# Patient Record
Sex: FEMALE | Race: WHITE | NOT HISPANIC OR LATINO | Employment: FULL TIME | URBAN - METROPOLITAN AREA
[De-identification: names, ages, dates, MRNs, and addresses within clinical notes are randomized per-mention and may not be internally consistent; named-entity substitution may affect disease eponyms.]

---

## 2017-01-27 ENCOUNTER — PATIENT MESSAGE (OUTPATIENT)
Dept: INTERNAL MEDICINE | Facility: CLINIC | Age: 72
End: 2017-01-27

## 2017-01-30 ENCOUNTER — TELEPHONE (OUTPATIENT)
Dept: INTERNAL MEDICINE | Facility: CLINIC | Age: 72
End: 2017-01-30

## 2017-01-30 RX ORDER — IBUPROFEN 800 MG/1
800 TABLET ORAL EVERY 6 HOURS PRN
Qty: 90 TABLET | Refills: 11 | Status: SHIPPED | OUTPATIENT
Start: 2017-01-30 | End: 2018-02-04 | Stop reason: SDUPTHER

## 2017-01-30 RX ORDER — FAMOTIDINE 20 MG/1
20 TABLET, FILM COATED ORAL 2 TIMES DAILY
Qty: 60 TABLET | Refills: 11 | Status: SHIPPED | OUTPATIENT
Start: 2017-01-30 | End: 2017-05-23 | Stop reason: SDUPTHER

## 2017-01-30 NOTE — TELEPHONE ENCOUNTER
Called pt's pharmacy to see which drugs are formulary for her insurance.  Says Rx has been broken up into the two drugs.//rlt

## 2017-02-28 ENCOUNTER — TELEPHONE (OUTPATIENT)
Dept: OPHTHALMOLOGY | Facility: CLINIC | Age: 72
End: 2017-02-28

## 2017-02-28 NOTE — TELEPHONE ENCOUNTER
----- Message from Chandrika Dacosta sent at 2/28/2017  3:39 PM CST -----  Contact: pt  Pt needs a request sent to [facility] to get medical records, pt can be reached at [phone]///thxMW

## 2017-02-28 NOTE — TELEPHONE ENCOUNTER
I called the pt and let her know she has to sign a release. She said [facility] said she did not and for us to request them. She wants us to fax her a form for her to complete She does not know her fax number- She will call our office tomorrow with a fax number.

## 2017-03-09 ENCOUNTER — TELEPHONE (OUTPATIENT)
Dept: OPHTHALMOLOGY | Facility: CLINIC | Age: 72
End: 2017-03-09

## 2017-03-09 NOTE — TELEPHONE ENCOUNTER
----- Message from Gosia Sun sent at 3/9/2017 10:26 AM CST -----  Contact: pt  Call pt at [phone]//calling to see if you all received my medical records from Dr Pacheco office last week//coty bella

## 2017-03-10 NOTE — TELEPHONE ENCOUNTER
----- Message from Gisel Dacosta sent at 3/9/2017  4:23 PM CST -----  Contact: pt  Would like to speak with nurse to make sure her medical records have been faxed over to office she is schedule for next week pls confirm at [phone]

## 2017-03-14 ENCOUNTER — OFFICE VISIT (OUTPATIENT)
Dept: OPHTHALMOLOGY | Facility: CLINIC | Age: 72
End: 2017-03-14
Payer: MEDICARE

## 2017-03-14 DIAGNOSIS — H25.13 NUCLEAR SCLEROTIC CATARACT OF BOTH EYES: Primary | ICD-10-CM

## 2017-03-14 DIAGNOSIS — H52.7 REFRACTION DISORDER: ICD-10-CM

## 2017-03-14 PROCEDURE — 92015 DETERMINE REFRACTIVE STATE: CPT | Mod: S$GLB,,, | Performed by: OPHTHALMOLOGY

## 2017-03-14 PROCEDURE — 92004 COMPRE OPH EXAM NEW PT 1/>: CPT | Mod: S$GLB,,, | Performed by: OPHTHALMOLOGY

## 2017-03-14 PROCEDURE — 99999 PR PBB SHADOW E&M-EST. PATIENT-LVL I: CPT | Mod: PBBFAC,,, | Performed by: OPHTHALMOLOGY

## 2017-03-14 NOTE — PROGRESS NOTES
HPI     NP to dept.  Told she needs cataract surgery by [facility].  Wants   to keep all care at [facility].  States vision is good, except in glare when   driving at night.       Last edited by Sia Ennis on 3/14/2017  4:17 PM.         Assessment /Plan     For exam results, see Encounter Report.      ICD-10-CM ICD-9-CM    1. Nuclear sclerotic cataract of both eyes H25.13 366.16 Will follow not Visually Impairing   2. Refraction disorder H52.7 367.9 Final Spec RX       Return to clinic PRN

## 2017-05-10 ENCOUNTER — PATIENT OUTREACH (OUTPATIENT)
Dept: ADMINISTRATIVE | Facility: HOSPITAL | Age: 72
End: 2017-05-10

## 2017-05-15 ENCOUNTER — LAB VISIT (OUTPATIENT)
Dept: LAB | Facility: HOSPITAL | Age: 72
End: 2017-05-15
Attending: PEDIATRICS
Payer: MEDICARE

## 2017-05-15 DIAGNOSIS — E78.5 HYPERLIPIDEMIA, UNSPECIFIED HYPERLIPIDEMIA TYPE: ICD-10-CM

## 2017-05-15 LAB
CHOLEST/HDLC SERPL: 2.9 {RATIO}
HDL/CHOLESTEROL RATIO: 34.2 %
HDLC SERPL-MCNC: 155 MG/DL
HDLC SERPL-MCNC: 53 MG/DL
LDLC SERPL CALC-MCNC: 87.2 MG/DL
NONHDLC SERPL-MCNC: 102 MG/DL
TRIGL SERPL-MCNC: 74 MG/DL

## 2017-05-15 PROCEDURE — 80061 LIPID PANEL: CPT

## 2017-05-15 PROCEDURE — 36415 COLL VENOUS BLD VENIPUNCTURE: CPT | Mod: PO

## 2017-05-23 ENCOUNTER — OFFICE VISIT (OUTPATIENT)
Dept: INTERNAL MEDICINE | Facility: CLINIC | Age: 72
End: 2017-05-23
Payer: MEDICARE

## 2017-05-23 VITALS
TEMPERATURE: 96 F | SYSTOLIC BLOOD PRESSURE: 120 MMHG | HEIGHT: 62 IN | OXYGEN SATURATION: 99 % | BODY MASS INDEX: 38.7 KG/M2 | HEART RATE: 74 BPM | WEIGHT: 210.31 LBS | DIASTOLIC BLOOD PRESSURE: 70 MMHG

## 2017-05-23 DIAGNOSIS — E66.9 OBESITY, CLASS I, BMI 30-34.9: ICD-10-CM

## 2017-05-23 DIAGNOSIS — M47.9 OSTEOARTHRITIS OF LOWER BACK: ICD-10-CM

## 2017-05-23 DIAGNOSIS — F41.9 ANXIETY: ICD-10-CM

## 2017-05-23 DIAGNOSIS — K21.9 GASTROESOPHAGEAL REFLUX DISEASE WITHOUT ESOPHAGITIS: Chronic | ICD-10-CM

## 2017-05-23 DIAGNOSIS — E55.9 VITAMIN D DEFICIENCY: ICD-10-CM

## 2017-05-23 DIAGNOSIS — G47.33 OSA ON CPAP: ICD-10-CM

## 2017-05-23 DIAGNOSIS — E78.00 PURE HYPERCHOLESTEROLEMIA: Primary | ICD-10-CM

## 2017-05-23 PROCEDURE — 99214 OFFICE O/P EST MOD 30 MIN: CPT | Mod: S$GLB,,, | Performed by: PEDIATRICS

## 2017-05-23 PROCEDURE — 1160F RVW MEDS BY RX/DR IN RCRD: CPT | Mod: S$GLB,,, | Performed by: PEDIATRICS

## 2017-05-23 PROCEDURE — 99499 UNLISTED E&M SERVICE: CPT | Mod: S$GLB,,, | Performed by: PEDIATRICS

## 2017-05-23 PROCEDURE — 1157F ADVNC CARE PLAN IN RCRD: CPT | Mod: 8P,S$GLB,, | Performed by: PEDIATRICS

## 2017-05-23 PROCEDURE — 1125F AMNT PAIN NOTED PAIN PRSNT: CPT | Mod: S$GLB,,, | Performed by: PEDIATRICS

## 2017-05-23 PROCEDURE — 1159F MED LIST DOCD IN RCRD: CPT | Mod: S$GLB,,, | Performed by: PEDIATRICS

## 2017-05-23 PROCEDURE — 99999 PR PBB SHADOW E&M-EST. PATIENT-LVL III: CPT | Mod: PBBFAC,,, | Performed by: PEDIATRICS

## 2017-05-23 RX ORDER — FAMOTIDINE 20 MG/1
20 TABLET, FILM COATED ORAL 2 TIMES DAILY
Qty: 180 TABLET | Refills: 3 | Status: SHIPPED | OUTPATIENT
Start: 2017-05-23 | End: 2018-02-27 | Stop reason: SDUPTHER

## 2017-05-23 NOTE — PROGRESS NOTES
Patient, Hiwot Coulter (MRN #[id number]), presented with a recorded BMI of 38.47 kg/m^2 and a documented comorbidity(s):  - Obstructive Sleep Apnea  - Hyperlipidemia  to which the severe obesity is a contributing factor. This is consistent with the definition of severe obesity (BMI 35.0-35.9) with comorbidity (ICD-10 E66.01, Z68.35). The patient's severe obesity was monitored, evaluated, addressed and/or treated. This addendum to the medical record is made on 05/23/2017.

## 2017-05-23 NOTE — PROGRESS NOTES
Subjective:       Patient ID: Hiwot Coulter is a 72 y.o. female.    Chief Complaint: Annual Exam (6mo/lab)    LIPIDS: Weight is up, not following D&E.   Quiet anxiety. No buspar/lexapro. [place]  website shows no interval xanax use  GERD Quiet on treatment, rare pepcid use.  Vitamin D: using twice weekly D  Plantar fascitis and low back pain. Generally good on ibuprofen, rarely takes hydrocodone when [place]  website accessed.  LABS REVIEWED AND DISCUSSED WITH PATIENT       Review of Systems   Constitutional: Negative for fever and unexpected weight change.   HENT: Negative for congestion and rhinorrhea.    Eyes: Negative for discharge and redness.   Respiratory: Negative for cough and wheezing.    Cardiovascular: Negative for chest pain, palpitations and leg swelling.   Gastrointestinal: Negative for constipation, diarrhea and vomiting.   Endocrine: Negative for cold intolerance, heat intolerance, polydipsia, polyphagia and polyuria.   Genitourinary: Negative for decreased urine volume, difficulty urinating and menstrual problem.   Musculoskeletal: Positive for gait problem (occasional plantar fascitis). Negative for arthralgias and joint swelling.   Skin: Negative for rash and wound.   Neurological: Negative for syncope and headaches.   Psychiatric/Behavioral: Negative for behavioral problems, decreased concentration, dysphoric mood, self-injury, sleep disturbance and suicidal ideas. The patient is not nervous/anxious.        Objective:      Physical Exam   Constitutional: She is oriented to person, place, and time. She appears well-developed and well-nourished. She is cooperative.   HENT:   Head: Normocephalic and atraumatic.   Eyes: Conjunctivae, EOM and lids are normal. Pupils are equal, round, and reactive to light.   Neck: Trachea normal and normal range of motion. Neck supple. No JVD present. Carotid bruit is not present. No Brudzinski's sign and no Kernig's sign noted. No thyroid mass and no thyromegaly  present.   Cardiovascular: Normal rate, regular rhythm, normal heart sounds and normal pulses.    No murmur heard.  Pulmonary/Chest: Effort normal and breath sounds normal. She has no wheezes. She has no rhonchi. She has no rales.   Abdominal: Soft. Normal appearance. There is no hepatosplenomegaly. There is no tenderness. There is no rebound and no CVA tenderness.   Lymphadenopathy:     She has no cervical adenopathy.   Neurological: She is alert and oriented to person, place, and time. She has normal strength and normal reflexes. No cranial nerve deficit or sensory deficit. Coordination and gait normal.   Skin: Skin is warm. No abrasion and no rash noted.   Psychiatric: She has a normal mood and affect. Her speech is normal and behavior is normal. Judgment and thought content normal. Her mood appears not anxious. Cognition and memory are normal. She does not exhibit a depressed mood.       Assessment:       1. Pure hypercholesterolemia    2. Obesity, Class I, BMI 30-34.9    3. Vitamin D deficiency    4. Gastroesophageal reflux disease without esophagitis    5. Anxiety    6. JAMAL on CPAP    7. Osteoarthritis of lower back        Plan:       Pure hypercholesterolemia    Obesity, Class I, BMI 30-34.9    Vitamin D deficiency    Gastroesophageal reflux disease without esophagitis    Anxiety    JAMAL on CPAP    Osteoarthritis of lower back    Other orders  -     naltrexone-bupropion 8-90 mg TbSR; Take 2 tablets by mouth 2 (two) times daily.  Dispense: 120 tablet; Refill: 4    meds reviewed with patient. She will remain off lexapro and buspar, try contrave. D&E, weight loss. F/U 3 months.

## 2017-05-31 ENCOUNTER — OFFICE VISIT (OUTPATIENT)
Dept: PULMONOLOGY | Facility: CLINIC | Age: 72
End: 2017-05-31
Payer: MEDICARE

## 2017-05-31 VITALS
HEIGHT: 62 IN | HEART RATE: 75 BPM | SYSTOLIC BLOOD PRESSURE: 120 MMHG | DIASTOLIC BLOOD PRESSURE: 82 MMHG | OXYGEN SATURATION: 99 % | WEIGHT: 191.38 LBS | BODY MASS INDEX: 35.22 KG/M2

## 2017-05-31 DIAGNOSIS — G47.33 OSA ON CPAP: Primary | ICD-10-CM

## 2017-05-31 PROCEDURE — 99213 OFFICE O/P EST LOW 20 MIN: CPT | Mod: S$GLB,,, | Performed by: INTERNAL MEDICINE

## 2017-05-31 PROCEDURE — 1159F MED LIST DOCD IN RCRD: CPT | Mod: S$GLB,,, | Performed by: INTERNAL MEDICINE

## 2017-05-31 PROCEDURE — 1126F AMNT PAIN NOTED NONE PRSNT: CPT | Mod: S$GLB,,, | Performed by: INTERNAL MEDICINE

## 2017-05-31 PROCEDURE — 99999 PR PBB SHADOW E&M-EST. PATIENT-LVL III: CPT | Mod: PBBFAC,,, | Performed by: INTERNAL MEDICINE

## 2017-05-31 NOTE — PROGRESS NOTES
Subjective:       Patient ID: Hiwot Coulter is a 72 y.o. female.    Chief Complaint: Sleep Apnea    HPI   Hiwot Coulter presents for review of CPAP compliance. Information from CPAP machine downloaded and reviewed. Summary of compliance report is as follows:  Out of town and did not use machine for 2 weeks  Compliance Summary  5/1/2017 - 5/30/2017 (30 days)  Days with Device Usage 15 days  Days without Device Usage 15 days  Percent Days with Device Usage 50.0%  Cumulative Usage 3 days 18 hrs. 58 mins.  Maximum Usage (1 Day) 9 hrs. 18 mins. 37 secs.  Average Usage (All Days) 3 hrs. 1 mins. 56 secs.  Average Usage (Days Used) 6 hrs. 3 mins. 52 secs.  Minimum Usage (1 Day) 3 hrs. 42 mins. 59 secs.  Percent of Days with Usage >= 4 Hours 46.7%  Percent of Days with Usage < 4 Hours 53.3%  Date Range  Sleep Therapy Statistics ([employer])  Average Time in Large Leak Per Day 0 secs.  Average AHI 15.4  CPAP 7.0 cmH2O    Patient has complaints of leak  Patient is using CPAP as prescribed and benefiting from therapy.  Does not want machine set any higher      Past Medical History:   Diagnosis Date    Cataract     GERD (gastroesophageal reflux disease)     Pollen allergies     Sleep apnea      Past Surgical History:   Procedure Laterality Date    CHOLECYSTECTOMY       Review of Systems   Constitutional: Negative for fever and fatigue.   HENT: Positive for postnasal drip. Negative for rhinorrhea.    Eyes: Negative for redness and itching.   Respiratory: Positive for apnea. Negative for cough, shortness of breath, wheezing, dyspnea on extertion and Paroxysmal Nocturnal Dyspnea.    Cardiovascular: Negative for chest pain.   Genitourinary: Negative for difficulty urinating and hematuria.   Endocrine: Negative for polyphagia, cold intolerance and heat intolerance.    Musculoskeletal: Negative for arthralgias.   Skin: Negative for rash.   Gastrointestinal: Negative for nausea, vomiting, abdominal pain and  abdominal distention.   Neurological: Negative for dizziness and headaches.   Hematological: Negative for adenopathy. Does not bruise/bleed easily and no excessive bruising.   Psychiatric/Behavioral: The patient is not nervous/anxious.          Objective:      Physical Exam   Constitutional: She is oriented to person, place, and time. She appears well-developed and well-nourished.   HENT:   Head: Normocephalic and atraumatic.   Eyes: Conjunctivae are normal. Pupils are equal, round, and reactive to light.   Neck: Neck supple. No JVD present. No tracheal deviation present. No thyromegaly present.   Cardiovascular: Normal rate, regular rhythm and normal heart sounds.    Pulmonary/Chest: Effort normal and breath sounds normal. No respiratory distress. She has no wheezes. She has no rales. She exhibits no tenderness.   Abdominal: Soft. Bowel sounds are normal.   Musculoskeletal: Normal range of motion. She exhibits no edema.   Lymphadenopathy:     She has no cervical adenopathy.   Neurological: She is alert and oriented to person, place, and time.   Skin: Skin is warm and dry.   Nursing note and vitals reviewed.    Personal Diagnostic Review  PSG: significant for Obstructive Sleep Apnea   No flowsheet data found.    [facility]  Sleep Disorder Center  CPAP T ITRAT ION PSG REPORT  Patient Name: Hiwot Coulter Subject Code: [id number] Study Date: 2016  Page 1  Patient Name: Hiwot Coulter Date of Report: 16 Date of PS2016  [facility] No.: [id number] : 1945 Time of PS:55:14 PM - 5:15:10 AM  Sex: Female Age: 71 Weight: 187.0 lbs Height: 5  2  Type of PSG: CPAP titration  REASONS FOR REFERRAL: Ms Nam diagnostic polysomnography (3-17-16) revealed an Apnea + Hypopnea Index = 12.2  events / hr asleep, mild obstructive sleep apnea / hypopnea syndrome. CPAP titration was recommended, and Dr. Zachary Marroquin, determined it was warranted. She has been using CPAP successfully  since then, but now has complaints about  leakage and high pressure, so a re - titration and new equipment has been recommended. Dr. Fred Ramirez was the original  referring physician, and is the patients primary care physician.  STUDY PARAMETERS: This study involved analysis of the patient's sleep pattern while breathing was assisted. The study was  performed with a sleep technologist in attendance for the entire test period, with video monitoring throughout the study, and  routine laboratory clinical parameters recorded: NOTE: The polysomnography electrophysiological record for the patient has  been reviewed in its entirety by Dr. Quijano.  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  1. The CPAP titration polysomnography revealed that 7 cm H2O pressure (C - Flex 3, humidity 2), the most effective pressure  most completely tested, was optimal as it reduced the rate of respiratory events to zero in 1.0 hrs sleep (0.54 hrs REM sleep)  Lower pressure also could be successful (6 cm A + H I = 1.5, with 0.3 hrs REM sleep in 2.7 hrs sleep), or autoCPAP (4 cm  - 20 cm) could be tried if necessary.  The overall A + H Index was 1.9 / hr asleep, with only 1.0 respiratory event - related arousals / hr asleep for the titration  trial; there were no RERAs (respiratory effort- related arousals). The mean SpO2 value was 96.1 % throughout the study,  with a minimum oxygen saturation during sleep of 89.0 % (waking baseline SpO2 was 97 %). Snoring was eliminated at all  pressures.  A medium [employer]ics Wisp nasal CPAP mask was used and was well - tolerated. Please see PAP trial outcomes  table, below.  Therapy Device Titration  Chart  Treatment  Level TIB REM Non-REM Obs. [place]. Mixed RERA Total AHI RDI Total AHI RDI Max. Min. Mean  (cm. H2O) Apnea Apnea Apnea Hyp Hyp SpO2% SpO2% SpO2%  CPAP 4 2:44:36 0:00:00 1:25:49 0 1 0 0 1 1.4 1.4 0 0.7 0.7 98.0 91.0 95.9  CPAP 5 0:04:13 0:00:00 0:04:13 0 0 0 0 2 28.5 28.5 1 14.3 14.3 98.0 93.0 96.1  CPAP 6 3:31:10 0:17:42 2:26:28 0 3 0 0 3 2.2 2.2 1 1.5 1.5 98.0 89.0 95.8  CPAP 7 0:59:40 0:32:18 0:26:52 0 0 0 0 0 0.0 0.0 0 0.0 0.0 99.0 93.0 97.1  TIME  (hrs:min:sec)  (AASM) APNEA & RERA AASM 3% Desat CMS 4% Desat OXIMETRY  2. Ms Coulter is taking Xanax, a benzodiazepine that can cause daytime hypersomnolence and may exacerbate OSAHS (and [facility]  Sleep Disorder Center  CPAP T ITRAT ION PSG REPORT  Patient Name: Hiwot Coulter Subject Code: [id number] Study Date: 6/19/2016  Page 2  possibly snoring) because of its muscle relaxant properties.  3. Weight loss to the normal range is recommended as it can decrease respiratory events and snoring in overweight patients.  SECONDARY TREATMENT RECOMMENDATIONS Treat, or refer to SDC if problems are not satisfactorily resolved by the above.  1. A severe PLM disorder was observed (PLMS Index = 72.9 / hr sleep), and the PLMS were disruptive of sleep (10.1 arousals /  hr asleep), plus there was SDI evidence of restless legs syndrome (which can be treated with the same medications as  PLMS); consider treatment of restless legs syndrome if RLS is confirmed, and / or PLM disorder if PLMS symptoms are  sufficiently bothersome to the patient. Note that the benzodiazepine medications sometimes used to treat PLM disorder  (e.g., alprazolam / Xanax) may exacerbate some sleep - related respiratory disorders, and that dopaminergic medications  such as Mirapex and Requip can be used in such instances. A moderate PLM disorder was evident in the dx PSG.  2. Ms Coulter is taking escitalopram / Lexapro. Most tricyclic, and many SSRI antidepressants (e.g.,  fluoxetine - Prozac,  sertraline - Zoloft, venlafaxine - Effexor), are associated with increased PLMS in some studies and increased RLS in others.  If Lexapro is one such medication, consider replacing it with a medication known not to exacerbate PLMS or RLS.  The following treatment recommendations from the Sleep Disorder Evaluation of 3-19-15 likely still apply:  PRIMARY TREATMENT RECOMMENDATIONS Treat, or refer to Sleep Disorders Center.  1. The diagnostic polysomnography revealed a mild obstructive sleep apnea / hypopnea syndrome (A + H Index = 12.2  events / hr asleep with only 1.4 arousals / hr asleep for the study, and a mean SpO2 value of 95.9 %, mild, minimum oxygen  saturation during sleep of 82.0 %, and waking baseline SpO2 = 97 %. Sporadic, loud snoring was noted. A CPAP titration  polysomnography is recommended  2. Ms Coulter is taking alprazolam / Xanax, a benzodiazepine that can cause daytime hypersomnolence and may exacerbate  OSAHS (and possibly snoring) because of its muscle relaxant properties.  3. As respiratory events had a strong supine positional tendency, a device to discourage sleep in the supine position is  recommended to reduce respiratory events and snoring.  4. Weight loss to the normal range is recommended as it can decrease respiratory events and snoring in overweight patients.  5. The following changes in sleep hygiene / sleep - related behavior are recommended after medical treatments are successful   Set time for sleep to number of hours of sleep needed for adequate daytime functioning (7.5 to 9.0 hrs / night).   Regular bedtimes and wake times, including weekends: Total sleep time / night should not be more than one hour more  than usual, and bedtime or wake time should not be more than one hour earlier or later than usual.   Do not attempt to make up lost sleep by extending sleep periods.  SECONDARY TREATMENT RECOMMENDATIONS Treat, or refer to SDC if problems are not  satisfactorily resolved by the above.  1. A moderate PLM disorder was observed (PLMS Index = 39.5 / hr asleep, but treatment might not be optimal because the  PLMS were not very disruptive of sleep (4.7 arousals / hr asleep); however, as there was SDI evidence of restless legs  syndrome (which can be treated with the same medications as PLMS), consider treatment of restless legs syndrome if  RLS is confirmed, and / or PLM disorder if PLMS symptoms are sufficiently bothersome to the patient. Note that  benzodiazepine medications sometimes used to treat PLM disorder (e.g., alprazolam / Xanax) may exacerbate some sleep -  related respiratory disorders, and that dopaminergic medications such as Mirapex and Requip can be used in such instances.  2. Ms Coulter is taking escitalopram / Lexapro. Most tricyclic, and many SSRI antidepressants (e.g., fluoxetine - Prozac, sertraline  - Zoloft, venlafaxine - Effexor), are associated with increased PLMS in some studies and increased RLS in others. If Lexapro is  one such medication, consider replacing it with a medication known not to exacerbate PLMS or RLS.  3. Consider behavioral and cognitive / behavioral treatments for anxiety (and / or depression) to complement the medications  (Xanax, Lexapro) and to increase the probability of long-term adaptive change. Sleep might be expected to further improve.  [facility]  Sleep Disorder Center  CPAP T ITRAT ION PSG REPORT  Patient Name: Hiwot Coulter Subject Code: [id number] Study Date: 6/19/2016  Page 3  See below for a complete interpretation of data from the polysomnography and Sleep Disorders Inventory.  Thank you for referring this patient to the [facility] Disorders Center.  Cornelius Quijano, Ph.D., ABPP; Diplomate, American Board of Sleep Medicine  Assessment:       1. JAMAL on CPAP        Outpatient Encounter Prescriptions as of 5/31/2017   Medication Sig Dispense Refill    acetaminophen (TYLENOL) 500 mg  Cap       alprazolam (XANAX) 2 MG Tab Take 1 tablet (2 mg total) by mouth 2 (two) times daily as needed. 180 tablet 1    clobetasol 0.05% (TEMOVATE) 0.05 % Oint Apply topically 2 (two) times daily. 60 g 2    desonide 0.05% (DESOWEN) 0.05 % Oint AAA bid      famotidine (PEPCID) 20 MG tablet Take 1 tablet (20 mg total) by mouth 2 (two) times daily. 180 tablet 3    fexofenadine-pseudoephedrine (ALLEGRA-D 24) 180-240 mg per 24 hr tablet Take 1 tablet by mouth once daily. 30 tablet 11    fluticasone (FLONASE) 50 mcg/actuation nasal spray USE 2 SPRAYS IN EACH NOSTRIL EVERY DAY 48 g 3    ibuprofen (ADVIL,MOTRIN) 800 MG tablet Take 1 tablet (800 mg total) by mouth every 6 (six) hours as needed for Pain. 90 tablet 11    naltrexone-bupropion 8-90 mg TbSR Take 2 tablets by mouth 2 (two) times daily. 120 tablet 4    VITAMIN D2 50,000 unit capsule TAKE 1 CAPSULE BY MOUTH TWICE A WEEK 25 capsule 3     No facility-administered encounter medications on file as of 5/31/2017.      Orders Placed This Encounter   Procedures    CPAP/BIPAP SUPPLIES     90 day supply, 4 refills  [facility] for CPAP/Oxygen/Nebulizer supplies.  Customer Service: [phone]  Call: [phone]  Fax: [phone]  Billing Inquiries: [phone] or [phone]     Order Specific Question:   Type of mask:     Answer:   FFM     Comments:   patient prefernce     Order Specific Question:   Headgear?     Answer:   Yes     Order Specific Question:   Tubing?     Answer:   Yes     Order Specific Question:   Humidifier chamber?     Answer:   Yes     Order Specific Question:   Chin strap?     Answer:   Yes     Order Specific Question:   Filters?     Answer:   Yes     Order Specific Question:   Length of need (1-99 months):     Answer:   99     Order Specific Question:   Vendor:     Answer:   [facility]     Order Specific Question:   Expected Date of Delivery:     Answer:   6/1/2017     Plan:     Follow-up in 1 year, sooner should new symptoms or  problems arise.    Return in about 1 year (around 5/31/2018) for CPAP download.

## 2017-05-31 NOTE — PATIENT INSTRUCTIONS
"[facility] program  [phone]    [facility] is a medically supervised program by Dr. Rufus Mcmullen and his team, and incorporates [employer] - behavioral weight loss programs, including meal replacement therapy. The program is designed for patients who want to lose anywhere from 10 to 200 or more pounds and offers several options, including a clinic-based weight loss program with medical supervision and a home-based weight loss program complete with "dl-ko-capytwsq" kits.  Research shows that consuming low-calorie, portion-controlled meal replacements lead to substantially greater weight loss and better weight maintenance than standard weight loss methods.  Our primary goal is to teach patients healthier behavior and new lifestyle skills through weekly educational sessions. After patients have achieved their desired weight loss goal, they participate in an extensive maintenance program that focuses on simple strategies for long-term weight management.  [employer] offers one-hour informational seminars held Mondays at 11 am by appointment only and Wednesdays at 6 pm. These sessions are designed to inform potential patients of all aspects of our weight management programs, including dieting level options, class availability, costs and how to get started. For more information, please call [phone].     Dr. Rufus Mcmullen, Medical Director of [facility], is a native of [place]. 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  Dr. Mcmullen is Board Certified in Internal Medicine and has been in practice since 1999. Dr. Mcmullen specializes in adult and adolescent primary care and preventive medicine. His special interest is in treatment of diabetes, metabolic " syndrome, insulin resistance, hypertension, high cholesterol and heart disease. Dr. Mcmullen received his certification from The American Board of Obesity Medicine (ABOM) in March 2011.    Please tell them that you were referred by Dr. Zachary Marroquin MD

## 2017-08-02 ENCOUNTER — OFFICE VISIT (OUTPATIENT)
Dept: INTERNAL MEDICINE | Facility: CLINIC | Age: 72
End: 2017-08-02
Payer: MEDICARE

## 2017-08-02 VITALS
HEIGHT: 62 IN | HEART RATE: 79 BPM | WEIGHT: 187.38 LBS | DIASTOLIC BLOOD PRESSURE: 76 MMHG | BODY MASS INDEX: 34.48 KG/M2 | OXYGEN SATURATION: 98 % | SYSTOLIC BLOOD PRESSURE: 128 MMHG

## 2017-08-02 DIAGNOSIS — H25.13 NUCLEAR SCLEROSIS, BILATERAL: ICD-10-CM

## 2017-08-02 DIAGNOSIS — K21.9 GASTROESOPHAGEAL REFLUX DISEASE WITHOUT ESOPHAGITIS: Chronic | ICD-10-CM

## 2017-08-02 DIAGNOSIS — E78.00 PURE HYPERCHOLESTEROLEMIA: ICD-10-CM

## 2017-08-02 DIAGNOSIS — G47.33 OSA ON CPAP: ICD-10-CM

## 2017-08-02 DIAGNOSIS — Z00.00 ENCOUNTER FOR PREVENTIVE HEALTH EXAMINATION: Primary | ICD-10-CM

## 2017-08-02 DIAGNOSIS — E55.9 VITAMIN D DEFICIENCY: ICD-10-CM

## 2017-08-02 DIAGNOSIS — M72.2 PLANTAR FASCIITIS: ICD-10-CM

## 2017-08-02 DIAGNOSIS — M50.30 DDD (DEGENERATIVE DISC DISEASE), CERVICAL: ICD-10-CM

## 2017-08-02 DIAGNOSIS — E66.9 OBESITY, CLASS I, BMI 30-34.9: ICD-10-CM

## 2017-08-02 DIAGNOSIS — M47.9 OSTEOARTHRITIS OF LOWER BACK: ICD-10-CM

## 2017-08-02 DIAGNOSIS — F41.9 ANXIETY: ICD-10-CM

## 2017-08-02 PROBLEM — H25.10 NUCLEAR SCLEROSIS: Status: ACTIVE | Noted: 2017-08-02

## 2017-08-02 PROCEDURE — G0439 PPPS, SUBSEQ VISIT: HCPCS | Mod: S$GLB,,, | Performed by: PHYSICIAN ASSISTANT

## 2017-08-02 PROCEDURE — 99999 PR PBB SHADOW E&M-EST. PATIENT-LVL IV: CPT | Mod: PBBFAC,,, | Performed by: PHYSICIAN ASSISTANT

## 2017-08-02 PROCEDURE — 99499 UNLISTED E&M SERVICE: CPT | Mod: S$GLB,,, | Performed by: PHYSICIAN ASSISTANT

## 2017-08-02 NOTE — PATIENT INSTRUCTIONS
Counseling and Referral of Other Preventative  (Italic type indicates deductible and co-insurance are waived)    Patient Name: Hiwot Coulter  Today's Date: 8/2/2017      SERVICE LIMITATIONS RECOMMENDATION    Vaccines    · Pneumococcal (once after 65)    · Influenza (annually)    · Hepatitis B (if medium/high risk)    · Prevnar 13      Hepatitis B medium/high risk factors:       - End-stage renal disease       - Hemophiliacs who received Factor VII or         IX concentrates       - Clients of institutions for the mentally             retarded       - Persons who live in the same house as          a HepB carrier       - Homosexual men       - Illicit injectable drug abusers     Pneumococcal: done 12/2013, 11/2012     Influenza: done 11/2016     Hepatitis B: N/A     Prevnar 13: done 11/2015    Mammogram (biennial age 50-74)  Annually (age 40 or over)  Last done 5/19/2016, recommend to repeat every 1 per report  years    Pap (up to age 70 and after 70 if unknown history or abnormal study last 10 years)   Done 11/2016     The USPSTF recommends against screening for cervical cancer in women older than age 65 years who have had adequate prior screening and are not otherwise at high risk for cervical cancer.      Colorectal cancer screening (to age 75)    · Fecal occult blood test (annual)  · Flexible sigmoidoscopy (5y)  · Screening colonoscopy (10y)  · Barium enema   Follow. [facility]. Dr. Kirkland    Diabetes self-management training (no USPSTF recommendations)  Requires referral by treating physician for patient with diabetes or renal disease. 10 hours of initial DSMT sessions of no less than 30 minutes each in a continuous 12-month period. 2 hours of follow-up DSMT in subsequent years.  N/A    Bone mass measurements (age 65 & older, biennial)  Requires diagnosis related to osteoporosis or estrogen deficiency. Biennial benefit unless patient has history of long-term glucocorticoid  Follow PCP    Glaucoma  screening (no USPSTF recommendation)  Diabetes mellitus, family history   , age 50 or over    American, age 65 or over  done. Dr. Ford.    Medical nutrition therapy for diabetes or renal disease (no recommended schedule)  Requires referral by treating physician for patient with diabetes or renal disease or kidney transplant within the past 3 years.  Can be provided in same year as diabetes self-management training (DSMT), and CMS recommends medical nutrition therapy take place after DSMT. Up to 3 hours for initial year and 2 hours in subsequent years.  N/A    Cardiovascular screening blood tests (every 5 years)  · Fasting lipid panel  Order as a panel if possible  Done this year, repeat every year    Diabetes screening tests (at least every 3 years, Medicare covers annually or at 6-month intervals for prediabetic patients)  · Fasting blood sugar (FBS) or glucose tolerance test (GTT)  Patient must be diagnosed with one of the following:       - Hypertension       - Dyslipidemia       - Obesity (BMI 30kg/m2)       - Previous elevated impaired FBS or GTT       ... or any two of the following:       - Overweight (BMI 25 but <30)       - Family history of diabetes       - Age 65 or older       - History of gestational diabetes or birth of baby weighing more than 9 pounds  follow PCP    Abdominal aortic aneurysm screening (once)  · Sonogram   Limited to patients who meet one of the following criteria:       - Men who are 65-75 years old and have smoked more than 100 cigarette in their lifetime       - Anyone with a family history of abdominal aortic aneurysm       - Anyone recommended for screening by the USPSTF  N/A    HIV screening (annually for increased risk patients)  · HIV-1 and HIV-2 by EIA, or QUINTEN, rapid antibody test or oral mucosa transudate  Patients must be at increased risk for HIV infection per USPSTF guidelines or pregnant. Tests covered annually for patient at increased risk or  as requested by the patient. Pregnant patients may receive up to 3 tests during pregnancy.  Risks discussed, screening is not recommended    Smoking cessation counseling (up to 8 sessions per year)  Patients must be asymptomatic of tobacco-related conditions to receive as a preventative service.  Non-smoker    Subsequent annual wellness visit  At least 12 months since last AWV  Return in one year     The following information is provided to all patients.  This information is to help you find resources for any of the problems found today that may be affecting your health:                Living healthy guide: www.[facility].[place].[facility]      Understanding Diabetes: www.diabetes.org      Eating healthy: www.cdc.gov/healthyweight      [facility] [place][note 2] home safety checklist: www.cdc.gov/steadi/patient.html      Agency on Aging: www.goea.[place].[facility]      Alcoholics anonymous (AA): www.aa.org      Physical Activity: www.natalie.nih.gov/np9bwqp      Tobacco use: www.quitwithusla.org

## 2017-08-02 NOTE — PROGRESS NOTES
"Hiwot Coulter presented for a  Medicare AWV and comprehensive Health Risk Assessment today. The following components were reviewed and updated:    · Medical history  · Family History  · Social history  · Allergies and Current Medications  · Health Risk Assessment  · Health Maintenance  · Care Team     ** See Completed Assessments for Annual Wellness Visit within the encounter summary.**       The following assessments were completed:  · Living Situation  · CAGE  · Depression Screening  · Timed Get Up and Go  · Whisper Test  · Cognitive Function Screening  · Nutrition Screening  · ADL Screening  · PAQ Screening    Vitals:    08/02/17 1617   BP: 128/76   BP Location: Left arm   Patient Position: Sitting   BP Method: Manual   Pulse: 79   SpO2: 98%   Weight: 85 kg (187 lb 6.3 oz)   Height: 5' 2" (1.575 m)     Body mass index is 34.27 kg/m².  Physical Exam   Constitutional: She appears well-developed and well-nourished. No distress.   HENT:   Head: Normocephalic and atraumatic.   Eyes: Conjunctivae and EOM are normal. Right eye exhibits no discharge. Left eye exhibits no discharge.   Neck: Normal range of motion. Neck supple. No tracheal deviation present. No thyromegaly present.   Cardiovascular: Normal rate, regular rhythm and normal heart sounds.    No murmur heard.  Pulses:       Radial pulses are 2+ on the right side, and 2+ on the left side.   Pulmonary/Chest: Effort normal and breath sounds normal. No respiratory distress. She has no wheezes.   Abdominal: Soft. Bowel sounds are normal. She exhibits no distension. There is no tenderness. There is no rebound and no guarding.   Musculoskeletal: Normal range of motion. She exhibits no edema or tenderness.   Neurological: No cranial nerve deficit.   Grasp equal both hands, No tremors, or muscle fasciculations noted. Toes downgoing, Sensation intact to soft touch. Gait: No ataxia.    Skin: Skin is warm and dry. No rash noted. She is not diaphoretic. No erythema. No " pallor.   Psychiatric: Her behavior is normal. Judgment and thought content normal.   anxious   Nursing note and vitals reviewed.        Diagnoses and health risks identified today and associated recommendations/orders:    1. Encounter for preventive health examination  Completed today    2. Gastroesophageal reflux disease without esophagitis  Stable. Pepcid prn. Continue current treatment plan as previously prescribed with your PCP.    3. Anxiety  Stable. Xanax prn. Continue current treatment plan as previously prescribed with your PCP.    4. Pure hypercholesterolemia  Stable. Diet/exercise. Continue current treatment plan as previously prescribed with your PCP.    5. Vitamin D deficiency  Stable. Takes Vit D. Continue current treatment plan as previously prescribed with your PCP.    6. JAMAL on CPAP  Polysomnogram 3/2015, 6/2016. Wears CPAP.  Stable. Continue current treatment plan as previously prescribed with your pulmonology, Dr. Marroquin/ Fritz NP. Appt 6/6/18.    7. Obesity, Class I, BMI 30-34.9  Encourage continue wt loss, diet/ exercise. Contrave prn.  Reports losing weight.   Continue follow with PCP.    8. Plantar fasciitis  Stable. Takes Ibuprofen. Continue current treatment plan as previously prescribed with your podiatrist, Dr. Veliz.    9. DDD (degenerative disc disease), cervical  C-MRI 8/26/13- Multilevel degenerative disk disease and degenerative change in the cervical spine as outlined above.  There is canal and foraminal narrowing at C4-5, C5-6 and C6-7.  Stable. Pt reports no specific pain. Follow PCP as necessary.    10. Osteoarthritis of lower back  Lumbar xray 5/6/12- There is minimal anterior subluxation of L4 upon L5.  Bony structures are diffusely demineralized without acute fracture or suspicious focal bony lesion   identified.  There is minimal disk space narrowing at the L5-S1 level.  There are prominent hypertrophic facet joint changes at the L4-5 and   L5-S1 levels.  No specific complaints.  Follow PCP as necessary.    11. Nuclear sclerosis, bilateral  Stable. Continue current treatment plan as previously prescribed with your ophthalmologist, Dr. Ford.    Provided Hiwot with a 5-10 year written screening schedule and personal prevention plan. Recommendations were developed using the USPSTF age appropriate recommendations. Education, counseling, and referrals were provided as needed. After Visit Summary printed and given to patient which includes a list of additional screenings\tests needed. Consider update bone density.  Continue to follow with your PCP as scheduled 8/24/17 or sooner if necessary.    Derek Rosas PA-C

## 2017-08-24 ENCOUNTER — OFFICE VISIT (OUTPATIENT)
Dept: INTERNAL MEDICINE | Facility: CLINIC | Age: 72
End: 2017-08-24
Payer: MEDICARE

## 2017-08-24 VITALS
OXYGEN SATURATION: 97 % | DIASTOLIC BLOOD PRESSURE: 82 MMHG | SYSTOLIC BLOOD PRESSURE: 130 MMHG | WEIGHT: 186.31 LBS | HEIGHT: 62 IN | HEART RATE: 67 BPM | TEMPERATURE: 98 F | BODY MASS INDEX: 34.29 KG/M2

## 2017-08-24 DIAGNOSIS — K21.9 GASTROESOPHAGEAL REFLUX DISEASE WITHOUT ESOPHAGITIS: Chronic | ICD-10-CM

## 2017-08-24 DIAGNOSIS — M50.30 DDD (DEGENERATIVE DISC DISEASE), CERVICAL: ICD-10-CM

## 2017-08-24 DIAGNOSIS — M47.9 OSTEOARTHRITIS OF LOWER BACK: ICD-10-CM

## 2017-08-24 DIAGNOSIS — E66.01 SEVERE OBESITY (BMI 35.0-39.9) WITH COMORBIDITY: ICD-10-CM

## 2017-08-24 DIAGNOSIS — M72.2 PLANTAR FASCIITIS: ICD-10-CM

## 2017-08-24 DIAGNOSIS — F41.9 ANXIETY: ICD-10-CM

## 2017-08-24 DIAGNOSIS — E78.00 PURE HYPERCHOLESTEROLEMIA: ICD-10-CM

## 2017-08-24 DIAGNOSIS — E55.9 VITAMIN D DEFICIENCY: Primary | ICD-10-CM

## 2017-08-24 PROCEDURE — 99214 OFFICE O/P EST MOD 30 MIN: CPT | Mod: S$GLB,,, | Performed by: PEDIATRICS

## 2017-08-24 PROCEDURE — 1159F MED LIST DOCD IN RCRD: CPT | Mod: S$GLB,,, | Performed by: PEDIATRICS

## 2017-08-24 PROCEDURE — 1126F AMNT PAIN NOTED NONE PRSNT: CPT | Mod: S$GLB,,, | Performed by: PEDIATRICS

## 2017-08-24 PROCEDURE — 3008F BODY MASS INDEX DOCD: CPT | Mod: S$GLB,,, | Performed by: PEDIATRICS

## 2017-08-24 PROCEDURE — 99499 UNLISTED E&M SERVICE: CPT | Mod: S$GLB,,, | Performed by: PEDIATRICS

## 2017-08-24 PROCEDURE — 99999 PR PBB SHADOW E&M-EST. PATIENT-LVL III: CPT | Mod: PBBFAC,,, | Performed by: PEDIATRICS

## 2017-08-24 NOTE — PROGRESS NOTES
Subjective:       Patient ID: Hiwot Coulter is a 72 y.o. female.    Chief Complaint: Follow-up (3mo)    LIPIDS: Weight is down, somewhat following D&E, inconsistent with contrave.   Obesity: contrave use  Quiet anxiety. No buspar/lexapro. Contrave does help patient. [place]  website shows no interval xanax use  GERD Quiet on treatment, rare pepcid use.  Vitamin D: using twice weekly D  Plantar fascitis and cervical/low back pain. Generally good on ibuprofen, rarely takes hydrocodone when [place]  website accessed.  Subspecialty notes and LABS REVIEWED AND DISCUSSED WITH PATIENT       Review of Systems   Constitutional: Negative for fever and unexpected weight change.   HENT: Negative for congestion and rhinorrhea.    Eyes: Negative for discharge and redness.   Respiratory: Negative for cough and wheezing.    Cardiovascular: Negative for chest pain, palpitations and leg swelling.   Gastrointestinal: Negative for abdominal pain, constipation, diarrhea and vomiting.   Genitourinary: Negative for decreased urine volume, difficulty urinating and menstrual problem.   Musculoskeletal: Positive for arthralgias (uses icve and stretching for plantar fascitis), back pain and neck pain. Negative for gait problem and joint swelling.        Managable   Skin: Negative for rash and wound.   Neurological: Negative for syncope and headaches.   Psychiatric/Behavioral: Negative for behavioral problems, dysphoric mood and sleep disturbance. The patient is nervous/anxious (minimal).        Objective:      Physical Exam   Constitutional: She is oriented to person, place, and time. She appears well-developed and well-nourished. She is cooperative.   HENT:   Head: Normocephalic and atraumatic.   Eyes: Conjunctivae, EOM and lids are normal. Pupils are equal, round, and reactive to light.   Neck: Trachea normal and normal range of motion. Neck supple. No JVD present. Carotid bruit is not present. No Brudzinski's sign and no Kernig's sign noted.  No thyroid mass and no thyromegaly present.   Cardiovascular: Normal rate, regular rhythm, normal heart sounds and normal pulses.    No murmur heard.  Pulmonary/Chest: Effort normal and breath sounds normal. She has no wheezes. She has no rhonchi. She has no rales.   Abdominal: Soft. Normal appearance. She exhibits no mass. There is no hepatosplenomegaly. There is no tenderness. There is no rebound, no guarding and no CVA tenderness.   Musculoskeletal: She exhibits no edema.   Lymphadenopathy:     She has no cervical adenopathy.   Neurological: She is alert and oriented to person, place, and time. She has normal strength and normal reflexes. No cranial nerve deficit or sensory deficit. Coordination and gait normal.   Skin: Skin is warm. No abrasion and no rash noted.   Psychiatric: She has a normal mood and affect. Her speech is normal and behavior is normal. Judgment and thought content normal. Her mood appears not anxious. Cognition and memory are normal. She does not exhibit a depressed mood.       Assessment:       1. Vitamin D deficiency    2. Gastroesophageal reflux disease without esophagitis    3. Pure hypercholesterolemia    4. Anxiety    5. Severe obesity (BMI 35.0-39.9) with comorbidity    6. Osteoarthritis of lower back    7. Plantar fasciitis    8. DDD (degenerative disc disease), cervical        Plan:       Vitamin D deficiency  -     Vitamin D; Future; Expected date: 08/24/2017    Gastroesophageal reflux disease without esophagitis    Pure hypercholesterolemia  -     Lipid panel; Future; Expected date: 08/24/2017    Anxiety    Severe obesity (BMI 35.0-39.9) with comorbidity  -     Basic metabolic panel; Future; Expected date: 08/24/2017    Osteoarthritis of lower back    Plantar fasciitis    DDD (degenerative disc disease), cervical    D&E, weight loss. Maintain meds(consistent contrave use discussed). F/U in 6 months with labs.

## 2017-10-14 DIAGNOSIS — E55.9 VITAMIN D DEFICIENCY: ICD-10-CM

## 2017-10-16 RX ORDER — ERGOCALCIFEROL 1.25 MG/1
CAPSULE ORAL
Qty: 25 CAPSULE | Refills: 3 | Status: SHIPPED | OUTPATIENT
Start: 2017-10-16 | End: 2018-10-02 | Stop reason: SDUPTHER

## 2018-02-05 RX ORDER — IBUPROFEN 800 MG/1
TABLET ORAL
Qty: 90 TABLET | Refills: 0 | Status: SHIPPED | OUTPATIENT
Start: 2018-02-05 | End: 2018-02-27 | Stop reason: SDUPTHER

## 2018-02-19 ENCOUNTER — LAB VISIT (OUTPATIENT)
Dept: LAB | Facility: HOSPITAL | Age: 73
End: 2018-02-19
Attending: PEDIATRICS
Payer: MEDICARE

## 2018-02-19 DIAGNOSIS — E78.00 PURE HYPERCHOLESTEROLEMIA: ICD-10-CM

## 2018-02-19 DIAGNOSIS — E66.01 SEVERE OBESITY (BMI 35.0-39.9) WITH COMORBIDITY: ICD-10-CM

## 2018-02-19 DIAGNOSIS — E55.9 VITAMIN D DEFICIENCY: ICD-10-CM

## 2018-02-19 LAB
25(OH)D3+25(OH)D2 SERPL-MCNC: 29 NG/ML
ANION GAP SERPL CALC-SCNC: 10 MMOL/L
BUN SERPL-MCNC: 23 MG/DL
CALCIUM SERPL-MCNC: 8.3 MG/DL
CHLORIDE SERPL-SCNC: 105 MMOL/L
CHOLEST SERPL-MCNC: 192 MG/DL
CHOLEST/HDLC SERPL: 3.1 {RATIO}
CO2 SERPL-SCNC: 23 MMOL/L
CREAT SERPL-MCNC: 0.8 MG/DL
EST. GFR  (AFRICAN AMERICAN): >60 ML/MIN/1.73 M^2
EST. GFR  (NON AFRICAN AMERICAN): >60 ML/MIN/1.73 M^2
GLUCOSE SERPL-MCNC: 79 MG/DL
HDLC SERPL-MCNC: 61 MG/DL
HDLC SERPL: 31.8 %
LDLC SERPL CALC-MCNC: 120.8 MG/DL
NONHDLC SERPL-MCNC: 131 MG/DL
POTASSIUM SERPL-SCNC: 4.3 MMOL/L
SODIUM SERPL-SCNC: 138 MMOL/L
TRIGL SERPL-MCNC: 51 MG/DL

## 2018-02-19 PROCEDURE — 36415 COLL VENOUS BLD VENIPUNCTURE: CPT | Mod: PO

## 2018-02-19 PROCEDURE — 80061 LIPID PANEL: CPT

## 2018-02-19 PROCEDURE — 82306 VITAMIN D 25 HYDROXY: CPT

## 2018-02-19 PROCEDURE — 80048 BASIC METABOLIC PNL TOTAL CA: CPT

## 2018-02-27 ENCOUNTER — OFFICE VISIT (OUTPATIENT)
Dept: INTERNAL MEDICINE | Facility: CLINIC | Age: 73
End: 2018-02-27
Payer: MEDICARE

## 2018-02-27 VITALS
TEMPERATURE: 97 F | WEIGHT: 190.25 LBS | SYSTOLIC BLOOD PRESSURE: 128 MMHG | HEART RATE: 76 BPM | OXYGEN SATURATION: 97 % | HEIGHT: 62 IN | BODY MASS INDEX: 35.01 KG/M2 | DIASTOLIC BLOOD PRESSURE: 70 MMHG

## 2018-02-27 DIAGNOSIS — K21.9 GASTROESOPHAGEAL REFLUX DISEASE WITHOUT ESOPHAGITIS: Chronic | ICD-10-CM

## 2018-02-27 DIAGNOSIS — E66.01 SEVERE OBESITY (BMI 35.0-39.9) WITH COMORBIDITY: ICD-10-CM

## 2018-02-27 DIAGNOSIS — G47.61 PERIODIC LIMB MOVEMENT DISORDER (PLMD): ICD-10-CM

## 2018-02-27 DIAGNOSIS — M81.0 OSTEOPOROSIS, UNSPECIFIED OSTEOPOROSIS TYPE, UNSPECIFIED PATHOLOGICAL FRACTURE PRESENCE: ICD-10-CM

## 2018-02-27 DIAGNOSIS — M47.9 OSTEOARTHRITIS OF LOWER BACK: ICD-10-CM

## 2018-02-27 DIAGNOSIS — E55.9 VITAMIN D DEFICIENCY: ICD-10-CM

## 2018-02-27 DIAGNOSIS — F41.9 ANXIETY: ICD-10-CM

## 2018-02-27 DIAGNOSIS — G47.33 OSA ON CPAP: ICD-10-CM

## 2018-02-27 DIAGNOSIS — E78.00 PURE HYPERCHOLESTEROLEMIA: Primary | ICD-10-CM

## 2018-02-27 DIAGNOSIS — M50.30 DDD (DEGENERATIVE DISC DISEASE), CERVICAL: ICD-10-CM

## 2018-02-27 PROCEDURE — 99214 OFFICE O/P EST MOD 30 MIN: CPT | Mod: S$GLB,,, | Performed by: PEDIATRICS

## 2018-02-27 PROCEDURE — 3008F BODY MASS INDEX DOCD: CPT | Mod: S$GLB,,, | Performed by: PEDIATRICS

## 2018-02-27 PROCEDURE — 99999 PR PBB SHADOW E&M-EST. PATIENT-LVL III: CPT | Mod: PBBFAC,,, | Performed by: PEDIATRICS

## 2018-02-27 PROCEDURE — 90662 IIV NO PRSV INCREASED AG IM: CPT | Mod: S$GLB,,, | Performed by: PEDIATRICS

## 2018-02-27 PROCEDURE — G0008 ADMIN INFLUENZA VIRUS VAC: HCPCS | Mod: S$GLB,,, | Performed by: PEDIATRICS

## 2018-02-27 PROCEDURE — 1159F MED LIST DOCD IN RCRD: CPT | Mod: S$GLB,,, | Performed by: PEDIATRICS

## 2018-02-27 PROCEDURE — 1126F AMNT PAIN NOTED NONE PRSNT: CPT | Mod: S$GLB,,, | Performed by: PEDIATRICS

## 2018-02-27 PROCEDURE — 99499 UNLISTED E&M SERVICE: CPT | Mod: S$GLB,,, | Performed by: PEDIATRICS

## 2018-02-27 RX ORDER — FAMOTIDINE 20 MG/1
20 TABLET, FILM COATED ORAL 2 TIMES DAILY
Qty: 180 TABLET | Refills: 3 | Status: SHIPPED | OUTPATIENT
Start: 2018-02-27 | End: 2019-03-25 | Stop reason: SDUPTHER

## 2018-02-27 RX ORDER — IBUPROFEN 800 MG/1
TABLET ORAL
Qty: 270 TABLET | Refills: 3 | Status: SHIPPED | OUTPATIENT
Start: 2018-02-27 | End: 2018-04-02 | Stop reason: ALTCHOICE

## 2018-02-27 RX ORDER — HYDROCODONE BITARTRATE AND ACETAMINOPHEN 5; 325 MG/1; MG/1
1 TABLET ORAL EVERY 6 HOURS PRN
Qty: 60 TABLET | Refills: 0 | Status: SHIPPED | OUTPATIENT
Start: 2018-02-27 | End: 2021-12-03

## 2018-02-27 NOTE — PROGRESS NOTES
Subjective:       Patient ID: Hiwot Coulter is a 72 y.o. female.    Chief Complaint: Annual Exam    LIPIDS: Weight is up, not following D&E, inconsistent with contrave.   Obesity: contrave use  Quiet anxiety. No buspar/lexapro. Contrave does help patient. [place]  website shows no interval xanax use  GERD Quiet on treatment, rare pepcid use.  Vitamin D: using twice weekly D  Plantar fascitis and cervical/low back pain. Recent flare with mild sciatica. Generally good on ibuprofen, used hydrocodone last 3 months ago when [place] [employer] website accessed. She needs updated refill for her work so that on rare chance she has to use norco and gets a urine drug screen, she is covered.JAMAL: followed by pulmonary  LABS REVIEWED AND DISCUSSED WITH PATIENT      Review of Systems   Constitutional: Negative for fever and unexpected weight change.   HENT: Negative for congestion and rhinorrhea.    Eyes: Negative for discharge and redness.   Respiratory: Negative for cough and wheezing.    Cardiovascular: Negative for chest pain, palpitations and leg swelling.   Gastrointestinal: Negative for constipation, diarrhea and vomiting.   Endocrine: Negative for cold intolerance, heat intolerance, polydipsia, polyphagia and polyuria.   Genitourinary: Negative for decreased urine volume, difficulty urinating and menstrual problem.   Musculoskeletal: Positive for arthralgias and back pain. Negative for gait problem and joint swelling.   Skin: Negative for rash and wound.   Neurological: Negative for syncope and headaches.   Psychiatric/Behavioral: Negative for behavioral problems, dysphoric mood, self-injury, sleep disturbance and suicidal ideas. The patient is nervous/anxious.        Objective:      Physical Exam   Constitutional: She is oriented to person, place, and time. She appears well-developed and well-nourished. She is cooperative.   HENT:   Head: Normocephalic and atraumatic.   Eyes: Conjunctivae, EOM and lids are normal. Pupils are equal,  round, and reactive to light.   Neck: Trachea normal and normal range of motion. Neck supple. No JVD present. Carotid bruit is not present. No Brudzinski's sign and no Kernig's sign noted. No thyroid mass and no thyromegaly present.   Cardiovascular: Normal rate, regular rhythm, normal heart sounds and normal pulses.    No murmur heard.  Pulmonary/Chest: Effort normal and breath sounds normal. She has no wheezes. She has no rhonchi. She has no rales.   Abdominal: Soft. Normal appearance. There is no hepatosplenomegaly. There is no tenderness. There is no rebound and no CVA tenderness.   Musculoskeletal: She exhibits no edema.   Lymphadenopathy:     She has no cervical adenopathy.   Neurological: She is alert and oriented to person, place, and time. She has normal strength and normal reflexes. No cranial nerve deficit or sensory deficit. Coordination and gait normal.   Skin: Skin is warm. No abrasion and no rash noted.   Psychiatric: She has a normal mood and affect. Her speech is normal and behavior is normal. Judgment and thought content normal. Her mood appears not anxious. Cognition and memory are normal. She does not exhibit a depressed mood.       Assessment:       1. Pure hypercholesterolemia    2. Anxiety    3. Vitamin D deficiency    4. Gastroesophageal reflux disease without esophagitis    5. JAMAL on CPAP    6. Severe obesity (BMI 35.0-39.9) with comorbidity    7. DDD (degenerative disc disease), cervical    8. Periodic limb movement disorder (PLMD)    9. Osteoporosis, unspecified osteoporosis type, unspecified pathological fracture presence    10. Osteoarthritis of lower back        Plan:       Pure hypercholesterolemia  -     ALT (SGPT); Future; Expected date: 02/27/2018  -     AST (SGOT); Future; Expected date: 02/27/2018  -     Lipid panel; Future; Expected date: 02/27/2018    Anxiety    Vitamin D deficiency  -     Vitamin D; Future; Expected date: 02/27/2018    Gastroesophageal reflux disease without  esophagitis  -     famotidine (PEPCID) 20 MG tablet; Take 1 tablet (20 mg total) by mouth 2 (two) times daily.  Dispense: 180 tablet; Refill: 3    JAMAL on CPAP    Severe obesity (BMI 35.0-39.9) with comorbidity  -     naltrexone-bupropion 8-90 mg TbSR; Take 2 tablets by mouth 2 (two) times daily. Not to be used with in 3 days of hydrocodone.  Dispense: 120 tablet; Refill: 4    DDD (degenerative disc disease), cervical    Periodic limb movement disorder (PLMD)    Osteoporosis, unspecified osteoporosis type, unspecified pathological fracture presence  -     DXA Bone Density Spine And Hip; Future; Expected date: 02/27/2018    Osteoarthritis of lower back  -     hydrocodone-acetaminophen 5-325mg (NORCO) 5-325 mg per tablet; Take 1 tablet by mouth every 6 (six) hours as needed for Pain. Do not use more than 4 a day including OTC tylenol. Not to be used within 3 days of contrave.  Dispense: 60 tablet; Refill: 0  -     ibuprofen (ADVIL,MOTRIN) 800 MG tablet; TAKE 1 TABLET(800 MG) BY MOUTH EVERY 6 HOURS AS NEEDED FOR PAIN  Dispense: 270 tablet; Refill: 3    She was cautioned about opiod antagonistic property of contrave. She was cautioned to not take the two w/i 3 days of each other. She rarely takes norco. D&E, weight loss. She is stable with her medical problems, f/u 6 months with labs. Flu vaccine.

## 2018-02-28 ENCOUNTER — APPOINTMENT (OUTPATIENT)
Dept: RADIOLOGY | Facility: CLINIC | Age: 73
End: 2018-02-28
Attending: PEDIATRICS
Payer: MEDICARE

## 2018-02-28 ENCOUNTER — PATIENT MESSAGE (OUTPATIENT)
Dept: INTERNAL MEDICINE | Facility: CLINIC | Age: 73
End: 2018-02-28

## 2018-02-28 DIAGNOSIS — M81.0 OSTEOPOROSIS, UNSPECIFIED OSTEOPOROSIS TYPE, UNSPECIFIED PATHOLOGICAL FRACTURE PRESENCE: ICD-10-CM

## 2018-02-28 DIAGNOSIS — M81.0 OSTEOPOROSIS, UNSPECIFIED OSTEOPOROSIS TYPE, UNSPECIFIED PATHOLOGICAL FRACTURE PRESENCE: Primary | ICD-10-CM

## 2018-02-28 PROCEDURE — 77080 DXA BONE DENSITY AXIAL: CPT | Mod: 26,,, | Performed by: RADIOLOGY

## 2018-02-28 PROCEDURE — 77080 DXA BONE DENSITY AXIAL: CPT | Mod: TC,PO

## 2018-02-28 RX ORDER — ALENDRONATE SODIUM 70 MG/1
70 TABLET ORAL
Qty: 4 TABLET | Refills: 11 | Status: SHIPPED | OUTPATIENT
Start: 2018-02-28 | End: 2018-03-03 | Stop reason: SDUPTHER

## 2018-03-03 DIAGNOSIS — M81.0 OSTEOPOROSIS, UNSPECIFIED OSTEOPOROSIS TYPE, UNSPECIFIED PATHOLOGICAL FRACTURE PRESENCE: ICD-10-CM

## 2018-03-03 RX ORDER — ALENDRONATE SODIUM 70 MG/1
TABLET ORAL
Qty: 4 TABLET | Refills: 11 | Status: SHIPPED | OUTPATIENT
Start: 2018-03-03 | End: 2018-03-12 | Stop reason: SDUPTHER

## 2018-03-12 ENCOUNTER — OFFICE VISIT (OUTPATIENT)
Dept: INTERNAL MEDICINE | Facility: CLINIC | Age: 73
End: 2018-03-12
Payer: MEDICARE

## 2018-03-12 VITALS
SYSTOLIC BLOOD PRESSURE: 118 MMHG | HEIGHT: 62 IN | OXYGEN SATURATION: 96 % | HEART RATE: 89 BPM | BODY MASS INDEX: 35.41 KG/M2 | RESPIRATION RATE: 20 BRPM | WEIGHT: 192.44 LBS | TEMPERATURE: 98 F | DIASTOLIC BLOOD PRESSURE: 74 MMHG

## 2018-03-12 DIAGNOSIS — M81.0 OSTEOPOROSIS, UNSPECIFIED OSTEOPOROSIS TYPE, UNSPECIFIED PATHOLOGICAL FRACTURE PRESENCE: ICD-10-CM

## 2018-03-12 DIAGNOSIS — J40 BRONCHITIS: Primary | ICD-10-CM

## 2018-03-12 PROCEDURE — 99213 OFFICE O/P EST LOW 20 MIN: CPT | Mod: S$GLB,,, | Performed by: PHYSICIAN ASSISTANT

## 2018-03-12 PROCEDURE — 99999 PR PBB SHADOW E&M-EST. PATIENT-LVL IV: CPT | Mod: PBBFAC,,, | Performed by: PHYSICIAN ASSISTANT

## 2018-03-12 PROCEDURE — 96372 THER/PROPH/DIAG INJ SC/IM: CPT | Mod: S$GLB,,, | Performed by: PEDIATRICS

## 2018-03-12 RX ORDER — ALENDRONATE SODIUM 70 MG/1
TABLET ORAL
Qty: 12 TABLET | Refills: 3 | Status: SHIPPED | OUTPATIENT
Start: 2018-03-12 | End: 2018-09-01 | Stop reason: RX

## 2018-03-12 RX ORDER — AMOXICILLIN AND CLAVULANATE POTASSIUM 500; 125 MG/1; MG/1
1 TABLET, FILM COATED ORAL 2 TIMES DAILY
Qty: 20 TABLET | Refills: 0 | Status: SHIPPED | OUTPATIENT
Start: 2018-03-12 | End: 2018-03-22

## 2018-03-12 RX ORDER — BENZONATATE 200 MG/1
CAPSULE ORAL
Qty: 30 CAPSULE | Refills: 1 | Status: SHIPPED | OUTPATIENT
Start: 2018-03-12 | End: 2018-12-03

## 2018-03-12 RX ORDER — METHYLPREDNISOLONE ACETATE 80 MG/ML
80 INJECTION, SUSPENSION INTRA-ARTICULAR; INTRALESIONAL; INTRAMUSCULAR; SOFT TISSUE
Status: COMPLETED | OUTPATIENT
Start: 2018-03-12 | End: 2018-03-12

## 2018-03-12 RX ADMIN — METHYLPREDNISOLONE ACETATE 80 MG: 80 INJECTION, SUSPENSION INTRA-ARTICULAR; INTRALESIONAL; INTRAMUSCULAR; SOFT TISSUE at 04:03

## 2018-03-12 NOTE — PROGRESS NOTES
Subjective:       Patient ID: Hiwot Coulter is a 72 y.o.W/ female.    Chief Complaint: Cough; Headache; Nasal Congestion; and Chest Congestion    HPI         She comes in today by herself and has the above problems.  She has been getting a headache directly over her left eyebrow every time she gets into a coughing fit.  Once the coughing is done the headache disappeared completely.  The pain is usually a 6-8/10 level of pain.  Once it's gone it's 0/10.  She also has been having some nasal congestion and drainage.  She denies any problem with fever, chills, rigors.  She says her chest hurts retrosternally anytime she takes a cough or she takes a deep breath.  She is getting some greenish mucus up out of her chest every time she coughs.  The cough seems to be round-the-clock and she's had it now for well over 2 weeks.  She still currently working 12 hour shifts a day and doing that 3-4 times per week.  She loves what she does has no intention of retiring until she loses that love of work.  She intends on going right back to work this afternoon so she can complete her shift.        She has been taken Mucinex DM, Claritin-D, and she was just in 2 weeks ago when she saw her PCP Dr. Ramirez and she did get a flu shot at that time.        She denies any problem with: Earache, sore throat, alteration of her voice, fever, chills, myalgias, arthralgias, CP, pleurisy, sweats, diaphoresis, wheezes, dyspnea of any modality, or GI symptoms of anorexia, nausea, or vomiting.        She has no prior history of asthma, bronchospasm, bronchitis, or pneumonia.  Review of Systems    Otherwise negative concerning the NEUROLOGIC, ENT, RESPIRATORY, PULMONARY, VASCULAR, CV, and GI system review.    Objective:      Physical Exam    HEAD: Normal exam.  EYES: Normal without papilledema.  CAROTIDS: Quiet without bruit.  Templer arteries are also without bruit.  ENT: All within normal limits and there is no turbinate redness or edema.  I don't  see any rhinorrhea or mucopurulence present inside the nose.  Her mouth and throat appear normal without any redness to the posterior pharynx or the soft palate.  She has no exudates or halitosis.  There are no tender glands or adenopathy present.  CHEST: She has loud rhonchi present in the left lower lobe.  She has moderately loud rhonchi present in the right lower lobe.  Throughout the rest of the lung fields I can hear the rhonchi from the bases but the rest of the lung fields appear clear anterior to posterior.    Assessment:       1. Bronchitis        Plan:      1.  She was offered and accepted a Depo-Medrol 80 mg IM injection as an anti-inflammatory today.  2.  She is going to continue taking her Mucinex DM 1200 mg along with Claritin-D.  3.  Also start her on Tessalon Perles 200 mg daily at bedtime to suppress cough at night.  4.  Also start 500 mg Augmentin twice a day ×10 days.  She is to take a probiotic or yogurt to prevent diarrhea and vaginal yeast development.  5.  Recheck in 10-12 days if her cough persists.  6.  She can return to work today as I see there not being any problem with that.

## 2018-04-02 ENCOUNTER — TELEPHONE (OUTPATIENT)
Dept: PULMONOLOGY | Facility: CLINIC | Age: 73
End: 2018-04-02

## 2018-04-02 ENCOUNTER — OFFICE VISIT (OUTPATIENT)
Dept: PULMONOLOGY | Facility: CLINIC | Age: 73
End: 2018-04-02
Payer: MEDICARE

## 2018-04-02 ENCOUNTER — HOSPITAL ENCOUNTER (OUTPATIENT)
Dept: RADIOLOGY | Facility: HOSPITAL | Age: 73
Discharge: HOME | End: 2018-04-02
Attending: INTERNAL MEDICINE
Payer: MEDICARE

## 2018-04-02 VITALS
RESPIRATION RATE: 18 BRPM | TEMPERATURE: 99 F | OXYGEN SATURATION: 98 % | HEIGHT: 62 IN | SYSTOLIC BLOOD PRESSURE: 120 MMHG | DIASTOLIC BLOOD PRESSURE: 60 MMHG | BODY MASS INDEX: 34.82 KG/M2 | HEART RATE: 85 BPM | WEIGHT: 189.25 LBS

## 2018-04-02 DIAGNOSIS — J30.2 CHRONIC SEASONAL ALLERGIC RHINITIS DUE TO OTHER ALLERGEN: ICD-10-CM

## 2018-04-02 DIAGNOSIS — G47.33 OSA ON CPAP: ICD-10-CM

## 2018-04-02 DIAGNOSIS — J20.9 ACUTE BRONCHITIS, UNSPECIFIED ORGANISM: Primary | ICD-10-CM

## 2018-04-02 DIAGNOSIS — J20.9 ACUTE BRONCHITIS, UNSPECIFIED ORGANISM: ICD-10-CM

## 2018-04-02 PROCEDURE — 99999 PR PBB SHADOW E&M-EST. PATIENT-LVL IV: CPT | Mod: PBBFAC,,, | Performed by: INTERNAL MEDICINE

## 2018-04-02 PROCEDURE — 71046 X-RAY EXAM CHEST 2 VIEWS: CPT | Mod: 26,,, | Performed by: RADIOLOGY

## 2018-04-02 PROCEDURE — 96372 THER/PROPH/DIAG INJ SC/IM: CPT | Mod: S$GLB,,, | Performed by: INTERNAL MEDICINE

## 2018-04-02 PROCEDURE — 71046 X-RAY EXAM CHEST 2 VIEWS: CPT | Mod: TC

## 2018-04-02 PROCEDURE — 99215 OFFICE O/P EST HI 40 MIN: CPT | Mod: 25,S$GLB,, | Performed by: INTERNAL MEDICINE

## 2018-04-02 RX ORDER — TRIAMCINOLONE ACETONIDE 40 MG/ML
80 INJECTION, SUSPENSION INTRA-ARTICULAR; INTRAMUSCULAR
Status: COMPLETED | OUTPATIENT
Start: 2018-04-02 | End: 2018-04-02

## 2018-04-02 RX ORDER — LEVOFLOXACIN 500 MG/1
500 TABLET, FILM COATED ORAL DAILY
Qty: 10 TABLET | Refills: 1 | Status: SHIPPED | OUTPATIENT
Start: 2018-04-02 | End: 2018-06-19

## 2018-04-02 RX ORDER — PREDNISONE 20 MG/1
TABLET ORAL
Qty: 20 TABLET | Refills: 1 | Status: SHIPPED | OUTPATIENT
Start: 2018-04-02 | End: 2019-07-01 | Stop reason: ALTCHOICE

## 2018-04-02 RX ORDER — FLUTICASONE PROPIONATE 50 MCG
2 SPRAY, SUSPENSION (ML) NASAL DAILY
Qty: 48 G | Refills: 3 | Status: SHIPPED | OUTPATIENT
Start: 2018-04-02 | End: 2018-08-28 | Stop reason: SDUPTHER

## 2018-04-02 RX ADMIN — TRIAMCINOLONE ACETONIDE 80 MG: 40 INJECTION, SUSPENSION INTRA-ARTICULAR; INTRAMUSCULAR at 03:04

## 2018-04-02 NOTE — PROGRESS NOTES
Subjective:       Patient ID: Hiwot Coulter is a 72 y.o. female.    Chief Complaint: She       Chest Pain and low grade fever    HPI       Concerned about persitent symptoms of bronchitis / asthma  Low grade fever  shortness of breath  Pleuritic pain - both sides  [facility]en [facility] - steroid shot, negative flu swab  Nasal spray - did not take this medicaiton  Fever come and go - highest 100    History of sciatic nerve pain - 3 weeks pf physical therapy - left leg is worse  Steroid shot helped that she received in walk in clinic    Past Medical History:   Diagnosis Date    Anxiety     Arthritis     hand, neck, back    Cataract     Depression     GERD (gastroesophageal reflux disease)     Hyperlipidemia     Obesity     Osteopenia     Pollen allergies     Sleep apnea      Past Surgical History:   Procedure Laterality Date    CHOLECYSTECTOMY  2012     Social History     Social History    Marital status:      Spouse name: N/A    Number of children: N/A    Years of education: N/A     Occupational History     [employer]     Social History Main Topics    Smoking status: Never Smoker    Smokeless tobacco: Never Used    Alcohol use No    Drug use: No    Sexual activity: Yes     Partners: Male     Birth control/ protection: None     Other Topics Concern    Are You Pregnant Or Think You May Be? No    Breast-Feeding No     Social History Narrative    No pets or smokers in household.     Review of Systems   Constitutional: Positive for fatigue. Negative for fever.   HENT: Positive for postnasal drip, rhinorrhea and congestion.    Eyes: Negative for redness and itching.   Respiratory: Positive for cough, sputum production, shortness of breath, dyspnea on extertion, use of rescue inhaler and Paroxysmal Nocturnal Dyspnea.    Cardiovascular: Negative for chest pain, palpitations and leg swelling.   Genitourinary: Negative for difficulty urinating and hematuria.   Endocrine: Negative  for cold intolerance and heat intolerance.    Skin: Negative for rash.   Gastrointestinal: Negative for nausea and abdominal pain.   Neurological: Negative for dizziness, syncope, weakness and light-headedness.   Hematological: Negative for adenopathy. Does not bruise/bleed easily.   Psychiatric/Behavioral: Negative for sleep disturbance. The patient is not nervous/anxious.        Objective:      Physical Exam   Constitutional: She is oriented to person, place, and time. She appears well-developed and well-nourished.   HENT:   Head: Normocephalic and atraumatic.   Mouth/Throat: Oropharyngeal exudate present.   Eyes: Conjunctivae are normal. Pupils are equal, round, and reactive to light.   Neck: Neck supple. No JVD present. No tracheal deviation present. No thyromegaly present.   Cardiovascular: Normal rate, regular rhythm and normal heart sounds.    Pulmonary/Chest: Effort normal. No respiratory distress. She has decreased breath sounds. She has wheezes in the right lower field and the left lower field. She has no rhonchi. She has no rales. She exhibits no tenderness.   Abdominal: Soft. Bowel sounds are normal.   Musculoskeletal: She exhibits no edema.   Decreased range of motion of  Left leg     Lymphadenopathy:     She has no cervical adenopathy.   Neurological: She is alert and oriented to person, place, and time.   Skin: Skin is warm and dry.   Nursing note and vitals reviewed.    Personal Diagnostic Review  Chest x-ray: stable hyperinflation    Office Spirometry Results:     No flowsheet data found.  Pulmonary Studies Review 4/2/2018   SpO2 98   Height 62.000   Weight 3028.24   BMI (Calculated) 34.7   Predicted Distance 247.54   Predicted Distance Meters (Calculated) 392.35         Assessment:       1. Acute bronchitis, unspecified organism    2. Chronic seasonal allergic rhinitis due to other allergen    3. JAMAL on CPAP        Outpatient Encounter Prescriptions as of 4/2/2018   Medication Sig Dispense Refill     acetaminophen (TYLENOL) 500 mg Cap as needed.       alendronate (FOSAMAX) 70 MG tablet TAKE 1 TABLET(70 MG) BY MOUTH EVERY 7 DAYS 12 tablet 3    alprazolam (XANAX) 2 MG Tab Take 1 tablet (2 mg total) by mouth 2 (two) times daily as needed. 180 tablet 1    benzonatate (TESSALON) 200 MG capsule 1 Q 8 hrs to suppress cough,or if not needed in AM prefer HS dosing only. 30 capsule 1    clobetasol 0.05% (TEMOVATE) 0.05 % Oint Apply topically 2 (two) times daily. (Patient taking differently: Apply topically as needed. ) 60 g 2    desonide 0.05% (DESOWEN) 0.05 % Oint as needed. AAA bid       ergocalciferol (ERGOCALCIFEROL) 50,000 unit Cap TAKE 1 CAPSULE BY MOUTH 2 TIMES A WEEK 25 capsule 3    famotidine (PEPCID) 20 MG tablet Take 1 tablet (20 mg total) by mouth 2 (two) times daily. 180 tablet 3    fluticasone (FLONASE) 50 mcg/actuation nasal spray 2 sprays (100 mcg total) by Each Nare route once daily. 48 g 3    hydrocodone-acetaminophen 5-325mg (NORCO) 5-325 mg per tablet Take 1 tablet by mouth every 6 (six) hours as needed for Pain. Do not use more than 4 a day including OTC tylenol. Not to be used within 3 days of contrave. 60 tablet 0    levoFLOXacin (LEVAQUIN) 500 MG tablet Take 1 tablet (500 mg total) by mouth once daily. 10 tablet 1    naltrexone-bupropion 8-90 mg TbSR Take 2 tablets by mouth 2 (two) times daily. Not to be used with in 3 days of hydrocodone. 120 tablet 4    predniSONE (DELTASONE) 20 MG tablet Prednisone 60 mg/ day for 3 days, 40 mg/day for 3 days,20 mg/ day for 3 days, (1/2 tablet )10 mg a day for 3 days. 20 tablet 1    [DISCONTINUED] fluticasone (FLONASE) 50 mcg/actuation nasal spray USE 2 SPRAYS IN EACH NOSTRIL EVERY DAY (Patient taking differently: USE 2 SPRAYS IN EACH NOSTRIL EVERY DAY AS NEEDED.) 48 g 3    [DISCONTINUED] ibuprofen (ADVIL,MOTRIN) 800 MG tablet TAKE 1 TABLET(800 MG) BY MOUTH EVERY 6 HOURS AS NEEDED FOR PAIN 270 tablet 3     Facility-Administered Encounter  Medications as of 2018   Medication Dose Route Frequency Provider Last Rate Last Dose    [COMPLETED] triamcinolone acetonide injection 80 mg  80 mg Intramuscular 1 time in Clinic/HOD Zachary Marroquin MD   80 mg at 18 1535     Orders Placed This Encounter   Procedures    CPAP/BIPAP SUPPLIES     [facility] for CPAP/Oxygen/Nebulizer supplies.  Customer Service: [phone]  Call: [phone]  Fax: [phone]  Billing Inquiries: [phone] or [phone]       Order Specific Question:   Type of mask:     Answer:   FFM     Comments:   patient prefernce     Order Specific Question:   Headgear?     Answer:   Yes     Order Specific Question:   Tubing?     Answer:   Yes     Order Specific Question:   Humidifier chamber?     Answer:   Yes     Order Specific Question:   Chin strap?     Answer:   Yes     Order Specific Question:   Filters?     Answer:   Yes     Order Specific Question:   Length of need (1-99 months):     Answer:   99    X-Ray Chest PA And Lateral     Standing Status:   Future     Number of Occurrences:   1     Standing Expiration Date:   2019     Order Specific Question:   May the Radiologist modify the order per protocol to meet the clinical needs of the patient?     Answer:   Yes     Plan:       Requested Prescriptions     Signed Prescriptions Disp Refills    fluticasone (FLONASE) 50 mcg/actuation nasal spray 48 g 3     Si sprays (100 mcg total) by Each Nare route once daily.    predniSONE (DELTASONE) 20 MG tablet 20 tablet 1     Sig: Prednisone 60 mg/ day for 3 days, 40 mg/day for 3 days,20 mg/ day for 3 days, (1/2 tablet )10 mg a day for 3 days.    levoFLOXacin (LEVAQUIN) 500 MG tablet 10 tablet 1     Sig: Take 1 tablet (500 mg total) by mouth once daily.     Acute bronchitis, unspecified organism  -     X-Ray Chest PA And Lateral; Future; Expected date: 2018  -     fluticasone (FLONASE) 50 mcg/actuation nasal spray; 2 sprays (100 mcg total) by Each Nare route once  daily.  Dispense: 48 g; Refill: 3  -     predniSONE (DELTASONE) 20 MG tablet; Prednisone 60 mg/ day for 3 days, 40 mg/day for 3 days,20 mg/ day for 3 days, (1/2 tablet )10 mg a day for 3 days.  Dispense: 20 tablet; Refill: 1  -     levoFLOXacin (LEVAQUIN) 500 MG tablet; Take 1 tablet (500 mg total) by mouth once daily.  Dispense: 10 tablet; Refill: 1  -     triamcinolone acetonide injection 80 mg; Inject 2 mLs (80 mg total) into the muscle one time.    Chronic seasonal allergic rhinitis due to other allergen  -     fluticasone (FLONASE) 50 mcg/actuation nasal spray; 2 sprays (100 mcg total) by Each Nare route once daily.  Dispense: 48 g; Refill: 3    JAMAL on CPAP  -     CPAP/BIPAP SUPPLIES           Follow-up in about 1 year (around 4/2/2019) for CPAP download.    MEDICAL DECISION MAKING: Moderate to high complexity.  Overall, the multiple problems listed are of moderate to high severity that may impact quality of life and activities of daily living. Side effects of medications, treatment plan as well as options and alternatives reviewed and discussed with patient. There was counseling of patient concerning these issues.    Total time spent in face to face counseling and coordination of care - 40  minutes over 50% of time was used in discussion of prognosis, risks, benefits of treatment, instructions and compliance with regimen . Discussion with other physicians or health care providers (DME, NP, pharmacy, respiratory therapy) occurred.

## 2018-04-02 NOTE — PATIENT INSTRUCTIONS
Levofloxacin tablets  What is this medicine?  LEVOFLOXACIN (isaac bell CLAUDIA juan ramon sin) is a quinolone antibiotic. It is used to treat certain kinds of bacterial infections. It will not work for colds, flu, or other viral infections.  How should I use this medicine?  Take this medicine by mouth with a full glass of water. Follow the directions on the prescription label. This medicine can be taken with or without food. Take your medicine at regular intervals. Do not take your medicine more often than directed. Do not skip doses or stop your medicine early even if you feel better. Do not stop taking except on your doctor's advice.  A special MedGuide will be given to you by the pharmacist with each prescription and refill. Be sure to read this information carefully each time.  Talk to your pediatrician regarding the use of this medicine in children. While this drug may be prescribed for children as young as 6 months for selected conditions, precautions do apply.  What side effects may I notice from receiving this medicine?  Side effects that you should report to your doctor or health care professional as soon as possible:  · allergic reactions like skin rash or hives, swelling of the face, lips, or tongue  · anxious  · confusion  · depressed mood  · diarrhea  · fast, irregular heartbeat  · hallucination, loss of contact with reality  · joint, muscle, or tendon pain or swelling  · pain, tingling, numbness in the hands or feet  · suicidal thoughts or other mood changes  · sunburn  · unusually weak or tired  Side effects that usually do not require medical attention (report to your doctor or health care professional if they continue or are bothersome):  · dry mouth  · headache  · nausea  · trouble sleeping  What may interact with this medicine?  Do not take this medicine with any of the following medications:  · arsenic trioxide  · chloroquine  · droperidol  · medicines for irregular heart rhythm like amiodarone, disopyramide,  dofetilide, flecainide, quinidine, procainamide, sotalol  · some medicines for depression or mental problems like phenothiazines, pimozide, and ziprasidone  This medicine may also interact with the following medications:  · amoxapine  · antacids  · birth control pills  · cisapride  · dairy products  · didanosine (ddI) buffered tablets or powder  · haloperidol  · multivitamins  · NSAIDS, medicines for pain and inflammation, like ibuprofen or naproxen  · retinoid products like tretinoin or isotretinoin  · risperidone  · some other antibiotics like clarithromycin or erythromycin  · sucralfate  · theophylline  · warfarin  What if I miss a dose?  If you miss a dose, take it as soon as you remember. If it is almost time for your next dose, take only that dose. Do not take double or extra doses.  Where should I keep my medicine?  Keep out of the reach of children.  Store at room temperature between 15 and 30 degrees C (59 and 86 degrees F). Keep in a tightly closed container. Throw away any unused medicine after the expiration date.  What should I tell my health care provider before I take this medicine?  They need to know if you have any of these conditions:  · bone problems  · cerebral disease  · history of low levels of potassium in the blood  · irregular heartbeat  · joint problems  · kidney disease  · myasthenia gravis  · seizures  · tendon problems  · tingling of the fingers or toes, or other nerve disorder  · an unusual or allergic reaction to levofloxacin, other quinolone antibiotics, foods, dyes, or preservatives  · pregnant or trying to get pregnant  · breast-feeding  What should I watch for while using this medicine?  Tell your doctor or health care professional if your symptoms do not improve or if they get worse. Drink several glasses of water a day and cut down on drinks that contain caffeine. You must not get dehydrated while taking this medicine.  You may get drowsy or dizzy. Do not drive, use machinery, or  do anything that needs mental alertness until you know how this medicine affects you. Do not sit or stand up quickly, especially if you are an older patient. This reduces the risk of dizzy or fainting spells.  This medicine can make you more sensitive to the sun. Keep out of the sun. If you cannot avoid being in the sun, wear protective clothing and use a sunscreen. Do not use sun lamps or tanning beds/booths. Contact your doctor if you get a sunburn.  If you are a diabetic monitor your blood glucose carefully. If you get an unusual reading stop taking this medicine and call your doctor right away.  Do not treat diarrhea with over-the-counter products. Contact your doctor if you have diarrhea that lasts more than 2 days or if the diarrhea is severe and watery.  Avoid antacids, calcium, iron, and zinc products for 2 hours before and 2 hours after taking a dose of this medicine.  NOTE:This sheet is a summary. It may not cover all possible information. If you have questions about this medicine, talk to your doctor, pharmacist, or health care provider. Copyright© 2017 [employer]        Prednisone tablets  What is this medicine?  PREDNISONE (PRED ni sone) is a corticosteroid. It is commonly used to treat inflammation of the skin, joints, lungs, and other organs. Common conditions treated include asthma, allergies, and arthritis. It is also used for other conditions, such as blood disorders and diseases of the adrenal glands.  How should I use this medicine?  Take this medicine by mouth with a glass of water. Follow the directions on the prescription label. Take this medicine with food. If you are taking this medicine once a day, take it in the morning. Do not take more medicine than you are told to take. Do not suddenly stop taking your medicine because you may develop a severe reaction. Your doctor will tell you how much medicine to take. If your doctor wants you to stop the medicine, the dose may be slowly lowered  over time to avoid any side effects.  Talk to your pediatrician regarding the use of this medicine in children. Special care may be needed.  What side effects may I notice from receiving this medicine?  Side effects that you should report to your doctor or health care professional as soon as possible:  · allergic reactions like skin rash, itching or hives, swelling of the face, lips, or tongue  · changes in emotions or moods  · changes in vision  · depressed mood  · eye pain  · fever or chills, cough, sore throat, pain or difficulty passing urine  · increased thirst  · swelling of ankles, feet  Side effects that usually do not require medical attention (report to your doctor or health care professional if they continue or are bothersome):  · confusion, excitement, restlessness  · headache  · nausea, vomiting  · skin problems, acne, thin and shiny skin  · trouble sleeping  · weight gain  What may interact with this medicine?  Do not take this medicine with any of the following medications:  · metyrapone  · mifepristone  This medicine may also interact with the following medications:  · aminoglutethimide  · amphotericin B  · aspirin and aspirin-like medicines  · barbiturates  · certain medicines for diabetes, like glipizide or glyburide  · cholestyramine  · cholinesterase inhibitors  · cyclosporine  · digoxin  · diuretics  · ephedrine  · female hormones, like estrogens and birth control pills  · isoniazid  · ketoconazole  · NSAIDS, medicines for pain and inflammation, like ibuprofen or naproxen  · phenytoin  · rifampin  · toxoids  · vaccines  · warfarin  What if I miss a dose?  If you miss a dose, take it as soon as you can. If it is almost time for your next dose, talk to your doctor or health care professional. You may need to miss a dose or take an extra dose. Do not take double or extra doses without advice.  Where should I keep my medicine?  Keep out of the reach of children.  Store at room temperature between 15  and 30 degrees C (59 and 86 degrees F). Protect from light. Keep container tightly closed. Throw away any unused medicine after the expiration date.  What should I tell my health care provider before I take this medicine?  They need to know if you have any of these conditions:  · Cushing's syndrome  · diabetes  · glaucoma  · heart disease  · high blood pressure  · infection (especially a virus infection such as chickenpox, cold sores, or herpes)  · kidney disease  · liver disease  · mental illness  · myasthenia gravis  · osteoporosis  · seizures  · stomach or intestine problems  · thyroid disease  · an unusual or allergic reaction to lactose, prednisone, other medicines, foods, dyes, or preservatives  · pregnant or trying to get pregnant  · breast-feeding  What should I watch for while using this medicine?  Visit your doctor or health care professional for regular checks on your progress. If you are taking this medicine over a prolonged period, carry an identification card with your name and address, the type and dose of your medicine, and your doctor's name and address.  This medicine may increase your risk of getting an infection. Tell your doctor or health care professional if you are around anyone with measles or chickenpox, or if you develop sores or blisters that do not heal properly.  If you are going to have surgery, tell your doctor or health care professional that you have taken this medicine within the last twelve months.  Ask your doctor or health care professional about your diet. You may need to lower the amount of salt you eat.  This medicine may affect blood sugar levels. If you have diabetes, check with your doctor or health care professional before you change your diet or the dose of your diabetic medicine.  NOTE:This sheet is a summary. It may not cover all possible information. If you have questions about this medicine, talk to your doctor, pharmacist, or health care provider. Copyright© 2017  [employer]

## 2018-04-09 ENCOUNTER — PATIENT MESSAGE (OUTPATIENT)
Dept: PULMONOLOGY | Facility: CLINIC | Age: 73
End: 2018-04-09

## 2018-05-21 ENCOUNTER — PATIENT MESSAGE (OUTPATIENT)
Dept: PULMONOLOGY | Facility: CLINIC | Age: 73
End: 2018-05-21

## 2018-06-19 ENCOUNTER — OFFICE VISIT (OUTPATIENT)
Dept: PULMONOLOGY | Facility: CLINIC | Age: 73
End: 2018-06-19
Payer: MEDICARE

## 2018-06-19 VITALS
BODY MASS INDEX: 33.63 KG/M2 | SYSTOLIC BLOOD PRESSURE: 136 MMHG | WEIGHT: 182.75 LBS | DIASTOLIC BLOOD PRESSURE: 78 MMHG | RESPIRATION RATE: 18 BRPM | OXYGEN SATURATION: 99 % | HEIGHT: 62 IN | HEART RATE: 59 BPM

## 2018-06-19 DIAGNOSIS — G47.33 OSA ON CPAP: Primary | ICD-10-CM

## 2018-06-19 DIAGNOSIS — E66.01 SEVERE OBESITY (BMI 35.0-39.9) WITH COMORBIDITY: ICD-10-CM

## 2018-06-19 PROCEDURE — 99213 OFFICE O/P EST LOW 20 MIN: CPT | Mod: S$GLB,,, | Performed by: NURSE PRACTITIONER

## 2018-06-19 PROCEDURE — 99999 PR PBB SHADOW E&M-EST. PATIENT-LVL III: CPT | Mod: PBBFAC,,, | Performed by: NURSE PRACTITIONER

## 2018-06-19 NOTE — ASSESSMENT & PLAN NOTE
"Encouraged calorie reduction and 30 minutes of exercise daily. Discussed impact of obesity on general health.  Exercise began last week, TM 3-4 miles 2-3 times a week.   Primary Care Provider, Dr. Ramirez provided naltrexone-bupropion 8-90 mg to aid in weight loss.   She has not begun her diet at this point, weakness is "sweets".   "

## 2018-06-19 NOTE — PROGRESS NOTES
"Subjective:      Patient ID: Hiwot Coulter is a 73 y.o. female.    Chief Complaint: Sleep Apnea    HPI: Hiwot Coulter is here for follow up for JAMAL and CPAP yearly complaince assessment.  She is on CPAP of 7 cmH2O pressure.  She is not compliant with CPAP use. Complaince download today reveals 33.3% of days with greater than 4 hours of device use.   Patient reports no noticable benefit from CPAP use. AHI is 30.9 on dwld, change to auto CPAP 4-10 cm   Patient reports no complaints, not compliance is reported by patient as "being lazy", neglect putting on most nights.     [place] [facility]    Previous Report Reviewed: lab reports and office notes     Past Medical History: The following portions of the patient's history were reviewed and updated as appropriate:   She  has a past surgical history that includes Cholecystectomy (2012).  Her family history includes Cancer in her father; Cataracts in her mother; Heart disease in her father; Leukemia in her father.  She  reports that she has never smoked. She has never used smokeless tobacco. She reports that she does not drink alcohol or use drugs.  She has a current medication list which includes the following prescription(s): acetaminophen, alendronate, alprazolam, benzonatate, ergocalciferol, famotidine, fluticasone, hydrocodone-acetaminophen, naltrexone-bupropion, prednisone, clobetasol 0.05%, and desonide 0.05%.  She has No Known Allergies..    The following portions of the patient's history were reviewed and updated as appropriate: allergies, current medications, past family history, past medical history, past social history, past surgical history and problem list.    Review of Systems   Constitutional: Negative for fever, chills, weight loss, weight gain, activity change, appetite change, fatigue and night sweats.   HENT: Negative for postnasal drip, rhinorrhea, sinus pressure, voice change and congestion.    Eyes: Negative for redness and itching.   Respiratory: " "Negative for snoring, cough, sputum production, chest tightness, shortness of breath, wheezing, orthopnea, asthma nighttime symptoms, dyspnea on extertion, use of rescue inhaler and somnolence.    Cardiovascular: Negative.  Negative for chest pain, palpitations and leg swelling.   Genitourinary: Negative for difficulty urinating and hematuria.   Endocrine: Negative for cold intolerance and heat intolerance.    Musculoskeletal: Negative for arthralgias, gait problem, joint swelling and myalgias.   Skin: Negative.    Gastrointestinal: Negative for nausea, vomiting, abdominal pain and acid reflux.   Neurological: Negative for dizziness, weakness, light-headedness and headaches.   Hematological: Negative for adenopathy. No excessive bruising.   All other systems reviewed and are negative.     Objective:   /78 (BP Location: Right arm, Patient Position: Sitting)   Pulse (!) 59   Resp 18   Ht 5' 2" (1.575 m)   Wt 82.9 kg (182 lb 12.2 oz)   SpO2 99%   BMI 33.43 kg/m²   Physical Exam   Constitutional: She is oriented to person, place, and time. She appears well-developed and well-nourished. She is active and cooperative.  Non-toxic appearance. She does not appear ill. No distress.   HENT:   Head: Normocephalic.   Right Ear: External ear normal.   Left Ear: External ear normal.   Nose: Nose normal.   Mouth/Throat: Oropharynx is clear and moist. No oropharyngeal exudate.   Eyes: Conjunctivae are normal.   Neck: Normal range of motion. Neck supple.   Cardiovascular: Normal rate, regular rhythm, normal heart sounds and intact distal pulses.    Pulmonary/Chest: Effort normal and breath sounds normal. No stridor.   Abdominal: Soft.   Musculoskeletal: Normal range of motion.   Lymphadenopathy:     She has no cervical adenopathy.   Neurological: She is alert and oriented to person, place, and time.   Skin: Skin is warm and dry.   Psychiatric: She has a normal mood and affect. Her behavior is normal. Judgment and thought " "content normal.   Vitals reviewed.    Personal Diagnostic Review  CPAP download  CPAP 7 cm  Compliance Summary  5/17/2018 - 6/15/2018 (30 days)  Days with Device Usage 10 days  Days without Device Usage 20 days  Percent Days with Device Usage 33.3%  Cumulative Usage 3 days 8 hrs. 17 mins. 38 secs.  Maximum Usage (1 Day) 9 hrs. 4 mins. 45 secs.  Average Usage (All Days) 2 hrs. 40 mins. 35 secs.  Average Usage (Days Used) 8 hrs. 1 mins. 45 secs.  Minimum Usage (1 Day) 6 hrs. 2 mins. 13 secs.  Percent of Days with Usage >= 4 Hours 33.3%  Percent of Days with Usage < 4 Hours 66.7%  Date Range  Total Blower Time 3 days 8 hrs. 17 mins. 38 secs.  CPAP Summary ([facility])  Average Time in Large Leak Per Day 0 secs.  Average AHI 30.9  CPAP 7.0 cmH2O    Assessment:     1. JAMAL on CPAP    2. Severe obesity (BMI 35.0-39.9) with comorbidity        Orders Placed This Encounter   Procedures    CPAP FOR HOME USE     This script is for patient to obtain on her own a small travel unit. Prescription printed.     Order Specific Question:   Type:     Answer:   Auto CPAP     Order Specific Question:   Auto CPAP pressure setting range (cmH20):     Answer:   4-10 cm     Order Specific Question:   Length of need (1-99 months):     Answer:   99     Order Specific Question:   Humidification:     Answer:   Heated     Order Specific Question:   Type of mask:     Answer:   FFM     Order Specific Question:   Headgear?     Answer:   Yes     Order Specific Question:   Tubing?     Answer:   Yes     Order Specific Question:   Humidifier chamber?     Answer:   Yes     Order Specific Question:   Chin strap?     Answer:   Yes     Order Specific Question:   Filters?     Answer:   Yes    HME - OTHER     Please change remotely to auto  CPAP 4-10 cm   [facility]: [email or web address]  Resp: [facility] location  [facility]     Order Specific Question:   Type of Equipment:     Answer:   cpap     Order Specific Question:   Height:     Answer:   5' 2" (1.575 m)     Order " "Specific Question:   Weight:     Answer:   82.9 kg (182 lb 12.2 oz)     Order Specific Question:   Does patient have medical equipment at home?     Answer:   CPAP     Plan:     Problem List Items Addressed This Visit     JAMAL on CPAP - Primary     Not compliant with CPAP 7 cm.   Percent of Days with Usage >= 4 Hours 33.3%  [place] 3  Average AHI 30.9    3/17/2015 PSG AHI 12.2   Complained of high pressures  6/19/2016 CPAP re-titration CPAP 7 cm optimal treatment     Changed to auto CPAP 4-10  Cm to determine if improved AHI   Follow up in 6 weeks for dwld.          Relevant Orders    CPAP FOR HOME USE    HME - OTHER    Severe obesity (BMI 35.0-39.9) with comorbidity     Encouraged calorie reduction and 30 minutes of exercise daily. Discussed impact of obesity on general health.  Exercise began last week, TM 3-4 miles 2-3 times a week.   Primary Care Provider, Dr. Ramirez provided naltrexone-bupropion 8-90 mg to aid in weight loss.   She has not begun her diet at this point, weakness is "sweets".               (DME - [facility]  Reviewed therapeutic goals for positive airway pressure therapy Auto CPAP  Ideal is usage 100% of nights for 6 - 8 hours per night. Minimum usage is 70% of night for at least 4 hours per night used. Pateint expressed understanding.     Follow-up in about 6 weeks (around 7/31/2018) for CPAP compliance download not compliant at annual. will need yearly supply order on rtc    "

## 2018-08-01 ENCOUNTER — PES CALL (OUTPATIENT)
Dept: ADMINISTRATIVE | Facility: CLINIC | Age: 73
End: 2018-08-01

## 2018-08-02 ENCOUNTER — OFFICE VISIT (OUTPATIENT)
Dept: PODIATRY | Facility: CLINIC | Age: 73
End: 2018-08-02
Payer: MEDICARE

## 2018-08-02 ENCOUNTER — LAB VISIT (OUTPATIENT)
Dept: LAB | Facility: HOSPITAL | Age: 73
End: 2018-08-02
Attending: PODIATRIST
Payer: MEDICARE

## 2018-08-02 VITALS — DIASTOLIC BLOOD PRESSURE: 86 MMHG | SYSTOLIC BLOOD PRESSURE: 121 MMHG | HEART RATE: 90 BPM

## 2018-08-02 DIAGNOSIS — B35.1 DERMATOPHYTOSIS OF NAIL: ICD-10-CM

## 2018-08-02 DIAGNOSIS — B35.1 DERMATOPHYTOSIS OF NAIL: Primary | ICD-10-CM

## 2018-08-02 DIAGNOSIS — S61.309A: ICD-10-CM

## 2018-08-02 LAB
ALBUMIN SERPL BCP-MCNC: 3.9 G/DL
ALP SERPL-CCNC: 60 U/L
ALT SERPL W/O P-5'-P-CCNC: 16 U/L
AST SERPL-CCNC: 19 U/L
BILIRUB DIRECT SERPL-MCNC: 0.2 MG/DL
BILIRUB SERPL-MCNC: 0.4 MG/DL
PROT SERPL-MCNC: 6.8 G/DL

## 2018-08-02 PROCEDURE — 88302 TISSUE EXAM BY PATHOLOGIST: CPT | Performed by: PATHOLOGY

## 2018-08-02 PROCEDURE — 88312 SPECIAL STAINS GROUP 1: CPT | Performed by: PATHOLOGY

## 2018-08-02 PROCEDURE — 36415 COLL VENOUS BLD VENIPUNCTURE: CPT | Mod: PO

## 2018-08-02 PROCEDURE — 88302 TISSUE EXAM BY PATHOLOGIST: CPT | Mod: 26,,, | Performed by: PATHOLOGY

## 2018-08-02 PROCEDURE — 80076 HEPATIC FUNCTION PANEL: CPT

## 2018-08-02 PROCEDURE — 11755 BIOPSY NAIL UNIT: CPT | Mod: T5,S$GLB,, | Performed by: PODIATRIST

## 2018-08-02 PROCEDURE — 99214 OFFICE O/P EST MOD 30 MIN: CPT | Mod: 25,S$GLB,, | Performed by: PODIATRIST

## 2018-08-02 PROCEDURE — 88312 SPECIAL STAINS GROUP 1: CPT | Mod: 26,,, | Performed by: PATHOLOGY

## 2018-08-02 PROCEDURE — 99999 PR PBB SHADOW E&M-EST. PATIENT-LVL IV: CPT | Mod: PBBFAC,,, | Performed by: PODIATRIST

## 2018-08-02 RX ORDER — TRAMADOL HYDROCHLORIDE 50 MG/1
TABLET ORAL
Refills: 0 | COMMUNITY
Start: 2018-07-18 | End: 2020-02-27

## 2018-08-02 RX ORDER — CICLOPIROX 80 MG/ML
SOLUTION TOPICAL
Qty: 1 BOTTLE | Refills: 10 | Status: SHIPPED | OUTPATIENT
Start: 2018-08-02 | End: 2019-01-29

## 2018-08-02 RX ORDER — IBUPROFEN 800 MG/1
TABLET ORAL
Refills: 3 | COMMUNITY
Start: 2018-05-13 | End: 2019-11-13 | Stop reason: SDUPTHER

## 2018-08-02 RX ORDER — CEPHALEXIN 500 MG/1
CAPSULE ORAL
Refills: 0 | COMMUNITY
Start: 2018-07-18 | End: 2018-12-03

## 2018-08-02 NOTE — PROGRESS NOTES
[facility]  PODIATRIC MEDICINE AND SURGERY        CHIEF COMPLAINT   Chief Complaint   Patient presents with    Nail Problem     PCP Dr. Ashley Schaeffer. Pt states that she has fungus on both halluces and wants to have them checked out.         HPI:    Hiwot Coulter is a 73 y.o. female presenting to podiatry clinic with complaint of fungal infection on  Both great toenails. The patient has tried over the counter medications with some success, but the problem is recurrent and aggravating. Patient inquires about available treatment options. No further pedal complaints.      PMH  Past Medical History:   Diagnosis Date    Anxiety     Arthritis     hand, neck, back    Cataract     Depression     GERD (gastroesophageal reflux disease)     Hyperlipidemia     Obesity     Osteopenia     Pollen allergies     Sleep apnea        PROBLEM LIST  Patient Active Problem List    Diagnosis Date Noted    DDD (degenerative disc disease), cervical 08/02/2017    Plantar fasciitis 08/02/2017    Nuclear sclerosis 08/02/2017    Behaviorally induced insufficient sleep syndrome     Periodic limb movement disorder (PLMD)     JAMAL on CPAP     Severe obesity (BMI 35.0-39.9) with comorbidity 04/18/2016    Osteoarthritis of lower back 11/27/2015    Vitamin D deficiency 12/05/2013    Anxiety 10/15/2013    Pure hypercholesterolemia 10/15/2013    GERD (gastroesophageal reflux disease) 10/15/2013       MEDS  Current Outpatient Prescriptions on File Prior to Visit   Medication Sig Dispense Refill    acetaminophen (TYLENOL) 500 mg Cap as needed.       alendronate (FOSAMAX) 70 MG tablet TAKE 1 TABLET(70 MG) BY MOUTH EVERY 7 DAYS 12 tablet 3    ergocalciferol (ERGOCALCIFEROL) 50,000 unit Cap TAKE 1 CAPSULE BY MOUTH 2 TIMES A WEEK 25 capsule 3    naltrexone-bupropion 8-90 mg TbSR Take 2 tablets by mouth 2 (two) times daily. Not to be used with in 3 days of hydrocodone. 120 tablet 4    alprazolam (XANAX) 2 MG Tab Take 1  tablet (2 mg total) by mouth 2 (two) times daily as needed. 180 tablet 1    benzonatate (TESSALON) 200 MG capsule 1 Q 8 hrs to suppress cough,or if not needed in AM prefer HS dosing only. 30 capsule 1    clobetasol 0.05% (TEMOVATE) 0.05 % Oint Apply topically 2 (two) times daily. (Patient taking differently: Apply topically as needed. ) 60 g 2    desonide 0.05% (DESOWEN) 0.05 % Oint as needed. AAA bid       famotidine (PEPCID) 20 MG tablet Take 1 tablet (20 mg total) by mouth 2 (two) times daily. 180 tablet 3    fluticasone (FLONASE) 50 mcg/actuation nasal spray 2 sprays (100 mcg total) by Each Nare route once daily. 48 g 3    hydrocodone-acetaminophen 5-325mg (NORCO) 5-325 mg per tablet Take 1 tablet by mouth every 6 (six) hours as needed for Pain. Do not use more than 4 a day including OTC tylenol. Not to be used within 3 days of contrave. 60 tablet 0    predniSONE (DELTASONE) 20 MG tablet Prednisone 60 mg/ day for 3 days, 40 mg/day for 3 days,20 mg/ day for 3 days, (1/2 tablet )10 mg a day for 3 days. 20 tablet 1     No current facility-administered medications on file prior to visit.        PSH     Past Surgical History:   Procedure Laterality Date    CHOLECYSTECTOMY  2012        ALL  Review of patient's allergies indicates:  No Known Allergies    SOC     Social History   Substance Use Topics    Smoking status: Never Smoker    Smokeless tobacco: Never Used    Alcohol use No         Family HX    Family History   Problem Relation Age of Onset    Leukemia Father     Heart disease Father     Cancer Father         leukemia    Cataracts Mother     Melanoma Neg Hx     Psoriasis Neg Hx     Lupus Neg Hx     Eczema Neg Hx             REVIEW OF SYSTEMS  General: Denies any fever or chills  Chest: Denies shortness of breath, wheezing, coughing, or sputum production  Heart: Denies chest pain, cold extremities, orthopenia, or reduced exercise tolerance  As noted above and per history of current illness  above, otherwise negative in the remainder of the 14 systems.      PHYSICAL EXAM:      Vitals:    08/02/18 1418   BP: 121/86   Pulse: 90       General: This patient is well-developed, well-nourished and appears stated age, well-oriented to person, place and time, and cooperative and pleasant on today's visit      LOWER EXTREMITY  Vascular:   · Palpable DP/PT pulses b/l  · Skin temperature warm to warm from prox to distally  · CFT <5 secs b/l  · There is no edema noted b/l    Dermatologic:   · Nails 1 bilateral thickened, elongated, dystrophic, with subungual debris. There is lifting noted RIGHT hallux with medial borders intact proximally  · No open skin lesions noted  · No erythema or drainage noted b/l  · Webspaces are C/D/I B/L  · There is no hyperkeratotic tissue noted .   · Skin texture and turgor WNL    Neurologic:  · epicritic sensation grossly intact b/l   · Light touch and sharp/dull sensation intact b/l  · Achilles and patellar deep tendon reflexes intact  · Babinski reflex absent b/l    Musculoskeletal/Orthopedic:  · No symptomatic structural abnormalities noted.   · Muscle strength AT/EHL/EDL/PT: 5/5; Achilles/Gastroc/Soleus: 5/5; PB/PL: 5/5 Muscle tone is normal.  · Ankle joint ROM  B/L supple DF/PF, non-crepitus  · STJ ROM supple inv/ev, non crepitus   · MTPJ b/l supple DF/PF, non crepitus    ASSESSMENT   Dermatophytosis of nail  -     Hepatic function panel; Future; Expected date: 08/02/2018  -     Tissue Specimen To Pathology, Dermatology    Nontraumatic avulsion of nail plate, initial encounter - Right Foot    Other orders  -     ciclopirox (PENLAC) 8 % Soln; Apply to affected toenails at night time DAILY. On 7th day, file nails down, clean all nails with alcohol and restart application process.  Dispense: 1 Bottle; Refill: 10        PLAN    1. Patient was educated about clinical and imaging findings, and verbalizes understanding of above.  2. I Discussed treatment options for nail fungus.   -I  explained that fungus lives in a warm dark moist environment and therefore patient should make every attempt to keep feet clean and dry.  We discussed drying feet thoroughly after shower particularly between the toes and then applying powder between the toes and in the shoes. I also discussed to disinfect shower tub, discard old shoes, and disinfect current shoes with antiseptic  -For fungal toenails I prescribed PENLAC nail lacquer to be used daily for up to a year.  I have provided the patient written instructions on topical solution application   I also discussed oral Lamisil but I did not recommend it as a first line of treatment since it is an internal medicine that may potentially have side effects, including liver problems.   -Patient elects for combination treatment.  -verbal consent was obtained for removal of right hallux toenail in order to obtain tissue specimen.  The area was cleansed with alcohol and then a tissue Nipper was then used and a  to completely excise the entire nail plate from the proximal nail fold.  Specimen sample was sent for identification and biopsy of nail was performed with the nail bed and matrix sent off to pathology.    -We also discussed final resolution of nail dystrophy via total nail removal either temporary or permanent. Patient was informed that nail fungus might return even after nail removal, however increased prognosis in resolution of nail fungus with combination therapy.    3. RTC  for follow up/evaluation as scheduled    Report Electronically Signed By:  Briseida Mccarty DPM   Podiatric Medicine & Surgery  [facility]  8/2/2018  2:24 PM

## 2018-08-02 NOTE — PATIENT INSTRUCTIONS
PENLAC ANTI-FUNGAL TOPICAL INSTRUCTIONS   1. You have been prescribed Penlac nail lacquer (Ciclopirox) topical solution 8%. Go and  the prescription from your local pharmacy.    2. Remove any nail polish on your feet. File away (with emery board) loose nail material and trim nails.    3. Apply the Penlac nail lacquer (ciclopirox) Topical Solution, 8% once daily (preferably at bedtime or eight hours before washing) to all affected nails with the applicator brush provided. The nail lacquer should be applied evenly over the entire nail plate. If possible, apply the solution to the nail bed, hyponychium, and the under surface of the nail plate when if your nail is loosely attached. Remember fungus lives under the nail plate, therefore the more the solution penetrates the nail bed, the better.    3. Continue to apply the solution daily (at bedtime preferably).  Daily applications should be made over the previous coat and removed with alcohol every seven days. This cycle should be repeated throughout the duration of therapy.     4. On the 7th day, remove the solution from all of your nails with alcohol. File your nails again with an emery board and then repeat the cycle again.    5. This treatment must be consistent. If you skip a day, continue the cycle as recommended. It may take up to 1 year for your nails to clear the fungal infection. Be patient.    It is recommended to dispose of all infected shoe gear that you will not likely wear again as well as weekly cleansing of all showering/bathing areas with antiseptics.Fungal spores like to hide in dark, warm, moist environments. Lastly, monthly treatments of all current shoe gear with moth balls x 8 hours.                  FUNGAL NAIL INFECTIONS    If your nail is thick and looks white or yellow, it may be infected with fungus. Nail   infections don't look pleasant, but they are not serious. There are treatments that   can clear up the infection.     What is a  fungal nail infection?   It's easy to catch a fungal nail infection, and lots of people get them. The sooner you   treat an infection, the easier it is to get rid of it. The fungi that cause these infections often live in warm, damp places, such as showers and floors around changing rooms.   People used to think there was nothing you could do about a fungal nail infection. But   there are now good treatments that can get rid of it. However, they take a long time to   work (as long as one year if the infection is bad). So you need to be patient.    Fungal infection of the nails causes changes in the appearance of fingernails and toenails. They may thicken, discolor, change shape or split. This condition is difficult to treat because nails grow slowly and have limited blood supply. It is common to have the infection come back after treatment.       What are the symptoms?   Your nail may look whitish or yellowish, and raised up from the finger or toe. The skin   around your nail may turn red. Sometimes the nail lifts off altogether. If the infection is   severe, the nail may also be crumbly. It's more common to get an infection of a toenail   than a fingernail. If you've had an infection for a long time, it may be painful. If you have a badly infected toe, it may be difficult to walk. If your immune system is weak or you have diabetes, you may be more likely to get these infections. The infection can also be more serious. So it's important to see your doctor as soon as possible if you think you have a nail infection.     What treatments work?   To get rid of a fungal nail infection you will probably have to take tablets, sometimes for   several months. There are also topical solutions (over the counter and prescription) that  you can put on your nail, and  things you can do to try to avoid getting another nail infection.    There are two types of medicines used.   Topical anti-fungal medicines are applied to the  "surface of the skin and nail area. These medicines are not very effective because they cannot get deep into the nail.     Topical medicines are most useful in combination with oral medicines . Oral antifungal medicines are more effective because they penetrate the nail from the inside out.  If medicines fail, the nail can be removed surgically or chemically. This improves the effectiveness of medical treatment because the fungus is physically removed from the body.       Tablets   The tablets that doctors use most often are called itraconazole (brand name Sporanox)   and terbinafine (Lamisil). Terbinafine seems to work slightly better. If you take terbinafine every day for 12 weeks, there's a 6 in 10 chance you'll get rid of   your fungal toenail infection.      How are fungal nail infections treated? -- Treatment depends, in part, on how severe the infection is, and how much it bothers you. If your infection is mild or doesn't bother you very much, you might choose not to treat it. An untreated nail infection probably won't go away, but it probably won't cause any long-term problems either.  When people need or choose to have treatment, it usually involves "antifungal" medicines that you get with a prescription from your doctor. These medicines are taken by mouth or put on the nail.Treatment with pills usually lasts a few months. Some people who take these medicines need to have blood tests. That's because these medicines can affect the liver.  If you don't want to or can't take antifungal pills, your doctor will talk with you about other treatment options. These might include using an antifungal medicine on the nail or having surgery to remove your nail.    Before starting any of these treatments, you should know that:  ?It can take many months for your nail to look normal again.  ?There is a chance that the treatment won't work. The infection might not get better, or it might come back. If either of these things " happen, your doctor can try another treatment or send you to a specialist.  Can fungal nail infections be prevented? -- Sometimes. To reduce your chance of getting one, you can:  ?Keep your feet clean and dry.  ?Avoid sharing nail tools, such as clippers and scissors.  ?Wear flip-flops or other footwear in a gym shower or locker room.  What if I want to get pregnant? -- If you want to get pregnant, let your doctor or nurse know. He or she might recommend that you not take certain antifungal medicines during pregnancy.    Alternative final options are:   If still no resolution with oral tablets or topics: then we can completely avulse/remove all involved toenails with subsequent treatment with topical antifungal solution.       Home Care:   1) Use medicines exactly as directed for as long as directed. Treating a fungal infection can take longer than other kinds of infections.   2) Smoking is a risk factor for fungal infection. This is one more reason to quit.   3) Wear absorbent socks and shoes that have ventilation. Sweaty feet increases risk of fungal infection and make an existing infection harder to treat.   4) Use footwear when in damp public places like swimming pools, gyms and shower rooms. This helps avoid exposure to the fungus that grows there.   It is recommended to dispose of all infected shoe gear that you will not likely wear again as well as weekly cleansing of all showering/bathing areas with antiseptics.Fungal spores like to hide in dark, warm, moist environments. Lastly, monthly treatments of all current shoe gear with moth balls x 8 hours.       Follow Up   with your doctor as directed by our staff.   Get Prompt Medical Attention   if any of the following occur:   -- Skin alongside the nail becomes reddened, swollen, painful or drains pus   -- Side effects from oral anti-fungal medicines       © 1913-1030 [employer]. [address] [place] [address], [place] [address]. All rights reserved.  This information is not intended as a substitute for professional medical care. Always follow your healthcare professional's instructions.

## 2018-08-03 DIAGNOSIS — Z12.39 BREAST CANCER SCREENING: ICD-10-CM

## 2018-08-16 ENCOUNTER — TELEPHONE (OUTPATIENT)
Dept: PODIATRY | Facility: CLINIC | Age: 73
End: 2018-08-16

## 2018-08-24 ENCOUNTER — LAB VISIT (OUTPATIENT)
Dept: LAB | Facility: HOSPITAL | Age: 73
End: 2018-08-24
Attending: PEDIATRICS
Payer: MEDICARE

## 2018-08-24 DIAGNOSIS — E55.9 VITAMIN D DEFICIENCY: ICD-10-CM

## 2018-08-24 DIAGNOSIS — E78.00 PURE HYPERCHOLESTEROLEMIA: ICD-10-CM

## 2018-08-24 LAB
25(OH)D3+25(OH)D2 SERPL-MCNC: 28 NG/ML
ALT SERPL W/O P-5'-P-CCNC: 18 U/L
AST SERPL-CCNC: 20 U/L
CHOLEST SERPL-MCNC: 162 MG/DL
CHOLEST/HDLC SERPL: 2.7 {RATIO}
HDLC SERPL-MCNC: 59 MG/DL
HDLC SERPL: 36.4 %
LDLC SERPL CALC-MCNC: 92 MG/DL
NONHDLC SERPL-MCNC: 103 MG/DL
TRIGL SERPL-MCNC: 55 MG/DL

## 2018-08-24 PROCEDURE — 84450 TRANSFERASE (AST) (SGOT): CPT

## 2018-08-24 PROCEDURE — 80061 LIPID PANEL: CPT

## 2018-08-24 PROCEDURE — 84460 ALANINE AMINO (ALT) (SGPT): CPT

## 2018-08-24 PROCEDURE — 82306 VITAMIN D 25 HYDROXY: CPT

## 2018-08-24 PROCEDURE — 36415 COLL VENOUS BLD VENIPUNCTURE: CPT | Mod: PO

## 2018-08-28 ENCOUNTER — OFFICE VISIT (OUTPATIENT)
Dept: INTERNAL MEDICINE | Facility: CLINIC | Age: 73
End: 2018-08-28
Payer: MEDICARE

## 2018-08-28 VITALS
TEMPERATURE: 97 F | HEART RATE: 84 BPM | HEIGHT: 62 IN | SYSTOLIC BLOOD PRESSURE: 108 MMHG | BODY MASS INDEX: 34.64 KG/M2 | DIASTOLIC BLOOD PRESSURE: 78 MMHG | WEIGHT: 188.25 LBS | OXYGEN SATURATION: 99 %

## 2018-08-28 DIAGNOSIS — E78.00 PURE HYPERCHOLESTEROLEMIA: Primary | ICD-10-CM

## 2018-08-28 DIAGNOSIS — K21.9 GASTROESOPHAGEAL REFLUX DISEASE WITHOUT ESOPHAGITIS: Chronic | ICD-10-CM

## 2018-08-28 DIAGNOSIS — J31.0 CHRONIC RHINITIS: ICD-10-CM

## 2018-08-28 DIAGNOSIS — E66.01 SEVERE OBESITY (BMI 35.0-39.9) WITH COMORBIDITY: ICD-10-CM

## 2018-08-28 DIAGNOSIS — M81.0 AGE RELATED OSTEOPOROSIS, UNSPECIFIED PATHOLOGICAL FRACTURE PRESENCE: ICD-10-CM

## 2018-08-28 DIAGNOSIS — M47.9 OSTEOARTHRITIS OF LOWER BACK: ICD-10-CM

## 2018-08-28 DIAGNOSIS — F41.9 ANXIETY: ICD-10-CM

## 2018-08-28 DIAGNOSIS — J30.89 NON-SEASONAL ALLERGIC RHINITIS DUE TO OTHER ALLERGIC TRIGGER: ICD-10-CM

## 2018-08-28 DIAGNOSIS — G47.33 OSA ON CPAP: ICD-10-CM

## 2018-08-28 DIAGNOSIS — E55.9 VITAMIN D DEFICIENCY: ICD-10-CM

## 2018-08-28 PROCEDURE — 99999 PR PBB SHADOW E&M-EST. PATIENT-LVL III: CPT | Mod: PBBFAC,,, | Performed by: PEDIATRICS

## 2018-08-28 PROCEDURE — 99214 OFFICE O/P EST MOD 30 MIN: CPT | Mod: S$GLB,,, | Performed by: PEDIATRICS

## 2018-08-28 RX ORDER — AMOXICILLIN 875 MG/1
TABLET, FILM COATED ORAL
Refills: 1 | COMMUNITY
Start: 2018-08-06 | End: 2018-12-03 | Stop reason: ALTCHOICE

## 2018-08-28 RX ORDER — FLUTICASONE PROPIONATE 50 MCG
2 SPRAY, SUSPENSION (ML) NASAL DAILY
Qty: 48 G | Refills: 3 | Status: SHIPPED | OUTPATIENT
Start: 2018-08-28 | End: 2019-11-13 | Stop reason: SDUPTHER

## 2018-08-28 NOTE — PROGRESS NOTES
Subjective:       Patient ID: Hiwot Coulter is a 73 y.o. female.    Chief Complaint: Follow-up    LIPIDS: Wsomewhat following D&E, inconsistent with contrave.   Obesity: used contrave until about 6-8 weeks ago. Had 20# weight loss overall.  Quiet anxiety. No buspar/lexapro. Contrave does help patient. [place]  website shows no interval xanax use  GERD Quiet on treatment, rare pepcid use.  Vitamin D: using twice weekly D  Plantar fascitis and cervical/low back pain. Generally good on ibuprofen, uses hydrocodone,  [place]  website accessed.   JAMAL/bronchitis: followed by pulmonary  Osteoporosis: on fosamax  LABS REVIEWED AND DISCUSSED WITH PATIENT          Review of Systems   Constitutional: Negative for fever and unexpected weight change.   HENT: Negative for congestion and rhinorrhea.    Eyes: Negative for discharge and redness.   Respiratory: Positive for cough and shortness of breath. Negative for wheezing.         Pastbronchitis currently stable.   Cardiovascular: Negative for chest pain, palpitations and leg swelling.   Gastrointestinal: Negative for abdominal pain, constipation, diarrhea and vomiting.   Endocrine: Negative for cold intolerance, heat intolerance, polydipsia, polyphagia and polyuria.   Genitourinary: Negative for decreased urine volume, difficulty urinating and menstrual problem.   Musculoskeletal: Negative for arthralgias and joint swelling.   Skin: Negative for rash and wound.   Neurological: Negative for syncope and headaches.   Psychiatric/Behavioral: Negative for behavioral problems and sleep disturbance.       Objective:      Physical Exam   Constitutional: She is oriented to person, place, and time. She appears well-developed and well-nourished. No distress.   Neck: No JVD present. No thyromegaly present.   Cardiovascular: Normal rate, regular rhythm and normal heart sounds.   No murmur heard.  Pulmonary/Chest: Effort normal and breath sounds normal. No respiratory distress. She has no  wheezes. She has no rales.   Abdominal: Soft. She exhibits no distension and no mass. There is no tenderness. There is no guarding.   Musculoskeletal: She exhibits no edema.   Lymphadenopathy:     She has no cervical adenopathy.   Neurological: She is alert and oriented to person, place, and time. No cranial nerve deficit. Coordination normal.   Skin: Capillary refill takes less than 2 seconds. No rash noted.   Psychiatric: She has a normal mood and affect. Her behavior is normal. Judgment and thought content normal.       Assessment:       1. Pure hypercholesterolemia    2. Vitamin D deficiency    3. Anxiety    4. Gastroesophageal reflux disease without esophagitis    5. Severe obesity (BMI 35.0-39.9) with comorbidity    6. JAMAL on CPAP    7. Osteoarthritis of lower back    8. Chronic rhinitis    9. Non-seasonal allergic rhinitis due to other allergic trigger    10. Age related osteoporosis, unspecified pathological fracture presence        Plan:       Pure hypercholesterolemia  -     Lipid panel; Future; Expected date: 08/28/2018  -     ALT (SGPT); Future; Expected date: 08/28/2018  -     AST (SGOT); Future; Expected date: 08/28/2018    Vitamin D deficiency  -     Vitamin D; Future; Expected date: 08/28/2018    Anxiety    Gastroesophageal reflux disease without esophagitis    Severe obesity (BMI 35.0-39.9) with comorbidity    JAMAL on CPAP    Osteoarthritis of lower back    Chronic rhinitis    Non-seasonal allergic rhinitis due to other allergic trigger  -     fluticasone (FLONASE) 50 mcg/actuation nasal spray; 2 sprays (100 mcg total) by Each Nare route once daily.  Dispense: 48 g; Refill: 3    Age related osteoporosis, unspecified pathological fracture presence    Maintain D&E, weight loss,. No change in meds, keep subspecialty care. Flu vaccine when in. F/U 6 months with labs. Mammogram.

## 2018-08-29 ENCOUNTER — HOSPITAL ENCOUNTER (OUTPATIENT)
Dept: RADIOLOGY | Facility: HOSPITAL | Age: 73
Discharge: HOME | End: 2018-08-29
Attending: PEDIATRICS
Payer: MEDICARE

## 2018-08-29 DIAGNOSIS — Z12.39 BREAST CANCER SCREENING: ICD-10-CM

## 2018-08-29 PROCEDURE — 77067 SCR MAMMO BI INCL CAD: CPT | Mod: 26,,, | Performed by: RADIOLOGY

## 2018-08-29 PROCEDURE — 77063 BREAST TOMOSYNTHESIS BI: CPT | Mod: TC,PO

## 2018-08-29 PROCEDURE — 77063 BREAST TOMOSYNTHESIS BI: CPT | Mod: 26,,, | Performed by: RADIOLOGY

## 2018-08-31 ENCOUNTER — TELEPHONE (OUTPATIENT)
Dept: INTERNAL MEDICINE | Facility: CLINIC | Age: 73
End: 2018-08-31

## 2018-08-31 NOTE — TELEPHONE ENCOUNTER
Received fax from pharmacy stating Fosamax not available from manufacture. S/w Pharmacist, she confirmed that Fosamax on national back order d/t  not currently manufacturing pills, no future expected date. Pt informed as above and will let her know Dr. Ramirez's response, pt voiced understanding.   Suggested alternatives are generic Boniva or generic Actonel, please advise?    LV 08/28/18

## 2018-09-01 RX ORDER — IBANDRONATE SODIUM 150 MG/1
150 TABLET, FILM COATED ORAL
Qty: 1 TABLET | Refills: 11 | Status: SHIPPED | OUTPATIENT
Start: 2018-09-01 | End: 2019-08-28 | Stop reason: SDUPTHER

## 2018-09-04 ENCOUNTER — OFFICE VISIT (OUTPATIENT)
Dept: PULMONOLOGY | Facility: CLINIC | Age: 73
End: 2018-09-04
Payer: MEDICARE

## 2018-09-04 VITALS
WEIGHT: 190.69 LBS | HEIGHT: 62 IN | OXYGEN SATURATION: 96 % | HEART RATE: 80 BPM | RESPIRATION RATE: 18 BRPM | DIASTOLIC BLOOD PRESSURE: 74 MMHG | SYSTOLIC BLOOD PRESSURE: 126 MMHG | BODY MASS INDEX: 35.09 KG/M2

## 2018-09-04 DIAGNOSIS — G47.61 PERIODIC LIMB MOVEMENT DISORDER (PLMD): ICD-10-CM

## 2018-09-04 DIAGNOSIS — G47.33 OSA ON CPAP: Primary | ICD-10-CM

## 2018-09-04 DIAGNOSIS — F51.12 BEHAVIORALLY INDUCED INSUFFICIENT SLEEP SYNDROME: ICD-10-CM

## 2018-09-04 DIAGNOSIS — E66.01 SEVERE OBESITY (BMI 35.0-39.9) WITH COMORBIDITY: ICD-10-CM

## 2018-09-04 PROCEDURE — 99214 OFFICE O/P EST MOD 30 MIN: CPT | Mod: PBBFAC,PO | Performed by: NURSE PRACTITIONER

## 2018-09-04 PROCEDURE — 99213 OFFICE O/P EST LOW 20 MIN: CPT | Mod: S$PBB,,, | Performed by: NURSE PRACTITIONER

## 2018-09-04 PROCEDURE — 99999 PR PBB SHADOW E&M-EST. PATIENT-LVL IV: CPT | Mod: PBBFAC,,, | Performed by: NURSE PRACTITIONER

## 2018-09-04 NOTE — ASSESSMENT & PLAN NOTE
Benefits, not compliant with usage >= 4 Hours   Improved compliant from 33.3% to 53.3%, not compliant with 70% usage related to travel out of town without CPAP.  [place] 2  Average AHI 4.2   Auto-CPAP Summary (Valorie Respironics)  Auto-CPAP Mean Pressure 7.0 cmH2O  Auto-CPAP Peak Average Pressure 8.4 cmH2O  Average Device Pressure <= 90% of Time 9.0 cmH2O  Continue auto CPAP 4-10 cm, prefers starting pressure of 4 cm  Yearly supply order  HME: [facility]

## 2018-09-04 NOTE — PROGRESS NOTES
Subjective:      Patient ID: Hiwot Coulter is a 73 y.o. female.    Chief Complaint: Sleep Apnea    HPI: Hiwot Coulter is here for follow up for JAMAL and CPAP yearly complaince assessment.  She is on Auto CPAP of 4-10 cmH2O pressure.  She remains not compliant with CPAP use with use greater than 4 hours of device use.   Complaince download today reveals improved compliance from 6/19/2018 download from 33.3% to 53.3% of days with greater than 4 hours of device use.  Patient reports benefit from CPAP use with improved energy and less daytime sleepiness when CPAP used,  the reason for not compliant is she does not take machine when travels out of town.   Patient reports no complaints, she is pleased with change in settings from CPAP 7 cm to auto cpap 4-10 cm, prefers starting pressure of 4 cm.   Improved AHI from 30.9 on CPAP 7 cm to AHI 4.2 on CPAP 4-10 cm change made June 2018.   [place] 2    Previous Report Reviewed: lab reports and office notes     Past Medical History: The following portions of the patient's history were reviewed and updated as appropriate:   She  has a past surgical history that includes Cholecystectomy (2012).  Her family history includes Cancer in her father; Cataracts in her mother; Heart disease in her father; Leukemia in her father.  She  reports that  has never smoked. she has never used smokeless tobacco. She reports that she does not drink alcohol or use drugs.  She has a current medication list which includes the following prescription(s): acetaminophen, alprazolam, ciclopirox, ergocalciferol, famotidine, fluticasone, ibandronate, ibuprofen, naltrexone-bupropion, prednisone, tramadol, amoxicillin, benzonatate, cephalexin, clobetasol 0.05%, desonide 0.05%, and hydrocodone-acetaminophen.  She has No Known Allergies..    The following portions of the patient's history were reviewed and updated as appropriate: allergies, current medications, past family history, past medical history, past  "social history, past surgical history and problem list.    Review of Systems   Constitutional: Negative for fever, chills, weight loss, weight gain, activity change, appetite change, fatigue and night sweats.   HENT: Positive for postnasal drip (began this morning, has flonase on hand). Negative for rhinorrhea, sinus pressure, voice change and congestion.    Eyes: Negative for redness and itching.   Respiratory: Negative for snoring, cough, sputum production, chest tightness, shortness of breath, wheezing, orthopnea, asthma nighttime symptoms, dyspnea on extertion, use of rescue inhaler and somnolence.    Cardiovascular: Negative.  Negative for chest pain, palpitations and leg swelling.   Genitourinary: Negative for difficulty urinating and hematuria.   Endocrine: Negative for cold intolerance and heat intolerance.    Musculoskeletal: Negative for arthralgias, gait problem, joint swelling and myalgias.   Skin: Negative.    Gastrointestinal: Negative for nausea, vomiting, abdominal pain and acid reflux.   Neurological: Negative for dizziness, weakness, light-headedness and headaches.   Hematological: Negative for adenopathy. No excessive bruising.   All other systems reviewed and are negative.     Objective:   /74   Pulse 80   Resp 18   Ht 5' 2" (1.575 m)   Wt 86.5 kg (190 lb 11.2 oz)   SpO2 96%   BMI 34.88 kg/m²   Physical Exam   Constitutional: She is oriented to person, place, and time. She appears well-developed and well-nourished. She is active and cooperative.  Non-toxic appearance. She does not appear ill. No distress.   HENT:   Head: Normocephalic.   Right Ear: External ear normal.   Left Ear: External ear normal.   Nose: Nose normal.   Mouth/Throat: Oropharynx is clear and moist. No oropharyngeal exudate.   Eyes: Conjunctivae are normal.   Neck: Normal range of motion. Neck supple.   Cardiovascular: Normal rate, regular rhythm, normal heart sounds and intact distal pulses.   Pulmonary/Chest: " Effort normal and breath sounds normal. No stridor.   Abdominal: Soft.   Musculoskeletal: Normal range of motion.   Lymphadenopathy:     She has no cervical adenopathy.   Neurological: She is alert and oriented to person, place, and time.   Skin: Skin is warm and dry.   Psychiatric: She has a normal mood and affect. Her behavior is normal. Judgment and thought content normal.   Vitals reviewed.    Personal Diagnostic Review  CPAP download  APAP 4-10 cm  Compliance Summary  7/5/2018 - 9/2/2018 (60 days)  Days with Device Usage 37 days  Days without Device Usage 23 days  Percent Days with Device Usage 61.7%  Cumulative Usage 9 days 7 hrs. 22 mins. 10 secs.  Maximum Usage (1 Day) 8 hrs. 16 mins. 9 secs.  Average Usage (All Days) 3 hrs. 43 mins. 22 secs.  Average Usage (Days Used) 6 hrs. 2 mins. 13 secs.  Minimum Usage (1 Day) 2 hrs. 56 mins. 51 secs.  Percent of Days with Usage >= 4 Hours 53.3%  Percent of Days with Usage < 4 Hours 46.7%  Date Range  Total Blower Time 9 days 7 hrs. 22 mins. 30 secs.  Average AHI 4.2  Auto-CPAP Summary (Valorie Respironics)  Auto-CPAP Mean Pressure 7.0 cmH2O  Auto-CPAP Peak Average Pressure 8.4 cmH2O  Average Device Pressure <= 90% of Time 9.0 cmH2O  Average Time in Large Leak Per Day 10 mins. 42 secs.    Assessment:     1. JAMAL on CPAP    2. Severe obesity (BMI 35.0-39.9) with comorbidity    3. Periodic limb movement disorder (PLMD)    4. Behaviorally induced insufficient sleep syndrome      Orders Placed This Encounter   Procedures    CPAP/BIPAP SUPPLIES     Yearly supply order.  Full face mask  90 day supply. 4 refills.  HME: [facility]     Order Specific Question:   Type of mask:     Answer:   FFM     Order Specific Question:   Headgear?     Answer:   Yes     Order Specific Question:   Tubing?     Answer:   Yes     Order Specific Question:   Humidifier chamber?     Answer:   Yes     Order Specific Question:   Chin strap?     Answer:   Yes     Order Specific Question:   Filters?      Answer:   Yes     Order Specific Question:   Length of need (1-99 months):     Answer:   99     Plan:     Problem List Items Addressed This Visit     Behaviorally induced insufficient sleep syndrome     Improved sleep hygiene         JAMAL on CPAP - Primary     Benefits, not compliant with usage >= 4 Hours   Improved compliant from 33.3% to 53.3%, not compliant with 70% usage related to travel out of town without CPAP.  [place] 2  Average AHI 4.2   Auto-CPAP Summary (Valorie Respironics)  Auto-CPAP Mean Pressure 7.0 cmH2O  Auto-CPAP Peak Average Pressure 8.4 cmH2O  Average Device Pressure <= 90% of Time 9.0 cmH2O  Continue auto CPAP 4-10 cm, prefers starting pressure of 4 cm  Yearly supply order  HME: [facility]           Relevant Orders    CPAP/BIPAP SUPPLIES    Periodic limb movement disorder (PLMD)     Controlled, on xanax 2mg            Severe obesity (BMI 35.0-39.9) with comorbidity     Continue to encourage exercise and dietary modification to obtain normal weight.                 (DME) - [facility]  Reviewed therapeutic goals for positive airway pressure therapy Auto CPAP  Ideal is usage 100% of nights for 6 - 8 hours per night. Minimum usage is 70% of night for at least 4 hours per night used. Pateint expressed understanding.     Follow-up in about 1 year (around 9/4/2019) for CPAP 1 year compliance download.

## 2018-09-04 NOTE — TELEPHONE ENCOUNTER
Pt advised Dr. Ramirez sent new rx for Boniva tablet, take one tablet once a week, pt voiced understanding and to call back PRN.

## 2018-10-02 DIAGNOSIS — E55.9 VITAMIN D DEFICIENCY: ICD-10-CM

## 2018-10-02 RX ORDER — ERGOCALCIFEROL 1.25 MG/1
CAPSULE ORAL
Qty: 25 CAPSULE | Refills: 3 | Status: SHIPPED | OUTPATIENT
Start: 2018-10-02 | End: 2019-10-24 | Stop reason: SDUPTHER

## 2018-11-27 ENCOUNTER — OFFICE VISIT (OUTPATIENT)
Dept: PODIATRY | Facility: CLINIC | Age: 73
End: 2018-11-27
Payer: MEDICARE

## 2018-11-27 VITALS
HEIGHT: 62 IN | BODY MASS INDEX: 35.11 KG/M2 | RESPIRATION RATE: 16 BRPM | WEIGHT: 190.81 LBS | HEART RATE: 85 BPM | DIASTOLIC BLOOD PRESSURE: 83 MMHG | SYSTOLIC BLOOD PRESSURE: 126 MMHG

## 2018-11-27 DIAGNOSIS — M79.674 PAIN OF RIGHT GREAT TOE: ICD-10-CM

## 2018-11-27 DIAGNOSIS — L60.0 INGROWING NAIL, RIGHT GREAT TOE: ICD-10-CM

## 2018-11-27 DIAGNOSIS — L03.031 PARONYCHIA OF GREAT TOE OF RIGHT FOOT: Primary | ICD-10-CM

## 2018-11-27 PROCEDURE — 99213 OFFICE O/P EST LOW 20 MIN: CPT | Mod: 25,HCNC,S$GLB, | Performed by: PODIATRIST

## 2018-11-27 PROCEDURE — 11730 AVULSION NAIL PLATE SIMPLE 1: CPT | Mod: T5,HCNC,S$GLB, | Performed by: PODIATRIST

## 2018-11-27 PROCEDURE — 99999 PR PBB SHADOW E&M-EST. PATIENT-LVL III: CPT | Mod: PBBFAC,HCNC,, | Performed by: PODIATRIST

## 2018-11-27 PROCEDURE — 1101F PT FALLS ASSESS-DOCD LE1/YR: CPT | Mod: CPTII,HCNC,S$GLB, | Performed by: PODIATRIST

## 2018-11-27 NOTE — PROGRESS NOTES
Subjective:       Patient ID: Hiwot Coulter is a 73 y.o. female.    Chief Complaint: Nail Problem (c/o possible infection right hallux, no c/o pain, c/o drainage, swelling, wears casual shoes with socks, non-diabetic Pt, PCP Dr. Ramirez.)      HPI: Hiwot Coulter presents to the office with complaints of pains to the right great  toe, due to ingrowing. States mild drainage. States swelling, redness and moderate to severe pains. Symptoms have been on going for several days and are worsening. States difficulties with walking as a result of pains. Walking and standing, particularly with shoe gear, exacerbates the ailment. Pains are sharp in nature and are rated at approx. 8/10. Patient's Primary Care Provider is JUDI Ramirez Jr, MD. Patient has been on oral Amoxicillin, 2/2 dental surgery, on and off, for the past 3 months, but states it does not help the toe area. She does not smoke.    Review of patient's allergies indicates:  No Known Allergies    Past Medical History:   Diagnosis Date    Anxiety     Arthritis     hand, neck, back    Cataract     Depression     GERD (gastroesophageal reflux disease)     Hyperlipidemia     Obesity     Osteopenia     Pollen allergies     Sleep apnea        Family History   Problem Relation Age of Onset    Leukemia Father     Heart disease Father     Cancer Father         leukemia    Cataracts Mother     Melanoma Neg Hx     Psoriasis Neg Hx     Lupus Neg Hx     Eczema Neg Hx        Social History     Socioeconomic History    Marital status:      Spouse name: Not on file    Number of children: Not on file    Years of education: Not on file    Highest education level: Not on file   Social Needs    Financial resource strain: Not on file    Food insecurity - worry: Not on file    Food insecurity - inability: Not on file    Transportation needs - medical: Not on file    Transportation needs - non-medical: Not on file   Occupational History     "Occupation:      Employer: [employer]   Tobacco Use    Smoking status: Never Smoker    Smokeless tobacco: Never Used   Substance and Sexual Activity    Alcohol use: No    Drug use: No    Sexual activity: Yes     Partners: Male     Birth control/protection: None   Other Topics Concern    Are you pregnant or think you may be? No    Breast-feeding No   Social History Narrative    No pets or smokers in household.       Past Surgical History:   Procedure Laterality Date    CHOLECYSTECTOMY  2012       Review of Systems   Constitutional: Negative for chills, fatigue and fever.   HENT: Negative for hearing loss.    Eyes: Negative for photophobia and visual disturbance.   Respiratory: Negative for cough, chest tightness, shortness of breath and wheezing.    Cardiovascular: Negative for chest pain and palpitations.   Gastrointestinal: Negative for constipation, diarrhea, nausea and vomiting.   Endocrine: Negative for cold intolerance and heat intolerance.   Genitourinary: Negative for flank pain.   Musculoskeletal: Positive for gait problem. Negative for neck pain and neck stiffness.   Skin: Positive for color change.   Neurological: Negative for light-headedness, numbness and headaches.   Psychiatric/Behavioral: Negative for sleep disturbance.         Objective:   /83 (BP Location: Right arm, Patient Position: Sitting, BP Method: Small (Automatic))   Pulse 85   Resp 16   Ht 5' 2" (1.575 m)   Wt 86.5 kg (190 lb 12.9 oz)   BMI 34.90 kg/m²     LOWER EXTREMITY PHYSICAL EXAMINATION    NEUROLOGY: Sensation to light touch is intact. Proprioception is intact.     VASCULAR: On the right foot, the dorsalis pedis pulse is 2/4 and the posterior tibial pulse is 1/4. Capillary refill time is less than 3 seconds. Hair growth is sparse, but present on the dorsum of the foot and at the digits. Proximal to distal temperature is warm to warm.    DERMATOLOGY: Ingrowing of the right foot medial, lateral and " proximal nail borders of the great toe. The nail is incurvated into the skin of the affected border, causing pains, which are moderate in nature. There is moderate to severe edema. There is mild cellulitis noted. There is no drainage. No fluctuance. Granuloma formation is absent.    ORTHOPEDIC: Manual Muscle Testing is 5/5 in all planes on the right, without pains, with and without resistance. Gait pattern is slightly antalgic.      Assessment:     1. Paronychia of great toe of right foot    2. Pain of right great toe    3. Ingrowing nail, right great toe        Plan:     Paronychia of great toe of right foot    Pain of right great toe    Ingrowing nail, right great toe      Oral, written and verbal consent were obtained from patient, prior to procedure being performed as discussed below.    The right great toe was anesthetized with a total of 3cc of 2% Lidocaine w/ Epinephrine.  The area was then prepped appropriately with betadine paint. Following this, a [place] Elevator was utilized to free up the medial and lateral nail borders as well as the nail plate itself, from the surrounding soft tissues.  Next, a Platypus Nail Pulling Forcep was used to slowly free the nail from the eponychium.    Once freed from the soft tissue structures, the nail bed was visualized and no defects or lacerations were noted. No osseous exposure is noted. There is scant sanguinous drainage. No purulence is noted. A curette was then utilized to remove any and all debris from either nail fold. Sterile Adaptic, antibiotic cream and a sterile bandage with Coban was placed on the digit.      Patient was informed of the possible complications related to nail regrowth, e.g., nail dystrophy or onychomycosis. Patient was given written and oral instructions for care including the office phone number if any questions or concerns arise.      Patient to start daily application of topical ABx. ointment with a bandage.     Patient to follow up in  approx. 10 to 14 days, if needed.                Future Appointments   Date Time Provider Department Center   2/20/2019  8:15 AM LABORATORY, [facility] [place]   2/27/2019  7:00 AM CINDY Ramirez Jr., MD [facility] IM [facility]   9/10/2019  7:40 AM Jaye Hu NP [facility]

## 2018-12-03 ENCOUNTER — HOSPITAL ENCOUNTER (OUTPATIENT)
Dept: RADIOLOGY | Facility: HOSPITAL | Age: 73
Discharge: HOME | End: 2018-12-03
Attending: FAMILY MEDICINE
Payer: MEDICARE

## 2018-12-03 ENCOUNTER — OFFICE VISIT (OUTPATIENT)
Dept: INTERNAL MEDICINE | Facility: CLINIC | Age: 73
End: 2018-12-03
Payer: MEDICARE

## 2018-12-03 VITALS
HEART RATE: 82 BPM | WEIGHT: 190.06 LBS | TEMPERATURE: 98 F | SYSTOLIC BLOOD PRESSURE: 124 MMHG | OXYGEN SATURATION: 98 % | HEIGHT: 62 IN | DIASTOLIC BLOOD PRESSURE: 82 MMHG | BODY MASS INDEX: 34.98 KG/M2

## 2018-12-03 DIAGNOSIS — R07.89 STERNAL PAIN: ICD-10-CM
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
## 2018-12-03 PROCEDURE — 71046 X-RAY EXAM CHEST 2 VIEWS: CPT | Mod: 26,HCNC,, | Performed by: RADIOLOGY

## 2018-12-03 PROCEDURE — 71046 X-RAY EXAM CHEST 2 VIEWS: CPT | Mod: TC,FY,HCNC,PO

## 2018-12-03 PROCEDURE — 1101F PT FALLS ASSESS-DOCD LE1/YR: CPT | Mod: CPTII,HCNC,S$GLB, | Performed by: FAMILY MEDICINE

## 2018-12-03 PROCEDURE — 99999 PR PBB SHADOW E&M-EST. PATIENT-LVL III: CPT | Mod: PBBFAC,HCNC,, | Performed by: FAMILY MEDICINE

## 2018-12-03 PROCEDURE — 99214 OFFICE O/P EST MOD 30 MIN: CPT | Mod: HCNC,S$GLB,, | Performed by: FAMILY MEDICINE
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
## 2019-01-29 ENCOUNTER — OFFICE VISIT (OUTPATIENT)
Dept: INTERNAL MEDICINE | Facility: CLINIC | Age: 74
End: 2019-01-29
Payer: MEDICARE

## 2019-01-29 VITALS
TEMPERATURE: 97 F | OXYGEN SATURATION: 95 % | HEART RATE: 91 BPM | WEIGHT: 197.56 LBS | DIASTOLIC BLOOD PRESSURE: 74 MMHG | HEIGHT: 62 IN | SYSTOLIC BLOOD PRESSURE: 122 MMHG | BODY MASS INDEX: 36.35 KG/M2

## 2019-01-29 DIAGNOSIS — J01.90 ACUTE SINUSITIS, RECURRENCE NOT SPECIFIED, UNSPECIFIED LOCATION: ICD-10-CM

## 2019-01-29 DIAGNOSIS — L30.9 ECZEMA, UNSPECIFIED TYPE: Primary | ICD-10-CM

## 2019-01-29 DIAGNOSIS — J20.9 ACUTE BRONCHITIS, UNSPECIFIED ORGANISM: ICD-10-CM

## 2019-01-29 PROCEDURE — 99999 PR PBB SHADOW E&M-EST. PATIENT-LVL IV: CPT | Mod: PBBFAC,HCNC,, | Performed by: PHYSICIAN ASSISTANT

## 2019-01-29 PROCEDURE — 1101F PR PT FALLS ASSESS DOC 0-1 FALLS W/OUT INJ PAST YR: ICD-10-PCS | Mod: HCNC,CPTII,S$GLB, | Performed by: PHYSICIAN ASSISTANT

## 2019-01-29 PROCEDURE — 1101F PT FALLS ASSESS-DOCD LE1/YR: CPT | Mod: HCNC,CPTII,S$GLB, | Performed by: PHYSICIAN ASSISTANT

## 2019-01-29 PROCEDURE — 99214 OFFICE O/P EST MOD 30 MIN: CPT | Mod: HCNC,S$GLB,, | Performed by: PHYSICIAN ASSISTANT

## 2019-01-29 PROCEDURE — 99999 PR PBB SHADOW E&M-EST. PATIENT-LVL IV: ICD-10-PCS | Mod: PBBFAC,HCNC,, | Performed by: PHYSICIAN ASSISTANT

## 2019-01-29 PROCEDURE — 99499 UNLISTED E&M SERVICE: CPT | Mod: HCNC,S$GLB,, | Performed by: PHYSICIAN ASSISTANT

## 2019-01-29 PROCEDURE — 99499 RISK ADDL DX/OHS AUDIT: ICD-10-PCS | Mod: HCNC,S$GLB,, | Performed by: PHYSICIAN ASSISTANT

## 2019-01-29 PROCEDURE — 99214 PR OFFICE/OUTPT VISIT, EST, LEVL IV, 30-39 MIN: ICD-10-PCS | Mod: HCNC,S$GLB,, | Performed by: PHYSICIAN ASSISTANT

## 2019-01-29 RX ORDER — DOXYCYCLINE 100 MG/1
100 CAPSULE ORAL 2 TIMES DAILY
Qty: 20 CAPSULE | Refills: 0 | Status: SHIPPED | OUTPATIENT
Start: 2019-01-29 | End: 2019-02-08

## 2019-01-29 RX ORDER — BETAMETHASONE DIPROPIONATE 0.5 MG/G
CREAM TOPICAL 2 TIMES DAILY
Qty: 45 G | Refills: 0 | Status: SHIPPED | OUTPATIENT
Start: 2019-01-29 | End: 2020-02-27

## 2019-01-29 NOTE — PATIENT INSTRUCTIONS
-guafenesin or mucinex DM for cough. Take with large glass of water twice a day.   -start fluticasone nasal spray 1 spray each nostril once a day  -aquaphor over the counter ointment for legs    Managing Atopic Dermatitis (Eczema)     After bathing, gently pat your skin dry (dont rub). Apply moisturizer while your skin is still damp.   To manage your symptoms and help reduce the severity and frequency, try these self-care tips:  Caring for your skin  · Use a gentle, fragrance-free cleanser (or nonsoap cleanser) for bathing. Rinse well. Pat skin dry.  · Take warm, not hot, baths or showers. Try to limit them to no more that 10 to 15 minutes.   · Use moisturizer liberally right after you bathe, while your skin is still damp.  · Avoid scratching because it will cause more damage to your skin.   · Topical, over-the-counter hydrocortisone cream may help control mild symptoms.   Controlling your environment  · Avoid extreme heat or cold.  · Avoid very humid or very dry air.  · If your home or office air is very dry, use a humidifier.  · Avoid allergens, such as dust, that may be present in bedding, carpets, plush toys, or rugs.  · Know that pet hair and dander can cause flare-ups.  Seeking medical treatment  Another way to keep symptoms under control is to seek medical treatment. Talk with your healthcare provider about the type of treatment that may work best for you. Your provider may prescribe treatments such as the following:  · Topical treatments to put on the skin daily  · Medicines taken by mouth (oral medicines), such as antihistamines, antibiotics, or corticosteroids  · In severe cases shots (injections) may be needed to control the symptoms. You may even need antibiotics if skin infections occur.  Treatments dont work the same way for every person. So if your symptoms continue or get worse, ask your healthcare provider about other treatments.  Making lifestyle choices  · Manage the stress in your  life.  · Wear loose-fitting cotton clothing that does not bind or rub your skin.  · Avoid contact with wool or other scratchy fabrics.  · Use fragrance-free products.  Getting good results  Now that you know more about atopic dermatitis, the next step is up to you. Follow your healthcare providers treatment plan and your self-care routine. This will help bring atopic dermatitis under control. If your symptoms persist, be sure to let your health care provider know.   Date Last Reviewed: 2/1/2017 © 2000-2017 [email or web address]. [address] [place] [id number]. All rights reserved. This information is not intended as a substitute for professional medical care. Always follow your healthcare professional's instructions.        Self-Care for Skin Rashes     Pat your skin dry. Do not rub.     When your skin reacts to a substance your body is sensitive to, it can cause a rash. You can treat most rashes at home by keeping the skin clean and dry. Many rashes may get better on their own within 2 to 3 days. You may need medical attention if your rash itches, drains, or hurts, particularly if the rash is getting worse.  What can cause a skin rash?  · Sun poisoning, caused by too much exposure to the sun  · An irritant or allergic reaction to a certain type of food, plant, or chemical, such as  shellfish, poison ivy, and or cleaning products  · An infection caused by a fungus (ringworm), virus (chickenpox), or bacteria (strep)  · Bites or infestation caused by insects or pests, such as ticks, lice, or mites  · Dry skin, which is often seen during the winter months and in older people  How can I control itching and skin damage?  · Take soothing lukewarm baths in a colloidal oatmeal product. You can buy this at the [employer].  · Do your best not to scratch. Clip fingernails short, especially in young children, to reduce skin damage if scratching does occur.  · Use moisturizing skin lotion instead of scratching  your dry skin.  · Use sunscreen whenever going out into direct sun.  · Use only mild cleansing agents whenever possible.  · Wash with mild, nonirritating soap and warm water.  · Wear clothing that breathes, such as cotton shirts or canvas shoes.  · If fluid is seeping from the rash, cover it loosely with clean gauze to absorb the discharge.  · Many rashes are contagious. Prevent the rash from spreading to others by washing your hands often before or after touching others with any skin rash.  Use medicine  · Antihistamines such as diphenhydramine can help control itching. But use with caution because they can make you drowsy.  · Using over-the-counter hydrocortisone cream on small rashes may help reduce swelling and itching  · Most over-the-counter antifungal medicines can treat athletes foot and many other fungal infections of the skin.  Check with your healthcare provider  Call your healthcare provider if:  · You were told that you have a fungal infection on your skin to make sure you have the correct type of medicine.  · You have questions or concerns about medicines or their side effects.     Call 911  Call 911 if either of these occur:  · Your tongue or lips start to swell  · You have difficulty breathing      Call your healthcare provider  Call your healthcare provider if any of these occur:  · Temperature of more than 101.0°F (38.3°C), or as directed  · Sore throat, a cough, or unusual fatigue  · Red, oozy, or painful rash gets worse. These are signs of infection.  · Rash covers your face, genitals, or most of your body  · Crusty sores or red rings that begin to spread  · You were exposed to someone who has a contagious rash, such as scabies or lice.  · Red bulls-eye rash with a white center (a sign of Lyme disease)  · You were told that you have resistant bacteria (MRSA) on your skin.   Date Last Reviewed: 5/12/2015  © 2611-2287 [employer]. [address] [place] [address], [place] [address]. All  rights reserved. This information is not intended as a substitute for professional medical care. Always follow your healthcare professional's instructions.

## 2019-01-29 NOTE — PROGRESS NOTES
Subjective:      Patient ID: Hiwot Coulter is a 73 y.o. female.    Chief Complaint: Chest Congestion; head congestion; Rash; and Cough    URI    This is a new problem. The current episode started 1 to 4 weeks ago. The problem has been gradually worsening. There has been no fever. Associated symptoms include chest pain, congestion, coughing, rhinorrhea, a sore throat and wheezing. Pertinent negatives include no abdominal pain, diarrhea, dysuria, ear pain, headaches, joint pain, joint swelling, nausea, neck pain, plugged ear sensation, rash, sinus pain, sneezing, swollen glands or vomiting. Treatments tried: coricidin hbp. The treatment provided no relief.   Rash   This is a recurrent problem. The current episode started in the past 7 days. The problem has been waxing and waning since onset. Location: right lower leg. The rash is characterized by scaling, itchiness and dryness. Associated with: winter. Associated symptoms include congestion, coughing, rhinorrhea and a sore throat. Pertinent negatives include no anorexia, diarrhea, eye pain, facial edema, fatigue, fever, joint pain, nail changes, shortness of breath or vomiting. The treatment provided no relief. Her past medical history is significant for eczema. There is no history of allergies, asthma or varicella.       Review of Systems   Constitutional: Negative for fatigue and fever.   HENT: Positive for congestion, rhinorrhea and sore throat. Negative for ear pain, sinus pain and sneezing.    Eyes: Negative for pain.   Respiratory: Positive for cough and wheezing. Negative for shortness of breath.    Cardiovascular: Positive for chest pain.   Gastrointestinal: Negative for abdominal pain, anorexia, diarrhea, nausea and vomiting.   Genitourinary: Negative for dysuria.   Musculoskeletal: Negative for joint pain and neck pain.   Skin: Negative for nail changes and rash.   Neurological: Negative for headaches.     Objective:   /74   Pulse 91   Temp 97 °F  "(36.1 °C) (Tympanic)   Ht 5' 2" (1.575 m)   Wt 89.6 kg (197 lb 8.5 oz)   SpO2 95%   BMI 36.13 kg/m²     Physical Exam   Constitutional: She is oriented to person, place, and time. She appears well-developed and well-nourished.   HENT:   Head: Normocephalic and atraumatic.   Right Ear: Hearing, external ear and ear canal normal. No tenderness. A middle ear effusion (serous) is present.   Left Ear: Hearing, tympanic membrane, external ear and ear canal normal. No tenderness.  No middle ear effusion.   Nose: Mucosal edema and rhinorrhea present. No sinus tenderness. Right sinus exhibits no maxillary sinus tenderness and no frontal sinus tenderness. Left sinus exhibits no maxillary sinus tenderness and no frontal sinus tenderness.   Mouth/Throat: Uvula is midline and mucous membranes are normal. No oral lesions. Normal dentition. No dental abscesses, uvula swelling or dental caries. Oropharyngeal exudate and posterior oropharyngeal erythema present. No posterior oropharyngeal edema or tonsillar abscesses. No tonsillar exudate.   Eyes: Conjunctivae and EOM are normal. Pupils are equal, round, and reactive to light. Right eye exhibits no discharge. Left eye exhibits no discharge.   Neck: Normal range of motion. Neck supple.   Cardiovascular: Normal rate, regular rhythm and normal heart sounds. Exam reveals no gallop and no friction rub.   No murmur heard.  Pulmonary/Chest: Effort normal and breath sounds normal. No respiratory distress. She has no decreased breath sounds. She has no wheezes. She has no rhonchi. She has no rales.   Musculoskeletal:        Legs:  Lymphadenopathy:     She has no cervical adenopathy.   Neurological: She is alert and oriented to person, place, and time.   Skin: Skin is warm. No rash noted. No erythema. No pallor.   Psychiatric: She has a normal mood and affect. Her behavior is normal. Judgment and thought content normal.   Nursing note and vitals reviewed.      Assessment:     1. Eczema, " unspecified type    2. Acute sinusitis, recurrence not specified, unspecified location    3. Acute bronchitis, unspecified organism      Plan:   Eczema, unspecified type  -     betamethasone dipropionate (DIPROLENE) 0.05 % cream; Apply topically 2 (two) times daily. for 7 days  Dispense: 45 g; Refill: 0  -aquafor ointment or unscented lotion twice a day    Acute sinusitis, recurrence not specified, unspecified location  -     doxycycline (MONODOX) 100 MG capsule; Take 1 capsule (100 mg total) by mouth 2 (two) times daily. Do not take with milk or yogurt for 10 days  Dispense: 20 capsule; Refill: 0  -start flonase nasal spray 1 spray each nostril once/day    Acute bronchitis, unspecified organism  -guafenesin or mucinex DM for cough. Take with large glass of water twice a day.       Follow-up if symptoms worsen or fail to improve.

## 2019-02-19 ENCOUNTER — LAB VISIT (OUTPATIENT)
Dept: LAB | Facility: HOSPITAL | Age: 74
End: 2019-02-19
Attending: PEDIATRICS
Payer: MEDICARE

## 2019-02-19 DIAGNOSIS — E55.9 VITAMIN D DEFICIENCY: ICD-10-CM

## 2019-02-19 DIAGNOSIS — E78.00 PURE HYPERCHOLESTEROLEMIA: ICD-10-CM

## 2019-02-19 LAB
25(OH)D3+25(OH)D2 SERPL-MCNC: 26 NG/ML
ALT SERPL W/O P-5'-P-CCNC: 23 U/L
AST SERPL-CCNC: 25 U/L
CHOLEST SERPL-MCNC: 210 MG/DL
CHOLEST/HDLC SERPL: 3.1 {RATIO}
HDLC SERPL-MCNC: 68 MG/DL
HDLC SERPL: 32.4 %
LDLC SERPL CALC-MCNC: 127.6 MG/DL
NONHDLC SERPL-MCNC: 142 MG/DL
TRIGL SERPL-MCNC: 72 MG/DL

## 2019-02-19 PROCEDURE — 80061 LIPID PANEL: CPT | Mod: HCNC

## 2019-02-19 PROCEDURE — 84450 TRANSFERASE (AST) (SGOT): CPT | Mod: HCNC

## 2019-02-19 PROCEDURE — 82306 VITAMIN D 25 HYDROXY: CPT | Mod: HCNC

## 2019-02-19 PROCEDURE — 36415 COLL VENOUS BLD VENIPUNCTURE: CPT | Mod: HCNC

## 2019-02-19 PROCEDURE — 84460 ALANINE AMINO (ALT) (SGPT): CPT | Mod: HCNC

## 2019-02-27 ENCOUNTER — OFFICE VISIT (OUTPATIENT)
Dept: INTERNAL MEDICINE | Facility: CLINIC | Age: 74
End: 2019-02-27
Payer: MEDICARE

## 2019-02-27 VITALS
HEIGHT: 62 IN | BODY MASS INDEX: 35.94 KG/M2 | TEMPERATURE: 97 F | HEART RATE: 74 BPM | WEIGHT: 195.31 LBS | SYSTOLIC BLOOD PRESSURE: 122 MMHG | OXYGEN SATURATION: 98 % | DIASTOLIC BLOOD PRESSURE: 84 MMHG

## 2019-02-27 DIAGNOSIS — J31.0 CHRONIC RHINITIS: ICD-10-CM

## 2019-02-27 DIAGNOSIS — M85.89 OSTEOPENIA OF MULTIPLE SITES: ICD-10-CM

## 2019-02-27 DIAGNOSIS — Z12.39 BREAST CANCER SCREENING: ICD-10-CM

## 2019-02-27 DIAGNOSIS — E78.00 PURE HYPERCHOLESTEROLEMIA: Primary | ICD-10-CM

## 2019-02-27 DIAGNOSIS — K21.9 GASTROESOPHAGEAL REFLUX DISEASE WITHOUT ESOPHAGITIS: Chronic | ICD-10-CM

## 2019-02-27 DIAGNOSIS — G47.61 PERIODIC LIMB MOVEMENT DISORDER (PLMD): ICD-10-CM

## 2019-02-27 DIAGNOSIS — F51.12 BEHAVIORALLY INDUCED INSUFFICIENT SLEEP SYNDROME: ICD-10-CM

## 2019-02-27 DIAGNOSIS — E55.9 VITAMIN D DEFICIENCY: ICD-10-CM

## 2019-02-27 DIAGNOSIS — M50.30 DDD (DEGENERATIVE DISC DISEASE), CERVICAL: ICD-10-CM

## 2019-02-27 DIAGNOSIS — F41.9 ANXIETY: ICD-10-CM

## 2019-02-27 DIAGNOSIS — M47.9 OSTEOARTHRITIS OF LOWER BACK: ICD-10-CM

## 2019-02-27 DIAGNOSIS — E66.01 SEVERE OBESITY (BMI 35.0-39.9) WITH COMORBIDITY: ICD-10-CM

## 2019-02-27 DIAGNOSIS — G47.33 OSA ON CPAP: ICD-10-CM

## 2019-02-27 PROCEDURE — 99999 PR PBB SHADOW E&M-EST. PATIENT-LVL III: ICD-10-PCS | Mod: PBBFAC,HCNC,, | Performed by: PEDIATRICS

## 2019-02-27 PROCEDURE — 99214 OFFICE O/P EST MOD 30 MIN: CPT | Mod: HCNC,S$GLB,, | Performed by: PEDIATRICS

## 2019-02-27 PROCEDURE — 99999 PR PBB SHADOW E&M-EST. PATIENT-LVL III: CPT | Mod: PBBFAC,HCNC,, | Performed by: PEDIATRICS

## 2019-02-27 PROCEDURE — 1101F PT FALLS ASSESS-DOCD LE1/YR: CPT | Mod: HCNC,CPTII,S$GLB, | Performed by: PEDIATRICS

## 2019-02-27 PROCEDURE — 1101F PR PT FALLS ASSESS DOC 0-1 FALLS W/OUT INJ PAST YR: ICD-10-PCS | Mod: HCNC,CPTII,S$GLB, | Performed by: PEDIATRICS

## 2019-02-27 PROCEDURE — 99499 UNLISTED E&M SERVICE: CPT | Mod: HCNC,S$GLB,, | Performed by: PEDIATRICS

## 2019-02-27 PROCEDURE — 99499 RISK ADDL DX/OHS AUDIT: ICD-10-PCS | Mod: HCNC,S$GLB,, | Performed by: PEDIATRICS

## 2019-02-27 PROCEDURE — 99214 PR OFFICE/OUTPT VISIT, EST, LEVL IV, 30-39 MIN: ICD-10-PCS | Mod: HCNC,S$GLB,, | Performed by: PEDIATRICS

## 2019-02-27 NOTE — PROGRESS NOTES
Subjective:       Patient ID: Hiwot Coulter is a 73 y.o. female.     Chief Complaint: Follow-up     LIPIDS: Not following D&E, weight is up over holidays, inconsistent with contrave.   Obesity:see above  Quiet anxiety. No buspar/lexapro. Contrave does help patient. [email or web address] website shows no interval xanax use  GERD Quiet on treatment, rare pepcid use.  Vitamin D: using twice weekly D  Plantar fascitis and cervical/low back pain. Generally good on ibuprofen, uses hydrocodone,  [email or web address] website accessed.   JAMAL/bronchitis: followed by pulmonary  Osteoporosis: on fosamax  Osteoarthritis: rare norco use, had tramadol for dental procedure in 11/18, [email or web address] website reviewed.  LABS REVIEWED AND DISCUSSED WITH PATIENT              Review of Systems   Constitutional: Negative for fever and unexpected weight change.   HENT: Negative for congestion and rhinorrhea(except occasional allergies).    Eyes: Negative for discharge and redness.   Respiratory: negative for cough and shortness of breath. Negative for wheezing.    Cardiovascular: Negative for chest pain, palpitations and leg swelling.   Gastrointestinal: Negative for abdominal pain, constipation, diarrhea and vomiting.   Endocrine: Negative for cold intolerance, heat intolerance, polydipsia, polyphagia and polyuria.   Genitourinary: Negative for decreased urine volume, difficulty urinating and menstrual problem.   Musculoskeletal: Negative for arthralgias and joint swelling.   Skin: Negative for rash and wound.   Neurological: Negative for syncope and headaches.   Psychiatric/Behavioral: Negative for behavioral problems and sleep disturbance.       Objective:   Physical Exam   Constitutional: She is oriented to person, place, and time. She appears well-developed and well-nourished. No distress.   Neck: No JVD present. No thyromegaly present.   Cardiovascular: Normal rate, regular rhythm and normal heart sounds.   No murmur heard.  Pulmonary/Chest: Effort normal and breath  sounds normal. No respiratory distress. She has no wheezes. She has no rales.   Abdominal: Soft. She exhibits no distension and no mass. There is no tenderness. There is no guarding.   Musculoskeletal: She exhibits no edema.   Lymphadenopathy:     She has no cervical adenopathy.   Neurological: She is alert and oriented to person, place, and time. No cranial nerve deficit. Coordination normal.   Skin: Capillary refill takes less than 2 seconds. No rash noted.   Psychiatric: She has a normal mood and affect. Her behavior is normal. Judgment and thought content normal.       Assessment:       1. Pure hypercholesterolemia    2. Vitamin D deficiency    3. Anxiety    4. Gastroesophageal reflux disease without esophagitis    5. Severe obesity (BMI 35.0-39.9) with comorbidity    6. JAMAL on CPAP    7. Osteoarthritis of lower back    8. Chronic rhinitis    9. Non-seasonal allergic rhinitis due to other allergic trigger    10. Age related osteoporosis, unspecified pathological fracture presence        Plan:    Maintain D&E, weight loss,. No change in meds, keep subspecialty care.  F/U yearly with labs. Mammogram in fall.

## 2019-03-02 DIAGNOSIS — M47.9 OSTEOARTHRITIS OF LOWER BACK: ICD-10-CM

## 2019-03-04 RX ORDER — IBUPROFEN 800 MG/1
TABLET ORAL
Qty: 270 TABLET | Refills: 3 | Status: SHIPPED | OUTPATIENT
Start: 2019-03-04 | End: 2019-05-14 | Stop reason: SDUPTHER

## 2019-03-25 DIAGNOSIS — K21.9 GASTROESOPHAGEAL REFLUX DISEASE WITHOUT ESOPHAGITIS: Chronic | ICD-10-CM

## 2019-03-25 RX ORDER — FAMOTIDINE 20 MG/1
TABLET, FILM COATED ORAL
Qty: 180 TABLET | Refills: 3 | Status: SHIPPED | OUTPATIENT
Start: 2019-03-25 | End: 2019-12-27 | Stop reason: SDUPTHER

## 2019-04-12 ENCOUNTER — OFFICE VISIT (OUTPATIENT)
Dept: OPHTHALMOLOGY | Facility: CLINIC | Age: 74
End: 2019-04-12
Payer: MEDICARE

## 2019-04-12 DIAGNOSIS — H52.7 REFRACTION DISORDER: ICD-10-CM

## 2019-04-12 DIAGNOSIS — H43.811 PVD (POSTERIOR VITREOUS DETACHMENT), RIGHT: Primary | ICD-10-CM

## 2019-04-12 DIAGNOSIS — H25.12 NUCLEAR SENILE CATARACT OF LEFT EYE: ICD-10-CM

## 2019-04-12 DIAGNOSIS — H25.11 SENILE NUCLEAR CATARACT, RIGHT: ICD-10-CM

## 2019-04-12 PROCEDURE — 92014 COMPRE OPH EXAM EST PT 1/>: CPT | Mod: HCNC,S$GLB,, | Performed by: OPHTHALMOLOGY

## 2019-04-12 PROCEDURE — 99999 PR PBB SHADOW E&M-EST. PATIENT-LVL I: ICD-10-PCS | Mod: PBBFAC,HCNC,, | Performed by: OPHTHALMOLOGY

## 2019-04-12 PROCEDURE — 92014 PR EYE EXAM, EST PATIENT,COMPREHESV: ICD-10-PCS | Mod: HCNC,S$GLB,, | Performed by: OPHTHALMOLOGY

## 2019-04-12 PROCEDURE — 99999 PR PBB SHADOW E&M-EST. PATIENT-LVL I: CPT | Mod: PBBFAC,HCNC,, | Performed by: OPHTHALMOLOGY
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
## 2019-04-18 ENCOUNTER — TELEPHONE (OUTPATIENT)
Dept: OPHTHALMOLOGY | Facility: CLINIC | Age: 74
End: 2019-04-18

## 2019-04-18 NOTE — TELEPHONE ENCOUNTER
Left message that CAMACHO only does prcdr on Wed and itz get her appt for Wed   Could try 4/26 at 10 AM with CAMACHO   But due to scheduling conflicts we cant have ascan and SHAMIKA appt on same day - since [facility] does not have cat eval a-scans on Friday

## 2019-04-26 ENCOUNTER — OFFICE VISIT (OUTPATIENT)
Dept: OPHTHALMOLOGY | Facility: CLINIC | Age: 74
End: 2019-04-26
Payer: MEDICARE

## 2019-04-26 DIAGNOSIS — H43.811 POSTERIOR VITREOUS DETACHMENT OF RIGHT EYE: Primary | ICD-10-CM

## 2019-04-26 PROCEDURE — 92014 COMPRE OPH EXAM EST PT 1/>: CPT | Mod: HCNC,S$GLB,, | Performed by: OPHTHALMOLOGY

## 2019-04-26 PROCEDURE — 99999 PR PBB SHADOW E&M-EST. PATIENT-LVL II: CPT | Mod: PBBFAC,HCNC,, | Performed by: OPHTHALMOLOGY

## 2019-04-26 PROCEDURE — 99999 PR PBB SHADOW E&M-EST. PATIENT-LVL II: ICD-10-PCS | Mod: PBBFAC,HCNC,, | Performed by: OPHTHALMOLOGY

## 2019-04-26 PROCEDURE — 92014 PR EYE EXAM, EST PATIENT,COMPREHESV: ICD-10-PCS | Mod: HCNC,S$GLB,, | Performed by: OPHTHALMOLOGY

## 2019-04-26 RX ORDER — MEDICATED FOOT POWDER 0.1 G/G
POWDER TOPICAL
COMMUNITY
Start: 2019-04-11 | End: 2019-05-14

## 2019-04-26 RX ORDER — LIDOCAINE, CAPSAICIN, MENTHOL, METHYL SALICYLATE .025; 4; 5; 2 G/1; G/1; G/1; G/1
PATCH TOPICAL
COMMUNITY
Start: 2019-04-11 | End: 2020-02-27

## 2019-04-26 NOTE — PROGRESS NOTES
===============================  04/26/2019   Hiwot Coulter,   74 y.o. female   Last visit [facility]: :Visit date not found   Last visit eye dept. 4/12/2019  [facility]:  Corrected distance visual acuity was 20/25 in the right eye and 20/25 in the left eye.  Tonometry     Tonometry (Applanation, 9:28 AM)       Right Left    Pressure 19 19               Not recorded         Not recorded        Chief Complaint   Patient presents with    Spots and/or Floaters     PVD EVAL PER DR. TALAVERA        HPI     Spots and/or Floaters      Additional comments: PVD EVAL PER DR. TALAVERA              Comments     Cataracts OU          Last edited by Rosalind Love on 4/26/2019  9:19 AM. (History)          ________________  4/26/2019  Problem List Items Addressed This Visit     None      Visit Diagnoses     Posterior vitreous detachment of right eye    -  Primary      pvd od  No holes no tears / / os normal  Discussed S&S of rd  rtc with any worsening or if symptoms persist      .       ===========================

## 2019-05-14 ENCOUNTER — OFFICE VISIT (OUTPATIENT)
Dept: OPHTHALMOLOGY | Facility: CLINIC | Age: 74
End: 2019-05-14
Payer: MEDICARE

## 2019-05-14 DIAGNOSIS — H25.12 NUCLEAR SENILE CATARACT OF LEFT EYE: ICD-10-CM

## 2019-05-14 DIAGNOSIS — H43.811 POSTERIOR VITREOUS DETACHMENT OF RIGHT EYE: ICD-10-CM

## 2019-05-14 DIAGNOSIS — H25.11 SENILE NUCLEAR CATARACT, RIGHT: Primary | ICD-10-CM

## 2019-05-14 PROCEDURE — 92136 OPHTHALMIC BIOMETRY: CPT | Mod: HCNC,RT,S$GLB, | Performed by: OPHTHALMOLOGY

## 2019-05-14 PROCEDURE — 99499 NO LOS: ICD-10-PCS | Mod: HCNC,S$GLB,, | Performed by: OPHTHALMOLOGY

## 2019-05-14 PROCEDURE — 92136 IOL MASTER - OD - RIGHT EYE: ICD-10-PCS | Mod: HCNC,RT,S$GLB, | Performed by: OPHTHALMOLOGY

## 2019-05-14 PROCEDURE — 99999 PR PBB SHADOW E&M-EST. PATIENT-LVL III: CPT | Mod: PBBFAC,HCNC,, | Performed by: OPHTHALMOLOGY

## 2019-05-14 PROCEDURE — 99999 PR PBB SHADOW E&M-EST. PATIENT-LVL III: ICD-10-PCS | Mod: PBBFAC,HCNC,, | Performed by: OPHTHALMOLOGY

## 2019-05-14 PROCEDURE — 99499 UNLISTED E&M SERVICE: CPT | Mod: HCNC,S$GLB,, | Performed by: OPHTHALMOLOGY

## 2019-05-14 RX ORDER — KETOROLAC TROMETHAMINE 5 MG/ML
1 SOLUTION OPHTHALMIC 4 TIMES DAILY
Qty: 1 BOTTLE | Refills: 3 | Status: SHIPPED | OUTPATIENT
Start: 2019-05-14 | End: 2019-05-21

## 2019-05-14 RX ORDER — DIFLUPREDNATE OPHTHALMIC 0.5 MG/ML
1 EMULSION OPHTHALMIC 4 TIMES DAILY
Qty: 1 BOTTLE | Refills: 1 | Status: SHIPPED | OUTPATIENT
Start: 2019-05-14 | End: 2019-06-13

## 2019-05-14 RX ORDER — POLYMYXIN B SULFATE AND TRIMETHOPRIM 1; 10000 MG/ML; [USP'U]/ML
1 SOLUTION OPHTHALMIC EVERY 4 HOURS
Qty: 1 BOTTLE | Refills: 1 | Status: SHIPPED | OUTPATIENT
Start: 2019-05-14 | End: 2019-05-19

## 2019-05-14 NOTE — PROGRESS NOTES
HPI     Patient was sent to Dr. Busch for a retinal clearance Patient returns for   cataract testing, and scheduling.  Patient desires to proceed with phaco OD      Last saw JCC04/26/19  Last saw MGM 04/12/19  Cataracts OU    Last edited by Donis Ford MD on 5/14/2019  1:33 PM. (History)            Assessment /Plan     For exam results, see Encounter Report.      ICD-10-CM ICD-9-CM    1. Senile nuclear cataract, right H25.11 366.16 Visually Significant Cataract OD  Patient reports decreased vision consistent with the clinical amount of lenticular opacity, which reaches the level of visual significance and affects activities of daily living including reading and glare. Risks, benefits, and alternatives to cataract surgery were discussed.   The pt expressed a desire to proceed with surgery with the potential for some reasonable degree of visual improvement.    Discussed IOL options and refractive outcomes for this patient.    Phaco right eye,   Topical  monofocal IOL.  Will aim for distance  Referral to [facility] for Ophthalmic surgery  Prescriptions sent for preoperative medications  Durezol, POLY, KETO  Explained that patient may need glasses after surgery.  Discussed that vision may be limited by PVD OD          2. Nuclear senile cataract of left eye H25.12 366.16 Monitor   3. Posterior vitreous detachment of right eye H43.811 379.21 Monitor       Return to clinic Phaco OD

## 2019-06-21 ENCOUNTER — PATIENT MESSAGE (OUTPATIENT)
Dept: INTERNAL MEDICINE | Facility: CLINIC | Age: 74
End: 2019-06-21

## 2019-06-21 RX ORDER — HYDROCORTISONE VALERATE CREAM 2 MG/G
CREAM TOPICAL 2 TIMES DAILY
Qty: 60 G | Refills: 0 | Status: SHIPPED | OUTPATIENT
Start: 2019-06-21 | End: 2022-12-09

## 2019-06-21 NOTE — TELEPHONE ENCOUNTER
See mychart image. Pt requesting rx for treatment, please advise?    LV 02/27/19  Next visit due 02/2020

## 2019-06-27 ENCOUNTER — OFFICE VISIT (OUTPATIENT)
Dept: DERMATOLOGY | Facility: CLINIC | Age: 74
End: 2019-06-27
Payer: MEDICARE

## 2019-06-27 DIAGNOSIS — L30.9 ECZEMA, UNSPECIFIED TYPE: ICD-10-CM

## 2019-06-27 DIAGNOSIS — L28.0 LICHEN SIMPLEX CHRONICUS: Primary | ICD-10-CM

## 2019-06-27 PROCEDURE — 1101F PR PT FALLS ASSESS DOC 0-1 FALLS W/OUT INJ PAST YR: ICD-10-PCS | Mod: HCNC,CPTII,S$GLB, | Performed by: PHYSICIAN ASSISTANT

## 2019-06-27 PROCEDURE — 99202 PR OFFICE/OUTPT VISIT, NEW, LEVL II, 15-29 MIN: ICD-10-PCS | Mod: HCNC,S$GLB,, | Performed by: PHYSICIAN ASSISTANT

## 2019-06-27 PROCEDURE — 99202 OFFICE O/P NEW SF 15 MIN: CPT | Mod: HCNC,S$GLB,, | Performed by: PHYSICIAN ASSISTANT

## 2019-06-27 PROCEDURE — 99999 PR PBB SHADOW E&M-EST. PATIENT-LVL II: CPT | Mod: PBBFAC,HCNC,, | Performed by: PHYSICIAN ASSISTANT

## 2019-06-27 PROCEDURE — 99499 UNLISTED E&M SERVICE: CPT | Mod: HCNC,S$GLB,, | Performed by: PHYSICIAN ASSISTANT

## 2019-06-27 PROCEDURE — 1101F PT FALLS ASSESS-DOCD LE1/YR: CPT | Mod: HCNC,CPTII,S$GLB, | Performed by: PHYSICIAN ASSISTANT

## 2019-06-27 PROCEDURE — 99999 PR PBB SHADOW E&M-EST. PATIENT-LVL II: ICD-10-PCS | Mod: PBBFAC,HCNC,, | Performed by: PHYSICIAN ASSISTANT

## 2019-06-27 PROCEDURE — 99499 RISK ADDL DX/OHS AUDIT: ICD-10-PCS | Mod: HCNC,S$GLB,, | Performed by: PHYSICIAN ASSISTANT

## 2019-06-27 RX ORDER — TRIAMCINOLONE ACETONIDE 1 MG/G
CREAM TOPICAL
Qty: 80 G | Refills: 1 | Status: SHIPPED | OUTPATIENT
Start: 2019-06-27 | End: 2019-11-21 | Stop reason: SDUPTHER

## 2019-06-27 NOTE — PROGRESS NOTES
Subjective:       Patient ID:  Hiwot Coulter is a 74 y.o. female who presents for No chief complaint on file.    HPI    Review of Systems     Objective:    Physical Exam       Diagram Legend     Erythematous scaling macule/papule c/w actinic keratosis       Vascular papule c/w angioma      Pigmented verrucoid papule/plaque c/w seborrheic keratosis      Yellow umbilicated papule c/w sebaceous hyperplasia      Irregularly shaped tan macule c/w lentigo     1-2 mm smooth white papules consistent with Milia      Movable subcutaneous cyst with punctum c/w epidermal inclusion cyst      Subcutaneous movable cyst c/w pilar cyst      Firm pink to brown papule c/w dermatofibroma      Pedunculated fleshy papule(s) c/w skin tag(s)      Evenly pigmented macule c/w junctional nevus     Mildly variegated pigmented, slightly irregular-bordered macule c/w mildly atypical nevus      Flesh colored to evenly pigmented papule c/w intradermal nevus       Pink pearly papule/plaque c/w basal cell carcinoma      Erythematous hyperkeratotic cursted plaque c/w SCC      Surgical scar with no sign of skin cancer recurrence      Open and closed comedones      Inflammatory papules and pustules      Verrucoid papule consistent consistent with wart     Erythematous eczematous patches and plaques     Dystrophic onycholytic nail with subungual debris c/w onychomycosis     Umbilicated papule    Erythematous-base heme-crusted tan verrucoid plaque consistent with inflamed seborrheic keratosis     Erythematous Silvery Scaling Plaque c/w Psoriasis     See annotation      Assessment / Plan:        There are no diagnoses linked to this encounter.         No follow-ups on file.

## 2019-06-27 NOTE — PROGRESS NOTES
"  Subjective:       Patient ID:  Hiwot Coulter is a 74 y.o. female who presents for   Chief Complaint   Patient presents with    Rash     to right lef X 3 months tx hydrocortisone cream - improvement     Hx of LSC of right leg and nummular eczema, last seen by Dr. Rider 4/2015.      History of Present Illness: The patient presents with chief complaint of rash.  Location: R leg  Duration: 3 months, but  History of similar rash in winter months  Signs/Symptoms: itchy, rough "alligator skin", red; denies oozing or crusting    Prior treatments: hydrocortisone valerate 0.2% cream (helped some)  PMHX; denies psoriasis or FHX of psoriasis        Review of Systems   Constitutional: Negative for fever and chills.   Gastrointestinal: Negative for nausea and vomiting.   Skin: Positive for itching, rash and dry skin. Negative for daily sunscreen use, activity-related sunscreen use and recent sunburn.   Hematologic/Lymphatic: Does not bruise/bleed easily.        Objective:    Physical Exam   Constitutional: She appears well-developed and well-nourished. No distress.   Neurological: She is alert and oriented to person, place, and time. She is not disoriented.   Psychiatric: She has a normal mood and affect.   Skin:   Areas Examined (abnormalities noted in diagram):   Head / Face Inspection Performed  Neck Inspection Performed  Chest / Axilla Inspection Performed  Back Inspection Performed  RUE Inspected  LUE Inspection Performed  RLE Inspected  LLE Inspection Performed              Diagram Legend     Erythematous scaling macule/papule c/w actinic keratosis       Vascular papule c/w angioma      Pigmented verrucoid papule/plaque c/w seborrheic keratosis      Yellow umbilicated papule c/w sebaceous hyperplasia      Irregularly shaped tan macule c/w lentigo     1-2 mm smooth white papules consistent with Milia      Movable subcutaneous cyst with punctum c/w epidermal inclusion cyst      Subcutaneous movable cyst c/w pilar cyst     "  Firm pink to brown papule c/w dermatofibroma      Pedunculated fleshy papule(s) c/w skin tag(s)      Evenly pigmented macule c/w junctional nevus     Mildly variegated pigmented, slightly irregular-bordered macule c/w mildly atypical nevus      Flesh colored to evenly pigmented papule c/w intradermal nevus       Pink pearly papule/plaque c/w basal cell carcinoma      Erythematous hyperkeratotic cursted plaque c/w SCC      Surgical scar with no sign of skin cancer recurrence      Open and closed comedones      Inflammatory papules and pustules      Verrucoid papule consistent consistent with wart     Erythematous eczematous patches and plaques     Dystrophic onycholytic nail with subungual debris c/w onychomycosis     Umbilicated papule    Erythematous-base heme-crusted tan verrucoid plaque consistent with inflamed seborrheic keratosis     Erythematous Silvery Scaling Plaque c/w Psoriasis     See annotation      Assessment / Plan:        Lichen simplex chronicus  -     triamcinolone acetonide 0.1% (KENALOG) 0.1 % cream; AAA of rash of leg bid prn flares. Steroid- do not apply to face or folds of skin.  Dispense: 80 g; Refill: 1  Eczema, unspecified type  -     triamcinolone acetonide 0.1% (KENALOG) 0.1 % cream; AAA of rash of leg bid prn flares. Steroid- do not apply to face or folds of skin.  Dispense: 80 g; Refill: 1  Discussed dx and tx options. Recommend gentle skin care routine for maintenance. Start Dove sensitive soap and Cera Ve or Cetaphil Cream qd. D/C HC 0.2% and start TAC 0.1% cream bid prn flares. Discussed risk of recurrence and the association with rubbing and scratching. Avoid scratching.           Follow up in about 3 months (around 9/27/2019).

## 2019-06-27 NOTE — PATIENT INSTRUCTIONS
Use a mild gentle soap such as Dove for Sensitive Skin or Cetaphil Gentle Cleanser.  Recommend fragrance free and hypoallergenic skin care products.  Shower or bathe once daily. Limit duration to 5 minutes with lukewarm water.  Apply moisturizer immediately after showering.  Some good moisturizers to try are Cetaphil, CeraVe, or Aveeno.

## 2019-07-01 ENCOUNTER — OFFICE VISIT (OUTPATIENT)
Dept: INTERNAL MEDICINE | Facility: CLINIC | Age: 74
End: 2019-07-01
Payer: MEDICARE

## 2019-07-01 VITALS
WEIGHT: 195.13 LBS | DIASTOLIC BLOOD PRESSURE: 82 MMHG | HEIGHT: 62 IN | BODY MASS INDEX: 35.91 KG/M2 | TEMPERATURE: 98 F | SYSTOLIC BLOOD PRESSURE: 118 MMHG | OXYGEN SATURATION: 99 % | HEART RATE: 64 BPM

## 2019-07-01 DIAGNOSIS — J06.9 VIRAL URI WITH COUGH: Primary | ICD-10-CM

## 2019-07-01 PROCEDURE — 99999 PR PBB SHADOW E&M-EST. PATIENT-LVL III: ICD-10-PCS | Mod: PBBFAC,HCNC,, | Performed by: FAMILY MEDICINE

## 2019-07-01 PROCEDURE — 99999 PR PBB SHADOW E&M-EST. PATIENT-LVL III: CPT | Mod: PBBFAC,HCNC,, | Performed by: FAMILY MEDICINE

## 2019-07-01 PROCEDURE — 1101F PR PT FALLS ASSESS DOC 0-1 FALLS W/OUT INJ PAST YR: ICD-10-PCS | Mod: HCNC,CPTII,S$GLB, | Performed by: FAMILY MEDICINE

## 2019-07-01 PROCEDURE — 99214 PR OFFICE/OUTPT VISIT, EST, LEVL IV, 30-39 MIN: ICD-10-PCS | Mod: HCNC,S$GLB,, | Performed by: FAMILY MEDICINE

## 2019-07-01 PROCEDURE — 1101F PT FALLS ASSESS-DOCD LE1/YR: CPT | Mod: HCNC,CPTII,S$GLB, | Performed by: FAMILY MEDICINE

## 2019-07-01 PROCEDURE — 99214 OFFICE O/P EST MOD 30 MIN: CPT | Mod: HCNC,S$GLB,, | Performed by: FAMILY MEDICINE

## 2019-07-01 RX ORDER — PREDNISONE 5 MG/1
TABLET ORAL
Qty: 18 TABLET | Refills: 0 | Status: SHIPPED | OUTPATIENT
Start: 2019-07-01 | End: 2019-08-02 | Stop reason: ALTCHOICE

## 2019-07-01 NOTE — PROGRESS NOTES
CHIEF COMPLAINT  URI    This is my first time treating her here. All problems addressed today are NEW TO ME.     HISTORY, ASSESSMENT, and PLAN    1. Viral URI with cough      Quality described as cough productive of modest amount of clear sputum.  Associated symptoms include coryza, postnasal drip, scratchy sore throat, and low-grade fever (maximum temperature 100.4° F).  Onset of symptoms reported as about one week ago.  Timing of symptoms described as getting worse over the week, better since the weekend.  Severity of symptoms described as moderate at present.  Alleviating factors include Chloraseptic spray. I educated her on the apparently viral nature of this acute illness, how the management was largely supportive and symptom focused. We discussed the risks and benefits of treatment options, and she feels that the severity of her symptoms is sufficient to warrant the treatment prescribed. I discouraged the use of over-the-counter cough and cold medications. I told her to expect gradual improvement and resolution of symptoms over the next week or two, and I instructed her to let me know if her illness failed to follow this expected course.     Orders Placed This Encounter    predniSONE (DELTASONE) 5 MG tablet       Problem List Items Addressed This Visit     None      Visit Diagnoses     Viral URI with cough    -  Primary    Relevant Medications    predniSONE (DELTASONE) 5 MG tablet           Outpatient Medications Prior to Visit   Medication Sig Dispense Refill    1ST MEDX-PATCH WITH LIDOCAINE 4-20-0.025-5 % PtMd       acetaminophen (TYLENOL) 500 mg Cap as needed.       alprazolam (XANAX) 2 MG Tab Take 1 tablet (2 mg total) by mouth 2 (two) times daily as needed. 180 tablet 1    ergocalciferol (ERGOCALCIFEROL) 50,000 unit Cap TAKE 1 CAPSULE BY MOUTH 2 TIMES A WEEK 25 capsule 3    famotidine (PEPCID) 20 MG tablet TAKE 1 TABLET(20 MG) BY MOUTH TWICE DAILY 180 tablet 3    fluticasone (FLONASE) 50  mcg/actuation nasal spray 2 sprays (100 mcg total) by Each Nare route once daily. 48 g 3    hydrocodone-acetaminophen 5-325mg (NORCO) 5-325 mg per tablet Take 1 tablet by mouth every 6 (six) hours as needed for Pain. Do not use more than 4 a day including OTC tylenol. Not to be used within 3 days of contrave. 60 tablet 0    hydrocortisone (WESTCORT) 0.2 % cream Apply topically 2 (two) times daily. 60 g 0    ibandronate (BONIVA) 150 mg tablet Take 1 tablet (150 mg total) by mouth every 30 days. 1 tablet 11    ibuprofen (ADVIL,MOTRIN) 800 MG tablet   3    naltrexone-bupropion 8-90 mg TbSR Take 2 tablets by mouth 2 (two) times daily. Not to be used with in 3 days of hydrocodone. 120 tablet 4    traMADol (ULTRAM) 50 mg tablet TK 1 TO 2 TS PO Q 6 H PRN P  0    triamcinolone acetonide 0.1% (KENALOG) 0.1 % cream AAA of rash of leg bid prn flares. Steroid- do not apply to face or folds of skin. 80 g 1    predniSONE (DELTASONE) 20 MG tablet Prednisone 60 mg/ day for 3 days, 40 mg/day for 3 days,20 mg/ day for 3 days, (1/2 tablet )10 mg a day for 3 days. 20 tablet 1    betamethasone dipropionate (DIPROLENE) 0.05 % cream Apply topically 2 (two) times daily. for 7 days 45 g 0     No facility-administered medications prior to visit.         Medications Ordered This Encounter   Medications    predniSONE (DELTASONE) 5 MG tablet     Sig: Take 3 tablets by mouth daily for 3 days, then 2 tablets daily for 3 days, then 1 tablets daily for 3 days.     Dispense:  18 tablet     Refill:  0       Medications Discontinued During This Encounter   Medication Reason    predniSONE (DELTASONE) 20 MG tablet Therapy completed        Follow up if symptoms worsen or fail to improve.    REVIEW OF SYSTEMS  CONSTITUTIONAL:  Low-grade fever reported.  PULMONARY: No hemoptysis or difficulty breathing reported.  CARDIOVASCULAR: No chest pain or orthopnea reported.     PHYSICAL EXAM  Vitals:    07/01/19 0921   BP: 118/82   Pulse: 64   Temp: 98.2  "°F (36.8 °C)   TempSrc: Axillary   SpO2: 99%   Weight: 88.5 kg (195 lb 1.7 oz)   Height: 5' 2" (1.575 m)     CONSTITUTIONAL: Vital signs noted. No apparent distress. Does not appear acutely ill or septic. Appears adequately hydrated.  EYES: Pupils equal and reactive. Extraocular movements intact. Sclerae anicteric. Lids and conjunctiva unremarkable.  ENT: External ENT grossly unremarkable. Ear canals and visualized tympanic membranes are unremarkable. Hearing grossly intact. Nasal mucosa pink. Oropharynx moist. Posterior oropharynx is reasonably symmetric with mild erythema, but is without lesion, ulceration, or exudate.  NECK: Trachea midline. No significant cervical lymphadenopathy.  PULM: Lungs clear. Breathing unlabored.  HEART: Auscultation reveals regular rate and rhythm.  DERM: Skin warm and moist with normal turgor.     Documentation entered by me for this encounter may have been done in part using speech-recognition technology. Although I have made an effort to ensure accuracy, "sound like" errors may exist and should be interpreted in context. -BONNIE Cartagena MD.   "

## 2019-07-18 ENCOUNTER — OUTSIDE PLACE OF SERVICE (OUTPATIENT)
Dept: ADMINISTRATIVE | Facility: OTHER | Age: 74
End: 2019-07-18
Payer: MEDICARE

## 2019-07-18 PROCEDURE — 66984 XCAPSL CTRC RMVL W/O ECP: CPT | Mod: RT,,, | Performed by: OPHTHALMOLOGY

## 2019-07-18 PROCEDURE — 66984 PR REMOVAL, CATARACT, W/INSRT INTRAOC LENS, W/O ENDO CYCLO: ICD-10-PCS | Mod: RT,,, | Performed by: OPHTHALMOLOGY

## 2019-07-19 ENCOUNTER — OFFICE VISIT (OUTPATIENT)
Dept: OPHTHALMOLOGY | Facility: CLINIC | Age: 74
End: 2019-07-19
Payer: MEDICARE

## 2019-07-19 DIAGNOSIS — Z98.890 POST-OPERATIVE STATE: Primary | ICD-10-CM

## 2019-07-19 DIAGNOSIS — H25.12 NUCLEAR SENILE CATARACT OF LEFT EYE: ICD-10-CM

## 2019-07-19 DIAGNOSIS — Z98.41 CATARACT EXTRACTION STATUS, RIGHT: ICD-10-CM

## 2019-07-19 PROCEDURE — 99024 POSTOP FOLLOW-UP VISIT: CPT | Mod: HCNC,S$GLB,, | Performed by: OPHTHALMOLOGY

## 2019-07-19 PROCEDURE — 99024 PR POST-OP FOLLOW-UP VISIT: ICD-10-PCS | Mod: HCNC,S$GLB,, | Performed by: OPHTHALMOLOGY

## 2019-07-19 PROCEDURE — 99999 PR PBB SHADOW E&M-EST. PATIENT-LVL II: ICD-10-PCS | Mod: PBBFAC,HCNC,, | Performed by: OPHTHALMOLOGY

## 2019-07-19 PROCEDURE — 99999 PR PBB SHADOW E&M-EST. PATIENT-LVL II: CPT | Mod: PBBFAC,HCNC,, | Performed by: OPHTHALMOLOGY

## 2019-07-19 NOTE — PROGRESS NOTES
HPI     Post-op Evaluation      Additional comments: 1 day PICOL OD, Polytrim BID, Keto and Durezol QID              Comments     Patient states no problems since surgery. Using her drops as directed. No   ocular pain or irritation.     Last saw JCC04/26/19  Last saw MGM 04/12/19  Cataracts OS  PCIOL OD 7/18/19    OD: Polytrim BID, Keto, Durezol QID          Last edited by Gentry Mcgowan on 7/19/2019  8:02 AM. (History)            Assessment /Plan     For exam results, see Encounter Report.      ICD-10-CM ICD-9-CM    1. Post-operative state Z98.890 V45.89    2. Cataract extraction status, right Z98.41 V45.61    3. 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    Use Durezol QID  Ketorolac  Polytrim  4 x day  Reinstructed in importance of absolute compliance with Post-OP instructions including medications, shield at bedtime, and limitation of activities. Follow up appointments in approximately one and six weeks or call immediately for increased pain, redness or vision loss.

## 2019-07-24 ENCOUNTER — OFFICE VISIT (OUTPATIENT)
Dept: OPHTHALMOLOGY | Facility: CLINIC | Age: 74
End: 2019-07-24
Payer: MEDICARE

## 2019-07-24 DIAGNOSIS — Z98.890 POST-OPERATIVE STATE: Primary | ICD-10-CM

## 2019-07-24 DIAGNOSIS — H25.12 NUCLEAR SENILE CATARACT OF LEFT EYE: ICD-10-CM

## 2019-07-24 DIAGNOSIS — Z98.41 CATARACT EXTRACTION STATUS, RIGHT: ICD-10-CM

## 2019-07-24 PROCEDURE — 99024 PR POST-OP FOLLOW-UP VISIT: ICD-10-PCS | Mod: HCNC,S$GLB,, | Performed by: OPHTHALMOLOGY

## 2019-07-24 PROCEDURE — 92136 OPHTHALMIC BIOMETRY: CPT | Mod: 26,HCNC,LT,S$GLB | Performed by: OPHTHALMOLOGY

## 2019-07-24 PROCEDURE — 99024 POSTOP FOLLOW-UP VISIT: CPT | Mod: HCNC,S$GLB,, | Performed by: OPHTHALMOLOGY

## 2019-07-24 PROCEDURE — 99999 PR PBB SHADOW E&M-EST. PATIENT-LVL II: ICD-10-PCS | Mod: PBBFAC,HCNC,, | Performed by: OPHTHALMOLOGY

## 2019-07-24 PROCEDURE — 99999 PR PBB SHADOW E&M-EST. PATIENT-LVL II: CPT | Mod: PBBFAC,HCNC,, | Performed by: OPHTHALMOLOGY

## 2019-07-24 PROCEDURE — 92136 IOL MASTER - MOD 26 - OS - LEFT EYE: ICD-10-PCS | Mod: 26,HCNC,LT,S$GLB | Performed by: OPHTHALMOLOGY

## 2019-07-24 NOTE — PROGRESS NOTES
HPI     1 week po Patient states no problems since surgery. Using her drops as   directed. No ocular pain or irritation.      Last saw JCC04/26/19  Cataracts OS  PCIOL OD 7/18/19    OD: Polytrim BID, Keto, Durezol QID    Last edited by Donis Ford MD on 7/24/2019  8:19 AM. (History)            Assessment /Plan     For exam results, see Encounter Report.      ICD-10-CM ICD-9-CM    1. Post-operative state Z98.890 V45.89 PO Week 1 S/P Phaco/ IOL right eye:   Doing well with no evidence of infection or abnormal inflammation.     D/C antibiotic drops and NSAID  Taper Durezol weekly per instruction sheet.  Resume normal activitites and d/c eye shield.  OTC reading glasses can be used until evaluated for final MR.  D/c Shield at night         2. Cataract extraction status, right Z98.41 V45.61    3. Nuclear senile cataract of left eye H25.12 366.16 Ambulatory Referral to External Surgery      IOL Master - MOD 26 - OS - Left eye  Patient reports decreased vision in the fellow eye consistent with the clinical amount of lenticular opacity, which reaches the level of visual significance and affects activities of daily living including reading and glare. Risks, benefits, and alternatives to cataract surgery were discussed and pt desired to schedule cataract surgery. Pt was consented and the biometry and lens options were reviewed.    Phaco left   Topical   Requests a monofocal  IOL.  Will aim for distance  Patient given prescriptions for Polytrim, Durezol, and Ketorolac  Explained that patient may need glasses after surgery.  Discussed that vision may be limited by retina,

## 2019-08-01 ENCOUNTER — TELEPHONE (OUTPATIENT)
Dept: OPHTHALMOLOGY | Facility: CLINIC | Age: 74
End: 2019-08-01

## 2019-08-01 NOTE — TELEPHONE ENCOUNTER
Spoke with pt.  She is having some fluttering vision in po eye.  No pain or blurring.  She has what looks like a ceiling fan in her vision.  No flashes, floaters or parts of vision missing.  Pt has appt tomorrow with Dr. Ford.  Advised to call if any changes or concerns.

## 2019-08-01 NOTE — TELEPHONE ENCOUNTER
----- Message from Gisel Dacosta sent at 8/1/2019  8:02 AM CDT -----  Contact: pt  Would like to be advised she is having blurry vision and discoloration in surgery eye pls advise she is schedule for tomorrow morning pls advise [phone]

## 2019-08-02 ENCOUNTER — OFFICE VISIT (OUTPATIENT)
Dept: OPHTHALMOLOGY | Facility: CLINIC | Age: 74
End: 2019-08-02
Payer: MEDICARE

## 2019-08-02 DIAGNOSIS — Z98.890 POST-OPERATIVE STATE: Primary | ICD-10-CM

## 2019-08-02 DIAGNOSIS — H25.12 NUCLEAR SENILE CATARACT OF LEFT EYE: ICD-10-CM

## 2019-08-02 DIAGNOSIS — Z98.41 CATARACT EXTRACTION STATUS, RIGHT: ICD-10-CM

## 2019-08-02 PROCEDURE — 99024 POSTOP FOLLOW-UP VISIT: CPT | Mod: HCNC,S$GLB,, | Performed by: OPHTHALMOLOGY

## 2019-08-02 PROCEDURE — 99024 PR POST-OP FOLLOW-UP VISIT: ICD-10-PCS | Mod: HCNC,S$GLB,, | Performed by: OPHTHALMOLOGY

## 2019-08-02 PROCEDURE — 99999 PR PBB SHADOW E&M-EST. PATIENT-LVL I: ICD-10-PCS | Mod: PBBFAC,HCNC,, | Performed by: OPHTHALMOLOGY

## 2019-08-02 PROCEDURE — 99999 PR PBB SHADOW E&M-EST. PATIENT-LVL I: CPT | Mod: PBBFAC,HCNC,, | Performed by: OPHTHALMOLOGY

## 2019-08-02 NOTE — PROGRESS NOTES
HPI     The patient is here for a 2 week PCIOL OD and she complains of her right   eye fluttering she states its not a flash of light but like a fan   fluttering in her vision that happens when she wakes up and only lasts 5   minutes then it goes away.    PCIOL OD 7/18/19 +20.0WF/CDE 10.73  Cataracts OS    OD: Durezol TID    Last edited by Saige Estrada on 8/2/2019  7:59 AM. (History)            Assessment /Plan     For exam results, see Encounter Report.      ICD-10-CM ICD-9-CM    1. Post-operative state Z98.890 V45.89 Doing well, reassurance   Pt denies any increase floaters, or flashes of light   Any increase in sxs pt will call    2. Cataract extraction status, right Z98.41 V45.61    3. 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

## 2019-08-14 ENCOUNTER — OFFICE VISIT (OUTPATIENT)
Dept: DERMATOLOGY | Facility: CLINIC | Age: 74
End: 2019-08-14
Payer: MEDICARE

## 2019-08-14 DIAGNOSIS — L81.4 LENTIGINES: ICD-10-CM

## 2019-08-14 DIAGNOSIS — D48.9 NEOPLASM OF UNCERTAIN BEHAVIOR: ICD-10-CM

## 2019-08-14 DIAGNOSIS — L30.9 ECZEMA, UNSPECIFIED TYPE: ICD-10-CM

## 2019-08-14 DIAGNOSIS — L82.1 SEBORRHEIC KERATOSES: Primary | ICD-10-CM

## 2019-08-14 DIAGNOSIS — L28.0 LICHEN SIMPLEX CHRONICUS: ICD-10-CM

## 2019-08-14 PROCEDURE — 1101F PR PT FALLS ASSESS DOC 0-1 FALLS W/OUT INJ PAST YR: ICD-10-PCS | Mod: HCNC,CPTII,S$GLB, | Performed by: PHYSICIAN ASSISTANT

## 2019-08-14 PROCEDURE — 11102 TANGNTL BX SKIN SINGLE LES: CPT | Mod: HCNC,S$GLB,, | Performed by: PHYSICIAN ASSISTANT

## 2019-08-14 PROCEDURE — 99999 PR PBB SHADOW E&M-EST. PATIENT-LVL II: CPT | Mod: PBBFAC,HCNC,, | Performed by: PHYSICIAN ASSISTANT

## 2019-08-14 PROCEDURE — 99213 PR OFFICE/OUTPT VISIT, EST, LEVL III, 20-29 MIN: ICD-10-PCS | Mod: 25,HCNC,S$GLB, | Performed by: PHYSICIAN ASSISTANT

## 2019-08-14 PROCEDURE — 88305 TISSUE SPECIMEN TO PATHOLOGY, DERMATOLOGY: ICD-10-PCS | Mod: 26,HCNC,, | Performed by: PATHOLOGY

## 2019-08-14 PROCEDURE — 88305 TISSUE EXAM BY PATHOLOGIST: CPT | Mod: HCNC | Performed by: PATHOLOGY

## 2019-08-14 PROCEDURE — 99999 PR PBB SHADOW E&M-EST. PATIENT-LVL II: ICD-10-PCS | Mod: PBBFAC,HCNC,, | Performed by: PHYSICIAN ASSISTANT

## 2019-08-14 PROCEDURE — 1101F PT FALLS ASSESS-DOCD LE1/YR: CPT | Mod: HCNC,CPTII,S$GLB, | Performed by: PHYSICIAN ASSISTANT

## 2019-08-14 PROCEDURE — 11102 PR TANGENTIAL BIOPSY, SKIN, SINGLE LESION: ICD-10-PCS | Mod: HCNC,S$GLB,, | Performed by: PHYSICIAN ASSISTANT

## 2019-08-14 PROCEDURE — 99213 OFFICE O/P EST LOW 20 MIN: CPT | Mod: 25,HCNC,S$GLB, | Performed by: PHYSICIAN ASSISTANT

## 2019-08-14 PROCEDURE — 88305 TISSUE EXAM BY PATHOLOGIST: CPT | Mod: 26,HCNC,, | Performed by: PATHOLOGY

## 2019-08-14 NOTE — PROGRESS NOTES
Subjective:       Patient ID:  Hiwot Coulter is a 74 y.o. female who presents for   Chief Complaint   Patient presents with    Lesion     to R arm x 4 days      Hx of LSC of right leg and nummular eczema, last seen 6/27/19. For LSC, using TAC 0.1% cream bid prn flares ( resolved) and Dove soap. Here today for new c/o.    History of Present Illness: The patient presents with chief complaint of lesion.  Location: right forearm  Duration: 4 days  Signs/Symptoms: reddish, elevated; denies flaking, itching, or tenderness.    Prior treatments: none  PMHX: denies NMSC or MM        Review of Systems   Constitutional: Negative for fever and chills.   Gastrointestinal: Negative for nausea and vomiting.   Skin: Positive for activity-related sunscreen use. Negative for itching, rash, dry skin, daily sunscreen use and recent sunburn.   Hematologic/Lymphatic: Does not bruise/bleed easily.        Objective:    Physical Exam   Constitutional: She appears well-developed and well-nourished. No distress.   Neurological: She is alert and oriented to person, place, and time. She is not disoriented.   Psychiatric: She has a normal mood and affect.   Skin:   Areas Examined (abnormalities noted in diagram):   Head / Face Inspection Performed  Neck Inspection Performed  Chest / Axilla Inspection Performed  Back Inspection Performed  RUE Inspected  LUE Inspection Performed              Diagram Legend     Erythematous scaling macule/papule c/w actinic keratosis       Vascular papule c/w angioma      Pigmented verrucoid papule/plaque c/w seborrheic keratosis      Yellow umbilicated papule c/w sebaceous hyperplasia      Irregularly shaped tan macule c/w lentigo     1-2 mm smooth white papules consistent with Milia      Movable subcutaneous cyst with punctum c/w epidermal inclusion cyst      Subcutaneous movable cyst c/w pilar cyst      Firm pink to brown papule c/w dermatofibroma      Pedunculated fleshy papule(s) c/w skin tag(s)       Evenly pigmented macule c/w junctional nevus     Mildly variegated pigmented, slightly irregular-bordered macule c/w mildly atypical nevus      Flesh colored to evenly pigmented papule c/w intradermal nevus       Pink pearly papule/plaque c/w basal cell carcinoma      Erythematous hyperkeratotic cursted plaque c/w SCC      Surgical scar with no sign of skin cancer recurrence      Open and closed comedones      Inflammatory papules and pustules      Verrucoid papule consistent consistent with wart     Erythematous eczematous patches and plaques     Dystrophic onycholytic nail with subungual debris c/w onychomycosis     Umbilicated papule    Erythematous-base heme-crusted tan verrucoid plaque consistent with inflamed seborrheic keratosis     Erythematous Silvery Scaling Plaque c/w Psoriasis     See annotation          Assessment / Plan:      Pathology Orders:     Normal Orders This Visit    Tissue Specimen To Pathology, Dermatology     Comments:    Nurse to give anesthesia, 2 cc of  1% lidocaine with epinephrine, intralesionally    Questions:    Directional Terms:  Other(comment)    Clinical Information:  r/o NMSC vs ISK    Specific Site:  right forearm        Seborrheic keratoses  Reassurance given.  Lesions are benign.    Lichen simplex chronicus  Eczema, unspecified type  Improved, continue gentle skin care and emollients. TAC prn flares.    Lentigines  Monitor, encouraged sunscreen w/spf 30    Neoplasm of uncertain behavior  PROCEDURE NOTE - SHAVE BIOPSY   Location: right forearm    After risk, benefits, and alternatives were discussed with the patient, the patient agrees to the procedure by verbal informed consent.  The area(s) were cleansed with alcohol. 1 cc of lidocaine 1% with epinephrine was injected for local anesthesia into each lesion(s).  A sharp dermablade was used to remove part or all of the lesion(s).  The specimen(s) will be sent for tissue pathology.  Hemostasis was obtained with aluminum chloride  and/or hyfrecation.  The area(s) were dressed with vaseline ointment and bandaged.  The patient tolerated the procedure well without adverse events.  Wound care instructions were given to the patient on the AVS.  The patient will be notified of pathology results once available. Results will also be available in Epic.         Follow up in about 6 months (around 2/14/2020) for lichen simplex chronicus.

## 2019-08-14 NOTE — PATIENT INSTRUCTIONS
Shave Biopsy Wound Care    Your PA has performed a shave biopsy today.  A band aid and vaseline ointment has been placed over the site.  This should remain in place for 24 hours.  It is recommended that you keep the area dry for the first 24 hours.  After 24 hours, you may remove the band aid and wash the area with warm soap and water and apply Vaseline jelly.  Many patients prefer to use Neosporin or Bacitracin ointment.  This is acceptable; however, know that you can develop an allergy to this medication even if you have used it safely for years.  It is important to keep the area moist.  Letting it dry out and get air slows healing time, and will worsen the scar.  Band aid is optional after first 24 hours.      If you notice increasing redness, tenderness, pain, or yellow drainage at the biopsy site, please notify your doctor.  These are signs of an infection.    If your biopsy site is bleeding, apply firm pressure for 15 minutes straight.  Repeat for another 15 minutes, if it is still bleeding.   If the surgical site continues to bleed, then please contact your doctor.      [facility] - [facility]   DERMATOLOGY  [address]   [place] [address]   Dept: [phone]   Dept Fax: [phone]

## 2019-08-22 ENCOUNTER — OUTSIDE PLACE OF SERVICE (OUTPATIENT)
Dept: ADMINISTRATIVE | Facility: OTHER | Age: 74
End: 2019-08-22
Payer: MEDICARE

## 2019-08-22 PROCEDURE — 66984 XCAPSL CTRC RMVL W/O ECP: CPT | Mod: 79,LT,, | Performed by: OPHTHALMOLOGY

## 2019-08-22 PROCEDURE — 66984 PR REMOVAL, CATARACT, W/INSRT INTRAOC LENS, W/O ENDO CYCLO: ICD-10-PCS | Mod: 79,LT,, | Performed by: OPHTHALMOLOGY

## 2019-08-23 ENCOUNTER — OFFICE VISIT (OUTPATIENT)
Dept: OPHTHALMOLOGY | Facility: CLINIC | Age: 74
End: 2019-08-23
Payer: MEDICARE

## 2019-08-23 DIAGNOSIS — Z98.42 CATARACT EXTRACTION STATUS, LEFT: ICD-10-CM

## 2019-08-23 DIAGNOSIS — Z98.890 POST-OPERATIVE STATE: Primary | ICD-10-CM

## 2019-08-23 PROCEDURE — 99999 PR PBB SHADOW E&M-EST. PATIENT-LVL I: CPT | Mod: PBBFAC,HCNC,, | Performed by: OPHTHALMOLOGY

## 2019-08-23 PROCEDURE — 99024 PR POST-OP FOLLOW-UP VISIT: ICD-10-PCS | Mod: HCNC,S$GLB,, | Performed by: OPHTHALMOLOGY

## 2019-08-23 PROCEDURE — 99024 POSTOP FOLLOW-UP VISIT: CPT | Mod: HCNC,S$GLB,, | Performed by: OPHTHALMOLOGY

## 2019-08-23 PROCEDURE — 99999 PR PBB SHADOW E&M-EST. PATIENT-LVL I: ICD-10-PCS | Mod: PBBFAC,HCNC,, | Performed by: OPHTHALMOLOGY

## 2019-08-23 NOTE — PROGRESS NOTES
HPI     Patient states 0/10 on pain scale.Patient would not review her  oral meds   with me stating they are all the same from last visit and your program   should have a button to push stating no changes.             PCIOL OD 7/18/19 +20.0WF/CDE 10.73  PCIOL OS 08/22/19    OS: Durezol qid, ket qid, poly qid    Last edited by ELLIOT Solano on 8/23/2019  7:58 AM. (History)            Assessment /Plan     For exam results, see Encounter Report.      ICD-10-CM ICD-9-CM    1. Post-operative state Z98.890 V45.89    2. Cataract extraction status, left Z98.42 V45.61        PO Day 1 S/P Phaco/IOL  left eye  Doing well.    Use Durezol QID  Ketorolac  Polytrim  4 x day  Reinstructed in importance of absolute compliance with Post-OP instructions including medications, shield at bedtime, and limitation of activities. Follow up appointments in approximately one and six weeks or call immediately for increased pain, redness or vision loss.

## 2019-08-28 RX ORDER — IBANDRONATE SODIUM 150 MG/1
TABLET, FILM COATED ORAL
Qty: 1 TABLET | Refills: 11 | Status: SHIPPED | OUTPATIENT
Start: 2019-08-28 | End: 2019-10-04 | Stop reason: SDUPTHER

## 2019-08-30 ENCOUNTER — OFFICE VISIT (OUTPATIENT)
Dept: OPHTHALMOLOGY | Facility: CLINIC | Age: 74
End: 2019-08-30
Payer: MEDICARE

## 2019-08-30 DIAGNOSIS — Z98.42 CATARACT EXTRACTION STATUS, LEFT: ICD-10-CM

## 2019-08-30 DIAGNOSIS — Z98.890 POST-OPERATIVE STATE: Primary | ICD-10-CM

## 2019-08-30 DIAGNOSIS — Z98.41 CATARACT EXTRACTION STATUS, RIGHT: ICD-10-CM

## 2019-08-30 PROBLEM — H25.10 NUCLEAR SCLEROSIS: Status: RESOLVED | Noted: 2017-08-02 | Resolved: 2019-08-30

## 2019-08-30 PROCEDURE — 99024 POSTOP FOLLOW-UP VISIT: CPT | Mod: HCNC,S$GLB,, | Performed by: OPHTHALMOLOGY

## 2019-08-30 PROCEDURE — 99999 PR PBB SHADOW E&M-EST. PATIENT-LVL I: CPT | Mod: PBBFAC,HCNC,, | Performed by: OPHTHALMOLOGY

## 2019-08-30 PROCEDURE — 99024 PR POST-OP FOLLOW-UP VISIT: ICD-10-PCS | Mod: HCNC,S$GLB,, | Performed by: OPHTHALMOLOGY

## 2019-08-30 PROCEDURE — 99999 PR PBB SHADOW E&M-EST. PATIENT-LVL I: ICD-10-PCS | Mod: PBBFAC,HCNC,, | Performed by: OPHTHALMOLOGY

## 2019-08-30 NOTE — PROGRESS NOTES
HPI     Post-op Evaluation      Additional comments: 1 wk s/p phaco OS               Comments     PCIOL OD 7/18/19 +20.0WF/CDE 10.73  PCIOL OS +21.0 SN60WF / CDE: 12.02 / 08/22/19    OS: Durezol qid, ket qid, poly qid          Last edited by Kanika Lopez MA on 8/30/2019  7:52 AM. (History)            Assessment /Plan     For exam results, see Encounter Report.      ICD-10-CM ICD-9-CM    1. Post-operative state Z98.890 V45.89    2. Cataract extraction status, left Z98.42 V45.61    3. Cataract extraction status, right Z98.41 V45.61        PO Week 1 S/P Phaco/ IOL left eye:   Doing well with no evidence of infection or abnormal inflammation.     D/C antibiotic drops and NSAID  Taper Durezol weekly per instruction sheet.  Resume normal activitites and d/c eye shield.  OTC reading glasses can be used until evaluated for final MR.  D/c Shield at night    RTC 5 weeks or PRN pain, redness, vision loss, or other concerns.  Refract OU - will need computer glasses.

## 2019-09-04 ENCOUNTER — HOSPITAL ENCOUNTER (OUTPATIENT)
Dept: RADIOLOGY | Facility: HOSPITAL | Age: 74
Discharge: HOME | End: 2019-09-04
Attending: PEDIATRICS
Payer: MEDICARE

## 2019-09-04 VITALS — BODY MASS INDEX: 35.88 KG/M2 | HEIGHT: 62 IN | WEIGHT: 195 LBS

## 2019-09-04 DIAGNOSIS — Z12.39 BREAST CANCER SCREENING: ICD-10-CM

## 2019-09-04 PROCEDURE — 77067 MAMMO DIGITAL SCREENING BILAT WITH TOMOSYNTHESIS_CAD: ICD-10-PCS | Mod: 26,HCNC,, | Performed by: RADIOLOGY

## 2019-09-04 PROCEDURE — 77067 SCR MAMMO BI INCL CAD: CPT | Mod: TC,HCNC

## 2019-09-04 PROCEDURE — 77067 SCR MAMMO BI INCL CAD: CPT | Mod: 26,HCNC,, | Performed by: RADIOLOGY

## 2019-09-04 PROCEDURE — 77063 BREAST TOMOSYNTHESIS BI: CPT | Mod: 26,HCNC,, | Performed by: RADIOLOGY

## 2019-09-04 PROCEDURE — 77063 MAMMO DIGITAL SCREENING BILAT WITH TOMOSYNTHESIS_CAD: ICD-10-PCS | Mod: 26,HCNC,, | Performed by: RADIOLOGY

## 2019-09-10 ENCOUNTER — OFFICE VISIT (OUTPATIENT)
Dept: PULMONOLOGY | Facility: CLINIC | Age: 74
End: 2019-09-10
Payer: MEDICARE

## 2019-09-10 VITALS
WEIGHT: 197.06 LBS | RESPIRATION RATE: 18 BRPM | BODY MASS INDEX: 36.26 KG/M2 | SYSTOLIC BLOOD PRESSURE: 118 MMHG | DIASTOLIC BLOOD PRESSURE: 62 MMHG | OXYGEN SATURATION: 95 % | HEART RATE: 73 BPM | HEIGHT: 62 IN

## 2019-09-10 DIAGNOSIS — G47.61 PERIODIC LIMB MOVEMENT DISORDER (PLMD): ICD-10-CM

## 2019-09-10 DIAGNOSIS — E66.01 SEVERE OBESITY (BMI 35.0-39.9) WITH COMORBIDITY: ICD-10-CM

## 2019-09-10 DIAGNOSIS — G47.33 OSA ON CPAP: Primary | ICD-10-CM

## 2019-09-10 PROCEDURE — 1101F PT FALLS ASSESS-DOCD LE1/YR: CPT | Mod: HCNC,CPTII,S$GLB, | Performed by: NURSE PRACTITIONER

## 2019-09-10 PROCEDURE — 99999 PR PBB SHADOW E&M-EST. PATIENT-LVL IV: ICD-10-PCS | Mod: PBBFAC,HCNC,, | Performed by: NURSE PRACTITIONER

## 2019-09-10 PROCEDURE — 99214 PR OFFICE/OUTPT VISIT, EST, LEVL IV, 30-39 MIN: ICD-10-PCS | Mod: HCNC,S$GLB,, | Performed by: NURSE PRACTITIONER

## 2019-09-10 PROCEDURE — 99214 OFFICE O/P EST MOD 30 MIN: CPT | Mod: HCNC,S$GLB,, | Performed by: NURSE PRACTITIONER

## 2019-09-10 PROCEDURE — 1101F PR PT FALLS ASSESS DOC 0-1 FALLS W/OUT INJ PAST YR: ICD-10-PCS | Mod: HCNC,CPTII,S$GLB, | Performed by: NURSE PRACTITIONER

## 2019-09-10 PROCEDURE — 99999 PR PBB SHADOW E&M-EST. PATIENT-LVL IV: CPT | Mod: PBBFAC,HCNC,, | Performed by: NURSE PRACTITIONER

## 2019-09-10 NOTE — ASSESSMENT & PLAN NOTE
"Continue to encourage exercise and dietary modification to obtain normal weight.   Goal to begin weight loss and regular exercise  Present goal 140 lbs  Current Weight 197lbs. bmi 36.05 ht 5'2"   "

## 2019-09-10 NOTE — PROGRESS NOTES
Subjective:      Patient ID: Hiwot Coulter is a 74 y.o. female.    Chief Complaint: Sleep Apnea    HPI: Hiwot Coulter is here for follow up for JAMAL with yearly CPAP complaince assessment.  She is on Auto CPAP of 4-10 cmH2O pressure which is optimally controlling sleep apnea with apneic index (AHI) 3.6 events an hour.   She is compliant with CPAP use. Complaince download today reveals 83.3% of days with greater than 4 hours of device use.   Patient reports benefit from CPAP use and denies snoring or excessive daytime sleepiness. She has been adherent with CPAP use over past year, plans on continue adherence and traveling with CPAP machine.   Patient reports complaint of would like to auto cpap pressure, does not like pressures going up to 9 cm. request auto cpap 4-8 cm . Full face mask is tolerated.   [place] 0    Occupational History:   Employed full time as   with [employer].       Previous Report Reviewed: lab reports and office notes     Past Medical History: The following portions of the patient's history were reviewed and updated as appropriate:   She  has a past surgical history that includes Cholecystectomy (2012); Cataract extraction w/  intraocular lens implant (Right, 07/18/2019); and Hysterectomy.  Her family history includes Cancer in her father; Cataracts in her mother; Heart disease in her father; Leukemia in her father.  She  reports that she has never smoked. She has never used smokeless tobacco. She reports that she does not drink alcohol or use drugs.  She has a current medication list which includes the following prescription(s): 1st medx-patch with lidocaine, acetaminophen, alprazolam, betamethasone dipropionate, ergocalciferol, famotidine, fluticasone propionate, hydrocodone-acetaminophen, hydrocortisone, ibandronate, ibuprofen, naltrexone-bupropion, tramadol, and triamcinolone acetonide 0.1%.  She has No Known Allergies..    The following portions of the patient's history  "were reviewed and updated as appropriate: allergies, current medications, past family history, past medical history, past social history, past surgical history and problem list.    Review of Systems   Constitutional: Negative for fever, chills, weight loss, weight gain, activity change, appetite change, fatigue and night sweats.   HENT: Negative for postnasal drip, rhinorrhea, sinus pressure, voice change and congestion.    Eyes: Negative for redness and itching.   Respiratory: Negative for snoring, cough, sputum production, chest tightness, shortness of breath, wheezing, orthopnea, asthma nighttime symptoms, dyspnea on extertion, use of rescue inhaler and somnolence.    Cardiovascular: Negative.  Negative for chest pain, palpitations and leg swelling.   Genitourinary: Negative for difficulty urinating and hematuria.   Endocrine: Negative for cold intolerance and heat intolerance.    Musculoskeletal: Negative for arthralgias, gait problem, joint swelling and myalgias.   Skin: Negative.    Gastrointestinal: Negative for nausea, vomiting, abdominal pain and acid reflux.   Neurological: Negative for dizziness, weakness, light-headedness and headaches.   Hematological: Negative for adenopathy. No excessive bruising.   All other systems reviewed and are negative.     Objective:   /62   Pulse 73   Resp 18   Ht 5' 2" (1.575 m)   Wt 89.4 kg (197 lb 1.5 oz)   SpO2 95%   BMI 36.05 kg/m²   Physical Exam   Constitutional: She is oriented to person, place, and time. She appears well-developed and well-nourished. She is active and cooperative.  Non-toxic appearance. She does not appear ill. No distress.   HENT:   Head: Normocephalic.   Right Ear: External ear normal.   Left Ear: External ear normal.   Nose: Nose normal.   Mouth/Throat: Oropharynx is clear and moist. No oropharyngeal exudate.   Eyes: Conjunctivae are normal.   Neck: Normal range of motion. Neck supple.   Cardiovascular: Normal rate, regular rhythm, " "normal heart sounds and intact distal pulses.   Pulmonary/Chest: Effort normal and breath sounds normal. No stridor.   Abdominal: Soft.   Musculoskeletal: Normal range of motion.   Lymphadenopathy:     She has no cervical adenopathy.   Neurological: She is alert and oriented to person, place, and time.   Skin: Skin is warm and dry.   Psychiatric: She has a normal mood and affect. Her behavior is normal. Judgment and thought content normal.   Vitals reviewed.    Personal Diagnostic Review  Compliance Summary  8/9/2019 - 9/7/2019 (30 days)  Days with Device Usage 27 days  Days without Device Usage 3 days  Percent Days with Device Usage 90.0%  Cumulative Usage 7 days 13 hrs. 9 mins. 37 secs.  Maximum Usage (1 Day) 11 hrs. 32 mins. 32 secs.  Average Usage (All Days) 6 hrs. 2 mins. 19 secs.  Average Usage (Days Used) 6 hrs. 42 mins. 34 secs.  Minimum Usage (1 Day) 3 hrs. 37 mins. 48 secs.  Percent of Days with Usage >= 4 Hours 83.3%  Percent of Days with Usage < 4 Hours 16.7%  Date Range  Total Blower Time 7 days 13 hrs. 9 mins. 37 secs.  Average AHI 3.6  Auto-CPAP Summary ([facility])  Auto-CPAP Mean Pressure 7.3 cmH2O  Auto-CPAP Peak Average Pressure 9.4 cmH2O  Average Device Pressure <= 90% of Time 9.2 cmH2O  Average Time in Large Leak Per Day 2 mins. 38 secs.    Assessment:     1. JAMAL on CPAP    2. Severe obesity (BMI 35.0-39.9) with comorbidity    3. Periodic limb movement disorder (PLMD)        Orders Placed This Encounter   Procedures    HME - OTHER     Please change remotely to auto CPAP 4-8 cm. Thank you.   HME: [facility]  Respiratory therapist: Crista     Order Specific Question:   Type of Equipment:     Answer:   cpap     Order Specific Question:   Height:     Answer:   5' 2" (1.575 m)     Order Specific Question:   Weight:     Answer:   89.4 kg (197 lb 1.5 oz)     Order Specific Question:   Does patient have medical equipment at home?     Answer:   CPAP    CPAP/BIPAP SUPPLIES     Benefits and " "compliant  Yearly supply order  90 day supply. 4 refills  HME: [facility]     Order Specific Question:   Type of mask:     Answer:   FFM     Order Specific Question:   Headgear?     Answer:   Yes     Order Specific Question:   Tubing?     Answer:   Yes     Order Specific Question:   Humidifier chamber?     Answer:   Yes     Order Specific Question:   Chin strap?     Answer:   Yes     Order Specific Question:   Filters?     Answer:   Yes     Order Specific Question:   Cushions?     Answer:   Yes     Order Specific Question:   Length of need (1-99 months):     Answer:   99     Plan:     Problem List Items Addressed This Visit     Severe obesity (BMI 35.0-39.9) with comorbidity     Continue to encourage exercise and dietary modification to obtain normal weight.   Goal to begin weight loss and regular exercise  Present goal 140 lbs  Current Weight 197lbs. bmi 36.05 ht 5'2"          Periodic limb movement disorder (PLMD)     Controlled, has on xanax 2mg if needed.            JAMAL on CPAP - Primary     Benefits and compliant  AHI 3.6  [place] 0  Average AHI 3.6  Auto-CPAP Summary ([facility])  Auto-CPAP Mean Pressure 7.3 cmH2O  Auto-CPAP Peak Average Pressure 9.4 cmH2O  Average Device Pressure <= 90% of Time 9.2 cmH2O  Average Time in Large Leak Per Day 2 mins. 38 secs.  Requested remote change to auto CPAP 4-8 cm per patient request.  HME: [facility]   Yearly supply order  Full face mask              Relevant Orders    HME - OTHER    CPAP/BIPAP SUPPLIES        TIME SPENT WITH PATIENT: Time spent:25 minutes in face to face  discussion concerning diagnosis, prognosis, review of lab and test results, benefits of treatment as well as management of disease, counseling of patient and coordination of care between various health  care providers . Greater than half the time spent was used for coordination of care and counseling of patient.     Follow up in about 1 year (around 9/10/2020) for CPAP 1 year compliance download.    "

## 2019-10-02 ENCOUNTER — OFFICE VISIT (OUTPATIENT)
Dept: OPHTHALMOLOGY | Facility: CLINIC | Age: 74
End: 2019-10-02
Payer: MEDICARE

## 2019-10-02 DIAGNOSIS — Z98.890 POST-OPERATIVE STATE: Primary | ICD-10-CM

## 2019-10-02 PROCEDURE — 99999 PR PBB SHADOW E&M-EST. PATIENT-LVL II: ICD-10-PCS | Mod: PBBFAC,HCNC,, | Performed by: OPHTHALMOLOGY

## 2019-10-02 PROCEDURE — 99024 PR POST-OP FOLLOW-UP VISIT: ICD-10-PCS | Mod: HCNC,S$GLB,, | Performed by: OPHTHALMOLOGY

## 2019-10-02 PROCEDURE — 99999 PR PBB SHADOW E&M-EST. PATIENT-LVL II: CPT | Mod: PBBFAC,HCNC,, | Performed by: OPHTHALMOLOGY

## 2019-10-02 PROCEDURE — 99024 POSTOP FOLLOW-UP VISIT: CPT | Mod: HCNC,S$GLB,, | Performed by: OPHTHALMOLOGY

## 2019-10-02 NOTE — PROGRESS NOTES
HPI     Patient returns for a 6 week p.o. Visit, patient states she has finished   all her drops, and her vision is great.Patient states she wants a computer   bifocal rx and states she does not need a distant rx. wants .        1. PCIOL OD 7/18/19 +20.0WF/CDE 10.73  PCIOL OS +21.0 SN60WF / CDE: 12.02 / 08/22/19            Last edited by ELLIOT Solano on 10/2/2019  7:55 AM. (History)            Assessment /Plan     For exam results, see Encounter Report.      ICD-10-CM ICD-9-CM    1. Post-operative state Z98.890 V45.89 PO Month 1 S/P Phaco/IOL left eye:   Doing Well.    Discontinue medications as per taper sheet.  Dispense Trifocal Rx  RTC 6 months with Dr. Ford

## 2019-10-04 DIAGNOSIS — M85.89 OSTEOPENIA OF MULTIPLE SITES: Primary | ICD-10-CM

## 2019-10-04 NOTE — TELEPHONE ENCOUNTER
Fax refill request received from pt's mail order pharmacy, sent to Dr. Ramirez for authorization [Marc].    LV 02/27/19  [place] 02/27/20    Last DXA 02/28/18

## 2019-10-06 RX ORDER — IBANDRONATE SODIUM 150 MG/1
TABLET, FILM COATED ORAL
Qty: 3 TABLET | Refills: 3 | Status: SHIPPED | OUTPATIENT
Start: 2019-10-06 | End: 2020-07-06 | Stop reason: SDUPTHER

## 2019-10-24 ENCOUNTER — PATIENT MESSAGE (OUTPATIENT)
Dept: OPHTHALMOLOGY | Facility: CLINIC | Age: 74
End: 2019-10-24

## 2019-10-24 DIAGNOSIS — E55.9 VITAMIN D DEFICIENCY: ICD-10-CM

## 2019-10-24 RX ORDER — ERGOCALCIFEROL 1.25 MG/1
CAPSULE ORAL
Qty: 25 CAPSULE | Refills: 4 | Status: SHIPPED | OUTPATIENT
Start: 2019-10-24 | End: 2020-11-20 | Stop reason: SDUPTHER

## 2019-10-25 ENCOUNTER — TELEPHONE (OUTPATIENT)
Dept: OPHTHALMOLOGY | Facility: CLINIC | Age: 74
End: 2019-10-25

## 2019-11-13 DIAGNOSIS — J30.89 NON-SEASONAL ALLERGIC RHINITIS DUE TO OTHER ALLERGIC TRIGGER: ICD-10-CM

## 2019-11-13 RX ORDER — ASPIRIN 325 MG
TABLET ORAL
COMMUNITY
Start: 2019-10-02 | End: 2020-02-27

## 2019-11-13 NOTE — TELEPHONE ENCOUNTER
Fax refill request received from pt's pharmacy, requests sent to Dr. Ramirez for auth [IBU 800mg, Fluticasone].    LV 07/01/19  NV 02/27/20

## 2019-11-14 RX ORDER — IBUPROFEN 800 MG/1
800 TABLET ORAL 3 TIMES DAILY PRN
Qty: 90 TABLET | Refills: 3 | Status: SHIPPED | OUTPATIENT
Start: 2019-11-14 | End: 2020-12-02

## 2019-11-14 RX ORDER — FLUTICASONE PROPIONATE 50 MCG
2 SPRAY, SUSPENSION (ML) NASAL DAILY
Qty: 3 BOTTLE | Refills: 3 | Status: SHIPPED | OUTPATIENT
Start: 2019-11-14

## 2019-11-21 DIAGNOSIS — L28.0 LICHEN SIMPLEX CHRONICUS: ICD-10-CM

## 2019-11-21 DIAGNOSIS — L30.9 ECZEMA, UNSPECIFIED TYPE: ICD-10-CM

## 2019-11-21 RX ORDER — TRIAMCINOLONE ACETONIDE 1 MG/G
CREAM TOPICAL
Qty: 80 G | Refills: 1 | Status: SHIPPED | OUTPATIENT
Start: 2019-11-21 | End: 2022-12-09

## 2019-12-11 ENCOUNTER — PATIENT MESSAGE (OUTPATIENT)
Dept: ORTHOPEDICS | Facility: CLINIC | Age: 74
End: 2019-12-11

## 2019-12-13 ENCOUNTER — HOSPITAL ENCOUNTER (OUTPATIENT)
Dept: RADIOLOGY | Facility: HOSPITAL | Age: 74
Discharge: HOME | End: 2019-12-13
Attending: ORTHOPAEDIC SURGERY
Payer: MEDICARE

## 2019-12-13 ENCOUNTER — OFFICE VISIT (OUTPATIENT)
Dept: SPORTS MEDICINE | Facility: CLINIC | Age: 74
End: 2019-12-13
Payer: MEDICARE

## 2019-12-13 VITALS
SYSTOLIC BLOOD PRESSURE: 119 MMHG | WEIGHT: 197 LBS | HEART RATE: 69 BPM | HEIGHT: 62 IN | BODY MASS INDEX: 36.25 KG/M2 | DIASTOLIC BLOOD PRESSURE: 76 MMHG

## 2019-12-13 DIAGNOSIS — E66.9 OBESITY (BMI 30-39.9): ICD-10-CM

## 2019-12-13 DIAGNOSIS — M25.561 RIGHT KNEE PAIN, UNSPECIFIED CHRONICITY: ICD-10-CM

## 2019-12-13 DIAGNOSIS — M25.561 RIGHT KNEE PAIN, UNSPECIFIED CHRONICITY: Primary | ICD-10-CM

## 2019-12-13 DIAGNOSIS — M17.11 PRIMARY OSTEOARTHRITIS OF RIGHT KNEE: ICD-10-CM

## 2019-12-13 DIAGNOSIS — M25.561 MECHANICAL PAIN OF RIGHT KNEE: ICD-10-CM

## 2019-12-13 DIAGNOSIS — Z01.812 PRE-PROCEDURE LAB EXAM: ICD-10-CM

## 2019-12-13 PROCEDURE — 1125F PR PAIN SEVERITY QUANTIFIED, PAIN PRESENT: ICD-10-PCS | Mod: HCNC,S$GLB,, | Performed by: ORTHOPAEDIC SURGERY

## 2019-12-13 PROCEDURE — 99999 PR PBB SHADOW E&M-EST. PATIENT-LVL IV: ICD-10-PCS | Mod: PBBFAC,HCNC,, | Performed by: ORTHOPAEDIC SURGERY

## 2019-12-13 PROCEDURE — 1159F PR MEDICATION LIST DOCUMENTED IN MEDICAL RECORD: ICD-10-PCS | Mod: HCNC,S$GLB,, | Performed by: ORTHOPAEDIC SURGERY

## 2019-12-13 PROCEDURE — 99204 OFFICE O/P NEW MOD 45 MIN: CPT | Mod: 25,HCNC,S$GLB, | Performed by: ORTHOPAEDIC SURGERY

## 2019-12-13 PROCEDURE — 20610 LARGE JOINT ASPIRATION/INJECTION: R KNEE: ICD-10-PCS | Mod: HCNC,RT,S$GLB, | Performed by: ORTHOPAEDIC SURGERY

## 2019-12-13 PROCEDURE — 1159F MED LIST DOCD IN RCRD: CPT | Mod: HCNC,S$GLB,, | Performed by: ORTHOPAEDIC SURGERY

## 2019-12-13 PROCEDURE — 73564 X-RAY EXAM KNEE 4 OR MORE: CPT | Mod: 26,HCNC,RT, | Performed by: RADIOLOGY

## 2019-12-13 PROCEDURE — 73564 X-RAY EXAM KNEE 4 OR MORE: CPT | Mod: TC,HCNC,RT

## 2019-12-13 PROCEDURE — 73562 XR KNEE ORTHO RIGHT WITH FLEXION: ICD-10-PCS | Mod: 26,HCNC,LT, | Performed by: RADIOLOGY

## 2019-12-13 PROCEDURE — 99204 PR OFFICE/OUTPT VISIT, NEW, LEVL IV, 45-59 MIN: ICD-10-PCS | Mod: 25,HCNC,S$GLB, | Performed by: ORTHOPAEDIC SURGERY

## 2019-12-13 PROCEDURE — 1125F AMNT PAIN NOTED PAIN PRSNT: CPT | Mod: HCNC,S$GLB,, | Performed by: ORTHOPAEDIC SURGERY

## 2019-12-13 PROCEDURE — 73564 XR KNEE ORTHO RIGHT WITH FLEXION: ICD-10-PCS | Mod: 26,HCNC,RT, | Performed by: RADIOLOGY

## 2019-12-13 PROCEDURE — 73562 X-RAY EXAM OF KNEE 3: CPT | Mod: 26,HCNC,LT, | Performed by: RADIOLOGY

## 2019-12-13 PROCEDURE — 20610 DRAIN/INJ JOINT/BURSA W/O US: CPT | Mod: HCNC,RT,S$GLB, | Performed by: ORTHOPAEDIC SURGERY

## 2019-12-13 PROCEDURE — 99999 PR PBB SHADOW E&M-EST. PATIENT-LVL IV: CPT | Mod: PBBFAC,HCNC,, | Performed by: ORTHOPAEDIC SURGERY

## 2019-12-13 PROCEDURE — 1101F PR PT FALLS ASSESS DOC 0-1 FALLS W/OUT INJ PAST YR: ICD-10-PCS | Mod: HCNC,CPTII,S$GLB, | Performed by: ORTHOPAEDIC SURGERY

## 2019-12-13 PROCEDURE — 1101F PT FALLS ASSESS-DOCD LE1/YR: CPT | Mod: HCNC,CPTII,S$GLB, | Performed by: ORTHOPAEDIC SURGERY

## 2019-12-13 RX ORDER — TRIAMCINOLONE ACETONIDE 40 MG/ML
40 INJECTION, SUSPENSION INTRA-ARTICULAR; INTRAMUSCULAR
Status: DISCONTINUED | OUTPATIENT
Start: 2019-12-13 | End: 2019-12-13 | Stop reason: HOSPADM

## 2019-12-13 RX ADMIN — TRIAMCINOLONE ACETONIDE 40 MG: 40 INJECTION, SUSPENSION INTRA-ARTICULAR; INTRAMUSCULAR at 07:12

## 2019-12-13 NOTE — PROGRESS NOTES
Subjective:     Patient ID: Hiwot Coulter is a 74 y.o. female.    Chief Complaint: Pain of the Right Knee    Ms. Mi is having right knee pain, mostly medial pain.  Feels like the knee shifts on her. She does not trust the knee    Has tried brace with limited results    Recurrent pain over the past couple years    Knee Pain    The pain is present in the right knee. This is a recurrent problem. The current episode started 1 to 4 weeks ago. The problem occurs intermittently. The problem has been gradually worsening. The quality of the pain is described as aching. The pain is at a severity of 4/10. Pertinent negatives include no fever or itching. The symptoms are aggravated by activity, bearing weight, bending, walking and standing. She has tried brace/corset for the symptoms. The treatment provided mild relief. Physical therapy was not tried.      Past Medical History:   Diagnosis Date    Anxiety     Arthritis     hand, neck, back    Cataract     Depression     GERD (gastroesophageal reflux disease)     Hyperlipidemia     Obesity     Osteopenia     Pollen allergies     Sleep apnea      Past Surgical History:   Procedure Laterality Date    CATARACT EXTRACTION W/  INTRAOCULAR LENS IMPLANT Right 07/18/2019    CHOLECYSTECTOMY  2012    HYSTERECTOMY       Family History   Problem Relation Age of Onset    Leukemia Father     Heart disease Father     Cancer Father         leukemia    Cataracts Mother     Melanoma Neg Hx     Psoriasis Neg Hx     Lupus Neg Hx     Eczema Neg Hx      Social History     Socioeconomic History    Marital status:      Spouse name: Not on file    Number of children: Not on file    Years of education: Not on file    Highest education level: Not on file   Occupational History    Occupation:      Employer: [employer]   Social Needs    Financial resource strain: Not on file    Food insecurity:     Worry: Not on file     Inability: Not on file     Transportation needs:     Medical: Not on file     Non-medical: Not on file   Tobacco Use    Smoking status: Never Smoker    Smokeless tobacco: Never Used   Substance and Sexual Activity    Alcohol use: No    Drug use: No    Sexual activity: Yes     Partners: Male     Birth control/protection: None   Lifestyle    Physical activity:     Days per week: Not on file     Minutes per session: Not on file    Stress: Not on file   Relationships    Social connections:     Talks on phone: Not on file     Gets together: Not on file     Attends Temple service: Not on file     Active member of club or organization: Not on file     Attends meetings of clubs or organizations: Not on file     Relationship status: Not on file   Other Topics Concern    Are you pregnant or think you may be? No    Breast-feeding No   Social History Narrative    No pets or smokers in household.     Medication List with Changes/Refills   Current Medications    1ST MEDX-PATCH WITH LIDOCAINE 4-20-0.025-5 % PTMD        ACETAMINOPHEN (TYLENOL) 500 MG CAP    as needed.     ALPRAZOLAM (XANAX) 2 MG TAB    Take 1 tablet (2 mg total) by mouth 2 (two) times daily as needed.    ASPIRIN 325 MG TABLET        BETAMETHASONE DIPROPIONATE (DIPROLENE) 0.05 % CREAM    Apply topically 2 (two) times daily. for 7 days    ERGOCALCIFEROL (ERGOCALCIFEROL) 50,000 UNIT CAP    TAKE 1 CAPSULE BY MOUTH 2 TIMES A WEEK    FAMOTIDINE (PEPCID) 20 MG TABLET    TAKE 1 TABLET(20 MG) BY MOUTH TWICE DAILY    FLUTICASONE PROPIONATE (FLONASE) 50 MCG/ACTUATION NASAL SPRAY    2 sprays (100 mcg total) by Each Nostril route once daily.    HYDROCODONE-ACETAMINOPHEN 5-325MG (NORCO) 5-325 MG PER TABLET    Take 1 tablet by mouth every 6 (six) hours as needed for Pain. Do not use more than 4 a day including OTC tylenol. Not to be used within 3 days of contrave.    HYDROCORTISONE (WESTCORT) 0.2 % CREAM    Apply topically 2 (two) times daily.    IBANDRONATE (BONIVA) 150 MG TABLET    TAKE 1  TABLET(150 MG) BY MOUTH EVERY 30 DAYS    IBUPROFEN (ADVIL,MOTRIN) 800 MG TABLET    Take 1 tablet (800 mg total) by mouth 3 (three) times daily as needed for Pain.    NALTREXONE-BUPROPION 8-90 MG TBSR    Take 2 tablets by mouth 2 (two) times daily. Not to be used with in 3 days of hydrocodone.    TRAMADOL (ULTRAM) 50 MG TABLET    TK 1 TO 2 TS PO Q 6 H PRN P    TRIAMCINOLONE ACETONIDE 0.1% (KENALOG) 0.1 % CREAM    AAA of rash of leg bid prn flares. Steroid- do not apply to face or folds of skin.     Review of patient's allergies indicates:  No Known Allergies  Review of Systems   Constitution: Negative for fever.   HENT: Negative for sore throat.    Eyes: Negative for blurred vision.   Cardiovascular: Negative for dyspnea on exertion.   Respiratory: Negative for shortness of breath.    Hematologic/Lymphatic: Does not bruise/bleed easily.   Skin: Negative for itching.   Gastrointestinal: Negative for vomiting.   Genitourinary: Negative for dysuria.   Neurological: Negative for dizziness.   Psychiatric/Behavioral: The patient does not have insomnia.        Objective:   Body mass index is 36.03 kg/m².  Vitals:    12/13/19 0726   BP: 119/76   Pulse: 69           General    Nursing note and vitals reviewed.  Constitutional: She is oriented to person, place, and time. She appears well-developed. No distress.   HENT:   Head: Normocephalic and atraumatic.   Eyes: EOM are normal.   Cardiovascular: Normal rate.    Pulmonary/Chest: Effort normal. No stridor.   Neurological: She is alert and oriented to person, place, and time.   Psychiatric: She has a normal mood and affect. Her behavior is normal.     General Musculoskeletal Exam   Gait: antalgic       Right Knee Exam     Inspection   Scars: absent  Effusion: present    Tenderness   The patient is tender to palpation of the medial joint line.    Crepitus   The patient has crepitus of the patella.    Range of Motion   Extension: 0   Flexion: 120     Tests   Meniscus   Coby:   Medial - positive Lateral - negative  Ligament Examination Lachman: normal (-1 to 2mm) PCL-Posterior Drawer: normal (0 to 2mm)     MCL - Valgus: normal (0 to 2mm)  LCL - Varus: normal  Patella   Patellar Tracking: normal  J-Sign: none    Other   Sensation: normal    Comments:  WWP foot    Left Knee Exam     Inspection   Scars: absent  Effusion: absent    Crepitus   The patient has crepitus of the patella.    Range of Motion   Extension: 0   Flexion: 120     Muscle Strength   Right Lower Extremity   Right quadriceps strength: mild decreased quad tone.   Left Lower Extremity   Left quadriceps strength: good quad tone.       IMAGING Radiographs / Imaging : Results reviewed by me and interpreted by me, discussed with the patient and / or family     X-ray Knee Ortho Right with Flexion  Narrative: EXAMINATION:  XR KNEE ORTHO RIGHT WITH FLEXION    CLINICAL HISTORY:  Pain in right knee    TECHNIQUE:  AP standing as well as PA flexion standing and Merchant views of both knees were performed.  A lateral view of the right knee was also performed.    COMPARISON:  None.    FINDINGS:  The tibiofemoral and patellofemoral joint spaces are maintained.  Mild enthesophyte formation at the superior pole of the right patella.  No joint effusion. No acute fracture or malalignment.  Impression: As above.    Electronically signed by: KATELYNN Vasquez MD  Date:    12/13/2019  Time:    07:54        Assessment:     Encounter Diagnoses   Name Primary?    Primary osteoarthritis of right knee     Right knee pain, unspecified chronicity Yes    Pre-procedure lab exam     Mechanical pain of right knee     Obesity (BMI 30-39.9)         Plan:     I had an in depth discussion today with Hiwot today regarding her right knee problem, going over her radiographs and the model to help further her understanding. I explained the anatomy and pathophysiology of the problem. I told Hiwot  that I believe the problem relates to combination of likely early  OA, possible meniscus tear, obesity and quadriceps weakness . We had an in depth discussion regarding appropriate treatment and management of her condition.     From a treatment standpoint, the decision was made to go forward with :     Recommend MRI of the right knee to further evaluate the integrity of the articular cartilage the meniscus giving her subjective instability, medial joint line tenderness palpation and mechanical symptoms    - Physical therapy for quad strengthening / Home exercise program - so she needs 1-2 visits with formal physical therapy to teach home exercise program for the right knee focusing on stretching and strengthening, quadriceps hamstrings hip abductors    -continue hinged knee brace PRN      - Steroid injection today, discussed risks and benefits, discussed pros and cons, discussed risks including but not limited to infection, bleeding. Hiwot Coulter would like to proceed.      We had an in depth discussion regarding importance of weight loss in minimizing knee pain. Weight loss referral for nutrition     Hiwot Coulter is very agreeable with above noted plan, and all questions answered to full satisfaction today.

## 2019-12-13 NOTE — PROCEDURES
Large Joint Aspiration/Injection: R knee  Date/Time: 12/13/2019 7:20 AM  Performed by: Tobias Corona MD  Authorized by: Tobias Corona MD     Consent Done?:  Yes (Verbal)  Indications:  Pain and diagnostic evaluation  Procedure site marked: Yes    Timeout: Prior to procedure the correct patient, procedure, and site was verified    Anesthesia  Local anesthesia used      Location:  Knee  Site:  R knee  Prep: Patient was prepped and draped in usual sterile fashion    Needle size:  22 G  Medications:  40 mg triamcinolone acetonide 40 mg/mL  Patient tolerance:  Patient tolerated the procedure well with no immediate complications    Additional Comments: The patient had no adverse reactions to the medication. The patient was instructed to apply ice to the joint for 30 minutes on and 30 minutes off for the next 48 hours, and avoid strenuous activities for 48 hours following the injection. Patient was warned of possible blood sugar and/or blood pressure changes during that time regarding corticosteroid injections if applicable.  Following that time, patient can resume regular activities. Note that informed consent was performed with the patient before injection discussing risks, benefits, indications and alternatives to injection, risks including but not limited to bleeding and infection.

## 2019-12-16 ENCOUNTER — PATIENT MESSAGE (OUTPATIENT)
Dept: SPORTS MEDICINE | Facility: CLINIC | Age: 74
End: 2019-12-16

## 2019-12-16 DIAGNOSIS — E63.9 POOR DIET: ICD-10-CM

## 2019-12-16 DIAGNOSIS — E66.9 OBESITY (BMI 30-39.9): Primary | ICD-10-CM

## 2019-12-16 DIAGNOSIS — Z76.89 ENCOUNTER FOR WEIGHT MANAGEMENT: ICD-10-CM

## 2019-12-27 DIAGNOSIS — K21.9 GASTROESOPHAGEAL REFLUX DISEASE WITHOUT ESOPHAGITIS: Chronic | ICD-10-CM

## 2019-12-27 NOTE — TELEPHONE ENCOUNTER
----- Message from Denver Tanner sent at 12/27/2019  1:24 PM CST -----  Contact: [facility] Lhqckkvz-[phone]  Would like to follow-up with nurse regarding status of refill request for  Famotidine medication.  Please call back at [phone].  md Nirali

## 2019-12-27 NOTE — TELEPHONE ENCOUNTER
I returned a call to the [facility] regarding the pt trying to request a new Rx for Famotidine 20mg which is not on her current medication list . If this is a Rx that you wish to restart please place a new Rx order. Chanelle olmos was on 02/27/2019 and  on 07/01/2019. Thanks //kah

## 2019-12-30 ENCOUNTER — TELEPHONE (OUTPATIENT)
Dept: RADIOLOGY | Facility: HOSPITAL | Age: 74
End: 2019-12-30

## 2019-12-30 RX ORDER — FAMOTIDINE 20 MG/1
TABLET, FILM COATED ORAL
Qty: 180 TABLET | Refills: 3 | Status: SHIPPED | OUTPATIENT
Start: 2019-12-30 | End: 2020-02-27

## 2019-12-31 ENCOUNTER — HOSPITAL ENCOUNTER (OUTPATIENT)
Dept: RADIOLOGY | Facility: HOSPITAL | Age: 74
Discharge: HOME | End: 2019-12-31
Attending: ORTHOPAEDIC SURGERY
Payer: MEDICARE

## 2019-12-31 DIAGNOSIS — M25.561 MECHANICAL PAIN OF RIGHT KNEE: ICD-10-CM

## 2019-12-31 DIAGNOSIS — M25.561 RIGHT KNEE PAIN, UNSPECIFIED CHRONICITY: ICD-10-CM

## 2019-12-31 DIAGNOSIS — M17.11 PRIMARY OSTEOARTHRITIS OF RIGHT KNEE: ICD-10-CM

## 2019-12-31 PROCEDURE — 73721 MRI JNT OF LWR EXTRE W/O DYE: CPT | Mod: 26,HCNC,RT, | Performed by: RADIOLOGY

## 2019-12-31 PROCEDURE — 73721 MRI KNEE WITHOUT CONTRAST RIGHT: ICD-10-PCS | Mod: 26,HCNC,RT, | Performed by: RADIOLOGY

## 2019-12-31 PROCEDURE — 73721 MRI JNT OF LWR EXTRE W/O DYE: CPT | Mod: TC,HCNC,RT

## 2020-01-06 ENCOUNTER — CLINICAL SUPPORT (OUTPATIENT)
Dept: REHABILITATION | Facility: HOSPITAL | Age: 75
End: 2020-01-06
Attending: ORTHOPAEDIC SURGERY
Payer: MEDICARE

## 2020-01-06 DIAGNOSIS — M79.604 RIGHT LEG PAIN: ICD-10-CM

## 2020-01-06 DIAGNOSIS — R53.1 WEAKNESS: Primary | ICD-10-CM

## 2020-01-06 DIAGNOSIS — M25.60 DECREASED RANGE OF MOTION: ICD-10-CM

## 2020-01-06 PROCEDURE — 97162 PT EVAL MOD COMPLEX 30 MIN: CPT | Mod: HCNC

## 2020-01-06 PROCEDURE — 97110 THERAPEUTIC EXERCISES: CPT | Mod: HCNC

## 2020-01-06 NOTE — PLAN OF CARE
[facility]  Physical Therapy Initial Evaluation    Name: Hiwot PACE [facility] Number: [id number]    Therapy Diagnosis: No diagnosis found.  Physician: Tobias Corona MD    Physician Orders: PT Eval and Treat   Medical Diagnosis from Referral: Primary osteoarthritis of right knee  Evaluation Date: 1/6/2020  Authorization Period Expiration: ***  Plan of Care Expiration: ***  Visit # / Visits authorized: ***/ ***    Time In: ***  Time Out: ***  Total Billable Time: *** minutes    Precautions: {IP WOUND PRECAUTIONS OHS:49758}    Subjective   Date of onset: ~ few months ago  History of current condition - Hiwot reports: Pt reports that she has had knee pain for a few months.  She f/u with Dr Corona who has referred for PT for HEP, she also reports about a year or two she began to have knee pain and F/U with [facility] at [facility] was given a brace with metal stays and told that they couldn't find anything wrong with knee.  She also reports she is having plantar faciitis and takes Ibuprofen most days for 1-2 tomes per week or less for past 2-3 years.  She also reports that she has another medicine that she takes when she takes the ibuprofen.  She also rpeorts a history of LBP as well.  Indicates pain over pes anserine on R knee/Medial inferior area.  Has not exercised in 6 months, slight jog on TM, uses weight machine.     Pain:  Current 0/10, worst 9/10, best 0/10   Location: right knee   Description: Sharp  Aggravating Factors: pulling leg into Adduction position.  Easing Factors: stopping that movement    Prior Therapy: Yes  Social History: pt lives alone  Occupation:  - mostly sitting  Prior Level of Function: No problems.  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    Imaging, MRI studies, xrays    Medical History:   Past Medical History:  "  Diagnosis Date    Anxiety     Arthritis     hand, neck, back    Cataract     Depression     GERD (gastroesophageal reflux disease)     Hyperlipidemia     Obesity     Osteopenia     Pollen allergies     Sleep apnea        Surgical History:   Hiwot Coulter  has a past surgical history that includes Cholecystectomy (2012); Cataract extraction w/  intraocular lens implant (Right, 07/18/2019); and Hysterectomy.    Medications:   Hiwot has a current medication list which includes the following prescription(s): 1st medx-patch with lidocaine, acetaminophen, alprazolam, aspirin, betamethasone dipropionate, ergocalciferol, famotidine, fluticasone propionate, hydrocodone-acetaminophen, hydrocortisone, ibandronate, ibuprofen, naltrexone-bupropion, tramadol, and triamcinolone acetonide 0.1%.    Allergies:   Review of patient's allergies indicates:  No Known Allergies     Pts goals: Learn how to strengthen muscles around joint.    Objective       CMS Impairment/Limitation/Restriction for FOTO *** Survey    Therapist reviewed FOTO scores for Hiwot Coulter on 1/6/2020.   FOTO documents entered into [employer] - see Media section.    Limitation Score: ***%  Category: {Blank single:54086::"Other","Self Care","Body Position","Carrying","Mobility"}    Current : {G Codes:71693}  Goal: {G Codes:14459}  Discharge: {G Codes:44699}       Gait: Slight decreased in knee ext in walking    Balance: R LE = 10s, L LE = 9s, used balance strategies at knee, hip and LB.    Reflex/Sensation: Sensation *** to light touch B LE's . Reflexes at Achilles and patella tendon ***.    Knee AROM:     (R) (L)     Flexion   130 ***     Extension  0 ***     Extension (sitting) -10 0     Hip strength:  Flexion   3+/5  4-/5     ER   4-/5-3+/5 4-/5-3+/5     IR   4-/5  4-/5     ABD   3+/5  ***    Ankle AROM:  Ankle DF  20 ***    Strength:  Hip " "flexors  ***/5 ***/5  Quadriceps  4-/5 4/5      Hamstrings  4/5 4/5     DF   ***/5 ***/5     PF   ***/5 ***/5     IR (sitting)  ***/5 ***/5     ER (sitting)  ***/5 ***/5     Gluteus Medius ***/5 ***/5     Gluteus Leonardo ***/5 ***/5    Joint Mobility: ***    Muscle Length: Pt presents with limited MLT in ***.    Special Test:            Valgus stress  + Varus stress  +     AILIN  ***  + drop leg test    Palpation: ***    TREATMENT   Treatment Time In: ***  Treatment Time Out: ***  Total Treatment time separate from Evaluation: *** minutes    Hiwot received therapeutic exercises to develop {AMB PT PROGRESS OBJECTIVE:51881} for *** minutes including:  ***    Hiwot received the following manual therapy techniques: {AMB PT PROGRESS MANUAL THERAPY:16175} were applied to the: *** for *** minutes, including:  ***    Hiwot participated in neuromuscular re-education activities to improve: {AMB PT PROGRESS NEURO RE-ED:79049} for *** minutes. The following activities were included:  ***    Hiwot participated in dynamic functional therapeutic activities to improve functional performance for ***  minutes, including:  ***    Hiwot participated in gait training to improve functional mobility and safety for ***  minutes, including:  ***    Hiwot received the following direct contact modalities after being cleared for contraindications: {AMB PT PROGRESS DIRECT CONTACT MODES:67168}    Hiwot received the following supervised modalities after being cleared for contradictions: {AMB PT SUPERVISED MODES:23340}    Hiwot received hot pack for *** minutes to ***.    Hiwot received cold pack for *** minutes to ***.    Home Exercises and Patient Education Provided    Education provided:   -Education on condition, HEP, and ***    Written Home Exercises Provided: {Blank single:58354::"yes","Patient instructed to cont prior HEP"}.  Exercises were reviewed and Hiwot was able to demonstrate them prior to the end of the session.  Hiwot " "demonstrated {Desc; good/fair/poor:17423} understanding of the education provided.     See EMR under {Blank single:66871::"Media","Patient Instructions"} for exercises provided {Blank single:94278::"1/6/2020","prior visit"}.    Assessment   Hiwot is a 74 y.o. female referred to outpatient Physical Therapy with a medical diagnosis of ***. The patient presents with signs and symptoms consistent with diagnosis along with *** and impairments which include {impairments list:45080}.  These impairments are limiting patient's ability to ***.     Pt prognosis is {REHAB PROGNOSIS OHS:91867}.   Pt will benefit from skilled outpatient Physical Therapy to address the deficits stated above and in the chart below, provide pt/family education, and to maximize pt's level of independence.     Plan of care discussed with patient: {YES:25294}  Pt's spiritual, cultural and educational needs considered and patient is agreeable to the plan of care and goals as stated below:     Anticipated Barriers for therapy: ***    Medical Necessity is demonstrated by the following  History  Co-morbidities and personal factors that may impact the plan of care Co-morbidities:   {Co-morbidities:42053}    Personal Factors:   {Personal Factors:79591}     {Desc; low/moderate/high:222232}   Examination  Body Structures and Functions, activity limitations and participation restrictions that may impact the plan of care Body Regions:   {Body Regions:95381}    Body Systems:    {Body Systems:53267}    Participation Restrictions:   ***    Activity limitations:   Learning and applying knowledge  {Learning and applying knowledge:66526}    General Tasks and Commands  {Gen tasks and commands:97185}    Communication  {Communication:07857}    Mobility  {Mobility:22291}    Self care  {Self Care:82363}    Domestic Life  {Domestic Life:74622}    Interactions/Relationships  {Interactions/Relationships:22811}    Life Areas  {Life Areas:42632}    Community and Social " "Life  {Community/Social Life:73461}         {Desc; low/moderate/high:633124}   Clinical Presentation {Clinical Presentation :83634} {Desc; low/moderate/high:852261}   Decision Making/ Complexity Score: {Desc; low/moderate/high:902736}     Goals:  Short Term Goals: In *** weeks:  1.Pt to be educated on HEP.  2.Patient to demo increased AROM to ***  3.Patient to increase strength *** LE by 1/2 grade.  4.Patient to have decreased pain to ***.  5.Patient to increase LE balance to ***.  6.Patient to improve score on the FOTO by 10%.  7.Patient to decrease swelling to ***cm    Long Term Goals: In *** weeks  1. Patient to perform daily activities including *** without limitation.  2. Patient to demonstrate increased knee AROM to ***.  3. Patient to demonstrate increased LE strength to ***.  4. Patient to have decreased pain to ***.  5. Patient to improve score on the FOTO to ***.      Plan   Plan of care Certification: 1/6/2020 to 2/6/2020.    Outpatient Physical Therapy {NUMBERS 1-5:68333} times weekly for {0-10:42280::"0"} weeks to include the following interventions: {TX PLAN:79317}.     Adrienne Chung, PT    Thank you for this referral.    These services are reasonable and necessary for the conditions set forth above while under my care.  "

## 2020-01-07 PROBLEM — M25.60 DECREASED RANGE OF MOTION: Status: ACTIVE | Noted: 2020-01-07

## 2020-01-07 PROBLEM — M79.604 RIGHT LEG PAIN: Status: ACTIVE | Noted: 2020-01-07

## 2020-01-07 PROBLEM — R53.1 WEAKNESS: Status: ACTIVE | Noted: 2020-01-07

## 2020-01-07 NOTE — PLAN OF CARE
[facility]  Physical Therapy Initial Evaluation    Name: Hiwot PACE [facility] Number: [id number]    Therapy Diagnosis:   Encounter Diagnoses   Name Primary?    Right leg pain     Weakness Yes    Decreased range of motion      Physician: Tobias Corona MD    Physician Orders: PT Eval and Treat   Medical Diagnosis from Referral: Primary osteoarthritis of right knee  Evaluation Date: 1/6/2020  Authorization Period Expiration: 1/6/2021  Plan of Care Expiration: 2/6/2020  Visit # / Visits authorized: 1/ 20    Time In: 5:00 pm  Time Out: 6:05 pm  Total Billable Time: 20 minutes    Precautions: Standard    Subjective   Date of onset: ~ few months ago  History of current condition - Hiwot reports: Pt reports that she has had knee pain for a few months.  She f/u with Dr Corona who has referred for PT for HEP, she also reports about a year or two she began to have knee pain and F/U with [facility] at [facility] was given a brace with metal stays and told that they couldn't find anything wrong with knee.  She also reports she is having plantar faciitis and takes Ibuprofen most days for 1-2 tomes per week or less for past 2-3 years.  She also reports that she has another medicine that she takes when she takes the ibuprofen.  She also rpeorts a history of LBP as well.  Indicates pain over pes anserine on R knee/Medial inferior area.  Has not exercised in 6 months, slight jog on TM, uses weight machine.     Pain:  Current 0/10, worst 9/10, best 0/10   Location: right knee   Description: Sharp  Aggravating Factors: pulling leg into Adduction position.  Easing Factors: stopping that movement    Prior Therapy: Yes  Social History: pt lives alone  Occupation:  - mostly sitting  Prior Level of Function: No problems.  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    Imaging, MRI studies, xrays    Medical History:   Past Medical History:   Diagnosis Date    Anxiety     Arthritis     hand, neck, back    Cataract     Depression     GERD (gastroesophageal reflux disease)     Hyperlipidemia     Obesity     Osteopenia     Pollen allergies     Sleep apnea        Surgical History:   Hiwot Coulter  has a past surgical history that includes Cholecystectomy (2012); Cataract extraction w/  intraocular lens implant (Right, 07/18/2019); and Hysterectomy.    Medications:   Hiwot has a current medication list which includes the following prescription(s): 1st medx-patch with lidocaine, acetaminophen, alprazolam, aspirin, betamethasone dipropionate, ergocalciferol, famotidine, fluticasone propionate, hydrocodone-acetaminophen, hydrocortisone, ibandronate, ibuprofen, naltrexone-bupropion, tramadol, and triamcinolone acetonide 0.1%.    Allergies:   Review of patient's allergies indicates:  No Known Allergies     Pts goals: Learn how to strengthen muscles around joint.    Objective         CMS Impairment/Limitation/Restriction for FOTO knee Survey    Therapist reviewed FOTO scores for Hiwot Coulter on 1/6/2020.   FOTO documents entered into [employer] - see Media section.    Limitation Score: 68%  Category: Mobility       Gait: Slight decreased in knee ext during gait.    Balance: R LE = 10s, L LE = 9s, used balance strategies at knee, hip and LB.    Reflex/Sensation: Sensation intact to light touch B LE's .     Knee AROM:     (R) (L)     Flexion   130 NT     Extension  0 NT     Extension (sitting) -10 0    Hip PROM:   WNL's in supine, B.       Ankle AROM:  Ankle DF  20 NT    Strength:         Hip flexors  3+/5  4-/5  Quadriceps  4-/5  4/5      Hamstrings  4/5  4/5     DF   4/5  4/5     PF   4/5  4/5     ER (sitting)  4-/5-3+/5 4-/5-3+/5     IR (sitting)  4-/5  4-/5     Gluteus Medius 3+/5  NT/5     Gluteus Leonardo 3+/5  3+/5    Joint Mobility: difficulty to palpate due to  "adipose, patella appear to sit laterally B.    Muscle Length: Pt presents with limited MLT in hamstrings, gastroc mm.    Special Test:            Valgus stress  + Varus stress  +     AILIN  - Thessaly's:   -     + drop leg test during S/L abduction drop testing, with pt unable to stabilize trunk, + roll     Palpation: Tender with increased tone over B SI jt, along upper/mid gluteals, piriformis mm, obturators, gemelli, quadratus femoris, hip flexors, piriformis, LB paraspinals.  Very tender at pes anserine and Add mm.    TREATMENT   Treatment Time In: 5:40 pm  Treatment Time Out: 6:05 pm  Total Treatment time separate from Evaluation: 20 minutes    Hiwot received therapeutic exercises to develop strength and core stabilization for 15 minutes including:    Lacrosse ball releases to upper and mid gluteals   PPT  Bridge  Clamshell  Prone hip ext with knee flex  SLR X 4      Hiwot received the following manual therapy techniques: Tape in "c" with kinesiotape to increase lateral support and proprioception with pt educated on how to perform.    Home Exercises and Patient Education Provided    Education provided:   -Education on condition, HEP, and importance of hip and core strength on knee and LE mm.  That foot pain/ROM may also affect gait and ROM of knee.    Written Home Exercises Provided: yes.  Exercises were reviewed and Hiwot was able to demonstrate them prior to the end of the session.  Hiwot demonstrated good  understanding of the education provided.     See EMR under Patient Instructions for exercises provided 1/6/2020.    Assessment   Hiwot is a 74 y.o. female referred to outpatient Physical Therapy with a medical diagnosis of  Primary osteoarthritis of right knee. The patient presents with signs and symptoms consistent with diagnosis along with LB/upper glut/glut med pain, B foot pain and impairments which include decreased ROM, decreased strength, decreased muscle length, impaired balance and decreased " overall function.  These impairments are limiting patient's ability to walk 2 blocks, getting in and out of car, performing light activities around the home, and dressing.     Pt prognosis is Good.   Pt will benefit from skilled outpatient Physical Therapy to address the deficits stated above and in the chart below, provide pt/family education, and to maximize pt's level of independence.     Plan of care discussed with patient: Yes  Pt's spiritual, cultural and educational needs considered and patient is agreeable to the plan of care and goals as stated below:     Anticipated Barriers for therapy: Pt only able to attend PT one time per week, long history of pain in LB, upper/mid gluteals, and B feet.    Medical Necessity is demonstrated by the following  History  Co-morbidities and personal factors that may impact the plan of care Co-morbidities:   high BMI, level of undertstanding of current condition and long history of LBP, B foot pain    Personal Factors:   lifestyle     high   Examination  Body Structures and Functions, activity limitations and participation restrictions that may impact the plan of care Body Regions:   back  lower extremities  trunk    Body Systems:    ROM  strength  balance  gait    Participation Restrictions:   None    Activity limitations:   Learning and applying knowledge  no deficits    General Tasks and Commands  no deficits    Communication  no deficits    Mobility  lifting and carrying objects  walking    Self care  dressing    Domestic Life  doing house work (cleaning house, washing dishes, laundry)    Interactions/Relationships  no deficits    Life Areas  no deficits    Community and Social Life  community life  recreation and leisure         moderate   Clinical Presentation evolving clinical presentation with changing clinical characteristics moderate   Decision Making/ Complexity Score: moderate     Goals:  Short Term Goals: In 3 weeks:  1.Pt to be educated on HEP.  2.Patient to  demo increased sitting knee extension to full.  3.Patient to increase strength B LE by 1/2 grade.  4.Patient to have decreased pain to 6/10.  5.Patient to increase LE balance to 20s.  6.Patient to improve score on the FOTO by 10%.    Long Term Goals: In 6 weeks  1. Patient to perform daily activities including walk 2 blocks, getting in and out of car, performing light activities around the home, and dressing without limitation.  2. Patient to demonstrate increased LE strength to 4+/5-5/5.  3. Patient to have decreased pain to 3/10 at worst.  4. Patient to improve score on the FOTO to 20%.      Plan   Plan of care Certification: 1/6/2020 to 2/6/2020.    Outpatient Physical Therapy 1 times weekly for 4 weeks to include the following interventions: Manual Therapy, Moist Heat/ Ice, Neuromuscular Re-ed, Patient Education, Self Care, Therapeutic Activites and Therapeutic Exercise.     Adrienne Chung, PT    Thank you for this referral.    These services are reasonable and necessary for the conditions set forth above while under my care.

## 2020-01-16 ENCOUNTER — CLINICAL SUPPORT (OUTPATIENT)
Dept: REHABILITATION | Facility: HOSPITAL | Age: 75
End: 2020-01-16
Payer: MEDICARE

## 2020-01-16 DIAGNOSIS — M25.60 DECREASED RANGE OF MOTION: ICD-10-CM

## 2020-01-16 DIAGNOSIS — R53.1 WEAKNESS: ICD-10-CM

## 2020-01-16 DIAGNOSIS — M79.604 RIGHT LEG PAIN: ICD-10-CM

## 2020-01-16 PROCEDURE — 97140 MANUAL THERAPY 1/> REGIONS: CPT | Mod: HCNC

## 2020-01-16 PROCEDURE — 97110 THERAPEUTIC EXERCISES: CPT | Mod: HCNC

## 2020-01-22 NOTE — PROGRESS NOTES
Physical Therapy Daily Treatment Note     Name: Hiwot PACE [facility] Number: [id number]    Therapy Diagnosis:   Encounter Diagnoses   Name Primary?    Right leg pain     Weakness     Decreased range of motion      Physician: Tobias Corona MD    Visit Date: 1/16/2020    Physician Orders: PT Eval and Treat   Medical Diagnosis from Referral: Primary osteoarthritis of right knee  Evaluation Date: 1/6/2020  Authorization Period Expiration: 1/6/2021  Plan of Care Expiration: 2/6/2020  Visit # / Visits authorized: 1/ 20     Time In: 5:00pm  Time Out: 6:00pm  Total Billable Time: 30 minutes    Precautions: Standard    Subjective     Pt reports: She has been doing her HEP and her knee is feeling a little bit better.  She reports fatigue but no increased pain with today's tx session.    She was compliant with home exercise program.  Response to previous treatment: Good  Functional change: less pain    Pain: NT/10  Location: right knee      Objective     Hiwot received therapeutic exercises to develop strength, endurance and core stabilization for 32 minutes including:    Bike to increase endurance and ROM 5'  Bridges 3'  PPT 3'  SLR x 5; 2/10 ea  Clamshells 2'/ea  Prone hip ext with knee flex 10x/ea  Stand glut med 10x/ea  Heel squeeze 3'  Standing quarter squat 10x    Hiwot received the following manual therapy techniques: Soft tissue Mobilization and manual releases were applied to the: R quad and Add's, along with kinesiotape to increase lateral support for 10 minutes.    Home Exercises Provided and Patient Education Provided     Education provided:   - Con't with HEP, issued new ex's    Written Home Exercises Provided: yes.  Exercises were reviewed and Hiwot was able to demonstrate them prior to the end of the session.  Hiwot demonstrated good  understanding of the education provided.     See EMR under Patient Instructions for exercises provided today.    Assessment     Pt without c/o pain during todays tx  session, just fatigued at hazel of session.  She required frequent cues for substitutions but tolerated all therex and progressions well.  Con'ts with decreased core and hip control, improved with cues.    Hiwot is progressing well towards her goals.   Pt prognosis is Good.     Pt will continue to benefit from skilled outpatient physical therapy to address the deficits listed in the problem list box on initial evaluation, provide pt/family education and to maximize pt's level of independence in the home and community environment.     Pt's spiritual, cultural and educational needs considered and pt agreeable to plan of care and goals.     Anticipated barriers to physical therapy: Pt only able to attend PT one time per week, long history of pain in LB, upper/mid gluteals, and B feet.    Goals:   Short Term Goals: In 3 weeks:  1.Pt to be educated on HEP.  2.Patient to demo increased sitting knee extension to full.  3.Patient to increase strength B LE by 1/2 grade.  4.Patient to have decreased pain to 6/10.  5.Patient to increase LE balance to 20s.  6.Patient to improve score on the FOTO by 10%.     Long Term Goals: In 6 weeks  1. Patient to perform daily activities including walk 2 blocks, getting in and out of car, performing light activities around the home, and dressing without limitation.  2. Patient to demonstrate increased LE strength to 4+/5-5/5.  3. Patient to have decreased pain to 3/10 at worst.  4. Patient to improve score on the FOTO to 20%.    Plan     Monitor response to today's tx and progress with strengthening and endurance PRN.    Adrienne Chung, PT

## 2020-01-30 ENCOUNTER — CLINICAL SUPPORT (OUTPATIENT)
Dept: REHABILITATION | Facility: HOSPITAL | Age: 75
End: 2020-01-30
Payer: MEDICARE

## 2020-01-30 DIAGNOSIS — M79.604 RIGHT LEG PAIN: ICD-10-CM

## 2020-01-30 DIAGNOSIS — M25.60 DECREASED RANGE OF MOTION: ICD-10-CM

## 2020-01-30 DIAGNOSIS — R53.1 WEAKNESS: ICD-10-CM

## 2020-01-30 PROCEDURE — 97110 THERAPEUTIC EXERCISES: CPT | Mod: HCNC

## 2020-01-31 NOTE — PROGRESS NOTES
Physical Therapy Daily Treatment Note     Name: Hiwot PACE [facility] Number: [id number]    Therapy Diagnosis:   Encounter Diagnoses   Name Primary?    Right leg pain     Weakness     Decreased range of motion      Physician: Tobias Corona MD    Visit Date: 1/30/2020    Physician Orders: PT Eval and Treat   Medical Diagnosis from Referral: Primary osteoarthritis of right knee  Evaluation Date: 1/6/2020  Authorization Period Expiration: 1/6/2021  Plan of Care Expiration: 2/6/2020  Visit # / Visits authorized: 2/ 20     Time In: 5:00pm  Time Out: 5:50pm  Total Billable Time: 38 minutes    Precautions: Standard    Subjective     Pt reports: She totally forgot about last weeks appointment.  She has been doing HEP and is feeling much better.  Knee pain is very little to none.  We discussed that MD was going to refer her to someone to discuss weight loss management but she never heard back form them - after chart review see that order was put in, she will contact main number to see what her next steps should be.  She reports that she knows that getting some weight off would help her overall.    She was compliant with home exercise program.  Response to previous treatment: Good  Functional change: less pain    Pain: NT/10  Location: right knee      Objective     Hiwot received therapeutic exercises to develop strength, endurance and core stabilization for 38 minutes including:    Bike to increase endurance and ROM 10'  Bridges 3'  PPT 3'  SLR x 5; 2/10 ea 2#  Fire hydrants 10x/ea, with wand on back  Bird dogs 2/10 ea, with wand on back  Stand glut med 10x/ea  Standing quarter squat 10x2  Sumo squat 15#, 10x    Soft tissue assessed but no retrcitions noted, improved tracking of patella with knee flexion noted.    FUNCTION:      Home Exercises Provided and Patient Education Provided     Education provided:   - Con't with HEP, issued new ex's    Written Home Exercises Provided: yes.  Exercises were reviewed and  Hiwot was able to demonstrate them prior to the end of the session.  Hiwot demonstrated good  understanding of the education provided.     See EMR under Patient Instructions for exercises provided today.    Assessment     Pt without c/o pain during todays tx session, she does report fatigue but no pain with any activity.  She does require cues for therex and feedback for bird dogs and fire hydrants.    Hiwot is progressing well towards her goals.   Pt prognosis is Good.     Pt will continue to benefit from skilled outpatient physical therapy to address the deficits listed in the problem list box on initial evaluation, provide pt/family education and to maximize pt's level of independence in the home and community environment.     Pt's spiritual, cultural and educational needs considered and pt agreeable to plan of care and goals.     Anticipated barriers to physical therapy: Pt only able to attend PT one time per week, long history of pain in LB, upper/mid gluteals, and B feet.    Goals:   Short Term Goals: In 3 weeks:  1.Pt to be educated on HEP.  2.Patient to demo increased sitting knee extension to full.  3.Patient to increase strength B LE by 1/2 grade.  4.Patient to have decreased pain to 6/10.  5.Patient to increase LE balance to 20s.  6.Patient to improve score on the FOTO by 10%.     Long Term Goals: In 6 weeks  1. Patient to perform daily activities including walk 2 blocks, getting in and out of car, performing light activities around the home, and dressing without limitation.  2. Patient to demonstrate increased LE strength to 4+/5-5/5.  3. Patient to have decreased pain to 3/10 at worst.  4. Patient to improve score on the FOTO to 20%.    Plan     Monitor response to today's tx and progress with strengthening and endurance PRN. Anticipate DC to self care after next treatment session.    Adrienne Chung, PT

## 2020-02-03 ENCOUNTER — PATIENT OUTREACH (OUTPATIENT)
Dept: ADMINISTRATIVE | Facility: HOSPITAL | Age: 75
End: 2020-02-03

## 2020-02-03 NOTE — PROGRESS NOTES
Statin Addressed in Appt Note        Cata NICOLE LPN Care Coordinator  Care Coordination Department  [facility]  [phone]

## 2020-02-06 ENCOUNTER — PATIENT MESSAGE (OUTPATIENT)
Dept: REHABILITATION | Facility: HOSPITAL | Age: 75
End: 2020-02-06

## 2020-02-20 ENCOUNTER — LAB VISIT (OUTPATIENT)
Dept: LAB | Facility: HOSPITAL | Age: 75
End: 2020-02-20
Attending: PEDIATRICS
Payer: MEDICARE

## 2020-02-20 DIAGNOSIS — E55.9 VITAMIN D DEFICIENCY: ICD-10-CM

## 2020-02-20 DIAGNOSIS — E66.01 SEVERE OBESITY (BMI 35.0-39.9) WITH COMORBIDITY: ICD-10-CM

## 2020-02-20 DIAGNOSIS — E78.00 PURE HYPERCHOLESTEROLEMIA: ICD-10-CM

## 2020-02-20 LAB
25(OH)D3+25(OH)D2 SERPL-MCNC: 31 NG/ML (ref 30–96)
ALT SERPL W/O P-5'-P-CCNC: 18 U/L (ref 10–44)
ANION GAP SERPL CALC-SCNC: 8 MMOL/L (ref 8–16)
AST SERPL-CCNC: 20 U/L (ref 10–40)
BUN SERPL-MCNC: 18 MG/DL (ref 8–23)
CALCIUM SERPL-MCNC: 9.5 MG/DL (ref 8.7–10.5)
CHLORIDE SERPL-SCNC: 105 MMOL/L (ref 95–110)
CHOLEST SERPL-MCNC: 184 MG/DL (ref 120–199)
CHOLEST/HDLC SERPL: 2.8 {RATIO} (ref 2–5)
CO2 SERPL-SCNC: 28 MMOL/L (ref 23–29)
CREAT SERPL-MCNC: 0.9 MG/DL (ref 0.5–1.4)
EST. GFR  (AFRICAN AMERICAN): >60 ML/MIN/1.73 M^2
EST. GFR  (NON AFRICAN AMERICAN): >60 ML/MIN/1.73 M^2
GLUCOSE SERPL-MCNC: 87 MG/DL (ref 70–110)
HDLC SERPL-MCNC: 65 MG/DL (ref 40–75)
HDLC SERPL: 35.3 % (ref 20–50)
LDLC SERPL CALC-MCNC: 107.6 MG/DL (ref 63–159)
NONHDLC SERPL-MCNC: 119 MG/DL
POTASSIUM SERPL-SCNC: 4.7 MMOL/L (ref 3.5–5.1)
SODIUM SERPL-SCNC: 141 MMOL/L (ref 136–145)
TRIGL SERPL-MCNC: 57 MG/DL (ref 30–150)

## 2020-02-20 PROCEDURE — 80061 LIPID PANEL: CPT | Mod: HCNC

## 2020-02-20 PROCEDURE — 80048 BASIC METABOLIC PNL TOTAL CA: CPT | Mod: HCNC

## 2020-02-20 PROCEDURE — 84450 TRANSFERASE (AST) (SGOT): CPT | Mod: HCNC

## 2020-02-20 PROCEDURE — 82306 VITAMIN D 25 HYDROXY: CPT | Mod: HCNC

## 2020-02-20 PROCEDURE — 84460 ALANINE AMINO (ALT) (SGPT): CPT | Mod: HCNC

## 2020-02-20 PROCEDURE — 36415 COLL VENOUS BLD VENIPUNCTURE: CPT | Mod: HCNC

## 2020-02-27 ENCOUNTER — OFFICE VISIT (OUTPATIENT)
Dept: INTERNAL MEDICINE | Facility: CLINIC | Age: 75
End: 2020-02-27
Payer: MEDICARE

## 2020-02-27 VITALS
HEIGHT: 62 IN | HEART RATE: 68 BPM | WEIGHT: 197.75 LBS | RESPIRATION RATE: 16 BRPM | DIASTOLIC BLOOD PRESSURE: 72 MMHG | SYSTOLIC BLOOD PRESSURE: 100 MMHG | OXYGEN SATURATION: 97 % | BODY MASS INDEX: 36.39 KG/M2 | TEMPERATURE: 98 F

## 2020-02-27 DIAGNOSIS — G47.61 PERIODIC LIMB MOVEMENT DISORDER (PLMD): ICD-10-CM

## 2020-02-27 DIAGNOSIS — Z00.00 WELL ADULT EXAM: Primary | ICD-10-CM

## 2020-02-27 DIAGNOSIS — J31.0 CHRONIC RHINITIS: ICD-10-CM

## 2020-02-27 DIAGNOSIS — G47.33 OSA ON CPAP: ICD-10-CM

## 2020-02-27 DIAGNOSIS — E78.49 OTHER HYPERLIPIDEMIA: ICD-10-CM

## 2020-02-27 DIAGNOSIS — E55.9 VITAMIN D DEFICIENCY: ICD-10-CM

## 2020-02-27 DIAGNOSIS — E66.01 SEVERE OBESITY (BMI 35.0-39.9) WITH COMORBIDITY: ICD-10-CM

## 2020-02-27 DIAGNOSIS — F51.12 BEHAVIORALLY INDUCED INSUFFICIENT SLEEP SYNDROME: ICD-10-CM

## 2020-02-27 DIAGNOSIS — K21.9 GASTROESOPHAGEAL REFLUX DISEASE WITHOUT ESOPHAGITIS: Chronic | ICD-10-CM

## 2020-02-27 DIAGNOSIS — M47.9 OSTEOARTHRITIS OF LOWER BACK: ICD-10-CM

## 2020-02-27 DIAGNOSIS — M85.89 OSTEOPENIA OF MULTIPLE SITES: ICD-10-CM

## 2020-02-27 DIAGNOSIS — M50.30 DDD (DEGENERATIVE DISC DISEASE), CERVICAL: ICD-10-CM

## 2020-02-27 DIAGNOSIS — F41.9 ANXIETY: ICD-10-CM

## 2020-02-27 PROCEDURE — 99499 RISK ADDL DX/OHS AUDIT: ICD-10-PCS | Mod: S$GLB,,, | Performed by: PEDIATRICS

## 2020-02-27 PROCEDURE — 99999 PR PBB SHADOW E&M-EST. PATIENT-LVL III: CPT | Mod: PBBFAC,HCNC,, | Performed by: PEDIATRICS

## 2020-02-27 PROCEDURE — 99397 PR PREVENTIVE VISIT,EST,65 & OVER: ICD-10-PCS | Mod: HCNC,S$GLB,, | Performed by: PEDIATRICS

## 2020-02-27 PROCEDURE — 99499 UNLISTED E&M SERVICE: CPT | Mod: S$GLB,,, | Performed by: PEDIATRICS

## 2020-02-27 PROCEDURE — 99397 PER PM REEVAL EST PAT 65+ YR: CPT | Mod: HCNC,S$GLB,, | Performed by: PEDIATRICS

## 2020-02-27 PROCEDURE — 99999 PR PBB SHADOW E&M-EST. PATIENT-LVL III: ICD-10-PCS | Mod: PBBFAC,HCNC,, | Performed by: PEDIATRICS

## 2020-02-27 RX ORDER — MONTELUKAST SODIUM 10 MG/1
TABLET ORAL
Qty: 90 TABLET | Refills: 3 | Status: SHIPPED | OUTPATIENT
Start: 2020-02-27

## 2020-02-27 RX ORDER — MONTELUKAST SODIUM 10 MG/1
10 TABLET ORAL NIGHTLY
Qty: 30 TABLET | Refills: 0 | Status: SHIPPED | OUTPATIENT
Start: 2020-02-27 | End: 2020-02-27

## 2020-02-27 RX ORDER — TRAZODONE HYDROCHLORIDE 50 MG/1
50 TABLET ORAL NIGHTLY
Qty: 30 TABLET | Refills: 1 | Status: SHIPPED | OUTPATIENT
Start: 2020-02-27 | End: 2021-12-03 | Stop reason: SDUPTHER

## 2020-02-27 NOTE — PROGRESS NOTES
Subjective:       Patient ID: Hiwot Coulter is a 74 y.o. female.     Chief Complaint: Follow-up     LIPIDS: Not following D&E, weight is up over holidays, inconsistent with contrave.   Obesity:see above  Quiet anxiety. No buspar/lexapro. [place]  website shows no interval xanax/narcotic use in 2 years use  GERD Quiet on treatment, rare pepcid use.  Vitamin D: using twice weekly D  Plantar fascitis and cervical/low back pain. Generally good on ibuprofen, uses hydrocodone,  [place]  website accessed.   JAMAL/sleep/RLS: followed by pulmonary. Still has occasional issues with falling asleep.  Osteoporosis: on boniva  Obesity: not using contrave, wants to see dietian.  Chronic rhinitis: flaring despite flonase and claritin D.  Osteoarthritis: overall mild symptomatic, [place]  website reviewed.  LABS REVIEWED AND DISCUSSED WITH PATIENT              Review of Systems   Constitutional: Negative for fever and unexpected weight change.   HENT: positive for congestion and rhinorrhea( allergies).    Eyes: Negative for discharge and redness.   Respiratory: negative for cough and shortness of breath. Negative for wheezing.    Cardiovascular: Negative for chest pain, palpitations and leg swelling.   Gastrointestinal: Negative for abdominal pain, constipation, diarrhea and vomiting.   Endocrine: Negative for cold intolerance, heat intolerance, polydipsia, polyphagia and polyuria.   Genitourinary: Negative for decreased urine volume, difficulty urinating and menstrual problem.   Musculoskeletal: Negative for arthralgias and joint swelling.   Skin: Negative for rash and wound.   Neurological: Negative for syncope and headaches. Mild chronic for years tremor with handwriting only  Psychiatric/Behavioral: Negative for behavioral problems and sleep disturbance.      Objective:   Physical Exam   Constitutional: She is oriented to person, place, and time. She appears well-developed and well-nourished. No distress.   Neck: No JVD present. No  thyromegaly present.   Cardiovascular: Normal rate, regular rhythm and normal heart sounds.   No murmur heard.  Pulmonary/Chest: Effort normal and breath sounds normal. No respiratory distress. She has no wheezes. She has no rales.   Abdominal: Soft. She exhibits no distension and no mass. There is no tenderness. There is no guarding.   Musculoskeletal: She exhibits no edema.   Lymphadenopathy:     She has no cervical adenopathy.   Neurological: She is alert and oriented to person, place, and time. No cranial nerve deficit. Coordination normal.   Skin: Capillary refill takes less than 2 seconds. No rash noted.   Psychiatric: She has a normal mood and affect. Her behavior is normal. Judgment and thought content normal.      Assessment:      1. Pure hypercholesterolemia    2. Vitamin D deficiency    3. Anxiety    4. Gastroesophageal reflux disease without esophagitis    5. Severe obesity (BMI 35.0-39.9) with comorbidity    6. JAMAL on CPAP    7. Osteoarthritis of lower back    8. Chronic rhinitis    9. Non-seasonal allergic rhinitis due to other allergic trigger    10. Age related osteoporosis, unspecified pathological fracture presence       Plan:    Maintain D&E, weight loss, see nutrition. No change in meds(try trazodone and add singular), keep subspecialty care.  F/U yearly with labs. Mammogram in fall. Shingrix at pharmacy.

## 2020-03-18 ENCOUNTER — PATIENT MESSAGE (OUTPATIENT)
Dept: NUTRITION | Facility: CLINIC | Age: 75
End: 2020-03-18

## 2020-03-19 ENCOUNTER — TELEPHONE (OUTPATIENT)
Dept: NUTRITION | Facility: CLINIC | Age: 75
End: 2020-03-19

## 2020-03-19 NOTE — TELEPHONE ENCOUNTER
Called to offer virtual visit to patient in place of canceled patient. Left name and number to call back.

## 2020-03-23 ENCOUNTER — DOCUMENTATION ONLY (OUTPATIENT)
Dept: REHABILITATION | Facility: HOSPITAL | Age: 75
End: 2020-03-23

## 2020-03-23 DIAGNOSIS — M25.60 DECREASED RANGE OF MOTION: ICD-10-CM

## 2020-03-23 DIAGNOSIS — R53.1 WEAKNESS: ICD-10-CM

## 2020-03-23 DIAGNOSIS — M79.604 RIGHT LEG PAIN: ICD-10-CM

## 2020-03-23 NOTE — PROGRESS NOTES
Outpatient Therapy Discharge Summary     Name: Hiwot Coulter  [facility] Number: [id number]    Therapy Diagnosis:   Encounter Diagnoses   Name Primary?    Right leg pain     Weakness     Decreased range of motion      Physician: Tobias Corona MD    Physician Orders: PT Eval and Treat   Medical Diagnosis from Referral: Primary osteoarthritis of right knee  Evaluation Date: 1/6/2020    Date of Last visit: 1/30/2020  Total Visits Received: 3  Cancelled Visits: 1  No Show Visits: 1    Assessment      At last visit patient was doing well with HEP and was progressed, she was also reporting decreased pain levels.    Goals:   Short Term Goals: In 3 weeks:  1.Pt to be educated on HEP. MET  2.Patient to demo increased sitting knee extension to full.  3.Patient to increase strength B LE by 1/2 grade.  4.Patient to have decreased pain to 6/10.   5.Patient to increase LE balance to 20s.  6.Patient to improve score on the FOTO by 10%. MET     Long Term Goals: In 6 weeks  1. Patient to perform daily activities including walk 2 blocks, getting in and out of car, performing light activities around the home, and dressing without limitation.  2. Patient to demonstrate increased LE strength to 4+/5-5/5.  3. Patient to have decreased pain to 3/10 at worst.  4. Patient to improve score on the FOTO to 20%.    Discharge reason: Patient has not attended therapy since 1/30/2020, was doing well with HEP at last visit.    Plan   This patient is discharged from Physical Therapy

## 2020-06-08 ENCOUNTER — NUTRITION (OUTPATIENT)
Dept: NUTRITION | Facility: CLINIC | Age: 75
End: 2020-06-08
Payer: MEDICARE

## 2020-06-08 VITALS — BODY MASS INDEX: 36.18 KG/M2 | WEIGHT: 196.63 LBS | HEIGHT: 62 IN

## 2020-06-08 DIAGNOSIS — E66.9 OBESITY (BMI 35.0-39.9 WITHOUT COMORBIDITY): ICD-10-CM

## 2020-06-08 DIAGNOSIS — Z71.3 DIETARY COUNSELING: Primary | ICD-10-CM

## 2020-06-08 PROCEDURE — 97802 PR MED NUTR THER, 1ST, INDIV, EA 15 MIN: ICD-10-PCS | Mod: HCNC,S$GLB,, | Performed by: DIETITIAN, REGISTERED

## 2020-06-08 PROCEDURE — 97802 MEDICAL NUTRITION INDIV IN: CPT | Mod: HCNC,S$GLB,, | Performed by: DIETITIAN, REGISTERED

## 2020-06-08 PROCEDURE — 99999 PR PBB SHADOW E&M-EST. PATIENT-LVL II: ICD-10-PCS | Mod: PBBFAC,HCNC,, | Performed by: DIETITIAN, REGISTERED

## 2020-06-08 PROCEDURE — 99999 PR PBB SHADOW E&M-EST. PATIENT-LVL II: CPT | Mod: PBBFAC,HCNC,, | Performed by: DIETITIAN, REGISTERED

## 2020-06-08 NOTE — PROGRESS NOTES
"Referring Physician:No ref. provider found     Reason for visit: Weight management    Initial Visit    :1945     Allergies Reviewed  Meds Reviewed    Anthropometrics  Weight:89.2 kg (196 lb 10.4 oz)  Height:5' 1.73" (1.568 m)  BMI:Body mass index is 36.28 kg/m².   IBW:  23-27 kg/m2 (151 lbs-177lbs)  Personal Goal: is 150 lbs        Meds:  Outpatient Medications Prior to Visit   Medication Sig Dispense Refill    acetaminophen (TYLENOL) 500 mg Cap as needed.       ergocalciferol (ERGOCALCIFEROL) 50,000 unit Cap TAKE 1 CAPSULE BY MOUTH 2 TIMES A WEEK 25 capsule 4    fluticasone propionate (FLONASE) 50 mcg/actuation nasal spray 2 sprays (100 mcg total) by Each Nostril route once daily. (Patient taking differently: 2 sprays by Each Nostril route as needed. ) 3 Bottle 3    hydrocodone-acetaminophen 5-325mg (NORCO) 5-325 mg per tablet Take 1 tablet by mouth every 6 (six) hours as needed for Pain. Do not use more than 4 a day including OTC tylenol. Not to be used within 3 days of contrave. 60 tablet 0    hydrocortisone (WESTCORT) 0.2 % cream Apply topically 2 (two) times daily. (Patient taking differently: Apply topically as needed. ) 60 g 0    ibandronate (BONIVA) 150 mg tablet TAKE 1 TABLET(150 MG) BY MOUTH EVERY 30 DAYS 3 tablet 3    ibuprofen (ADVIL,MOTRIN) 800 MG tablet Take 1 tablet (800 mg total) by mouth 3 (three) times daily as needed for Pain. 90 tablet 3    montelukast (SINGULAIR) 10 mg tablet TAKE 1 TABLET(10 MG) BY MOUTH EVERY EVENING 90 tablet 3    traZODone (DESYREL) 50 MG tablet Take 1 tablet (50 mg total) by mouth every evening. 30 tablet 1    triamcinolone acetonide 0.1% (KENALOG) 0.1 % cream AAA of rash of leg bid prn flares. Steroid- do not apply to face or folds of skin. (Patient taking differently: Apply topically as needed. AAA of rash of leg bid prn flares. Steroid- do not apply to face or folds of skin.) 80 g 1     No facility-administered medications prior to visit.      "   Food/Drug Interactions Noted:  None    Vitamins/Supplements/Herbs:  No, reports she takes a natural supplement to keep cholesterol down. Does not know that name.     Labs: WNL of 2/20/2020    Nutrition Prescription:  Calorie Needs (via [employer] [address] [place]):  Activity Factor:  1.2   - Maintenance:9790-2988 kcal   -Loss: 1200 kcal per day for loss of 1-2 pounds per week    CHO (50-65%): 150-195 g   Protein (via 0.8 g/kg): 64g  Fat: (25% of calories): 33 g  Fluid (30 ml/kg): 2672 ml of water (89 oz)    Support System:  Yes    Diet Hx: Does not eat rice and bread. Reports biggest downfall is sugar. Reports she loves vegetables, greens, salads. Pt is not picky. Pt does not eat a lot of meat or fish. Will eat beef and pork, but does not crave foods. Pt does not plan meals out before shopping. She eats mostly out of pantry and freezer. Does not want to buy food she knows she's not going to eat.  Does not eat a lot of fast food. Pt noticed that weight gain began 3 years ago. Usual weight before that point was 145 lbs. Reports that she began to prioritize work over health 3 years ago and stopped working out. This caused her to gain weight. Reports she cooks brocili head or cauliflower head and will eat half of that for dinner. Will eat chili for dinner at times too.     Breakfast: 1 Bagel + 1 teaspoon of butterOR Boiled Egg OR Grits/Cream of Wheat  Lunch:  Wheat bread sandwich with ham or roast beef + Mustard + Trejo + Cheese OR Crackers (5-6) with Sour cream + Cheese Dip  Dinner: Homemade Potato Soup  Snacking: A pack of peanut butter crackers occasionally  Fluid: 3-4 cups of coffee with Stevia, Ocean Spray Diet Cranberry Juice, V8 Juice, Unsweet Tea; (16 oz of noncaffienated beverages per day)    Current activity level and/or physical limitations:  NOne    Motivation to make changes/anticipated barriers and/or expected adherence:  Stong, patient likely to adhere as long as she is held accountable.     Nutrition-Focus Physical  Findings:  Ample fat stores, no signs of nutrient difficiency.       Assessment: Ms. Coulter is a pleasant elderly women. She was very knowledgeable about nutrition information and asked many probing questions about macro counting, fad diets, and diet plans like weight watchers. Reports she used to go to the gym but is a workaholic () and does not prioritize time for herself anymore. She lives alone and makes large portions, enough meals to keep in freezer. She cooks for herself and does not like to plan meals out. Ms. Coulter presented as very fidgety with nervous ticks. She reports being very healthy and feeling young with weight gain in the past few years. She is aware of changes she made that caused her to gain weight and is aware of what she needs to do to lose weight. Would benefit from accountability and structure in regards to dieting.     Nutrition Diagnosis: Obesity related to physical inactivity and inproper balance of nutrients as evidenced by BMI of 36.28 kg/m2.  Physical inactivity related to motivation as evidenced by reports of not exercising due to work a holic nature.   Inadequate fluid consumption related to water consumption below needs as evidenced by non caffinated fluid intake of 16 oz per day.     Recommendations:   Pt Goals:  1. Limit to one day of sugar eating per week, limiting sweetener/ artificial sweetener to the weekend day of choice.  2. Bike ride for 30 minutes twice a week.  3. Will drink 1 bottle of water per day.     Strategies Implemented:  Educated pt on the NCM weight loss tip. Discussed the importance of drinking ample water and when to drink water in order to avoid cravings. Educated pt on minimizing calories during cooking, making meal times more mindful, snacking healthier, and eating based on emotions. Stressed the importance of eating small frequent meals and getting plenty fiber. Taught patient different strategies to decrease sugar and fat content in purchased and  prepared foods. Provided patient with some of my favorite low calorie sweets and educated based on the MyPlate method. Provided handouts on healthy snacks, my plate, and weight loss tips.       Consultation Time:60 minutes.  Communicated with referring healthcare provider:  Consult note available in pt's Epic chart per MD discretion  Follow Up:2 weeks or PRN. Patient will schedule or call.

## 2020-07-03 ENCOUNTER — PATIENT MESSAGE (OUTPATIENT)
Dept: INTERNAL MEDICINE | Facility: CLINIC | Age: 75
End: 2020-07-03

## 2020-07-03 DIAGNOSIS — M85.89 OSTEOPENIA OF MULTIPLE SITES: ICD-10-CM

## 2020-07-06 RX ORDER — IBANDRONATE SODIUM 150 MG/1
TABLET, FILM COATED ORAL
Qty: 3 TABLET | Refills: 3 | Status: SHIPPED | OUTPATIENT
Start: 2020-07-06 | End: 2021-07-13 | Stop reason: SDUPTHER

## 2020-07-06 NOTE — TELEPHONE ENCOUNTER
See mychart refill request, request sent to Dr. Ramirez for auth [Ibandronate].    LV 02/27/20  [place] 03./02/21    Last DXA 02/28/2018

## 2020-09-23 ENCOUNTER — PATIENT OUTREACH (OUTPATIENT)
Dept: ADMINISTRATIVE | Facility: HOSPITAL | Age: 75
End: 2020-09-23

## 2020-09-23 ENCOUNTER — PATIENT OUTREACH (OUTPATIENT)
Dept: ADMINISTRATIVE | Facility: OTHER | Age: 75
End: 2020-09-23

## 2020-09-23 ENCOUNTER — OFFICE VISIT (OUTPATIENT)
Dept: OPHTHALMOLOGY | Facility: CLINIC | Age: 75
End: 2020-09-23
Payer: MEDICARE

## 2020-09-23 DIAGNOSIS — H52.7 REFRACTION DISORDER: ICD-10-CM

## 2020-09-23 DIAGNOSIS — H40.023 OPEN ANGLE WITH BORDERLINE FINDINGS, HIGH RISK, BILATERAL: Primary | ICD-10-CM

## 2020-09-23 DIAGNOSIS — H40.003 OPEN ANGLE WITH BORDERLINE INTRAOCULAR PRESSURE OF BOTH EYES: ICD-10-CM

## 2020-09-23 DIAGNOSIS — Z96.1 PSEUDOPHAKIA: ICD-10-CM

## 2020-09-23 DIAGNOSIS — H40.023 OPEN ANGLE WITH BORDERLINE FINDINGS, HIGH RISK, BILATERAL: ICD-10-CM

## 2020-09-23 PROCEDURE — 99999 PR PBB SHADOW E&M-EST. PATIENT-LVL II: ICD-10-PCS | Mod: PBBFAC,HCNC,, | Performed by: OPHTHALMOLOGY

## 2020-09-23 PROCEDURE — 92133 POSTERIOR SEGMENT OCT OPTIC NERVE(OCULAR COHERENCE TOMOGRAPHY) - OU - BOTH EYES: ICD-10-PCS | Mod: HCNC,S$GLB,, | Performed by: OPHTHALMOLOGY

## 2020-09-23 PROCEDURE — 92014 PR EYE EXAM, EST PATIENT,COMPREHESV: ICD-10-PCS | Mod: HCNC,S$GLB,, | Performed by: OPHTHALMOLOGY

## 2020-09-23 PROCEDURE — 92133 CPTRZD OPH DX IMG PST SGM ON: CPT | Mod: HCNC,S$GLB,, | Performed by: OPHTHALMOLOGY

## 2020-09-23 PROCEDURE — 99999 PR PBB SHADOW E&M-EST. PATIENT-LVL II: CPT | Mod: PBBFAC,HCNC,, | Performed by: OPHTHALMOLOGY

## 2020-09-23 PROCEDURE — 92014 COMPRE OPH EXAM EST PT 1/>: CPT | Mod: HCNC,S$GLB,, | Performed by: OPHTHALMOLOGY

## 2020-09-23 NOTE — PROGRESS NOTES
HPI     6 month Pseudophakic check  Slightly blurred VA for distance  Wearing computer/readers  No pain or discomfort    1. PCIOL OD 7/18/19 +20.0WF/CDE 10.73  PCIOL OS +21.0 SN60WF / CDE: 12.02 / 08/22/19    Last edited by Berta Marin on 9/23/2020 10:38 AM. (History)            Assessment /Plan     For exam results, see Encounter Report.      ICD-10-CM ICD-9-CM    1. 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 Refraction disorder  H52.7 367.9    3. Pseudophakia  Z96.1 V43.1    4. Open angle with borderline findings, high risk, bilateral   H40.023 365.05 Posterior Segment OCT Optic Nerve- Both eyes       Return to clinic with Hvf, CCT, and iop  Consider treatment due to thinning of OD temp rim and gOCT findings

## 2020-09-23 NOTE — PROGRESS NOTES
Health Maintenance Due   Topic Date Due    Shingles Vaccine (2 of 3) 06/28/2012    Influenza Vaccine (1) 08/01/2020     Updates were requested from care everywhere.  Chart was reviewed for overdue Proactive [facility] Encounters ([facility]) topics (CRS, Breast Cancer Screening, Eye exam)  Health Maintenance has been updated.  LINKS immunization registry triggered.  Immunizations were reconciled.

## 2020-10-27 ENCOUNTER — PATIENT OUTREACH (OUTPATIENT)
Dept: ADMINISTRATIVE | Facility: OTHER | Age: 75
End: 2020-10-27

## 2020-10-28 ENCOUNTER — OFFICE VISIT (OUTPATIENT)
Dept: OPHTHALMOLOGY | Facility: CLINIC | Age: 75
End: 2020-10-28
Payer: MEDICARE

## 2020-10-28 DIAGNOSIS — Z96.1 PSEUDOPHAKIA: ICD-10-CM

## 2020-10-28 DIAGNOSIS — H40.003 OPEN ANGLE WITH BORDERLINE INTRAOCULAR PRESSURE OF BOTH EYES: Primary | ICD-10-CM

## 2020-10-28 PROCEDURE — 92083 EXTENDED VISUAL FIELD XM: CPT | Mod: HCNC,S$GLB,, | Performed by: OPHTHALMOLOGY

## 2020-10-28 PROCEDURE — 99999 PR PBB SHADOW E&M-EST. PATIENT-LVL III: CPT | Mod: PBBFAC,HCNC,, | Performed by: OPHTHALMOLOGY

## 2020-10-28 PROCEDURE — 92012 INTRM OPH EXAM EST PATIENT: CPT | Mod: HCNC,S$GLB,, | Performed by: OPHTHALMOLOGY

## 2020-10-28 PROCEDURE — 76514 PR  US, EYE, FOR CORNEAL THICKNESS: ICD-10-PCS | Mod: HCNC,S$GLB,, | Performed by: OPHTHALMOLOGY

## 2020-10-28 PROCEDURE — 99999 PR PBB SHADOW E&M-EST. PATIENT-LVL III: ICD-10-PCS | Mod: PBBFAC,HCNC,, | Performed by: OPHTHALMOLOGY

## 2020-10-28 PROCEDURE — 92083 HUMPHREY VISUAL FIELD - OU - BOTH EYES: ICD-10-PCS | Mod: HCNC,S$GLB,, | Performed by: OPHTHALMOLOGY

## 2020-10-28 PROCEDURE — 92012 PR EYE EXAM, EST PATIENT,INTERMED: ICD-10-PCS | Mod: HCNC,S$GLB,, | Performed by: OPHTHALMOLOGY

## 2020-10-28 PROCEDURE — 76514 ECHO EXAM OF EYE THICKNESS: CPT | Mod: HCNC,S$GLB,, | Performed by: OPHTHALMOLOGY

## 2020-10-28 RX ORDER — KETOROLAC TROMETHAMINE 5 MG/ML
1 SOLUTION OPHTHALMIC 4 TIMES DAILY
Qty: 1 BOTTLE | Refills: 1 | Status: SHIPPED | OUTPATIENT
Start: 2020-10-28 | End: 2020-11-04

## 2020-10-28 NOTE — PROGRESS NOTES
HPI     Glaucoma Suspect     Comments: 1 month IOP check with PACH, & HVF               Comments     Patient states OU is doing well, no changes in VA and denies any pain or   irritation.        1. PCIOL OD 7/18/19 +20.0WF/CDE 10.73  PCIOL OS +21.0 SN60WF / CDE: 12.02 / 08/22/19  2. Glaucoma Suspect          Last edited by Marjorie Zuniga, Patient Care Assistant on 10/28/2020  3:47   PM. (History)            Assessment /Plan     For exam results, see Encounter Report.      ICD-10-CM ICD-9-CM    1. 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 Additional treatment required.  Discussed options, risks, and benefits of additional medication, SLT laser, or incisional glaucoma surgery.     recommend SLT OD W/ KETO     Patient chooses THE ABOVE     Reviewed importance of continued compliance with treatment and follow up.      2. Pseudophakia  Z96.1 V43.1

## 2020-11-05 ENCOUNTER — OUTSIDE PLACE OF SERVICE (OUTPATIENT)
Dept: ADMINISTRATIVE | Facility: OTHER | Age: 75
End: 2020-11-05
Payer: MEDICARE

## 2020-11-05 PROCEDURE — 65855 PR TRABECULOPLASTY LASER SURGERY: ICD-10-PCS | Mod: RT,,, | Performed by: OPHTHALMOLOGY

## 2020-11-05 PROCEDURE — 65855 TRABECULOPLASTY LASER SURG: CPT | Mod: RT,,, | Performed by: OPHTHALMOLOGY

## 2020-11-09 ENCOUNTER — IMMUNIZATION (OUTPATIENT)
Dept: PHARMACY | Facility: CLINIC | Age: 75
End: 2020-11-09
Payer: MEDICARE

## 2020-11-20 DIAGNOSIS — E55.9 VITAMIN D DEFICIENCY: ICD-10-CM

## 2020-11-20 NOTE — TELEPHONE ENCOUNTER
Fax refill request rec'vd from pt's pharm, refill request sent to Dr. Ramirez for auth [Vit D2].    LV 02/27/20  NV 03/02/21    Last Vit D 02/20/20, repeat sched 02/23/21

## 2020-11-23 RX ORDER — ERGOCALCIFEROL 1.25 MG/1
CAPSULE ORAL
Qty: 25 CAPSULE | Refills: 3 | Status: SHIPPED | OUTPATIENT
Start: 2020-11-23 | End: 2021-12-28 | Stop reason: SDUPTHER

## 2020-12-01 ENCOUNTER — OFFICE VISIT (OUTPATIENT)
Dept: OPHTHALMOLOGY | Facility: CLINIC | Age: 75
End: 2020-12-01
Payer: MEDICARE

## 2020-12-01 DIAGNOSIS — Z96.1 PSEUDOPHAKIA: ICD-10-CM

## 2020-12-01 DIAGNOSIS — H52.7 REFRACTION DISORDER: ICD-10-CM

## 2020-12-01 DIAGNOSIS — H40.003 OPEN ANGLE WITH BORDERLINE INTRAOCULAR PRESSURE OF BOTH EYES: Primary | ICD-10-CM

## 2020-12-01 PROCEDURE — 99999 PR PBB SHADOW E&M-EST. PATIENT-LVL II: CPT | Mod: PBBFAC,HCNC,, | Performed by: OPHTHALMOLOGY

## 2020-12-01 PROCEDURE — 92012 INTRM OPH EXAM EST PATIENT: CPT | Mod: HCNC,S$GLB,, | Performed by: OPHTHALMOLOGY

## 2020-12-01 PROCEDURE — 92012 PR EYE EXAM, EST PATIENT,INTERMED: ICD-10-PCS | Mod: HCNC,S$GLB,, | Performed by: OPHTHALMOLOGY

## 2020-12-01 PROCEDURE — 99999 PR PBB SHADOW E&M-EST. PATIENT-LVL II: ICD-10-PCS | Mod: PBBFAC,HCNC,, | Performed by: OPHTHALMOLOGY

## 2020-12-01 NOTE — PROGRESS NOTES
HPI     Post-op Evaluation     Comments: 3wk SLT OD               Comments     Patient reports for 3wk SLT OD   Reports of VA stability OU  Denies any pain//irritation     1. PCIOL OD 7/18/19 +20.0WF/CDE 10.73  PCIOL OS +21.0 SN60WF / CDE: 12.02 / 08/22/19  2. Glaucoma Suspect  3. -  glaucoma  SLT OD 11/5/20    No drops          Last edited by Theo Callahan on 12/1/2020 10:21 AM. (History)            Assessment /Plan     For exam results, see Encounter Report.      ICD-10-CM ICD-9-CM    1. 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 Pseudophakia  Z96.1 V43.1    3. Refraction disorder  H52.7 367.9 Given rx from last visit        RETURN TO CLINIC 4 month IOP

## 2020-12-24 ENCOUNTER — PES CALL (OUTPATIENT)
Dept: ADMINISTRATIVE | Facility: CLINIC | Age: 75
End: 2020-12-24

## 2021-01-05 ENCOUNTER — IMMUNIZATION (OUTPATIENT)
Dept: INTERNAL MEDICINE | Facility: CLINIC | Age: 76
End: 2021-01-05
Payer: MEDICARE

## 2021-01-05 DIAGNOSIS — Z23 NEED FOR VACCINATION: ICD-10-CM

## 2021-01-05 PROCEDURE — 91300 COVID-19, MRNA, LNP-S, PF, 30 MCG/0.3 ML DOSE VACCINE: CPT | Mod: PBBFAC | Performed by: FAMILY MEDICINE

## 2021-01-25 ENCOUNTER — HOSPITAL ENCOUNTER (OUTPATIENT)
Dept: RADIOLOGY | Facility: HOSPITAL | Age: 76
Discharge: HOME | End: 2021-01-25
Attending: PHYSICAL MEDICINE & REHABILITATION
Payer: MEDICARE

## 2021-01-25 ENCOUNTER — OFFICE VISIT (OUTPATIENT)
Dept: ORTHOPEDICS | Facility: CLINIC | Age: 76
End: 2021-01-25
Payer: MEDICARE

## 2021-01-25 VITALS — WEIGHT: 196 LBS | HEIGHT: 61 IN | BODY MASS INDEX: 37 KG/M2

## 2021-01-25 DIAGNOSIS — M25.569 KNEE PAIN, UNSPECIFIED CHRONICITY, UNSPECIFIED LATERALITY: Primary | ICD-10-CM

## 2021-01-25 DIAGNOSIS — M25.561 ACUTE PAIN OF RIGHT KNEE: ICD-10-CM

## 2021-01-25 DIAGNOSIS — M17.11 PRIMARY OSTEOARTHRITIS OF RIGHT KNEE: Primary | ICD-10-CM

## 2021-01-25 DIAGNOSIS — M25.569 KNEE PAIN, UNSPECIFIED CHRONICITY, UNSPECIFIED LATERALITY: ICD-10-CM

## 2021-01-25 DIAGNOSIS — S83.231S COMPLEX TEAR OF MEDIAL MENISCUS OF RIGHT KNEE AS CURRENT INJURY, SEQUELA: ICD-10-CM

## 2021-01-25 DIAGNOSIS — M19.041 DEGENERATIVE ARTHRITIS OF FINGER, RIGHT: ICD-10-CM

## 2021-01-25 PROCEDURE — 3288F PR FALLS RISK ASSESSMENT DOCUMENTED: ICD-10-PCS | Mod: HCNC,CPTII,S$GLB, | Performed by: PHYSICAL MEDICINE & REHABILITATION

## 2021-01-25 PROCEDURE — 73562 X-RAY EXAM OF KNEE 3: CPT | Mod: 26,HCNC,LT, | Performed by: RADIOLOGY

## 2021-01-25 PROCEDURE — 1125F PR PAIN SEVERITY QUANTIFIED, PAIN PRESENT: ICD-10-PCS | Mod: HCNC,S$GLB,, | Performed by: PHYSICAL MEDICINE & REHABILITATION

## 2021-01-25 PROCEDURE — 73564 X-RAY EXAM KNEE 4 OR MORE: CPT | Mod: TC,HCNC,RT

## 2021-01-25 PROCEDURE — 73562 XR KNEE ORTHO RIGHT WITH FLEXION: ICD-10-PCS | Mod: 26,HCNC,LT, | Performed by: RADIOLOGY

## 2021-01-25 PROCEDURE — 1101F PT FALLS ASSESS-DOCD LE1/YR: CPT | Mod: HCNC,CPTII,S$GLB, | Performed by: PHYSICAL MEDICINE & REHABILITATION

## 2021-01-25 PROCEDURE — 99204 PR OFFICE/OUTPT VISIT, NEW, LEVL IV, 45-59 MIN: ICD-10-PCS | Mod: HCNC,S$GLB,, | Performed by: PHYSICAL MEDICINE & REHABILITATION

## 2021-01-25 PROCEDURE — 3288F FALL RISK ASSESSMENT DOCD: CPT | Mod: HCNC,CPTII,S$GLB, | Performed by: PHYSICAL MEDICINE & REHABILITATION

## 2021-01-25 PROCEDURE — 73564 XR KNEE ORTHO RIGHT WITH FLEXION: ICD-10-PCS | Mod: 26,HCNC,RT, | Performed by: RADIOLOGY

## 2021-01-25 PROCEDURE — 3008F PR BODY MASS INDEX (BMI) DOCUMENTED: ICD-10-PCS | Mod: HCNC,CPTII,S$GLB, | Performed by: PHYSICAL MEDICINE & REHABILITATION

## 2021-01-25 PROCEDURE — 1125F AMNT PAIN NOTED PAIN PRSNT: CPT | Mod: HCNC,S$GLB,, | Performed by: PHYSICAL MEDICINE & REHABILITATION

## 2021-01-25 PROCEDURE — 99999 PR PBB SHADOW E&M-EST. PATIENT-LVL III: CPT | Mod: PBBFAC,HCNC,, | Performed by: PHYSICAL MEDICINE & REHABILITATION

## 2021-01-25 PROCEDURE — 1101F PR PT FALLS ASSESS DOC 0-1 FALLS W/OUT INJ PAST YR: ICD-10-PCS | Mod: HCNC,CPTII,S$GLB, | Performed by: PHYSICAL MEDICINE & REHABILITATION

## 2021-01-25 PROCEDURE — 3008F BODY MASS INDEX DOCD: CPT | Mod: HCNC,CPTII,S$GLB, | Performed by: PHYSICAL MEDICINE & REHABILITATION

## 2021-01-25 PROCEDURE — 99204 OFFICE O/P NEW MOD 45 MIN: CPT | Mod: HCNC,S$GLB,, | Performed by: PHYSICAL MEDICINE & REHABILITATION

## 2021-01-25 PROCEDURE — 73564 X-RAY EXAM KNEE 4 OR MORE: CPT | Mod: 26,HCNC,RT, | Performed by: RADIOLOGY

## 2021-01-25 PROCEDURE — 99999 PR PBB SHADOW E&M-EST. PATIENT-LVL III: ICD-10-PCS | Mod: PBBFAC,HCNC,, | Performed by: PHYSICAL MEDICINE & REHABILITATION

## 2021-01-25 PROCEDURE — 1159F PR MEDICATION LIST DOCUMENTED IN MEDICAL RECORD: ICD-10-PCS | Mod: HCNC,S$GLB,, | Performed by: PHYSICAL MEDICINE & REHABILITATION

## 2021-01-25 PROCEDURE — 1159F MED LIST DOCD IN RCRD: CPT | Mod: HCNC,S$GLB,, | Performed by: PHYSICAL MEDICINE & REHABILITATION

## 2021-01-27 ENCOUNTER — IMMUNIZATION (OUTPATIENT)
Dept: INTERNAL MEDICINE | Facility: CLINIC | Age: 76
End: 2021-01-27
Payer: MEDICARE

## 2021-01-27 DIAGNOSIS — Z23 NEED FOR VACCINATION: Primary | ICD-10-CM

## 2021-01-27 PROCEDURE — 91300 COVID-19, MRNA, LNP-S, PF, 30 MCG/0.3 ML DOSE VACCINE: CPT | Mod: PBBFAC | Performed by: FAMILY MEDICINE

## 2021-01-27 PROCEDURE — 0002A COVID-19, MRNA, LNP-S, PF, 30 MCG/0.3 ML DOSE VACCINE: CPT | Mod: PBBFAC | Performed by: FAMILY MEDICINE

## 2021-02-20 ENCOUNTER — PATIENT MESSAGE (OUTPATIENT)
Dept: INTERNAL MEDICINE | Facility: CLINIC | Age: 76
End: 2021-02-20

## 2021-02-20 DIAGNOSIS — Z20.822 CONTACT WITH AND (SUSPECTED) EXPOSURE TO COVID-19: Primary | ICD-10-CM

## 2021-02-22 ENCOUNTER — OFFICE VISIT (OUTPATIENT)
Dept: ORTHOPEDICS | Facility: CLINIC | Age: 76
End: 2021-02-22
Payer: MEDICARE

## 2021-02-22 ENCOUNTER — LAB VISIT (OUTPATIENT)
Dept: LAB | Facility: HOSPITAL | Age: 76
End: 2021-02-22
Attending: PEDIATRICS
Payer: MEDICARE

## 2021-02-22 VITALS — WEIGHT: 196 LBS | BODY MASS INDEX: 37 KG/M2 | HEIGHT: 61 IN

## 2021-02-22 DIAGNOSIS — E55.9 VITAMIN D DEFICIENCY: ICD-10-CM

## 2021-02-22 DIAGNOSIS — Z20.822 CONTACT WITH AND (SUSPECTED) EXPOSURE TO COVID-19: ICD-10-CM

## 2021-02-22 DIAGNOSIS — M25.561 ACUTE PAIN OF RIGHT KNEE: ICD-10-CM

## 2021-02-22 DIAGNOSIS — E78.49 OTHER HYPERLIPIDEMIA: ICD-10-CM

## 2021-02-22 DIAGNOSIS — M17.11 PRIMARY OSTEOARTHRITIS OF RIGHT KNEE: Primary | ICD-10-CM

## 2021-02-22 DIAGNOSIS — Z00.00 WELL ADULT EXAM: ICD-10-CM

## 2021-02-22 PROCEDURE — 36415 COLL VENOUS BLD VENIPUNCTURE: CPT

## 2021-02-22 PROCEDURE — 20610 DRAIN/INJ JOINT/BURSA W/O US: CPT | Mod: RT,S$GLB,, | Performed by: PHYSICAL MEDICINE & REHABILITATION

## 2021-02-22 PROCEDURE — 99499 NO LOS: ICD-10-PCS | Mod: S$GLB,,, | Performed by: PHYSICAL MEDICINE & REHABILITATION

## 2021-02-22 PROCEDURE — 82306 VITAMIN D 25 HYDROXY: CPT

## 2021-02-22 PROCEDURE — 3008F PR BODY MASS INDEX (BMI) DOCUMENTED: ICD-10-PCS | Mod: CPTII,S$GLB,, | Performed by: PHYSICAL MEDICINE & REHABILITATION

## 2021-02-22 PROCEDURE — 1125F AMNT PAIN NOTED PAIN PRSNT: CPT | Mod: S$GLB,,, | Performed by: PHYSICAL MEDICINE & REHABILITATION

## 2021-02-22 PROCEDURE — 86769 SARS-COV-2 COVID-19 ANTIBODY: CPT

## 2021-02-22 PROCEDURE — 20610 LARGE JOINT ASPIRATION/INJECTION: R KNEE: ICD-10-PCS | Mod: RT,S$GLB,, | Performed by: PHYSICAL MEDICINE & REHABILITATION

## 2021-02-22 PROCEDURE — 3008F BODY MASS INDEX DOCD: CPT | Mod: CPTII,S$GLB,, | Performed by: PHYSICAL MEDICINE & REHABILITATION

## 2021-02-22 PROCEDURE — 99999 PR PBB SHADOW E&M-EST. PATIENT-LVL III: ICD-10-PCS | Mod: PBBFAC,,, | Performed by: PHYSICAL MEDICINE & REHABILITATION

## 2021-02-22 PROCEDURE — 3288F FALL RISK ASSESSMENT DOCD: CPT | Mod: CPTII,S$GLB,, | Performed by: PHYSICAL MEDICINE & REHABILITATION

## 2021-02-22 PROCEDURE — 80048 BASIC METABOLIC PNL TOTAL CA: CPT

## 2021-02-22 PROCEDURE — 1101F PT FALLS ASSESS-DOCD LE1/YR: CPT | Mod: CPTII,S$GLB,, | Performed by: PHYSICAL MEDICINE & REHABILITATION

## 2021-02-22 PROCEDURE — 1101F PR PT FALLS ASSESS DOC 0-1 FALLS W/OUT INJ PAST YR: ICD-10-PCS | Mod: CPTII,S$GLB,, | Performed by: PHYSICAL MEDICINE & REHABILITATION

## 2021-02-22 PROCEDURE — 3288F PR FALLS RISK ASSESSMENT DOCUMENTED: ICD-10-PCS | Mod: CPTII,S$GLB,, | Performed by: PHYSICAL MEDICINE & REHABILITATION

## 2021-02-22 PROCEDURE — 99999 PR PBB SHADOW E&M-EST. PATIENT-LVL III: CPT | Mod: PBBFAC,,, | Performed by: PHYSICAL MEDICINE & REHABILITATION

## 2021-02-22 PROCEDURE — 80061 LIPID PANEL: CPT

## 2021-02-22 PROCEDURE — 1125F PR PAIN SEVERITY QUANTIFIED, PAIN PRESENT: ICD-10-PCS | Mod: S$GLB,,, | Performed by: PHYSICAL MEDICINE & REHABILITATION

## 2021-02-22 PROCEDURE — 99499 UNLISTED E&M SERVICE: CPT | Mod: S$GLB,,, | Performed by: PHYSICAL MEDICINE & REHABILITATION

## 2021-02-23 LAB
25(OH)D3+25(OH)D2 SERPL-MCNC: 36 NG/ML (ref 30–96)
ANION GAP SERPL CALC-SCNC: 9 MMOL/L (ref 8–16)
BUN SERPL-MCNC: 18 MG/DL (ref 8–23)
CALCIUM SERPL-MCNC: 9.4 MG/DL (ref 8.7–10.5)
CHLORIDE SERPL-SCNC: 105 MMOL/L (ref 95–110)
CHOLEST SERPL-MCNC: 222 MG/DL (ref 120–199)
CHOLEST/HDLC SERPL: 3.2 {RATIO} (ref 2–5)
CO2 SERPL-SCNC: 26 MMOL/L (ref 23–29)
CREAT SERPL-MCNC: 0.8 MG/DL (ref 0.5–1.4)
EST. GFR  (AFRICAN AMERICAN): >60 ML/MIN/1.73 M^2
EST. GFR  (NON AFRICAN AMERICAN): >60 ML/MIN/1.73 M^2
GLUCOSE SERPL-MCNC: 80 MG/DL (ref 70–110)
HDLC SERPL-MCNC: 70 MG/DL (ref 40–75)
HDLC SERPL: 31.5 % (ref 20–50)
LDLC SERPL CALC-MCNC: 137 MG/DL (ref 63–159)
NONHDLC SERPL-MCNC: 152 MG/DL
POTASSIUM SERPL-SCNC: 4.7 MMOL/L (ref 3.5–5.1)
SARS-COV-2 IGG SERPLBLD QL IA.RAPID: NEGATIVE
SODIUM SERPL-SCNC: 140 MMOL/L (ref 136–145)
TRIGL SERPL-MCNC: 75 MG/DL (ref 30–150)

## 2021-03-01 ENCOUNTER — OFFICE VISIT (OUTPATIENT)
Dept: ORTHOPEDICS | Facility: CLINIC | Age: 76
End: 2021-03-01
Payer: MEDICARE

## 2021-03-01 VITALS — BODY MASS INDEX: 37 KG/M2 | HEIGHT: 61 IN | WEIGHT: 196 LBS

## 2021-03-01 DIAGNOSIS — M25.561 ACUTE PAIN OF RIGHT KNEE: ICD-10-CM

## 2021-03-01 DIAGNOSIS — M17.11 PRIMARY OSTEOARTHRITIS OF RIGHT KNEE: Primary | ICD-10-CM

## 2021-03-01 DIAGNOSIS — S83.231S COMPLEX TEAR OF MEDIAL MENISCUS OF RIGHT KNEE AS CURRENT INJURY, SEQUELA: ICD-10-CM

## 2021-03-01 PROCEDURE — 99499 NO LOS: ICD-10-PCS | Mod: S$GLB,,, | Performed by: PHYSICAL MEDICINE & REHABILITATION

## 2021-03-01 PROCEDURE — 99499 UNLISTED E&M SERVICE: CPT | Mod: S$GLB,,, | Performed by: PHYSICAL MEDICINE & REHABILITATION

## 2021-03-01 PROCEDURE — 1125F AMNT PAIN NOTED PAIN PRSNT: CPT | Mod: S$GLB,,, | Performed by: PHYSICAL MEDICINE & REHABILITATION

## 2021-03-01 PROCEDURE — 3008F PR BODY MASS INDEX (BMI) DOCUMENTED: ICD-10-PCS | Mod: CPTII,S$GLB,, | Performed by: PHYSICAL MEDICINE & REHABILITATION

## 2021-03-01 PROCEDURE — 1125F PR PAIN SEVERITY QUANTIFIED, PAIN PRESENT: ICD-10-PCS | Mod: S$GLB,,, | Performed by: PHYSICAL MEDICINE & REHABILITATION

## 2021-03-01 PROCEDURE — 3288F FALL RISK ASSESSMENT DOCD: CPT | Mod: CPTII,S$GLB,, | Performed by: PHYSICAL MEDICINE & REHABILITATION

## 2021-03-01 PROCEDURE — 20610 LARGE JOINT ASPIRATION/INJECTION: R KNEE: ICD-10-PCS | Mod: RT,S$GLB,, | Performed by: PHYSICAL MEDICINE & REHABILITATION

## 2021-03-01 PROCEDURE — 1101F PT FALLS ASSESS-DOCD LE1/YR: CPT | Mod: CPTII,S$GLB,, | Performed by: PHYSICAL MEDICINE & REHABILITATION

## 2021-03-01 PROCEDURE — 99999 PR PBB SHADOW E&M-EST. PATIENT-LVL III: CPT | Mod: PBBFAC,,, | Performed by: PHYSICAL MEDICINE & REHABILITATION

## 2021-03-01 PROCEDURE — 1101F PR PT FALLS ASSESS DOC 0-1 FALLS W/OUT INJ PAST YR: ICD-10-PCS | Mod: CPTII,S$GLB,, | Performed by: PHYSICAL MEDICINE & REHABILITATION

## 2021-03-01 PROCEDURE — 3288F PR FALLS RISK ASSESSMENT DOCUMENTED: ICD-10-PCS | Mod: CPTII,S$GLB,, | Performed by: PHYSICAL MEDICINE & REHABILITATION

## 2021-03-01 PROCEDURE — 3008F BODY MASS INDEX DOCD: CPT | Mod: CPTII,S$GLB,, | Performed by: PHYSICAL MEDICINE & REHABILITATION

## 2021-03-01 PROCEDURE — 20610 DRAIN/INJ JOINT/BURSA W/O US: CPT | Mod: RT,S$GLB,, | Performed by: PHYSICAL MEDICINE & REHABILITATION

## 2021-03-01 PROCEDURE — 99999 PR PBB SHADOW E&M-EST. PATIENT-LVL III: ICD-10-PCS | Mod: PBBFAC,,, | Performed by: PHYSICAL MEDICINE & REHABILITATION

## 2021-03-02 ENCOUNTER — OFFICE VISIT (OUTPATIENT)
Dept: INTERNAL MEDICINE | Facility: CLINIC | Age: 76
End: 2021-03-02
Payer: MEDICARE

## 2021-03-02 ENCOUNTER — IMMUNIZATION (OUTPATIENT)
Dept: PHARMACY | Facility: CLINIC | Age: 76
End: 2021-03-02
Payer: MEDICARE

## 2021-03-02 VITALS
DIASTOLIC BLOOD PRESSURE: 66 MMHG | WEIGHT: 186.31 LBS | SYSTOLIC BLOOD PRESSURE: 116 MMHG | HEART RATE: 72 BPM | HEIGHT: 61 IN | BODY MASS INDEX: 35.18 KG/M2 | RESPIRATION RATE: 16 BRPM | OXYGEN SATURATION: 96 % | TEMPERATURE: 98 F

## 2021-03-02 DIAGNOSIS — M47.9 OSTEOARTHRITIS OF LOWER BACK: ICD-10-CM

## 2021-03-02 DIAGNOSIS — M85.89 OSTEOPENIA OF MULTIPLE SITES: ICD-10-CM

## 2021-03-02 DIAGNOSIS — E66.01 SEVERE OBESITY (BMI 35.0-39.9) WITH COMORBIDITY: ICD-10-CM

## 2021-03-02 DIAGNOSIS — E78.49 OTHER HYPERLIPIDEMIA: ICD-10-CM

## 2021-03-02 DIAGNOSIS — E55.9 VITAMIN D DEFICIENCY: ICD-10-CM

## 2021-03-02 DIAGNOSIS — K21.9 GASTROESOPHAGEAL REFLUX DISEASE WITHOUT ESOPHAGITIS: Chronic | ICD-10-CM

## 2021-03-02 DIAGNOSIS — F41.9 ANXIETY: Primary | ICD-10-CM

## 2021-03-02 DIAGNOSIS — G47.33 OSA ON CPAP: ICD-10-CM

## 2021-03-02 PROCEDURE — 1101F PT FALLS ASSESS-DOCD LE1/YR: CPT | Mod: CPTII,S$GLB,, | Performed by: PEDIATRICS

## 2021-03-02 PROCEDURE — 99499 UNLISTED E&M SERVICE: CPT | Mod: S$GLB,,, | Performed by: PEDIATRICS

## 2021-03-02 PROCEDURE — 3008F BODY MASS INDEX DOCD: CPT | Mod: CPTII,S$GLB,, | Performed by: PEDIATRICS

## 2021-03-02 PROCEDURE — 3288F PR FALLS RISK ASSESSMENT DOCUMENTED: ICD-10-PCS | Mod: CPTII,S$GLB,, | Performed by: PEDIATRICS

## 2021-03-02 PROCEDURE — 99397 PER PM REEVAL EST PAT 65+ YR: CPT | Mod: S$GLB,,, | Performed by: PEDIATRICS

## 2021-03-02 PROCEDURE — 1101F PR PT FALLS ASSESS DOC 0-1 FALLS W/OUT INJ PAST YR: ICD-10-PCS | Mod: CPTII,S$GLB,, | Performed by: PEDIATRICS

## 2021-03-02 PROCEDURE — 99499 RISK ADDL DX/OHS AUDIT: ICD-10-PCS | Mod: S$GLB,,, | Performed by: PEDIATRICS

## 2021-03-02 PROCEDURE — 1125F PR PAIN SEVERITY QUANTIFIED, PAIN PRESENT: ICD-10-PCS | Mod: S$GLB,,, | Performed by: PEDIATRICS

## 2021-03-02 PROCEDURE — 99999 PR PBB SHADOW E&M-EST. PATIENT-LVL IV: ICD-10-PCS | Mod: PBBFAC,,, | Performed by: PEDIATRICS

## 2021-03-02 PROCEDURE — 3288F FALL RISK ASSESSMENT DOCD: CPT | Mod: CPTII,S$GLB,, | Performed by: PEDIATRICS

## 2021-03-02 PROCEDURE — 99999 PR PBB SHADOW E&M-EST. PATIENT-LVL IV: CPT | Mod: PBBFAC,,, | Performed by: PEDIATRICS

## 2021-03-02 PROCEDURE — 1125F AMNT PAIN NOTED PAIN PRSNT: CPT | Mod: S$GLB,,, | Performed by: PEDIATRICS

## 2021-03-02 PROCEDURE — 3008F PR BODY MASS INDEX (BMI) DOCUMENTED: ICD-10-PCS | Mod: CPTII,S$GLB,, | Performed by: PEDIATRICS

## 2021-03-02 PROCEDURE — 99397 PR PREVENTIVE VISIT,EST,65 & OVER: ICD-10-PCS | Mod: S$GLB,,, | Performed by: PEDIATRICS

## 2021-03-08 ENCOUNTER — OFFICE VISIT (OUTPATIENT)
Dept: ORTHOPEDICS | Facility: CLINIC | Age: 76
End: 2021-03-08
Payer: MEDICARE

## 2021-03-08 ENCOUNTER — PATIENT MESSAGE (OUTPATIENT)
Dept: ORTHOPEDICS | Facility: CLINIC | Age: 76
End: 2021-03-08

## 2021-03-08 VITALS — WEIGHT: 186 LBS | BODY MASS INDEX: 35.12 KG/M2 | HEIGHT: 61 IN

## 2021-03-08 DIAGNOSIS — M25.561 ACUTE PAIN OF RIGHT KNEE: ICD-10-CM

## 2021-03-08 DIAGNOSIS — M17.11 PRIMARY OSTEOARTHRITIS OF RIGHT KNEE: Primary | ICD-10-CM

## 2021-03-08 PROCEDURE — 20610 LARGE JOINT ASPIRATION/INJECTION: R KNEE: ICD-10-PCS | Mod: RT,S$GLB,, | Performed by: PHYSICAL MEDICINE & REHABILITATION

## 2021-03-08 PROCEDURE — 1101F PR PT FALLS ASSESS DOC 0-1 FALLS W/OUT INJ PAST YR: ICD-10-PCS | Mod: CPTII,S$GLB,, | Performed by: PHYSICAL MEDICINE & REHABILITATION

## 2021-03-08 PROCEDURE — 99499 UNLISTED E&M SERVICE: CPT | Mod: S$GLB,,, | Performed by: PHYSICAL MEDICINE & REHABILITATION

## 2021-03-08 PROCEDURE — 3008F BODY MASS INDEX DOCD: CPT | Mod: CPTII,S$GLB,, | Performed by: PHYSICAL MEDICINE & REHABILITATION

## 2021-03-08 PROCEDURE — 3288F FALL RISK ASSESSMENT DOCD: CPT | Mod: CPTII,S$GLB,, | Performed by: PHYSICAL MEDICINE & REHABILITATION

## 2021-03-08 PROCEDURE — 99999 PR PBB SHADOW E&M-EST. PATIENT-LVL III: ICD-10-PCS | Mod: PBBFAC,,, | Performed by: PHYSICAL MEDICINE & REHABILITATION

## 2021-03-08 PROCEDURE — 1101F PT FALLS ASSESS-DOCD LE1/YR: CPT | Mod: CPTII,S$GLB,, | Performed by: PHYSICAL MEDICINE & REHABILITATION

## 2021-03-08 PROCEDURE — 20610 DRAIN/INJ JOINT/BURSA W/O US: CPT | Mod: RT,S$GLB,, | Performed by: PHYSICAL MEDICINE & REHABILITATION

## 2021-03-08 PROCEDURE — 1125F AMNT PAIN NOTED PAIN PRSNT: CPT | Mod: S$GLB,,, | Performed by: PHYSICAL MEDICINE & REHABILITATION

## 2021-03-08 PROCEDURE — 99999 PR PBB SHADOW E&M-EST. PATIENT-LVL III: CPT | Mod: PBBFAC,,, | Performed by: PHYSICAL MEDICINE & REHABILITATION

## 2021-03-08 PROCEDURE — 99499 NO LOS: ICD-10-PCS | Mod: S$GLB,,, | Performed by: PHYSICAL MEDICINE & REHABILITATION

## 2021-03-08 PROCEDURE — 1125F PR PAIN SEVERITY QUANTIFIED, PAIN PRESENT: ICD-10-PCS | Mod: S$GLB,,, | Performed by: PHYSICAL MEDICINE & REHABILITATION

## 2021-03-08 PROCEDURE — 3288F PR FALLS RISK ASSESSMENT DOCUMENTED: ICD-10-PCS | Mod: CPTII,S$GLB,, | Performed by: PHYSICAL MEDICINE & REHABILITATION

## 2021-03-08 PROCEDURE — 3008F PR BODY MASS INDEX (BMI) DOCUMENTED: ICD-10-PCS | Mod: CPTII,S$GLB,, | Performed by: PHYSICAL MEDICINE & REHABILITATION

## 2021-04-23 ENCOUNTER — OFFICE VISIT (OUTPATIENT)
Dept: OPHTHALMOLOGY | Facility: CLINIC | Age: 76
End: 2021-04-23
Payer: MEDICARE

## 2021-04-23 DIAGNOSIS — H52.7 REFRACTION DISORDER: ICD-10-CM

## 2021-04-23 DIAGNOSIS — Z96.1 PSEUDOPHAKIA: ICD-10-CM

## 2021-04-23 DIAGNOSIS — H40.003 OPEN ANGLE WITH BORDERLINE INTRAOCULAR PRESSURE OF BOTH EYES: Primary | ICD-10-CM

## 2021-04-23 PROCEDURE — 1159F PR MEDICATION LIST DOCUMENTED IN MEDICAL RECORD: ICD-10-PCS | Mod: S$GLB,,, | Performed by: OPHTHALMOLOGY

## 2021-04-23 PROCEDURE — 99999 PR PBB SHADOW E&M-EST. PATIENT-LVL II: CPT | Mod: PBBFAC,,, | Performed by: OPHTHALMOLOGY

## 2021-04-23 PROCEDURE — 99214 OFFICE O/P EST MOD 30 MIN: CPT | Mod: S$GLB,,, | Performed by: OPHTHALMOLOGY

## 2021-04-23 PROCEDURE — 1159F MED LIST DOCD IN RCRD: CPT | Mod: S$GLB,,, | Performed by: OPHTHALMOLOGY

## 2021-04-23 PROCEDURE — 99999 PR PBB SHADOW E&M-EST. PATIENT-LVL II: ICD-10-PCS | Mod: PBBFAC,,, | Performed by: OPHTHALMOLOGY

## 2021-04-23 PROCEDURE — 99214 PR OFFICE/OUTPT VISIT, EST, LEVL IV, 30-39 MIN: ICD-10-PCS | Mod: S$GLB,,, | Performed by: OPHTHALMOLOGY

## 2021-04-29 ENCOUNTER — PES CALL (OUTPATIENT)
Dept: ADMINISTRATIVE | Facility: CLINIC | Age: 76
End: 2021-04-29

## 2021-05-21 ENCOUNTER — LAB VISIT (OUTPATIENT)
Dept: LAB | Facility: HOSPITAL | Age: 76
End: 2021-05-21
Attending: PEDIATRICS
Payer: MEDICARE

## 2021-05-21 DIAGNOSIS — E78.49 OTHER HYPERLIPIDEMIA: ICD-10-CM

## 2021-05-21 LAB
ALT SERPL W/O P-5'-P-CCNC: 15 U/L (ref 10–44)
AST SERPL-CCNC: 19 U/L (ref 10–40)
CHOLEST SERPL-MCNC: 187 MG/DL (ref 120–199)
CHOLEST/HDLC SERPL: 3.1 {RATIO} (ref 2–5)
HDLC SERPL-MCNC: 60 MG/DL (ref 40–75)
HDLC SERPL: 32.1 % (ref 20–50)
LDLC SERPL CALC-MCNC: 110.2 MG/DL (ref 63–159)
NONHDLC SERPL-MCNC: 127 MG/DL
TRIGL SERPL-MCNC: 84 MG/DL (ref 30–150)

## 2021-05-21 PROCEDURE — 36415 COLL VENOUS BLD VENIPUNCTURE: CPT | Performed by: PEDIATRICS

## 2021-05-21 PROCEDURE — 84450 TRANSFERASE (AST) (SGOT): CPT | Performed by: PEDIATRICS

## 2021-05-21 PROCEDURE — 80061 LIPID PANEL: CPT | Performed by: PEDIATRICS

## 2021-05-21 PROCEDURE — 84460 ALANINE AMINO (ALT) (SGPT): CPT | Performed by: PEDIATRICS

## 2021-06-01 ENCOUNTER — OFFICE VISIT (OUTPATIENT)
Dept: INTERNAL MEDICINE | Facility: CLINIC | Age: 76
End: 2021-06-01
Payer: MEDICARE

## 2021-06-01 VITALS
OXYGEN SATURATION: 97 % | SYSTOLIC BLOOD PRESSURE: 102 MMHG | WEIGHT: 183.44 LBS | HEART RATE: 65 BPM | DIASTOLIC BLOOD PRESSURE: 64 MMHG | HEIGHT: 61 IN | TEMPERATURE: 99 F | RESPIRATION RATE: 16 BRPM | BODY MASS INDEX: 34.63 KG/M2

## 2021-06-01 DIAGNOSIS — E78.49 OTHER HYPERLIPIDEMIA: ICD-10-CM

## 2021-06-01 DIAGNOSIS — Z13.220 ENCOUNTER FOR LIPID SCREENING FOR CARDIOVASCULAR DISEASE: ICD-10-CM

## 2021-06-01 DIAGNOSIS — M85.89 OSTEOPENIA OF MULTIPLE SITES: ICD-10-CM

## 2021-06-01 DIAGNOSIS — Z12.31 ENCOUNTER FOR SCREENING MAMMOGRAM FOR BREAST CANCER: Primary | ICD-10-CM

## 2021-06-01 DIAGNOSIS — Z13.6 ENCOUNTER FOR LIPID SCREENING FOR CARDIOVASCULAR DISEASE: ICD-10-CM

## 2021-06-01 PROCEDURE — 3288F FALL RISK ASSESSMENT DOCD: CPT | Mod: CPTII,S$GLB,, | Performed by: PEDIATRICS

## 2021-06-01 PROCEDURE — 99213 OFFICE O/P EST LOW 20 MIN: CPT | Mod: S$GLB,,, | Performed by: PEDIATRICS

## 2021-06-01 PROCEDURE — 1101F PT FALLS ASSESS-DOCD LE1/YR: CPT | Mod: CPTII,S$GLB,, | Performed by: PEDIATRICS

## 2021-06-01 PROCEDURE — 1126F AMNT PAIN NOTED NONE PRSNT: CPT | Mod: S$GLB,,, | Performed by: PEDIATRICS

## 2021-06-01 PROCEDURE — 1159F MED LIST DOCD IN RCRD: CPT | Mod: S$GLB,,, | Performed by: PEDIATRICS

## 2021-06-01 PROCEDURE — 1126F PR PAIN SEVERITY QUANTIFIED, NO PAIN PRESENT: ICD-10-PCS | Mod: S$GLB,,, | Performed by: PEDIATRICS

## 2021-06-01 PROCEDURE — 1159F PR MEDICATION LIST DOCUMENTED IN MEDICAL RECORD: ICD-10-PCS | Mod: S$GLB,,, | Performed by: PEDIATRICS

## 2021-06-01 PROCEDURE — 3288F PR FALLS RISK ASSESSMENT DOCUMENTED: ICD-10-PCS | Mod: CPTII,S$GLB,, | Performed by: PEDIATRICS

## 2021-06-01 PROCEDURE — 99999 PR PBB SHADOW E&M-EST. PATIENT-LVL V: ICD-10-PCS | Mod: PBBFAC,,, | Performed by: PEDIATRICS

## 2021-06-01 PROCEDURE — 99213 PR OFFICE/OUTPT VISIT, EST, LEVL III, 20-29 MIN: ICD-10-PCS | Mod: S$GLB,,, | Performed by: PEDIATRICS

## 2021-06-01 PROCEDURE — 99999 PR PBB SHADOW E&M-EST. PATIENT-LVL V: CPT | Mod: PBBFAC,,, | Performed by: PEDIATRICS

## 2021-06-01 PROCEDURE — 1101F PR PT FALLS ASSESS DOC 0-1 FALLS W/OUT INJ PAST YR: ICD-10-PCS | Mod: CPTII,S$GLB,, | Performed by: PEDIATRICS

## 2021-06-04 ENCOUNTER — HOSPITAL ENCOUNTER (OUTPATIENT)
Dept: RADIOLOGY | Facility: HOSPITAL | Age: 76
Discharge: HOME | End: 2021-06-04
Attending: PEDIATRICS
Payer: MEDICARE

## 2021-06-04 DIAGNOSIS — Z12.31 ENCOUNTER FOR SCREENING MAMMOGRAM FOR BREAST CANCER: ICD-10-CM

## 2021-06-04 PROCEDURE — 77067 SCR MAMMO BI INCL CAD: CPT | Mod: TC

## 2021-06-04 PROCEDURE — 77067 MAMMO DIGITAL SCREENING BILAT WITH TOMO: ICD-10-PCS | Mod: 26,,, | Performed by: RADIOLOGY

## 2021-06-04 PROCEDURE — 77067 SCR MAMMO BI INCL CAD: CPT | Mod: 26,,, | Performed by: RADIOLOGY

## 2021-06-04 PROCEDURE — 77063 MAMMO DIGITAL SCREENING BILAT WITH TOMO: ICD-10-PCS | Mod: 26,,, | Performed by: RADIOLOGY

## 2021-06-04 PROCEDURE — 77063 BREAST TOMOSYNTHESIS BI: CPT | Mod: 26,,, | Performed by: RADIOLOGY

## 2021-07-10 ENCOUNTER — PATIENT MESSAGE (OUTPATIENT)
Dept: INTERNAL MEDICINE | Facility: CLINIC | Age: 76
End: 2021-07-10

## 2021-07-10 DIAGNOSIS — M85.89 OSTEOPENIA OF MULTIPLE SITES: ICD-10-CM

## 2021-07-13 RX ORDER — IBANDRONATE SODIUM 150 MG/1
TABLET, FILM COATED ORAL
Qty: 3 TABLET | Refills: 3 | Status: SHIPPED | OUTPATIENT
Start: 2021-07-13

## 2021-07-26 ENCOUNTER — PES CALL (OUTPATIENT)
Dept: ADMINISTRATIVE | Facility: CLINIC | Age: 76
End: 2021-07-26

## 2021-07-27 ENCOUNTER — PATIENT MESSAGE (OUTPATIENT)
Dept: INTERNAL MEDICINE | Facility: CLINIC | Age: 76
End: 2021-07-27

## 2021-08-06 ENCOUNTER — PATIENT OUTREACH (OUTPATIENT)
Dept: ADMINISTRATIVE | Facility: OTHER | Age: 76
End: 2021-08-06

## 2021-08-06 ENCOUNTER — HOSPITAL ENCOUNTER (OUTPATIENT)
Dept: RADIOLOGY | Facility: HOSPITAL | Age: 76
Discharge: HOME | End: 2021-08-06
Attending: PODIATRIST
Payer: MEDICARE

## 2021-08-06 ENCOUNTER — OFFICE VISIT (OUTPATIENT)
Dept: PODIATRY | Facility: CLINIC | Age: 76
End: 2021-08-06
Payer: MEDICARE

## 2021-08-06 VITALS — WEIGHT: 182.19 LBS | BODY MASS INDEX: 34.4 KG/M2 | HEIGHT: 61 IN

## 2021-08-06 DIAGNOSIS — M79.641 PAIN IN RIGHT HAND: ICD-10-CM

## 2021-08-06 DIAGNOSIS — M79.671 PAIN OF RIGHT FOOT: ICD-10-CM

## 2021-08-06 DIAGNOSIS — M19.071 OSTEOARTHRITIS OF RIGHT ANKLE OR FOOT: Primary | ICD-10-CM

## 2021-08-06 DIAGNOSIS — G57.91 PERIPHERAL NEURITIS OF RIGHT FOOT: ICD-10-CM

## 2021-08-06 PROCEDURE — 73130 X-RAY EXAM OF HAND: CPT | Mod: 26,RT,, | Performed by: RADIOLOGY

## 2021-08-06 PROCEDURE — 99214 OFFICE O/P EST MOD 30 MIN: CPT | Mod: S$GLB,,, | Performed by: PODIATRIST

## 2021-08-06 PROCEDURE — 73630 X-RAY EXAM OF FOOT: CPT | Mod: TC,RT

## 2021-08-06 PROCEDURE — 99214 PR OFFICE/OUTPT VISIT, EST, LEVL IV, 30-39 MIN: ICD-10-PCS | Mod: S$GLB,,, | Performed by: PODIATRIST

## 2021-08-06 PROCEDURE — 1101F PT FALLS ASSESS-DOCD LE1/YR: CPT | Mod: CPTII,S$GLB,, | Performed by: PODIATRIST

## 2021-08-06 PROCEDURE — 3288F PR FALLS RISK ASSESSMENT DOCUMENTED: ICD-10-PCS | Mod: CPTII,S$GLB,, | Performed by: PODIATRIST

## 2021-08-06 PROCEDURE — 1159F PR MEDICATION LIST DOCUMENTED IN MEDICAL RECORD: ICD-10-PCS | Mod: CPTII,S$GLB,, | Performed by: PODIATRIST

## 2021-08-06 PROCEDURE — 73130 X-RAY EXAM OF HAND: CPT | Mod: TC,RT

## 2021-08-06 PROCEDURE — 1101F PR PT FALLS ASSESS DOC 0-1 FALLS W/OUT INJ PAST YR: ICD-10-PCS | Mod: CPTII,S$GLB,, | Performed by: PODIATRIST

## 2021-08-06 PROCEDURE — 99999 PR PBB SHADOW E&M-EST. PATIENT-LVL III: ICD-10-PCS | Mod: PBBFAC,,, | Performed by: PODIATRIST

## 2021-08-06 PROCEDURE — 1159F MED LIST DOCD IN RCRD: CPT | Mod: CPTII,S$GLB,, | Performed by: PODIATRIST

## 2021-08-06 PROCEDURE — 73630 XR FOOT COMPLETE 3 VIEW RIGHT: ICD-10-PCS | Mod: 26,RT,, | Performed by: RADIOLOGY

## 2021-08-06 PROCEDURE — 73630 X-RAY EXAM OF FOOT: CPT | Mod: 26,RT,, | Performed by: RADIOLOGY

## 2021-08-06 PROCEDURE — 99999 PR PBB SHADOW E&M-EST. PATIENT-LVL III: CPT | Mod: PBBFAC,,, | Performed by: PODIATRIST

## 2021-08-06 PROCEDURE — 1160F RVW MEDS BY RX/DR IN RCRD: CPT | Mod: CPTII,S$GLB,, | Performed by: PODIATRIST

## 2021-08-06 PROCEDURE — 3288F FALL RISK ASSESSMENT DOCD: CPT | Mod: CPTII,S$GLB,, | Performed by: PODIATRIST

## 2021-08-06 PROCEDURE — 1160F PR REVIEW ALL MEDS BY PRESCRIBER/CLIN PHARMACIST DOCUMENTED: ICD-10-PCS | Mod: CPTII,S$GLB,, | Performed by: PODIATRIST

## 2021-08-06 PROCEDURE — 1125F AMNT PAIN NOTED PAIN PRSNT: CPT | Mod: CPTII,S$GLB,, | Performed by: PODIATRIST

## 2021-08-06 PROCEDURE — 1125F PR PAIN SEVERITY QUANTIFIED, PAIN PRESENT: ICD-10-PCS | Mod: CPTII,S$GLB,, | Performed by: PODIATRIST

## 2021-08-06 PROCEDURE — 73130 XR HAND COMPLETE 3 VIEW RIGHT: ICD-10-PCS | Mod: 26,RT,, | Performed by: RADIOLOGY

## 2021-08-06 RX ORDER — GABAPENTIN 300 MG/1
300 CAPSULE ORAL NIGHTLY
Qty: 30 CAPSULE | Refills: 0 | Status: SHIPPED | OUTPATIENT
Start: 2021-08-06 | End: 2021-09-16 | Stop reason: SDUPTHER

## 2021-08-06 RX ORDER — CELECOXIB 200 MG/1
200 CAPSULE ORAL DAILY
Qty: 30 CAPSULE | Refills: 0 | Status: SHIPPED | OUTPATIENT
Start: 2021-08-06 | End: 2021-08-06

## 2021-08-13 ENCOUNTER — OFFICE VISIT (OUTPATIENT)
Dept: ORTHOPEDICS | Facility: CLINIC | Age: 76
End: 2021-08-13
Payer: MEDICARE

## 2021-08-13 VITALS — HEIGHT: 61 IN | BODY MASS INDEX: 34.36 KG/M2 | WEIGHT: 182 LBS

## 2021-08-13 DIAGNOSIS — M17.11 PRIMARY OSTEOARTHRITIS OF RIGHT KNEE: Primary | ICD-10-CM

## 2021-08-13 DIAGNOSIS — S83.231S COMPLEX TEAR OF MEDIAL MENISCUS OF RIGHT KNEE AS CURRENT INJURY, SEQUELA: ICD-10-CM

## 2021-08-13 DIAGNOSIS — M79.641 PAIN IN RIGHT HAND: ICD-10-CM

## 2021-08-13 DIAGNOSIS — M25.561 ACUTE PAIN OF RIGHT KNEE: ICD-10-CM

## 2021-08-13 PROCEDURE — 1101F PT FALLS ASSESS-DOCD LE1/YR: CPT | Mod: CPTII,S$GLB,, | Performed by: PHYSICAL MEDICINE & REHABILITATION

## 2021-08-13 PROCEDURE — 1101F PR PT FALLS ASSESS DOC 0-1 FALLS W/OUT INJ PAST YR: ICD-10-PCS | Mod: CPTII,S$GLB,, | Performed by: PHYSICAL MEDICINE & REHABILITATION

## 2021-08-13 PROCEDURE — 1160F RVW MEDS BY RX/DR IN RCRD: CPT | Mod: CPTII,S$GLB,, | Performed by: PHYSICAL MEDICINE & REHABILITATION

## 2021-08-13 PROCEDURE — 99213 PR OFFICE/OUTPT VISIT, EST, LEVL III, 20-29 MIN: ICD-10-PCS | Mod: S$GLB,,, | Performed by: PHYSICAL MEDICINE & REHABILITATION

## 2021-08-13 PROCEDURE — 99213 OFFICE O/P EST LOW 20 MIN: CPT | Mod: S$GLB,,, | Performed by: PHYSICAL MEDICINE & REHABILITATION

## 2021-08-13 PROCEDURE — 3288F PR FALLS RISK ASSESSMENT DOCUMENTED: ICD-10-PCS | Mod: CPTII,S$GLB,, | Performed by: PHYSICAL MEDICINE & REHABILITATION

## 2021-08-13 PROCEDURE — 1160F PR REVIEW ALL MEDS BY PRESCRIBER/CLIN PHARMACIST DOCUMENTED: ICD-10-PCS | Mod: CPTII,S$GLB,, | Performed by: PHYSICAL MEDICINE & REHABILITATION

## 2021-08-13 PROCEDURE — 99999 PR PBB SHADOW E&M-EST. PATIENT-LVL IV: ICD-10-PCS | Mod: PBBFAC,,, | Performed by: PHYSICAL MEDICINE & REHABILITATION

## 2021-08-13 PROCEDURE — 3288F FALL RISK ASSESSMENT DOCD: CPT | Mod: CPTII,S$GLB,, | Performed by: PHYSICAL MEDICINE & REHABILITATION

## 2021-08-13 PROCEDURE — 1159F MED LIST DOCD IN RCRD: CPT | Mod: CPTII,S$GLB,, | Performed by: PHYSICAL MEDICINE & REHABILITATION

## 2021-08-13 PROCEDURE — 1159F PR MEDICATION LIST DOCUMENTED IN MEDICAL RECORD: ICD-10-PCS | Mod: CPTII,S$GLB,, | Performed by: PHYSICAL MEDICINE & REHABILITATION

## 2021-08-13 PROCEDURE — 1126F PR PAIN SEVERITY QUANTIFIED, NO PAIN PRESENT: ICD-10-PCS | Mod: CPTII,S$GLB,, | Performed by: PHYSICAL MEDICINE & REHABILITATION

## 2021-08-13 PROCEDURE — 1126F AMNT PAIN NOTED NONE PRSNT: CPT | Mod: CPTII,S$GLB,, | Performed by: PHYSICAL MEDICINE & REHABILITATION

## 2021-08-13 PROCEDURE — 99999 PR PBB SHADOW E&M-EST. PATIENT-LVL IV: CPT | Mod: PBBFAC,,, | Performed by: PHYSICAL MEDICINE & REHABILITATION

## 2021-08-13 RX ORDER — HYALURONATE SODIUM 20 MG/2 ML
SYRINGE (ML) INTRAARTICULAR
COMMUNITY
Start: 2021-03-08

## 2021-08-20 ENCOUNTER — OFFICE VISIT (OUTPATIENT)
Dept: OPHTHALMOLOGY | Facility: CLINIC | Age: 76
End: 2021-08-20
Payer: MEDICARE

## 2021-08-20 DIAGNOSIS — Z96.1 PSEUDOPHAKIA: ICD-10-CM

## 2021-08-20 DIAGNOSIS — H40.003 OPEN ANGLE WITH BORDERLINE INTRAOCULAR PRESSURE OF BOTH EYES: Primary | ICD-10-CM

## 2021-08-20 PROCEDURE — 1126F PR PAIN SEVERITY QUANTIFIED, NO PAIN PRESENT: ICD-10-PCS | Mod: CPTII,S$GLB,, | Performed by: OPHTHALMOLOGY

## 2021-08-20 PROCEDURE — 99214 PR OFFICE/OUTPT VISIT, EST, LEVL IV, 30-39 MIN: ICD-10-PCS | Mod: S$GLB,,, | Performed by: OPHTHALMOLOGY

## 2021-08-20 PROCEDURE — 1160F PR REVIEW ALL MEDS BY PRESCRIBER/CLIN PHARMACIST DOCUMENTED: ICD-10-PCS | Mod: CPTII,S$GLB,, | Performed by: OPHTHALMOLOGY

## 2021-08-20 PROCEDURE — 99999 PR PBB SHADOW E&M-EST. PATIENT-LVL III: CPT | Mod: PBBFAC,,, | Performed by: OPHTHALMOLOGY

## 2021-08-20 PROCEDURE — 1126F AMNT PAIN NOTED NONE PRSNT: CPT | Mod: CPTII,S$GLB,, | Performed by: OPHTHALMOLOGY

## 2021-08-20 PROCEDURE — 99214 OFFICE O/P EST MOD 30 MIN: CPT | Mod: S$GLB,,, | Performed by: OPHTHALMOLOGY

## 2021-08-20 PROCEDURE — 1159F PR MEDICATION LIST DOCUMENTED IN MEDICAL RECORD: ICD-10-PCS | Mod: CPTII,S$GLB,, | Performed by: OPHTHALMOLOGY

## 2021-08-20 PROCEDURE — 3288F PR FALLS RISK ASSESSMENT DOCUMENTED: ICD-10-PCS | Mod: CPTII,S$GLB,, | Performed by: OPHTHALMOLOGY

## 2021-08-20 PROCEDURE — 1101F PT FALLS ASSESS-DOCD LE1/YR: CPT | Mod: CPTII,S$GLB,, | Performed by: OPHTHALMOLOGY

## 2021-08-20 PROCEDURE — 99999 PR PBB SHADOW E&M-EST. PATIENT-LVL III: ICD-10-PCS | Mod: PBBFAC,,, | Performed by: OPHTHALMOLOGY

## 2021-08-20 PROCEDURE — 1159F MED LIST DOCD IN RCRD: CPT | Mod: CPTII,S$GLB,, | Performed by: OPHTHALMOLOGY

## 2021-08-20 PROCEDURE — 3288F FALL RISK ASSESSMENT DOCD: CPT | Mod: CPTII,S$GLB,, | Performed by: OPHTHALMOLOGY

## 2021-08-20 PROCEDURE — 1160F RVW MEDS BY RX/DR IN RCRD: CPT | Mod: CPTII,S$GLB,, | Performed by: OPHTHALMOLOGY

## 2021-08-20 PROCEDURE — 1101F PR PT FALLS ASSESS DOC 0-1 FALLS W/OUT INJ PAST YR: ICD-10-PCS | Mod: CPTII,S$GLB,, | Performed by: OPHTHALMOLOGY

## 2021-08-25 DIAGNOSIS — M79.642 LEFT HAND PAIN: Primary | ICD-10-CM

## 2021-08-27 ENCOUNTER — OFFICE VISIT (OUTPATIENT)
Dept: ORTHOPEDICS | Facility: CLINIC | Age: 76
End: 2021-08-27
Payer: MEDICARE

## 2021-08-27 ENCOUNTER — HOSPITAL ENCOUNTER (OUTPATIENT)
Dept: RADIOLOGY | Facility: HOSPITAL | Age: 76
Discharge: HOME | End: 2021-08-27
Attending: ORTHOPAEDIC SURGERY
Payer: MEDICARE

## 2021-08-27 VITALS — HEIGHT: 62 IN | WEIGHT: 189.81 LBS | BODY MASS INDEX: 34.93 KG/M2

## 2021-08-27 DIAGNOSIS — G24.9 DYSTONIA: ICD-10-CM

## 2021-08-27 DIAGNOSIS — M65.30 TRIGGER FINGER, ACQUIRED: Primary | ICD-10-CM

## 2021-08-27 DIAGNOSIS — M19.049 ARTHRITIS OF HAND: ICD-10-CM

## 2021-08-27 DIAGNOSIS — M79.642 LEFT HAND PAIN: ICD-10-CM

## 2021-08-27 PROCEDURE — 1160F RVW MEDS BY RX/DR IN RCRD: CPT | Mod: CPTII,S$GLB,, | Performed by: ORTHOPAEDIC SURGERY

## 2021-08-27 PROCEDURE — 73130 X-RAY EXAM OF HAND: CPT | Mod: 26,LT,, | Performed by: RADIOLOGY

## 2021-08-27 PROCEDURE — 99999 PR PBB SHADOW E&M-EST. PATIENT-LVL III: CPT | Mod: PBBFAC,,, | Performed by: ORTHOPAEDIC SURGERY

## 2021-08-27 PROCEDURE — 73130 XR HAND COMPLETE 3 VIEW LEFT: ICD-10-PCS | Mod: 26,LT,, | Performed by: RADIOLOGY

## 2021-08-27 PROCEDURE — 99203 PR OFFICE/OUTPT VISIT, NEW, LEVL III, 30-44 MIN: ICD-10-PCS | Mod: S$GLB,,, | Performed by: ORTHOPAEDIC SURGERY

## 2021-08-27 PROCEDURE — 99999 PR PBB SHADOW E&M-EST. PATIENT-LVL III: ICD-10-PCS | Mod: PBBFAC,,, | Performed by: ORTHOPAEDIC SURGERY

## 2021-08-27 PROCEDURE — 1125F PR PAIN SEVERITY QUANTIFIED, PAIN PRESENT: ICD-10-PCS | Mod: CPTII,S$GLB,, | Performed by: ORTHOPAEDIC SURGERY

## 2021-08-27 PROCEDURE — 1159F MED LIST DOCD IN RCRD: CPT | Mod: CPTII,S$GLB,, | Performed by: ORTHOPAEDIC SURGERY

## 2021-08-27 PROCEDURE — 73130 X-RAY EXAM OF HAND: CPT | Mod: TC,LT

## 2021-08-27 PROCEDURE — 1125F AMNT PAIN NOTED PAIN PRSNT: CPT | Mod: CPTII,S$GLB,, | Performed by: ORTHOPAEDIC SURGERY

## 2021-08-27 PROCEDURE — 99203 OFFICE O/P NEW LOW 30 MIN: CPT | Mod: S$GLB,,, | Performed by: ORTHOPAEDIC SURGERY

## 2021-08-27 PROCEDURE — 1159F PR MEDICATION LIST DOCUMENTED IN MEDICAL RECORD: ICD-10-PCS | Mod: CPTII,S$GLB,, | Performed by: ORTHOPAEDIC SURGERY

## 2021-08-27 PROCEDURE — 1160F PR REVIEW ALL MEDS BY PRESCRIBER/CLIN PHARMACIST DOCUMENTED: ICD-10-PCS | Mod: CPTII,S$GLB,, | Performed by: ORTHOPAEDIC SURGERY

## 2021-09-09 ENCOUNTER — PATIENT MESSAGE (OUTPATIENT)
Dept: PODIATRY | Facility: CLINIC | Age: 76
End: 2021-09-09

## 2021-09-16 ENCOUNTER — PES CALL (OUTPATIENT)
Dept: ADMINISTRATIVE | Facility: CLINIC | Age: 76
End: 2021-09-16

## 2021-09-16 DIAGNOSIS — M79.671 PAIN OF RIGHT FOOT: ICD-10-CM

## 2021-09-16 DIAGNOSIS — G57.91 PERIPHERAL NEURITIS OF RIGHT FOOT: ICD-10-CM

## 2021-09-16 RX ORDER — GABAPENTIN 300 MG/1
300 CAPSULE ORAL NIGHTLY
Qty: 90 CAPSULE | Refills: 0 | Status: SHIPPED | OUTPATIENT
Start: 2021-09-16 | End: 2022-01-01

## 2021-09-16 RX ORDER — CELECOXIB 200 MG/1
CAPSULE ORAL
Qty: 90 CAPSULE | Refills: 0 | Status: SHIPPED | OUTPATIENT
Start: 2021-09-16 | End: 2021-12-28 | Stop reason: SDUPTHER

## 2021-10-04 ENCOUNTER — IMMUNIZATION (OUTPATIENT)
Dept: PRIMARY CARE CLINIC | Facility: CLINIC | Age: 76
End: 2021-10-04

## 2021-10-04 DIAGNOSIS — Z23 NEED FOR VACCINATION: Primary | ICD-10-CM

## 2021-10-04 PROCEDURE — 91300 COVID-19, MRNA, LNP-S, PF, 30 MCG/0.3 ML DOSE VACCINE: CPT | Mod: PBBFAC | Performed by: FAMILY MEDICINE

## 2021-10-04 PROCEDURE — 0003A COVID-19, MRNA, LNP-S, PF, 30 MCG/0.3 ML DOSE VACCINE: CPT | Mod: PBBFAC | Performed by: FAMILY MEDICINE

## 2021-10-27 ENCOUNTER — IMMUNIZATION (OUTPATIENT)
Dept: INTERNAL MEDICINE | Facility: CLINIC | Age: 76
End: 2021-10-27
Payer: MEDICARE

## 2021-10-27 PROCEDURE — G0008 FLU VACCINE - QUADRIVALENT - ADJUVANTED: ICD-10-PCS | Mod: HCNC,S$GLB,, | Performed by: PEDIATRICS

## 2021-10-27 PROCEDURE — G0008 ADMIN INFLUENZA VIRUS VAC: HCPCS | Mod: HCNC,S$GLB,, | Performed by: PEDIATRICS

## 2021-10-27 PROCEDURE — 90694 VACC AIIV4 NO PRSRV 0.5ML IM: CPT | Mod: HCNC,S$GLB,, | Performed by: PEDIATRICS

## 2021-10-27 PROCEDURE — 90694 FLU VACCINE - QUADRIVALENT - ADJUVANTED: ICD-10-PCS | Mod: HCNC,S$GLB,, | Performed by: PEDIATRICS

## 2021-11-18 ENCOUNTER — PATIENT MESSAGE (OUTPATIENT)
Dept: INTERNAL MEDICINE | Facility: CLINIC | Age: 76
End: 2021-11-18
Payer: MEDICARE

## 2021-11-26 ENCOUNTER — LAB VISIT (OUTPATIENT)
Dept: LAB | Facility: HOSPITAL | Age: 76
End: 2021-11-26
Attending: PEDIATRICS
Payer: MEDICARE

## 2021-11-26 DIAGNOSIS — Z13.220 ENCOUNTER FOR LIPID SCREENING FOR CARDIOVASCULAR DISEASE: ICD-10-CM

## 2021-11-26 DIAGNOSIS — Z13.6 ENCOUNTER FOR LIPID SCREENING FOR CARDIOVASCULAR DISEASE: ICD-10-CM

## 2021-11-26 LAB
ALBUMIN SERPL BCP-MCNC: 3.9 G/DL (ref 3.5–5.2)
ALP SERPL-CCNC: 55 U/L (ref 55–135)
ALT SERPL W/O P-5'-P-CCNC: 12 U/L (ref 10–44)
ANION GAP SERPL CALC-SCNC: 10 MMOL/L (ref 8–16)
AST SERPL-CCNC: 19 U/L (ref 10–40)
BILIRUB SERPL-MCNC: 0.5 MG/DL (ref 0.1–1)
BUN SERPL-MCNC: 20 MG/DL (ref 8–23)
CALCIUM SERPL-MCNC: 9.7 MG/DL (ref 8.7–10.5)
CHLORIDE SERPL-SCNC: 103 MMOL/L (ref 95–110)
CHOLEST SERPL-MCNC: 203 MG/DL (ref 120–199)
CHOLEST/HDLC SERPL: 3.2 {RATIO} (ref 2–5)
CO2 SERPL-SCNC: 29 MMOL/L (ref 23–29)
CREAT SERPL-MCNC: 0.8 MG/DL (ref 0.5–1.4)
EST. GFR  (AFRICAN AMERICAN): >60 ML/MIN/1.73 M^2
EST. GFR  (NON AFRICAN AMERICAN): >60 ML/MIN/1.73 M^2
GLUCOSE SERPL-MCNC: 84 MG/DL (ref 70–110)
HDLC SERPL-MCNC: 64 MG/DL (ref 40–75)
HDLC SERPL: 31.5 % (ref 20–50)
LDLC SERPL CALC-MCNC: 120.2 MG/DL (ref 63–159)
NONHDLC SERPL-MCNC: 139 MG/DL
POTASSIUM SERPL-SCNC: 4.6 MMOL/L (ref 3.5–5.1)
PROT SERPL-MCNC: 6.4 G/DL (ref 6–8.4)
SODIUM SERPL-SCNC: 142 MMOL/L (ref 136–145)
TRIGL SERPL-MCNC: 94 MG/DL (ref 30–150)

## 2021-11-26 PROCEDURE — 80053 COMPREHEN METABOLIC PANEL: CPT | Mod: HCNC | Performed by: PEDIATRICS

## 2021-11-26 PROCEDURE — 80061 LIPID PANEL: CPT | Mod: HCNC | Performed by: PEDIATRICS

## 2021-11-26 PROCEDURE — 36415 COLL VENOUS BLD VENIPUNCTURE: CPT | Mod: HCNC | Performed by: PEDIATRICS

## 2021-11-29 ENCOUNTER — PATIENT OUTREACH (OUTPATIENT)
Dept: ADMINISTRATIVE | Facility: OTHER | Age: 76
End: 2021-11-29
Payer: MEDICARE

## 2021-12-03 ENCOUNTER — OFFICE VISIT (OUTPATIENT)
Dept: OPHTHALMOLOGY | Facility: CLINIC | Age: 76
End: 2021-12-03
Payer: MEDICARE

## 2021-12-03 ENCOUNTER — OFFICE VISIT (OUTPATIENT)
Dept: INTERNAL MEDICINE | Facility: CLINIC | Age: 76
End: 2021-12-03
Payer: MEDICARE

## 2021-12-03 VITALS
HEART RATE: 79 BPM | WEIGHT: 170.19 LBS | RESPIRATION RATE: 18 BRPM | TEMPERATURE: 98 F | OXYGEN SATURATION: 99 % | BODY MASS INDEX: 31.13 KG/M2 | DIASTOLIC BLOOD PRESSURE: 84 MMHG | SYSTOLIC BLOOD PRESSURE: 134 MMHG

## 2021-12-03 DIAGNOSIS — Z13.220 ENCOUNTER FOR LIPID SCREENING FOR CARDIOVASCULAR DISEASE: ICD-10-CM

## 2021-12-03 DIAGNOSIS — M19.041 PRIMARY OSTEOARTHRITIS OF BOTH HANDS: ICD-10-CM

## 2021-12-03 DIAGNOSIS — M47.9 OSTEOARTHRITIS OF LOWER BACK: ICD-10-CM

## 2021-12-03 DIAGNOSIS — H40.023 OAG (OPEN ANGLE GLAUCOMA) SUSPECT, HIGH RISK, BILATERAL: Primary | ICD-10-CM

## 2021-12-03 DIAGNOSIS — Z00.00 WELL ADULT EXAM: Primary | ICD-10-CM

## 2021-12-03 DIAGNOSIS — E66.01 SEVERE OBESITY (BMI 35.0-39.9) WITH COMORBIDITY: ICD-10-CM

## 2021-12-03 DIAGNOSIS — Z13.6 ENCOUNTER FOR LIPID SCREENING FOR CARDIOVASCULAR DISEASE: ICD-10-CM

## 2021-12-03 DIAGNOSIS — Z96.1 PSEUDOPHAKIA: ICD-10-CM

## 2021-12-03 DIAGNOSIS — F51.12 BEHAVIORALLY INDUCED INSUFFICIENT SLEEP SYNDROME: ICD-10-CM

## 2021-12-03 DIAGNOSIS — E55.9 VITAMIN D DEFICIENCY: ICD-10-CM

## 2021-12-03 DIAGNOSIS — M19.042 PRIMARY OSTEOARTHRITIS OF BOTH HANDS: ICD-10-CM

## 2021-12-03 DIAGNOSIS — M85.89 OSTEOPENIA OF MULTIPLE SITES: ICD-10-CM

## 2021-12-03 DIAGNOSIS — F41.9 ANXIETY: ICD-10-CM

## 2021-12-03 DIAGNOSIS — K21.9 GASTROESOPHAGEAL REFLUX DISEASE WITHOUT ESOPHAGITIS: Chronic | ICD-10-CM

## 2021-12-03 DIAGNOSIS — H52.7 REFRACTION DISORDER: ICD-10-CM

## 2021-12-03 PROCEDURE — 99213 PR OFFICE/OUTPT VISIT, EST, LEVL III, 20-29 MIN: ICD-10-PCS | Mod: HCNC,S$GLB,, | Performed by: OPHTHALMOLOGY

## 2021-12-03 PROCEDURE — 99397 PER PM REEVAL EST PAT 65+ YR: CPT | Mod: HCNC,S$GLB,, | Performed by: PEDIATRICS

## 2021-12-03 PROCEDURE — 92133 CPTRZD OPH DX IMG PST SGM ON: CPT | Mod: HCNC,S$GLB,, | Performed by: OPHTHALMOLOGY

## 2021-12-03 PROCEDURE — 99999 PR PBB SHADOW E&M-EST. PATIENT-LVL V: ICD-10-PCS | Mod: PBBFAC,HCNC,, | Performed by: PEDIATRICS

## 2021-12-03 PROCEDURE — 99213 OFFICE O/P EST LOW 20 MIN: CPT | Mod: HCNC,S$GLB,, | Performed by: OPHTHALMOLOGY

## 2021-12-03 PROCEDURE — 99999 PR PBB SHADOW E&M-EST. PATIENT-LVL III: ICD-10-PCS | Mod: PBBFAC,HCNC,, | Performed by: OPHTHALMOLOGY

## 2021-12-03 PROCEDURE — 99397 PR PREVENTIVE VISIT,EST,65 & OVER: ICD-10-PCS | Mod: HCNC,S$GLB,, | Performed by: PEDIATRICS

## 2021-12-03 PROCEDURE — 99999 PR PBB SHADOW E&M-EST. PATIENT-LVL III: CPT | Mod: PBBFAC,HCNC,, | Performed by: OPHTHALMOLOGY

## 2021-12-03 PROCEDURE — 92083 HUMPHREY VISUAL FIELD - OU - BOTH EYES: ICD-10-PCS | Mod: HCNC,S$GLB,, | Performed by: OPHTHALMOLOGY

## 2021-12-03 PROCEDURE — 92083 EXTENDED VISUAL FIELD XM: CPT | Mod: HCNC,S$GLB,, | Performed by: OPHTHALMOLOGY

## 2021-12-03 PROCEDURE — 92133 POSTERIOR SEGMENT OCT OPTIC NERVE(OCULAR COHERENCE TOMOGRAPHY) - OU - BOTH EYES: ICD-10-PCS | Mod: HCNC,S$GLB,, | Performed by: OPHTHALMOLOGY

## 2021-12-03 PROCEDURE — 99999 PR PBB SHADOW E&M-EST. PATIENT-LVL V: CPT | Mod: PBBFAC,HCNC,, | Performed by: PEDIATRICS

## 2021-12-03 RX ORDER — TRAZODONE HYDROCHLORIDE 50 MG/1
50 TABLET ORAL NIGHTLY
Qty: 90 TABLET | Refills: 3 | Status: SHIPPED | OUTPATIENT
Start: 2021-12-03 | End: 2022-12-09

## 2021-12-10 ENCOUNTER — CLINICAL SUPPORT (OUTPATIENT)
Dept: REHABILITATION | Facility: HOSPITAL | Age: 76
End: 2021-12-10
Attending: PEDIATRICS
Payer: MEDICARE

## 2021-12-10 DIAGNOSIS — M25.641 STIFFNESS OF FINGER JOINT, RIGHT: ICD-10-CM

## 2021-12-10 DIAGNOSIS — M19.041 PRIMARY OSTEOARTHRITIS OF BOTH HANDS: ICD-10-CM

## 2021-12-10 DIAGNOSIS — Z78.9 SELF-CARE DEFICIT: ICD-10-CM

## 2021-12-10 DIAGNOSIS — M19.042 PRIMARY OSTEOARTHRITIS OF BOTH HANDS: ICD-10-CM

## 2021-12-10 DIAGNOSIS — R52 PAIN: ICD-10-CM

## 2021-12-10 PROCEDURE — 97165 OT EVAL LOW COMPLEX 30 MIN: CPT | Mod: HCNC | Performed by: OCCUPATIONAL THERAPIST

## 2021-12-27 ENCOUNTER — PATIENT MESSAGE (OUTPATIENT)
Dept: INTERNAL MEDICINE | Facility: CLINIC | Age: 76
End: 2021-12-27
Payer: MEDICARE

## 2021-12-27 ENCOUNTER — LAB VISIT (OUTPATIENT)
Dept: PRIMARY CARE CLINIC | Facility: OTHER | Age: 76
End: 2021-12-27
Attending: INTERNAL MEDICINE

## 2021-12-27 ENCOUNTER — PATIENT MESSAGE (OUTPATIENT)
Dept: ADMINISTRATIVE | Facility: OTHER | Age: 76
End: 2021-12-27
Payer: MEDICARE

## 2021-12-27 DIAGNOSIS — Z20.822 ENCOUNTER FOR LABORATORY TESTING FOR COVID-19 VIRUS: ICD-10-CM

## 2021-12-27 DIAGNOSIS — E55.9 VITAMIN D DEFICIENCY: ICD-10-CM

## 2021-12-27 DIAGNOSIS — M79.671 PAIN OF RIGHT FOOT: ICD-10-CM

## 2021-12-27 PROCEDURE — U0003 INFECTIOUS AGENT DETECTION BY NUCLEIC ACID (DNA OR RNA); SEVERE ACUTE RESPIRATORY SYNDROME CORONAVIRUS 2 (SARS-COV-2) (CORONAVIRUS DISEASE [COVID-19]), AMPLIFIED PROBE TECHNIQUE, MAKING USE OF HIGH THROUGHPUT TECHNOLOGIES AS DESCRIBED BY CMS-2020-01-R: HCPCS | Performed by: INTERNAL MEDICINE

## 2021-12-28 RX ORDER — CELECOXIB 200 MG/1
CAPSULE ORAL
Qty: 90 CAPSULE | Refills: 3 | Status: SHIPPED | OUTPATIENT
Start: 2021-12-28 | End: 2022-01-04 | Stop reason: SDUPTHER

## 2021-12-28 RX ORDER — ERGOCALCIFEROL 1.25 MG/1
CAPSULE ORAL
Qty: 25 CAPSULE | Refills: 3 | Status: SHIPPED | OUTPATIENT
Start: 2021-12-28 | End: 2022-12-09

## 2021-12-29 LAB
SARS-COV-2 RNA RESP QL NAA+PROBE: NOT DETECTED
SARS-COV-2- CYCLE NUMBER: NORMAL

## 2022-01-03 ENCOUNTER — PATIENT MESSAGE (OUTPATIENT)
Dept: PODIATRY | Facility: CLINIC | Age: 77
End: 2022-01-03
Payer: MEDICARE

## 2022-01-04 DIAGNOSIS — G57.91 PERIPHERAL NEURITIS OF RIGHT FOOT: ICD-10-CM

## 2022-01-04 DIAGNOSIS — M79.671 PAIN OF RIGHT FOOT: ICD-10-CM

## 2022-01-04 RX ORDER — GABAPENTIN 300 MG/1
300 CAPSULE ORAL NIGHTLY
Qty: 90 CAPSULE | Refills: 0 | Status: SHIPPED | OUTPATIENT
Start: 2022-01-04 | End: 2022-12-09

## 2022-01-04 RX ORDER — CELECOXIB 200 MG/1
CAPSULE ORAL
Qty: 90 CAPSULE | Refills: 3 | Status: SHIPPED | OUTPATIENT
Start: 2022-01-04 | End: 2022-12-09

## 2022-01-10 ENCOUNTER — HOSPITAL ENCOUNTER (OUTPATIENT)
Dept: RADIOLOGY | Facility: HOSPITAL | Age: 77
Discharge: HOME | End: 2022-01-10
Attending: NURSE PRACTITIONER
Payer: MEDICARE

## 2022-01-10 ENCOUNTER — OFFICE VISIT (OUTPATIENT)
Dept: INTERNAL MEDICINE | Facility: CLINIC | Age: 77
End: 2022-01-10
Payer: MEDICARE

## 2022-01-10 VITALS
BODY MASS INDEX: 33.15 KG/M2 | RESPIRATION RATE: 16 BRPM | OXYGEN SATURATION: 100 % | WEIGHT: 181.19 LBS | DIASTOLIC BLOOD PRESSURE: 70 MMHG | SYSTOLIC BLOOD PRESSURE: 126 MMHG | HEART RATE: 76 BPM

## 2022-01-10 DIAGNOSIS — M54.2 NECK PAIN: ICD-10-CM

## 2022-01-10 DIAGNOSIS — M47.9 OSTEOARTHRITIS OF LOWER BACK: ICD-10-CM

## 2022-01-10 DIAGNOSIS — M50.30 DDD (DEGENERATIVE DISC DISEASE), CERVICAL: ICD-10-CM
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
## 2022-01-10 PROCEDURE — 1125F AMNT PAIN NOTED PAIN PRSNT: CPT | Mod: HCNC,CPTII,S$GLB, | Performed by: NURSE PRACTITIONER

## 2022-01-10 PROCEDURE — 99999 PR PBB SHADOW E&M-EST. PATIENT-LVL IV: ICD-10-PCS | Mod: PBBFAC,HCNC,, | Performed by: NURSE PRACTITIONER

## 2022-01-10 PROCEDURE — 72070 X-RAY EXAM THORAC SPINE 2VWS: CPT | Mod: TC,HCNC

## 2022-01-10 PROCEDURE — 1125F PR PAIN SEVERITY QUANTIFIED, PAIN PRESENT: ICD-10-PCS | Mod: HCNC,CPTII,S$GLB, | Performed by: NURSE PRACTITIONER

## 2022-01-10 PROCEDURE — 1159F MED LIST DOCD IN RCRD: CPT | Mod: HCNC,CPTII,S$GLB, | Performed by: NURSE PRACTITIONER

## 2022-01-10 PROCEDURE — 1159F PR MEDICATION LIST DOCUMENTED IN MEDICAL RECORD: ICD-10-PCS | Mod: HCNC,CPTII,S$GLB, | Performed by: NURSE PRACTITIONER

## 2022-01-10 PROCEDURE — 72040 X-RAY EXAM NECK SPINE 2-3 VW: CPT | Mod: 26,HCNC,, | Performed by: RADIOLOGY

## 2022-01-10 PROCEDURE — 99213 PR OFFICE/OUTPT VISIT, EST, LEVL III, 20-29 MIN: ICD-10-PCS | Mod: HCNC,S$GLB,, | Performed by: NURSE PRACTITIONER

## 2022-01-10 PROCEDURE — 72070 XR THORACIC SPINE AP LATERAL: ICD-10-PCS | Mod: 26,HCNC,, | Performed by: RADIOLOGY

## 2022-01-10 PROCEDURE — 72070 X-RAY EXAM THORAC SPINE 2VWS: CPT | Mod: 26,HCNC,, | Performed by: RADIOLOGY

## 2022-01-10 PROCEDURE — 3074F SYST BP LT 130 MM HG: CPT | Mod: HCNC,CPTII,S$GLB, | Performed by: NURSE PRACTITIONER

## 2022-01-10 PROCEDURE — 99999 PR PBB SHADOW E&M-EST. PATIENT-LVL IV: CPT | Mod: PBBFAC,HCNC,, | Performed by: NURSE PRACTITIONER

## 2022-01-10 PROCEDURE — 3078F DIAST BP <80 MM HG: CPT | Mod: HCNC,CPTII,S$GLB, | Performed by: NURSE PRACTITIONER

## 2022-01-10 PROCEDURE — 99213 OFFICE O/P EST LOW 20 MIN: CPT | Mod: HCNC,S$GLB,, | Performed by: NURSE PRACTITIONER

## 2022-01-10 PROCEDURE — 3074F PR MOST RECENT SYSTOLIC BLOOD PRESSURE < 130 MM HG: ICD-10-PCS | Mod: HCNC,CPTII,S$GLB, | Performed by: NURSE PRACTITIONER

## 2022-01-10 PROCEDURE — 3078F PR MOST RECENT DIASTOLIC BLOOD PRESSURE < 80 MM HG: ICD-10-PCS | Mod: HCNC,CPTII,S$GLB, | Performed by: NURSE PRACTITIONER

## 2022-01-10 PROCEDURE — 72040 X-RAY EXAM NECK SPINE 2-3 VW: CPT | Mod: TC,HCNC

## 2022-01-10 PROCEDURE — 72040 XR CERVICAL SPINE AP LATERAL: ICD-10-PCS | Mod: 26,HCNC,, | Performed by: RADIOLOGY
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
## 2022-01-14 ENCOUNTER — CLINICAL SUPPORT (OUTPATIENT)
Dept: REHABILITATION | Facility: HOSPITAL | Age: 77
End: 2022-01-14
Payer: MEDICARE

## 2022-01-14 DIAGNOSIS — R52 PAIN: ICD-10-CM

## 2022-01-14 DIAGNOSIS — Z78.9 SELF-CARE DEFICIT: ICD-10-CM

## 2022-01-14 DIAGNOSIS — M25.641 STIFFNESS OF FINGER JOINT, RIGHT: ICD-10-CM

## 2022-01-14 PROCEDURE — 97110 THERAPEUTIC EXERCISES: CPT | Mod: HCNC | Performed by: OCCUPATIONAL THERAPIST

## 2022-01-14 PROCEDURE — 97140 MANUAL THERAPY 1/> REGIONS: CPT | Mod: HCNC | Performed by: OCCUPATIONAL THERAPIST
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
## 2022-02-11 ENCOUNTER — CLINICAL SUPPORT (OUTPATIENT)
Dept: REHABILITATION | Facility: HOSPITAL | Age: 77
End: 2022-02-11
Payer: MEDICARE

## 2022-02-11 DIAGNOSIS — R52 PAIN: ICD-10-CM

## 2022-02-11 DIAGNOSIS — Z78.9 SELF-CARE DEFICIT: ICD-10-CM

## 2022-02-11 DIAGNOSIS — M25.641 STIFFNESS OF FINGER JOINT, RIGHT: ICD-10-CM

## 2022-02-11 PROCEDURE — 97110 THERAPEUTIC EXERCISES: CPT | Mod: HCNC | Performed by: OCCUPATIONAL THERAPIST

## 2022-02-11 NOTE — PROGRESS NOTES
OCCUPATIONAL THERAPY DAILY NOTE    Name: Hiwot Coulter  Clinic Number: [id number]    Therapy Diagnosis:   Encounter Diagnoses   Name Primary?    Pain     Stiffness of finger joint, right     Self-care deficit      Physician: CINDY Ramirez Jr., MD    Visit Date: 2/11/2022     Physician Orders: Evaluation and treatment     Medical Diagnosis: M19.041,M19.042 (ICD-10-CM) - Primary osteoarthritis of both hands  Surgical Procedure and Date: NA,   Evaluation Date: 12/10/2021  Insurance Authorization Period Expiration: 12/3/2021 - 12/3/2022  Plan of Care Certification Period: 12/10/2021 -   Date of Return to MD: 12/5/2021     PROGRESS NOTE DUE: 01/10/2022  FOTO: 2/3     Visit # / Visits authorized: 3 / 20    Time In:0700  Time Out: 0735  Total Time: 35 minutes     Precautions:  Standard      SUBJECTIVE     Today, pt reports: that she is wearing her oval 8  Brace and it is helping. She is more aware of how she uses her hands around the house.   He/She was compliant with home exercise program.  Response to previous treatment: increased range of motion   Functional change: self care    Pre-Treatment Pain: 4/10  Post-Treatment Pain: 2/10  Location: proximal interphalangeal joint of the right long finger and palm of ring finger      CMS Impairment/Limitation/Restriction for FOTO hand Survey    Therapist reviewed FOTO scores for Hiwot Coulter on 2/11/2022.   FOTO documents entered into "[employer]" - see Media section.    Limitation Score: 47%        TREATMENT     Hiwot received the following manual therapy techniques for 8 minutes:   -IASTM of the right palm  -soft tissue massage of the right palm ring finger     ISSUED AN OVAL 8 BRACE FOR THE RIGHT RING FINGER WITH INSTRUCTIONS ON WEAR AND CARE size 8 not today      Hiwot received therapeutic exercises for 22 minutes including:  -joint blocking distal interphalangeal joint of the right right finger and long finger and proximal interphalangeal joint of the right long  finger  -reverse joint blocking of the long finger proximal interphalangeal joint   -passive range of motion: hook and flat fist 10/10 second holds  -passive range of motion flat fist  -passive range of motion hook fist  -ring finger extension stretches  -mid and low rows with green therapband - issued to patient 20x  -bilateral external rotation with green theraband 20x    Hiwot received the following supervised modalities after being cleared for contradictions:     Hiwot received hot pack for 5 minutes to right hand to increase tissue extensibility and decrease stiffness.    Hiwot received cold pack for 0 minutes.      Home Exercises Provided and Patient Education Provided     Education/Self-Care provided: (2 minutes) during exercises   Patient educated on biomechanical justification for therapeutic exercise and importance of compliance with HEP in order to improve overall impairments and QOL    Patient educated on postural awareness.    Patient educated on proper ergonomics at home and using her core muscles with exercises and activities.   Patient provided with osteoarthritis handout and review of home modifications.    Written Home Exercises Provided: yes.  Exercises were reviewed and Hiwot was able to demonstrate them prior to the end of the session.  Hiwot demonstrated good  understanding of the education provided.     See EMR under Patient Instructions for exercises provided 1/14/2022.    ASSESSMENT   Pt tolerated manual therapy well with reports of decreased pain and tension in musculature following intervention. Pt tolerated exercise well with reports of increased fatigue but no increased pain. Pt demonstrated good understanding of exercises and required minimal cueing to maintain proper form.  Patient demonstrates full proximal interphalangeal joint flexion with mild pain.    Hiwot Is progressing well towards her goals.   Pt prognosis is Good.     Pt will continue to benefit from skilled  outpatient occupational therapy to address the deficits listed in the problem list box on initial evaluation, provide pt/family education and to maximize pt's level of independence in the home and community environment.     Pt's spiritual, cultural and educational needs considered and pt agreeable to plan of care and goals.     Anticipated barriers to occupational therapy: None    GOALS:     Short Term Goals:  2 weeks                                                       updated  2/11/2022   1. Pain: Pt will demonstrate improved pain by reports of less than or equal to 1/10 worst pain on the verbal rating scale in order to progress toward maximal functional ability and improve QOL. progressing   2. Mobility: Patient will improve AROM to 50% of stated goals, listed in objective measures above, in order to progress towards independence with functional activities.   Met 1/14/2022   3. Strength: Patient will improve strength to 50% of stated goals, listed in objective measures above, in order to progress towards independence with functional activities.      4. Self Care: Patient will demonstrate improved self care skills listed in objective measure above,in order to progress towards independence with functional activities.  progressing    5. HEP: Patient will demonstrate independence with HEP in order to progress toward functional independence. progressing       Long Term Goals:  4 weeks                                                                   updated  2/11/2022   1. Pain: Pt will demonstrate improved pain by reports of less than or equal to 0/10 worst pain on the verbal rating scale in order to progress toward maximal functional ability and improve QOL.       2. Function: Patient will demonstrate improved function as indicated by a functional limitation score of less than or equal to 37 out of 100 on FOTO.     3. Mobility: Patient will improve AROM to stated goals, listed in objective measures above, in order  to return to maximal functional potential and improve quality of life. progressing   4. Strength: Patient will improve strength to stated goals, listed in objective measures above, in order to improve functional independence and quality of life.     5. Self Care: Patient will demonstrate increased self care skills to an independent level or modified independent level with adaptive equipment as needed.     6. Patient will return to normal ADL's, IADL's, community involvement, recreational activities, and work-related activities with less than or equal to 0/10 pain and maximal function.              PLAN   Continue Plan of Care (POC) and progress per patient tolerance. To address postural control and strengthening next session.    Tammy Arango, OTR, LOTR

## 2022-03-07 ENCOUNTER — OFFICE VISIT (OUTPATIENT)
Dept: DERMATOLOGY | Facility: CLINIC | Age: 77
End: 2022-03-07
Payer: MEDICARE

## 2022-03-07 DIAGNOSIS — D22.9 NEVUS: ICD-10-CM

## 2022-03-07 DIAGNOSIS — L82.0 INFLAMED SEBORRHEIC KERATOSIS OF LEFT CHEEK: Primary | ICD-10-CM

## 2022-03-07 DIAGNOSIS — L82.1 SEBORRHEIC KERATOSES: ICD-10-CM

## 2022-03-07 PROCEDURE — 99999 PR PBB SHADOW E&M-EST. PATIENT-LVL III: ICD-10-PCS | Mod: PBBFAC,HCNC,, | Performed by: PHYSICIAN ASSISTANT

## 2022-03-07 PROCEDURE — 1160F PR REVIEW ALL MEDS BY PRESCRIBER/CLIN PHARMACIST DOCUMENTED: ICD-10-PCS | Mod: CPTII,S$GLB,, | Performed by: PHYSICIAN ASSISTANT

## 2022-03-07 PROCEDURE — 1101F PR PT FALLS ASSESS DOC 0-1 FALLS W/OUT INJ PAST YR: ICD-10-PCS | Mod: CPTII,S$GLB,, | Performed by: PHYSICIAN ASSISTANT

## 2022-03-07 PROCEDURE — 1101F PT FALLS ASSESS-DOCD LE1/YR: CPT | Mod: CPTII,S$GLB,, | Performed by: PHYSICIAN ASSISTANT

## 2022-03-07 PROCEDURE — 17110 PR DESTRUCTION BENIGN LESIONS UP TO 14: ICD-10-PCS | Mod: S$GLB,,, | Performed by: PHYSICIAN ASSISTANT

## 2022-03-07 PROCEDURE — 3288F FALL RISK ASSESSMENT DOCD: CPT | Mod: CPTII,S$GLB,, | Performed by: PHYSICIAN ASSISTANT

## 2022-03-07 PROCEDURE — 99213 OFFICE O/P EST LOW 20 MIN: CPT | Mod: 25,S$GLB,, | Performed by: PHYSICIAN ASSISTANT

## 2022-03-07 PROCEDURE — 1160F RVW MEDS BY RX/DR IN RCRD: CPT | Mod: CPTII,S$GLB,, | Performed by: PHYSICIAN ASSISTANT

## 2022-03-07 PROCEDURE — 17110 DESTRUCTION B9 LES UP TO 14: CPT | Mod: S$GLB,,, | Performed by: PHYSICIAN ASSISTANT

## 2022-03-07 PROCEDURE — 99213 PR OFFICE/OUTPT VISIT, EST, LEVL III, 20-29 MIN: ICD-10-PCS | Mod: 25,S$GLB,, | Performed by: PHYSICIAN ASSISTANT

## 2022-03-07 PROCEDURE — 3288F PR FALLS RISK ASSESSMENT DOCUMENTED: ICD-10-PCS | Mod: CPTII,S$GLB,, | Performed by: PHYSICIAN ASSISTANT

## 2022-03-07 PROCEDURE — 1126F AMNT PAIN NOTED NONE PRSNT: CPT | Mod: CPTII,S$GLB,, | Performed by: PHYSICIAN ASSISTANT

## 2022-03-07 PROCEDURE — 1126F PR PAIN SEVERITY QUANTIFIED, NO PAIN PRESENT: ICD-10-PCS | Mod: CPTII,S$GLB,, | Performed by: PHYSICIAN ASSISTANT

## 2022-03-07 PROCEDURE — 1159F MED LIST DOCD IN RCRD: CPT | Mod: CPTII,S$GLB,, | Performed by: PHYSICIAN ASSISTANT

## 2022-03-07 PROCEDURE — 99999 PR PBB SHADOW E&M-EST. PATIENT-LVL III: CPT | Mod: PBBFAC,HCNC,, | Performed by: PHYSICIAN ASSISTANT

## 2022-03-07 PROCEDURE — 1159F PR MEDICATION LIST DOCUMENTED IN MEDICAL RECORD: ICD-10-PCS | Mod: CPTII,S$GLB,, | Performed by: PHYSICIAN ASSISTANT

## 2022-03-07 NOTE — PROGRESS NOTES
Subjective:       Patient ID:  Hiwot Coultre is a 76 y.o. female who presents for   Chief Complaint   Patient presents with    Lesion     C/o of lesions on face and stomach     Hx of Lichenoid keratosis of arm, SKs, last seen 8/14/19. Here for new c/o today.    C/o bump of left face, growing, raising over the past 3 months. Duration 3 months. Denies tenderness, bleeding, or itching.     C/o bump of stomach, duration 20 years. Denies change in size or irritation.     Denies personal hx or FHX skin CA.      Review of Systems   Constitutional: Negative for fever and chills.   Gastrointestinal: Negative for nausea and vomiting.   Skin: Positive for activity-related sunscreen use. Negative for itching, rash, dry skin, sun sensitivity, daily sunscreen use, recent sunburn, dry lips and abscesses.   Hematologic/Lymphatic: Does not bruise/bleed easily.        Objective:    Physical Exam   Constitutional: She appears well-developed and well-nourished. No distress.   Neurological: She is alert and oriented to person, place, and time. She is not disoriented.   Psychiatric: She has a normal mood and affect.   Skin:   Areas Examined (abnormalities noted in diagram):   Head / Face Inspection Performed  Neck Inspection Performed  Chest / Axilla Inspection Performed  Back Inspection Performed  RUE Inspected  LUE Inspection Performed                   Diagram Legend     Erythematous scaling macule/papule c/w actinic keratosis       Vascular papule c/w angioma      Pigmented verrucoid papule/plaque c/w seborrheic keratosis      Yellow umbilicated papule c/w sebaceous hyperplasia      Irregularly shaped tan macule c/w lentigo     1-2 mm smooth white papules consistent with Milia      Movable subcutaneous cyst with punctum c/w epidermal inclusion cyst      Subcutaneous movable cyst c/w pilar cyst      Firm pink to brown papule c/w dermatofibroma      Pedunculated fleshy papule(s) c/w skin tag(s)      Evenly pigmented macule c/w  junctional nevus     Mildly variegated pigmented, slightly irregular-bordered macule c/w mildly atypical nevus      Flesh colored to evenly pigmented papule c/w intradermal nevus       Pink pearly papule/plaque c/w basal cell carcinoma      Erythematous hyperkeratotic cursted plaque c/w SCC      Surgical scar with no sign of skin cancer recurrence      Open and closed comedones      Inflammatory papules and pustules      Verrucoid papule consistent consistent with wart     Erythematous eczematous patches and plaques     Dystrophic onycholytic nail with subungual debris c/w onychomycosis     Umbilicated papule    Erythematous-base heme-crusted tan verrucoid plaque consistent with inflamed seborrheic keratosis     Erythematous Silvery Scaling Plaque c/w Psoriasis     See annotation      Assessment / Plan:        Inflamed seborrheic keratosis of left cheek  Ddx: pigmented AK vs. Less likely r/o atypia  Agree to trial of cryo today. Discussed biopsy as way to r/o atypia, but she declines in favor of trial of cryo. If not improved, may reconsider biopsy in future.    Seborrheic keratoses  Reassurance given.  Lesions are benign.    Nevus  Reassurance given.  Lesions are benign.           Follow up in about 6 months (around 9/7/2022), or if symptoms worsen or fail to improve, for Full Skin Exam.

## 2022-03-20 ENCOUNTER — DOCUMENTATION ONLY (OUTPATIENT)
Dept: REHABILITATION | Facility: HOSPITAL | Age: 77
End: 2022-03-20
Payer: MEDICARE

## 2022-03-20 NOTE — PROGRESS NOTES
Impression: Vitreous degen OD>OS Plan: CONSULT today to Retina team for consideration of PPVIT. FLOATERS OD > OS    Vit Degen (PVD), symptoms, opacities. Failed observation, persisting, affecting function (see complaints)- No RD / tear / retinal defect - few stephanie? R/b/a - risk Retinal tear / detachment reviewed w pt. Consider options observe / surgx.   
    Determined plan VIT /  Laser  RIGHT eye [facility]  Occupational therapy  Discharge Note    Name: Hiwot Coulter  [facility] Number: [id number]    Therapy Diagnosis:        Encounter Diagnoses   Name Primary?    Pain      Stiffness of finger joint, right      Self-care deficit        Physician: CINDY Ramirez Jr., MD     Visit Date: 2/11/2022     Physician Orders: Evaluation and treatment     Medical Diagnosis: M19.041,M19.042 (ICD-10-CM) - Primary osteoarthritis of both hands  Surgical Procedure and Date: NA,   Evaluation Date: 12/10/2021      Date of Last visit: 2/11/2022  Total Visits Received: 3    ASSESSMENT      Unable to reasses as patient failed to return for therapy.    Discharge reason: Patient has not attended therapy since 2/11/2022    Discharge FOTO Score: NA    GOALS:     Short Term Goals:  2 weeks                                                       updated  2/11/2022   1. Pain: Pt will demonstrate improved pain by reports of less than or equal to 1/10 worst pain on the verbal rating scale in order to progress toward maximal functional ability and improve QOL. progressing   2. Mobility: Patient will improve AROM to 50% of stated goals, listed in objective measures above, in order to progress towards independence with functional activities.   Met 1/14/2022   3. Strength: Patient will improve strength to 50% of stated goals, listed in objective measures above, in order to progress towards independence with functional activities.      4. Self Care: Patient will demonstrate improved self care skills listed in objective measure above,in order to progress towards independence with functional activities.  progressing    5. HEP: Patient will demonstrate independence with HEP in order to progress toward functional independence. progressing       Long Term Goals:  4 weeks                                                                   updated  2/11/2022   1. Pain: Pt will demonstrate improved pain by reports of less  than or equal to 0/10 worst pain on the verbal rating scale in order to progress toward maximal functional ability and improve QOL.       2. Function: Patient will demonstrate improved function as indicated by a functional limitation score of less than or equal to 37 out of 100 on FOTO.     3. Mobility: Patient will improve AROM to stated goals, listed in objective measures above, in order to return to maximal functional potential and improve quality of life. progressing   4. Strength: Patient will improve strength to stated goals, listed in objective measures above, in order to improve functional independence and quality of life.     5. Self Care: Patient will demonstrate increased self care skills to an independent level or modified independent level with adaptive equipment as needed.     6. Patient will return to normal ADL's, IADL's, community involvement, recreational activities, and work-related activities with less than or equal to 0/10 pain and maximal function.                  PLAN   This patient is discharged from Occupational Therapy      Tammy Arango OTR

## 2022-04-18 ENCOUNTER — PATIENT MESSAGE (OUTPATIENT)
Dept: SPORTS MEDICINE | Facility: CLINIC | Age: 77
End: 2022-04-18
Payer: MEDICARE

## 2022-04-18 ENCOUNTER — OFFICE VISIT (OUTPATIENT)
Dept: ORTHOPEDICS | Facility: CLINIC | Age: 77
End: 2022-04-18
Payer: MEDICARE

## 2022-04-18 ENCOUNTER — HOSPITAL ENCOUNTER (OUTPATIENT)
Dept: RADIOLOGY | Facility: HOSPITAL | Age: 77
Discharge: HOME | End: 2022-04-18
Attending: FAMILY MEDICINE
Payer: MEDICARE

## 2022-04-18 VITALS — HEIGHT: 61 IN | WEIGHT: 181 LBS | BODY MASS INDEX: 34.17 KG/M2

## 2022-04-18 DIAGNOSIS — M25.561 RIGHT KNEE PAIN, UNSPECIFIED CHRONICITY: Primary | ICD-10-CM

## 2022-04-18 DIAGNOSIS — M17.11 OSTEOARTHRITIS OF RIGHT KNEE, UNSPECIFIED OSTEOARTHRITIS TYPE: Primary | ICD-10-CM

## 2022-04-18 DIAGNOSIS — M17.0 LOCALIZED OSTEOARTHRITIS OF KNEES, BILATERAL: ICD-10-CM

## 2022-04-18 DIAGNOSIS — M25.561 RIGHT KNEE PAIN, UNSPECIFIED CHRONICITY: ICD-10-CM

## 2022-04-18 PROCEDURE — 99999 PR PBB SHADOW E&M-EST. PATIENT-LVL III: CPT | Mod: PBBFAC,,, | Performed by: FAMILY MEDICINE

## 2022-04-18 PROCEDURE — 99999 PR PBB SHADOW E&M-EST. PATIENT-LVL III: ICD-10-PCS | Mod: PBBFAC,,, | Performed by: FAMILY MEDICINE

## 2022-04-18 PROCEDURE — 3288F FALL RISK ASSESSMENT DOCD: CPT | Mod: CPTII,S$GLB,, | Performed by: FAMILY MEDICINE

## 2022-04-18 PROCEDURE — 3288F PR FALLS RISK ASSESSMENT DOCUMENTED: ICD-10-PCS | Mod: CPTII,S$GLB,, | Performed by: FAMILY MEDICINE

## 2022-04-18 PROCEDURE — 20610 DRAIN/INJ JOINT/BURSA W/O US: CPT | Mod: RT,S$GLB,, | Performed by: FAMILY MEDICINE

## 2022-04-18 PROCEDURE — 99214 PR OFFICE/OUTPT VISIT, EST, LEVL IV, 30-39 MIN: ICD-10-PCS | Mod: 25,S$GLB,, | Performed by: FAMILY MEDICINE

## 2022-04-18 PROCEDURE — 99214 OFFICE O/P EST MOD 30 MIN: CPT | Mod: 25,S$GLB,, | Performed by: FAMILY MEDICINE

## 2022-04-18 PROCEDURE — 1125F AMNT PAIN NOTED PAIN PRSNT: CPT | Mod: CPTII,S$GLB,, | Performed by: FAMILY MEDICINE

## 2022-04-18 PROCEDURE — 73564 XR KNEE ORTHO BILAT WITH FLEXION: ICD-10-PCS | Mod: 26,50,, | Performed by: RADIOLOGY

## 2022-04-18 PROCEDURE — 1125F PR PAIN SEVERITY QUANTIFIED, PAIN PRESENT: ICD-10-PCS | Mod: CPTII,S$GLB,, | Performed by: FAMILY MEDICINE

## 2022-04-18 PROCEDURE — 1101F PT FALLS ASSESS-DOCD LE1/YR: CPT | Mod: CPTII,S$GLB,, | Performed by: FAMILY MEDICINE

## 2022-04-18 PROCEDURE — 20610 LARGE JOINT ASPIRATION/INJECTION: R KNEE: ICD-10-PCS | Mod: RT,S$GLB,, | Performed by: FAMILY MEDICINE

## 2022-04-18 PROCEDURE — 73564 X-RAY EXAM KNEE 4 OR MORE: CPT | Mod: TC,50

## 2022-04-18 PROCEDURE — 73564 X-RAY EXAM KNEE 4 OR MORE: CPT | Mod: 26,50,, | Performed by: RADIOLOGY

## 2022-04-18 PROCEDURE — 1101F PR PT FALLS ASSESS DOC 0-1 FALLS W/OUT INJ PAST YR: ICD-10-PCS | Mod: CPTII,S$GLB,, | Performed by: FAMILY MEDICINE

## 2022-04-18 RX ADMIN — BETAMETHASONE SODIUM PHOSPHATE AND BETAMETHASONE ACETATE 6 MG: 3; 3 INJECTION, SUSPENSION INTRA-ARTICULAR; INTRALESIONAL; INTRAMUSCULAR; SOFT TISSUE at 03:04

## 2022-04-18 NOTE — PROGRESS NOTES
Subjective:     Patient ID: Hiwot Coulter is a 77 y.o. female.    Chief Complaint: Pain of the Right Knee      HPI: Patient is complaining of right knee pain that began last Thursday while at work. Currently rating pain 8/10 after walking from parking lot. Reports pain as 9/10 at its worst. Taking Ibuprofen 800 and aspirin to help relieve knee pain. Walking triggers pain in the right knee.     Patient received Euflexxa injections in March 2021 and thinks it may be time to order another round.    Would like cortisone injection today for short-term relief.  Past Medical History:   Diagnosis Date    Anxiety     Arthritis     hand, neck, back    Behaviorally induced insufficient sleep syndrome     Cataract     Depression     GERD (gastroesophageal reflux disease)     Hyperlipidemia     Obesity     Osteopenia     Pollen allergies     Sleep apnea      Past Surgical History:   Procedure Laterality Date    CATARACT EXTRACTION W/  INTRAOCULAR LENS IMPLANT Right 07/18/2019    CATARACT EXTRACTION W/  INTRAOCULAR LENS IMPLANT Left 08/22/2019    CHOLECYSTECTOMY  2012    HYSTERECTOMY       Family History   Problem Relation Age of Onset    Leukemia Father     Heart disease Father     Cancer Father         leukemia    Cataracts Mother     Melanoma Neg Hx     Psoriasis Neg Hx     Lupus Neg Hx     Eczema Neg Hx      Social History     Socioeconomic History    Marital status:    Occupational History    Occupation:      Employer: [employer]   Tobacco Use    Smoking status: Never Smoker    Smokeless tobacco: Never Used   Substance and Sexual Activity    Alcohol use: No    Drug use: No    Sexual activity: Yes     Partners: Male     Birth control/protection: None   Other Topics Concern    Are you pregnant or think you may be? No    Breast-feeding No   Social History Narrative    1 dog, no smokers in household.       Current Outpatient Medications:     acetaminophen (TYLENOL) 500 mg  Cap, as needed. , Disp: , Rfl:     celecoxib (CELEBREX) 200 MG capsule, TAKE 1 CAPSULE(200 MG) BY MOUTH EVERY DAY, Disp: 90 capsule, Rfl: 3    ergocalciferol (ERGOCALCIFEROL) 50,000 unit Cap, TAKE 1 CAPSULE BY MOUTH 2 TIMES A WEEK, Disp: 25 capsule, Rfl: 3    EUFLEXXA 10 mg/mL(mw 2.4 -3.6 million) IAt injection, , Disp: , Rfl:     fluticasone propionate (FLONASE) 50 mcg/actuation nasal spray, 2 sprays (100 mcg total) by Each Nostril route once daily., Disp: 3 Bottle, Rfl: 3    hydrocortisone (WESTCORT) 0.2 % cream, Apply topically 2 (two) times daily., Disp: 60 g, Rfl: 0    ibandronate (BONIVA) 150 mg tablet, TAKE 1 TABLET(150 MG) BY MOUTH EVERY 30 DAYS, Disp: 3 tablet, Rfl: 3     mg tablet, TAKE 1 TABLET THREE TIMES DAILY AS NEEDED FOR PAIN, Disp: 90 tablet, Rfl: 3    montelukast (SINGULAIR) 10 mg tablet, TAKE 1 TABLET(10 MG) BY MOUTH EVERY EVENING (Patient taking differently: Take 10 mg by mouth as needed.), Disp: 90 tablet, Rfl: 3    traZODone (DESYREL) 50 MG tablet, Take 1 tablet (50 mg total) by mouth every evening., Disp: 90 tablet, Rfl: 3    triamcinolone acetonide 0.1% (KENALOG) 0.1 % cream, AAA of rash of leg bid prn flares. Steroid- do not apply to face or folds of skin., Disp: 80 g, Rfl: 1    gabapentin (NEURONTIN) 300 MG capsule, Take 1 capsule (300 mg total) by mouth every evening., Disp: 90 capsule, Rfl: 0  No current facility-administered medications for this visit.  Review of patient's allergies indicates:  No Known Allergies  Review of Systems   Constitutional: Negative for chills, fever and weight loss.   Respiratory: Negative for shortness of breath.    Cardiovascular: Negative for chest pain and palpitations.       Objective:   Body mass index is 34.2 kg/m².  There were no vitals filed for this visit.        Ortho/SPM Exam-alert and oriented well-nourished well-developed ambulatory with antalgic gait in no acute distress    Respiratory effort is normal    Right knee-no acute  deformity or swelling    Joint is stable with range of motion 0 to 100° and negative Lachman's    Tenderness medial joint line    Neurovascular intact    Psychiatric good affect cognition    Plan or another round of viscosupplementation, physical therapy as appropriate.  Ice and medication as needed.    Patient Instructions   Order Visco injection for right knee    Ice 15 minutes 3 times a day for next 2 days after cortisone injection today    Consider physical therapy      IMAGING: X-ray Knee Ortho Bilateral with Flexion  Narrative: EXAMINATION:  XR KNEE ORTHO BILAT WITH FLEXION    CLINICAL HISTORY:  Pain in right knee    TECHNIQUE:  AP standing of both knees, PA flexion standing views of both knees, and Merchant views of both knees were performed.  Lateral views of both knees were also performed.    COMPARISON:  01/25/2021    FINDINGS:  No acute osseous abnormality.  Joint spaces stable with minimal to mild medial compartment narrowing.  No large joint effusion.  Impression: As above    Electronically signed by: Nav Cullen MD  Date:    04/18/2022  Time:    14:46       Radiographs / Imaging : Relevant imaging results reviewed by me and interpreted by me, discussed with the patient and / or family -x-rays reviewed by me and agree with report      Assessment:     Encounter Diagnoses   Name Primary?    Right knee pain, unspecified chronicity Yes    Localized osteoarthritis of knees, bilateral         Plan:   Right knee pain, unspecified chronicity  -     Large Joint Aspiration/Injection: R knee    Localized osteoarthritis of knees, bilateral        The patient verbalized good understanding of the medical issues discussed today and expressed appreciation for the care provided.  Patient was given the opportunity to ask questions and be an active participant in their medical care. Patient had no further questions or concerns at this time.     The patient was encouraged to participate in appropriate physical  activity.      Tobias Kaye M.D.  [facility]        This note was dictated using voice recognition software and may have sound a like errors.

## 2022-04-18 NOTE — PROCEDURES
Large Joint Aspiration/Injection: R knee    Date/Time: 4/18/2022 3:20 PM  Performed by: Tobias Kaye MD  Authorized by: Tobias Kaye MD     Indications:  Pain  Site marked: the procedure site was marked    Timeout: prior to procedure the correct patient, procedure, and site was verified    Prep: patient was prepped and draped in usual sterile fashion    Local anesthetic:  Bupivacaine 0.25% without epinephrine and lidocaine 1% without epinephrine  Approach:  Anterolateral  Location:  Knee  Site:  R knee  Medications:  6 mg betamethasone acetate-betamethasone sodium phosphate 6 mg/mL  Patient tolerance:  Patient tolerated the procedure well with no immediate complications

## 2022-04-22 ENCOUNTER — TELEPHONE (OUTPATIENT)
Dept: ORTHOPEDICS | Facility: CLINIC | Age: 77
End: 2022-04-22
Payer: MEDICARE

## 2022-04-22 NOTE — TELEPHONE ENCOUNTER
Contacted patient.  Patient is interested in whether she is a candidate for PRP.  Explained that Dr. Hilton doesn't perform PRP, patient understood but wanted to received additional information on the procedure and if she would be someone who would be considered for the procedure, what the procedure involved, if it can be done the same time as visco.  Told patient that I would consult with Dr. Hilton and get back in touch with her.

## 2022-04-25 ENCOUNTER — PATIENT MESSAGE (OUTPATIENT)
Dept: ORTHOPEDICS | Facility: CLINIC | Age: 77
End: 2022-04-25
Payer: MEDICARE

## 2022-04-25 ENCOUNTER — TELEPHONE (OUTPATIENT)
Dept: SPORTS MEDICINE | Facility: CLINIC | Age: 77
End: 2022-04-25
Payer: MEDICARE

## 2022-04-25 NOTE — TELEPHONE ENCOUNTER
Contacted patient and able to have her scheduled with Dr. Hooper to discuss PRP and visco.  Placed patient on waiting list with Dr. Hooper if sooner appt becomes available.  ----- Message from Zenobia Pena sent at 4/25/2022  8:48 AM CDT -----  Regarding: pt called  Name of Who is Calling: JIE SCHUSTER [[id number]]      What is the request in detail: pt was returning Dr white phone call. Her phone wasn't working.       Can the clinic reply by [facility]: No      What Number [facility] Back [facility]: [phone]

## 2022-04-25 NOTE — TELEPHONE ENCOUNTER
Spoke with patient in regards to visco injection appt days and times. Patient accepted and verbalized understanding.

## 2022-05-02 DIAGNOSIS — M17.11 OSTEOARTHRITIS OF RIGHT KNEE, UNSPECIFIED OSTEOARTHRITIS TYPE: Primary | ICD-10-CM

## 2022-05-03 ENCOUNTER — PATIENT MESSAGE (OUTPATIENT)
Dept: SPORTS MEDICINE | Facility: CLINIC | Age: 77
End: 2022-05-03
Payer: MEDICARE

## 2022-05-09 ENCOUNTER — OFFICE VISIT (OUTPATIENT)
Dept: SPORTS MEDICINE | Facility: CLINIC | Age: 77
End: 2022-05-09
Payer: MEDICARE

## 2022-05-09 VITALS — HEIGHT: 61 IN | BODY MASS INDEX: 34.17 KG/M2 | WEIGHT: 181 LBS

## 2022-05-09 DIAGNOSIS — M17.11 OSTEOARTHRITIS OF RIGHT KNEE, UNSPECIFIED OSTEOARTHRITIS TYPE: Primary | ICD-10-CM

## 2022-05-09 DIAGNOSIS — M25.561 RIGHT KNEE PAIN, UNSPECIFIED CHRONICITY: ICD-10-CM

## 2022-05-09 PROCEDURE — 1159F MED LIST DOCD IN RCRD: CPT | Mod: CPTII,S$GLB,, | Performed by: PHYSICAL MEDICINE & REHABILITATION

## 2022-05-09 PROCEDURE — 99499 UNLISTED E&M SERVICE: CPT | Mod: S$GLB,,, | Performed by: PHYSICAL MEDICINE & REHABILITATION

## 2022-05-09 PROCEDURE — 1125F PR PAIN SEVERITY QUANTIFIED, PAIN PRESENT: ICD-10-PCS | Mod: CPTII,S$GLB,, | Performed by: PHYSICAL MEDICINE & REHABILITATION

## 2022-05-09 PROCEDURE — 1160F RVW MEDS BY RX/DR IN RCRD: CPT | Mod: CPTII,S$GLB,, | Performed by: PHYSICAL MEDICINE & REHABILITATION

## 2022-05-09 PROCEDURE — 20610 LARGE JOINT ASPIRATION/INJECTION: R KNEE: ICD-10-PCS | Mod: RT,S$GLB,, | Performed by: PHYSICAL MEDICINE & REHABILITATION

## 2022-05-09 PROCEDURE — 3288F PR FALLS RISK ASSESSMENT DOCUMENTED: ICD-10-PCS | Mod: CPTII,S$GLB,, | Performed by: PHYSICAL MEDICINE & REHABILITATION

## 2022-05-09 PROCEDURE — 3288F FALL RISK ASSESSMENT DOCD: CPT | Mod: CPTII,S$GLB,, | Performed by: PHYSICAL MEDICINE & REHABILITATION

## 2022-05-09 PROCEDURE — 99999 PR PBB SHADOW E&M-EST. PATIENT-LVL III: CPT | Mod: PBBFAC,,, | Performed by: PHYSICAL MEDICINE & REHABILITATION

## 2022-05-09 PROCEDURE — 1101F PR PT FALLS ASSESS DOC 0-1 FALLS W/OUT INJ PAST YR: ICD-10-PCS | Mod: CPTII,S$GLB,, | Performed by: PHYSICAL MEDICINE & REHABILITATION

## 2022-05-09 PROCEDURE — 1160F PR REVIEW ALL MEDS BY PRESCRIBER/CLIN PHARMACIST DOCUMENTED: ICD-10-PCS | Mod: CPTII,S$GLB,, | Performed by: PHYSICAL MEDICINE & REHABILITATION

## 2022-05-09 PROCEDURE — 1159F PR MEDICATION LIST DOCUMENTED IN MEDICAL RECORD: ICD-10-PCS | Mod: CPTII,S$GLB,, | Performed by: PHYSICAL MEDICINE & REHABILITATION

## 2022-05-09 PROCEDURE — 99499 NO LOS: ICD-10-PCS | Mod: S$GLB,,, | Performed by: PHYSICAL MEDICINE & REHABILITATION

## 2022-05-09 PROCEDURE — 20610 DRAIN/INJ JOINT/BURSA W/O US: CPT | Mod: RT,S$GLB,, | Performed by: PHYSICAL MEDICINE & REHABILITATION

## 2022-05-09 PROCEDURE — 99999 PR PBB SHADOW E&M-EST. PATIENT-LVL III: ICD-10-PCS | Mod: PBBFAC,,, | Performed by: PHYSICAL MEDICINE & REHABILITATION

## 2022-05-09 PROCEDURE — 1125F AMNT PAIN NOTED PAIN PRSNT: CPT | Mod: CPTII,S$GLB,, | Performed by: PHYSICAL MEDICINE & REHABILITATION

## 2022-05-09 PROCEDURE — 1101F PT FALLS ASSESS-DOCD LE1/YR: CPT | Mod: CPTII,S$GLB,, | Performed by: PHYSICAL MEDICINE & REHABILITATION

## 2022-05-09 RX ORDER — BETAMETHASONE SODIUM PHOSPHATE AND BETAMETHASONE ACETATE 3; 3 MG/ML; MG/ML
6 INJECTION, SUSPENSION INTRA-ARTICULAR; INTRALESIONAL; INTRAMUSCULAR; SOFT TISSUE
Status: DISCONTINUED | OUTPATIENT
Start: 2022-04-18 | End: 2022-05-09 | Stop reason: HOSPADM

## 2022-05-09 NOTE — PROCEDURES
Large Joint Aspiration/Injection: R knee    Date/Time: 5/9/2022 8:30 AM  Performed by: Mei Hilton MD  Authorized by: Mei Hilton MD     Consent Done?:  Yes (Verbal)  Indications:  Joint swelling and pain  Site marked: the procedure site was marked    Timeout: prior to procedure the correct patient, procedure, and site was verified    Prep: patient was prepped and draped in usual sterile fashion      Local anesthesia used?: Yes    Local anesthetic:  Topical anesthetic    Details:  Needle Size:  22 G  Ultrasonic Guidance for needle placement?: No    Approach:  Superior  Location:  Knee  Site:  R knee  Medications:  30 mg sodium hyaluronate (orthovisc) 30 mg/2 mL  Patient tolerance:  Patient tolerated the procedure well with no immediate complications

## 2022-05-09 NOTE — PATIENT INSTRUCTIONS
Order Visco injection for right knee    Ice 15 minutes 3 times a day for next 2 days after cortisone injection today    Consider physical therapy

## 2022-05-16 ENCOUNTER — OFFICE VISIT (OUTPATIENT)
Dept: SPORTS MEDICINE | Facility: CLINIC | Age: 77
End: 2022-05-16
Payer: MEDICARE

## 2022-05-16 VITALS — HEIGHT: 61 IN | WEIGHT: 181 LBS | BODY MASS INDEX: 34.17 KG/M2

## 2022-05-16 DIAGNOSIS — M25.561 CHRONIC PAIN OF RIGHT KNEE: ICD-10-CM

## 2022-05-16 DIAGNOSIS — M17.11 PRIMARY OSTEOARTHRITIS OF RIGHT KNEE: Primary | ICD-10-CM

## 2022-05-16 DIAGNOSIS — G89.29 CHRONIC PAIN OF RIGHT KNEE: ICD-10-CM

## 2022-05-16 PROCEDURE — 1125F PR PAIN SEVERITY QUANTIFIED, PAIN PRESENT: ICD-10-PCS | Mod: CPTII,S$GLB,, | Performed by: PHYSICAL MEDICINE & REHABILITATION

## 2022-05-16 PROCEDURE — 3288F FALL RISK ASSESSMENT DOCD: CPT | Mod: CPTII,S$GLB,, | Performed by: PHYSICAL MEDICINE & REHABILITATION

## 2022-05-16 PROCEDURE — 99499 UNLISTED E&M SERVICE: CPT | Mod: S$GLB,,, | Performed by: PHYSICAL MEDICINE & REHABILITATION

## 2022-05-16 PROCEDURE — 1159F MED LIST DOCD IN RCRD: CPT | Mod: CPTII,S$GLB,, | Performed by: PHYSICAL MEDICINE & REHABILITATION

## 2022-05-16 PROCEDURE — 1159F PR MEDICATION LIST DOCUMENTED IN MEDICAL RECORD: ICD-10-PCS | Mod: CPTII,S$GLB,, | Performed by: PHYSICAL MEDICINE & REHABILITATION

## 2022-05-16 PROCEDURE — 20610 LARGE JOINT ASPIRATION/INJECTION: R KNEE: ICD-10-PCS | Mod: RT,S$GLB,, | Performed by: PHYSICAL MEDICINE & REHABILITATION

## 2022-05-16 PROCEDURE — 1101F PR PT FALLS ASSESS DOC 0-1 FALLS W/OUT INJ PAST YR: ICD-10-PCS | Mod: CPTII,S$GLB,, | Performed by: PHYSICAL MEDICINE & REHABILITATION

## 2022-05-16 PROCEDURE — 99999 PR PBB SHADOW E&M-EST. PATIENT-LVL III: CPT | Mod: PBBFAC,,, | Performed by: PHYSICAL MEDICINE & REHABILITATION

## 2022-05-16 PROCEDURE — 1160F PR REVIEW ALL MEDS BY PRESCRIBER/CLIN PHARMACIST DOCUMENTED: ICD-10-PCS | Mod: CPTII,S$GLB,, | Performed by: PHYSICAL MEDICINE & REHABILITATION

## 2022-05-16 PROCEDURE — 99499 NO LOS: ICD-10-PCS | Mod: S$GLB,,, | Performed by: PHYSICAL MEDICINE & REHABILITATION

## 2022-05-16 PROCEDURE — 1125F AMNT PAIN NOTED PAIN PRSNT: CPT | Mod: CPTII,S$GLB,, | Performed by: PHYSICAL MEDICINE & REHABILITATION

## 2022-05-16 PROCEDURE — 1101F PT FALLS ASSESS-DOCD LE1/YR: CPT | Mod: CPTII,S$GLB,, | Performed by: PHYSICAL MEDICINE & REHABILITATION

## 2022-05-16 PROCEDURE — 1160F RVW MEDS BY RX/DR IN RCRD: CPT | Mod: CPTII,S$GLB,, | Performed by: PHYSICAL MEDICINE & REHABILITATION

## 2022-05-16 PROCEDURE — 99999 PR PBB SHADOW E&M-EST. PATIENT-LVL III: ICD-10-PCS | Mod: PBBFAC,,, | Performed by: PHYSICAL MEDICINE & REHABILITATION

## 2022-05-16 PROCEDURE — 20610 DRAIN/INJ JOINT/BURSA W/O US: CPT | Mod: RT,S$GLB,, | Performed by: PHYSICAL MEDICINE & REHABILITATION

## 2022-05-16 PROCEDURE — 3288F PR FALLS RISK ASSESSMENT DOCUMENTED: ICD-10-PCS | Mod: CPTII,S$GLB,, | Performed by: PHYSICAL MEDICINE & REHABILITATION

## 2022-05-17 NOTE — PROCEDURES
Large Joint Aspiration/Injection: R knee    Date/Time: 5/16/2022 4:00 PM  Performed by: Mei Hilton MD  Authorized by: Mei Hilton MD     Consent Done?:  Yes (Verbal)  Indications:  Joint swelling and pain  Site marked: the procedure site was marked    Timeout: prior to procedure the correct patient, procedure, and site was verified    Prep: patient was prepped and draped in usual sterile fashion      Local anesthesia used?: Yes    Local anesthetic:  Topical anesthetic    Details:  Needle Size:  22 G  Ultrasonic Guidance for needle placement?: No    Approach:  Superior  Location:  Knee  Site:  R knee  Medications:  30 mg sodium hyaluronate (orthovisc) 30 mg/2 mL  Patient tolerance:  Patient tolerated the procedure well with no immediate complications

## 2022-05-20 ENCOUNTER — OFFICE VISIT (OUTPATIENT)
Dept: SPORTS MEDICINE | Facility: CLINIC | Age: 77
End: 2022-05-20
Payer: MEDICARE

## 2022-05-20 VITALS — HEIGHT: 61 IN | BODY MASS INDEX: 34.17 KG/M2 | WEIGHT: 181 LBS

## 2022-05-20 DIAGNOSIS — M17.11 PRIMARY OSTEOARTHRITIS OF RIGHT KNEE: Primary | ICD-10-CM

## 2022-05-20 DIAGNOSIS — M23.203 DEGENERATIVE TEAR OF MEDIAL MENISCUS OF RIGHT KNEE: ICD-10-CM

## 2022-05-20 PROCEDURE — 1125F PR PAIN SEVERITY QUANTIFIED, PAIN PRESENT: ICD-10-PCS | Mod: CPTII,S$GLB,, | Performed by: STUDENT IN AN ORGANIZED HEALTH CARE EDUCATION/TRAINING PROGRAM

## 2022-05-20 PROCEDURE — 3288F PR FALLS RISK ASSESSMENT DOCUMENTED: ICD-10-PCS | Mod: CPTII,S$GLB,, | Performed by: STUDENT IN AN ORGANIZED HEALTH CARE EDUCATION/TRAINING PROGRAM

## 2022-05-20 PROCEDURE — 1101F PR PT FALLS ASSESS DOC 0-1 FALLS W/OUT INJ PAST YR: ICD-10-PCS | Mod: CPTII,S$GLB,, | Performed by: STUDENT IN AN ORGANIZED HEALTH CARE EDUCATION/TRAINING PROGRAM

## 2022-05-20 PROCEDURE — 1159F MED LIST DOCD IN RCRD: CPT | Mod: CPTII,S$GLB,, | Performed by: STUDENT IN AN ORGANIZED HEALTH CARE EDUCATION/TRAINING PROGRAM

## 2022-05-20 PROCEDURE — 1125F AMNT PAIN NOTED PAIN PRSNT: CPT | Mod: CPTII,S$GLB,, | Performed by: STUDENT IN AN ORGANIZED HEALTH CARE EDUCATION/TRAINING PROGRAM

## 2022-05-20 PROCEDURE — 1101F PT FALLS ASSESS-DOCD LE1/YR: CPT | Mod: CPTII,S$GLB,, | Performed by: STUDENT IN AN ORGANIZED HEALTH CARE EDUCATION/TRAINING PROGRAM

## 2022-05-20 PROCEDURE — 99213 PR OFFICE/OUTPT VISIT, EST, LEVL III, 20-29 MIN: ICD-10-PCS | Mod: S$GLB,,, | Performed by: STUDENT IN AN ORGANIZED HEALTH CARE EDUCATION/TRAINING PROGRAM

## 2022-05-20 PROCEDURE — 99213 OFFICE O/P EST LOW 20 MIN: CPT | Mod: S$GLB,,, | Performed by: STUDENT IN AN ORGANIZED HEALTH CARE EDUCATION/TRAINING PROGRAM

## 2022-05-20 PROCEDURE — 99999 PR PBB SHADOW E&M-EST. PATIENT-LVL III: ICD-10-PCS | Mod: PBBFAC,,, | Performed by: STUDENT IN AN ORGANIZED HEALTH CARE EDUCATION/TRAINING PROGRAM

## 2022-05-20 PROCEDURE — 3288F FALL RISK ASSESSMENT DOCD: CPT | Mod: CPTII,S$GLB,, | Performed by: STUDENT IN AN ORGANIZED HEALTH CARE EDUCATION/TRAINING PROGRAM

## 2022-05-20 PROCEDURE — 1159F PR MEDICATION LIST DOCUMENTED IN MEDICAL RECORD: ICD-10-PCS | Mod: CPTII,S$GLB,, | Performed by: STUDENT IN AN ORGANIZED HEALTH CARE EDUCATION/TRAINING PROGRAM

## 2022-05-20 PROCEDURE — 99999 PR PBB SHADOW E&M-EST. PATIENT-LVL III: CPT | Mod: PBBFAC,,, | Performed by: STUDENT IN AN ORGANIZED HEALTH CARE EDUCATION/TRAINING PROGRAM

## 2022-05-20 NOTE — PROGRESS NOTES
Patient ID: Hiwot Coulter  YOB: 1945  MRN: [id number]    Chief Complaint: Pain of the Right Knee    Referred By: Dr. Hilton for Right knee Pain    History of Present Illness: Hiwot Coulter is a right-hand dominant 77 y.o. female who presents today with 4/10 pain in right knee.     The patient is active in none.  Occupation:     This is a 77-year-old female presenting today for evaluation of right knee.  She has had mild osteoarthritis and previous MRI showing a medial meniscus tear in the past and she has more medial pain in general with activity.  She had gel shots with Dr. Hilton year ago and had almost complete year of relief.  She is in her 2nd round of viscosupplementation or now and had questions from friend about possibly getting platelet rich plasma.  She is interested if there is anything else that she can do to help preserve her knee as long as possible.    Past Medical History:   Past Medical History:   Diagnosis Date    Anxiety     Arthritis     hand, neck, back    Behaviorally induced insufficient sleep syndrome     Cataract     Depression     GERD (gastroesophageal reflux disease)     Hyperlipidemia     Obesity     Osteopenia     Pollen allergies     Sleep apnea      Past Surgical History:   Procedure Laterality Date    CATARACT EXTRACTION W/  INTRAOCULAR LENS IMPLANT Right 07/18/2019    CATARACT EXTRACTION W/  INTRAOCULAR LENS IMPLANT Left 08/22/2019    CHOLECYSTECTOMY  2012    HYSTERECTOMY       Family History   Problem Relation Age of Onset    Leukemia Father     Heart disease Father     Cancer Father         leukemia    Cataracts Mother     Melanoma Neg Hx     Psoriasis Neg Hx     Lupus Neg Hx     Eczema Neg Hx      Social History     Socioeconomic History    Marital status:    Occupational History    Occupation:      Employer: [employer]   Tobacco Use    Smoking status: Never Smoker    Smokeless tobacco: Never Used    Substance and Sexual Activity    Alcohol use: No    Drug use: No    Sexual activity: Yes     Partners: Male     Birth control/protection: None   Other Topics Concern    Are you pregnant or think you may be? No    Breast-feeding No   Social History Narrative    1 dog, no smokers in household.     Medication List with Changes/Refills   Current Medications    ACETAMINOPHEN (TYLENOL) 500 MG CAP    as needed.     CELECOXIB (CELEBREX) 200 MG CAPSULE    TAKE 1 CAPSULE(200 MG) BY MOUTH EVERY DAY    ERGOCALCIFEROL (ERGOCALCIFEROL) 50,000 UNIT CAP    TAKE 1 CAPSULE BY MOUTH 2 TIMES A WEEK    EUFLEXXA 10 MG/ML(MW 2.4 -3.6 MILLION) IATC INJECTION        FLUTICASONE PROPIONATE (FLONASE) 50 MCG/ACTUATION NASAL SPRAY    2 sprays (100 mcg total) by Each Nostril route once daily.    GABAPENTIN (NEURONTIN) 300 MG CAPSULE    Take 1 capsule (300 mg total) by mouth every evening.    HYDROCORTISONE (WESTCORT) 0.2 % CREAM    Apply topically 2 (two) times daily.    IBANDRONATE (BONIVA) 150 MG TABLET    TAKE 1 TABLET(150 MG) BY MOUTH EVERY 30 DAYS     MG TABLET    TAKE 1 TABLET THREE TIMES DAILY AS NEEDED FOR PAIN    MONTELUKAST (SINGULAIR) 10 MG TABLET    TAKE 1 TABLET(10 MG) BY MOUTH EVERY EVENING    TRAZODONE (DESYREL) 50 MG TABLET    Take 1 tablet (50 mg total) by mouth every evening.    TRIAMCINOLONE ACETONIDE 0.1% (KENALOG) 0.1 % CREAM    AAA of rash of leg bid prn flares. Steroid- do not apply to face or folds of skin.     Review of patient's allergies indicates:  No Known Allergies    Physical Exam:   Body mass index is 34.2 kg/m².    GENERAL: Well appearing, in no acute distress.  HEAD: Normocephalic and atraumatic.  ENT: External ears and nose grossly normal.  EYES: EOMI bilaterally  PULMONARY: Respirations are grossly even and non-labored.  NEURO: Awake, alert, and oriented x 3.  SKIN: No obvious rashes appreciated.  PSYCH: Mood & affect are appropriate.    Detailed MSK exam:     Right knee exam  Full range of  motion flexion extension good patellar mobility slightly decreased in all motions with subtle click no large effusion appreciated  Ligamentously intact  Tenderness over the medial joint line equivocal Coby's      Imaging:    No new imaging    Assessment:  Hiwot Coulter is a 77 y.o. female presents today for right knee pain consistent with mild degenerative changes are medial joint line in a mildly symptomatic medial meniscus injury that is chronic.  Reviewed her MRI today as well as her x-rays with her and discussed the role of conservative treatment of early arthritis.  Discussed her x-rays actually look really good and she likely has some mild chondrosis over the medial joint.  Discussed conservative treatment especially if she had 1 year of complete relief with gel shots in the past to continue using that moving forward.  I discussed the viability and efficacy of using PRP for her knee at her age is likely not to be very beneficial and to continue with gel shots weight loss and exercise and home exercises.  I discussed follow-up with me as needed in the future and can re-evaluate if not having relief with the gel shots time round I would be open to intra-articular PRP if patient requested.    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       Antonio Hooper MD    Disclaimer: This note was prepared using a voice recognition system and is likely to have sound alike errors within the text.

## 2022-05-24 ENCOUNTER — OFFICE VISIT (OUTPATIENT)
Dept: SPORTS MEDICINE | Facility: CLINIC | Age: 77
End: 2022-05-24
Payer: MEDICARE

## 2022-05-24 ENCOUNTER — PATIENT MESSAGE (OUTPATIENT)
Dept: SPORTS MEDICINE | Facility: CLINIC | Age: 77
End: 2022-05-24

## 2022-05-24 VITALS — BODY MASS INDEX: 34.17 KG/M2 | WEIGHT: 181 LBS | HEIGHT: 61 IN

## 2022-05-24 DIAGNOSIS — M17.11 PRIMARY OSTEOARTHRITIS OF RIGHT KNEE: Primary | ICD-10-CM

## 2022-05-24 PROCEDURE — 1160F PR REVIEW ALL MEDS BY PRESCRIBER/CLIN PHARMACIST DOCUMENTED: ICD-10-PCS | Mod: CPTII,S$GLB,, | Performed by: PHYSICAL MEDICINE & REHABILITATION

## 2022-05-24 PROCEDURE — 99499 UNLISTED E&M SERVICE: CPT | Mod: S$GLB,,, | Performed by: PHYSICAL MEDICINE & REHABILITATION

## 2022-05-24 PROCEDURE — 3288F PR FALLS RISK ASSESSMENT DOCUMENTED: ICD-10-PCS | Mod: CPTII,S$GLB,, | Performed by: PHYSICAL MEDICINE & REHABILITATION

## 2022-05-24 PROCEDURE — 1101F PR PT FALLS ASSESS DOC 0-1 FALLS W/OUT INJ PAST YR: ICD-10-PCS | Mod: CPTII,S$GLB,, | Performed by: PHYSICAL MEDICINE & REHABILITATION

## 2022-05-24 PROCEDURE — 1160F RVW MEDS BY RX/DR IN RCRD: CPT | Mod: CPTII,S$GLB,, | Performed by: PHYSICAL MEDICINE & REHABILITATION

## 2022-05-24 PROCEDURE — 1125F PR PAIN SEVERITY QUANTIFIED, PAIN PRESENT: ICD-10-PCS | Mod: CPTII,S$GLB,, | Performed by: PHYSICAL MEDICINE & REHABILITATION

## 2022-05-24 PROCEDURE — 1159F PR MEDICATION LIST DOCUMENTED IN MEDICAL RECORD: ICD-10-PCS | Mod: CPTII,S$GLB,, | Performed by: PHYSICAL MEDICINE & REHABILITATION

## 2022-05-24 PROCEDURE — 1125F AMNT PAIN NOTED PAIN PRSNT: CPT | Mod: CPTII,S$GLB,, | Performed by: PHYSICAL MEDICINE & REHABILITATION

## 2022-05-24 PROCEDURE — 20610 DRAIN/INJ JOINT/BURSA W/O US: CPT | Mod: RT,S$GLB,, | Performed by: PHYSICAL MEDICINE & REHABILITATION

## 2022-05-24 PROCEDURE — 99499 NO LOS: ICD-10-PCS | Mod: S$GLB,,, | Performed by: PHYSICAL MEDICINE & REHABILITATION

## 2022-05-24 PROCEDURE — 99999 PR PBB SHADOW E&M-EST. PATIENT-LVL III: ICD-10-PCS | Mod: PBBFAC,,, | Performed by: PHYSICAL MEDICINE & REHABILITATION

## 2022-05-24 PROCEDURE — 3288F FALL RISK ASSESSMENT DOCD: CPT | Mod: CPTII,S$GLB,, | Performed by: PHYSICAL MEDICINE & REHABILITATION

## 2022-05-24 PROCEDURE — 1101F PT FALLS ASSESS-DOCD LE1/YR: CPT | Mod: CPTII,S$GLB,, | Performed by: PHYSICAL MEDICINE & REHABILITATION

## 2022-05-24 PROCEDURE — 1159F MED LIST DOCD IN RCRD: CPT | Mod: CPTII,S$GLB,, | Performed by: PHYSICAL MEDICINE & REHABILITATION

## 2022-05-24 PROCEDURE — 20610 LARGE JOINT ASPIRATION/INJECTION: R KNEE: ICD-10-PCS | Mod: RT,S$GLB,, | Performed by: PHYSICAL MEDICINE & REHABILITATION

## 2022-05-24 PROCEDURE — 99999 PR PBB SHADOW E&M-EST. PATIENT-LVL III: CPT | Mod: PBBFAC,,, | Performed by: PHYSICAL MEDICINE & REHABILITATION

## 2022-05-24 NOTE — PROCEDURES
Large Joint Aspiration/Injection: R knee    Date/Time: 5/24/2022 4:00 PM  Performed by: Mei Hilton MD  Authorized by: Mei Hilton MD     Consent Done?:  Yes (Verbal)  Indications:  Joint swelling and pain  Site marked: the procedure site was marked    Timeout: prior to procedure the correct patient, procedure, and site was verified    Prep: patient was prepped and draped in usual sterile fashion      Local anesthesia used?: Yes    Local anesthetic:  Topical anesthetic    Details:  Needle Size:  22 G  Ultrasonic Guidance for needle placement?: No    Approach:  Superior  Location:  Knee  Site:  R knee  Medications:  30 mg sodium hyaluronate (orthovisc) 30 mg/2 mL  Patient tolerance:  Patient tolerated the procedure well with no immediate complications

## 2022-10-26 ENCOUNTER — IMMUNIZATION (OUTPATIENT)
Dept: PHARMACY | Facility: CLINIC | Age: 77
End: 2022-10-26
Payer: MEDICARE

## 2022-11-28 ENCOUNTER — TELEPHONE (OUTPATIENT)
Dept: ADMINISTRATIVE | Facility: HOSPITAL | Age: 77
End: 2022-11-28
Payer: MEDICARE

## 2022-12-01 ENCOUNTER — PATIENT MESSAGE (OUTPATIENT)
Dept: PULMONOLOGY | Facility: CLINIC | Age: 77
End: 2022-12-01
Payer: MEDICARE

## 2022-12-02 ENCOUNTER — LAB VISIT (OUTPATIENT)
Dept: LAB | Facility: HOSPITAL | Age: 77
End: 2022-12-02
Attending: PEDIATRICS
Payer: MEDICARE

## 2022-12-02 DIAGNOSIS — Z13.6 ENCOUNTER FOR LIPID SCREENING FOR CARDIOVASCULAR DISEASE: ICD-10-CM

## 2022-12-02 DIAGNOSIS — Z13.220 ENCOUNTER FOR LIPID SCREENING FOR CARDIOVASCULAR DISEASE: ICD-10-CM

## 2022-12-02 DIAGNOSIS — Z00.00 WELL ADULT EXAM: ICD-10-CM

## 2022-12-02 LAB
ALBUMIN SERPL BCP-MCNC: 3.8 G/DL (ref 3.5–5.2)
ALP SERPL-CCNC: 59 U/L (ref 55–135)
ALT SERPL W/O P-5'-P-CCNC: 12 U/L (ref 10–44)
ANION GAP SERPL CALC-SCNC: 7 MMOL/L (ref 8–16)
AST SERPL-CCNC: 18 U/L (ref 10–40)
BILIRUB SERPL-MCNC: 0.6 MG/DL (ref 0.1–1)
BUN SERPL-MCNC: 15 MG/DL (ref 8–23)
CALCIUM SERPL-MCNC: 9.6 MG/DL (ref 8.7–10.5)
CHLORIDE SERPL-SCNC: 107 MMOL/L (ref 95–110)
CHOLEST SERPL-MCNC: 216 MG/DL (ref 120–199)
CHOLEST/HDLC SERPL: 3.4 {RATIO} (ref 2–5)
CO2 SERPL-SCNC: 28 MMOL/L (ref 23–29)
CREAT SERPL-MCNC: 0.8 MG/DL (ref 0.5–1.4)
EST. GFR  (NO RACE VARIABLE): >60 ML/MIN/1.73 M^2
GLUCOSE SERPL-MCNC: 92 MG/DL (ref 70–110)
HDLC SERPL-MCNC: 63 MG/DL (ref 40–75)
HDLC SERPL: 29.2 % (ref 20–50)
LDLC SERPL CALC-MCNC: 140.4 MG/DL (ref 63–159)
NONHDLC SERPL-MCNC: 153 MG/DL
POTASSIUM SERPL-SCNC: 4.6 MMOL/L (ref 3.5–5.1)
PROT SERPL-MCNC: 6.7 G/DL (ref 6–8.4)
SODIUM SERPL-SCNC: 142 MMOL/L (ref 136–145)
TRIGL SERPL-MCNC: 63 MG/DL (ref 30–150)

## 2022-12-02 PROCEDURE — 80053 COMPREHEN METABOLIC PANEL: CPT | Performed by: PEDIATRICS

## 2022-12-02 PROCEDURE — 36415 COLL VENOUS BLD VENIPUNCTURE: CPT | Performed by: PEDIATRICS

## 2022-12-02 PROCEDURE — 80061 LIPID PANEL: CPT | Performed by: PEDIATRICS

## 2022-12-09 ENCOUNTER — OFFICE VISIT (OUTPATIENT)
Dept: INTERNAL MEDICINE | Facility: CLINIC | Age: 77
End: 2022-12-09
Payer: MEDICARE

## 2022-12-09 ENCOUNTER — HOSPITAL ENCOUNTER (OUTPATIENT)
Dept: RADIOLOGY | Facility: HOSPITAL | Age: 77
Discharge: HOME | End: 2022-12-09
Attending: PEDIATRICS
Payer: MEDICARE

## 2022-12-09 ENCOUNTER — PATIENT MESSAGE (OUTPATIENT)
Dept: INTERNAL MEDICINE | Facility: CLINIC | Age: 77
End: 2022-12-09

## 2022-12-09 ENCOUNTER — IMMUNIZATION (OUTPATIENT)
Dept: PHARMACY | Facility: CLINIC | Age: 77
End: 2022-12-09
Payer: MEDICARE

## 2022-12-09 ENCOUNTER — OFFICE VISIT (OUTPATIENT)
Dept: OPHTHALMOLOGY | Facility: CLINIC | Age: 77
End: 2022-12-09
Payer: MEDICARE

## 2022-12-09 ENCOUNTER — IMMUNIZATION (OUTPATIENT)
Dept: PHARMACY | Facility: CLINIC | Age: 77
End: 2022-12-09

## 2022-12-09 VITALS
HEART RATE: 70 BPM | WEIGHT: 177.5 LBS | DIASTOLIC BLOOD PRESSURE: 68 MMHG | OXYGEN SATURATION: 100 % | RESPIRATION RATE: 18 BRPM | BODY MASS INDEX: 33.53 KG/M2 | SYSTOLIC BLOOD PRESSURE: 124 MMHG

## 2022-12-09 DIAGNOSIS — Z12.31 ENCOUNTER FOR SCREENING MAMMOGRAM FOR BREAST CANCER: ICD-10-CM

## 2022-12-09 DIAGNOSIS — G47.33 OSA ON CPAP: ICD-10-CM

## 2022-12-09 DIAGNOSIS — Z00.00 WELL ADULT EXAM: Primary | ICD-10-CM

## 2022-12-09 DIAGNOSIS — E78.5 HYPERLIPIDEMIA, UNSPECIFIED HYPERLIPIDEMIA TYPE: ICD-10-CM

## 2022-12-09 DIAGNOSIS — E66.01 SEVERE OBESITY (BMI 35.0-39.9) WITH COMORBIDITY: ICD-10-CM

## 2022-12-09 DIAGNOSIS — R05.3 CHRONIC COUGH: ICD-10-CM

## 2022-12-09 DIAGNOSIS — H35.30 AGE-RELATED MACULAR DEGENERATION: ICD-10-CM

## 2022-12-09 DIAGNOSIS — Z96.1 PSEUDOPHAKIA: ICD-10-CM

## 2022-12-09 DIAGNOSIS — M15.9 PRIMARY OSTEOARTHRITIS INVOLVING MULTIPLE JOINTS: ICD-10-CM

## 2022-12-09 DIAGNOSIS — F41.9 ANXIETY: ICD-10-CM

## 2022-12-09 DIAGNOSIS — H40.023 OAG (OPEN ANGLE GLAUCOMA) SUSPECT, HIGH RISK, BILATERAL: Primary | ICD-10-CM

## 2022-12-09 DIAGNOSIS — K21.9 GASTROESOPHAGEAL REFLUX DISEASE WITHOUT ESOPHAGITIS: Chronic | ICD-10-CM

## 2022-12-09 DIAGNOSIS — J31.0 CHRONIC RHINITIS: ICD-10-CM

## 2022-12-09 DIAGNOSIS — E55.9 VITAMIN D DEFICIENCY: ICD-10-CM

## 2022-12-09 DIAGNOSIS — H52.7 REFRACTION DISORDER: ICD-10-CM

## 2022-12-09 DIAGNOSIS — E66.01 SEVERE OBESITY: Primary | ICD-10-CM

## 2022-12-09 DIAGNOSIS — M85.89 OSTEOPENIA OF MULTIPLE SITES: ICD-10-CM

## 2022-12-09 PROBLEM — M15.0 PRIMARY OSTEOARTHRITIS INVOLVING MULTIPLE JOINTS: Status: ACTIVE | Noted: 2022-12-09

## 2022-12-09 PROCEDURE — 3074F SYST BP LT 130 MM HG: CPT | Mod: HCNC,CPTII,S$GLB, | Performed by: PEDIATRICS

## 2022-12-09 PROCEDURE — 99999 PR PBB SHADOW E&M-EST. PATIENT-LVL I: ICD-10-PCS | Mod: PBBFAC,HCNC,, | Performed by: OPHTHALMOLOGY

## 2022-12-09 PROCEDURE — 99214 PR OFFICE/OUTPT VISIT, EST, LEVL IV, 30-39 MIN: ICD-10-PCS | Mod: HCNC,S$GLB,, | Performed by: PEDIATRICS

## 2022-12-09 PROCEDURE — 1126F PR PAIN SEVERITY QUANTIFIED, NO PAIN PRESENT: ICD-10-PCS | Mod: HCNC,CPTII,S$GLB, | Performed by: PEDIATRICS

## 2022-12-09 PROCEDURE — 92083 EXTENDED VISUAL FIELD XM: CPT | Mod: HCNC,S$GLB,, | Performed by: OPHTHALMOLOGY

## 2022-12-09 PROCEDURE — 3288F PR FALLS RISK ASSESSMENT DOCUMENTED: ICD-10-PCS | Mod: HCNC,CPTII,S$GLB, | Performed by: PEDIATRICS

## 2022-12-09 PROCEDURE — 99214 OFFICE O/P EST MOD 30 MIN: CPT | Mod: HCNC,S$GLB,, | Performed by: OPHTHALMOLOGY

## 2022-12-09 PROCEDURE — 71046 X-RAY EXAM CHEST 2 VIEWS: CPT | Mod: TC

## 2022-12-09 PROCEDURE — 1159F MED LIST DOCD IN RCRD: CPT | Mod: HCNC,CPTII,S$GLB, | Performed by: PEDIATRICS

## 2022-12-09 PROCEDURE — 99999 PR PBB SHADOW E&M-EST. PATIENT-LVL V: CPT | Mod: PBBFAC,HCNC,, | Performed by: PEDIATRICS

## 2022-12-09 PROCEDURE — 92083 HUMPHREY VISUAL FIELD - OU - BOTH EYES: ICD-10-PCS | Mod: HCNC,S$GLB,, | Performed by: OPHTHALMOLOGY

## 2022-12-09 PROCEDURE — 1160F RVW MEDS BY RX/DR IN RCRD: CPT | Mod: HCNC,CPTII,S$GLB, | Performed by: OPHTHALMOLOGY

## 2022-12-09 PROCEDURE — 3078F DIAST BP <80 MM HG: CPT | Mod: HCNC,CPTII,S$GLB, | Performed by: PEDIATRICS

## 2022-12-09 PROCEDURE — 71046 X-RAY EXAM CHEST 2 VIEWS: CPT | Mod: 26,,, | Performed by: RADIOLOGY

## 2022-12-09 PROCEDURE — 99214 PR OFFICE/OUTPT VISIT, EST, LEVL IV, 30-39 MIN: ICD-10-PCS | Mod: HCNC,S$GLB,, | Performed by: OPHTHALMOLOGY

## 2022-12-09 PROCEDURE — 1160F RVW MEDS BY RX/DR IN RCRD: CPT | Mod: HCNC,CPTII,S$GLB, | Performed by: PEDIATRICS

## 2022-12-09 PROCEDURE — 71046 XR CHEST PA AND LATERAL: ICD-10-PCS | Mod: 26,,, | Performed by: RADIOLOGY

## 2022-12-09 PROCEDURE — 3288F FALL RISK ASSESSMENT DOCD: CPT | Mod: HCNC,CPTII,S$GLB, | Performed by: PEDIATRICS

## 2022-12-09 PROCEDURE — 92133 CPTRZD OPH DX IMG PST SGM ON: CPT | Mod: HCNC,S$GLB,, | Performed by: OPHTHALMOLOGY

## 2022-12-09 PROCEDURE — 92133 POSTERIOR SEGMENT OCT OPTIC NERVE(OCULAR COHERENCE TOMOGRAPHY) - OU - BOTH EYES: ICD-10-PCS | Mod: HCNC,S$GLB,, | Performed by: OPHTHALMOLOGY

## 2022-12-09 PROCEDURE — 1101F PT FALLS ASSESS-DOCD LE1/YR: CPT | Mod: HCNC,CPTII,S$GLB, | Performed by: PEDIATRICS

## 2022-12-09 PROCEDURE — 1159F MED LIST DOCD IN RCRD: CPT | Mod: HCNC,CPTII,S$GLB, | Performed by: OPHTHALMOLOGY

## 2022-12-09 PROCEDURE — 1101F PR PT FALLS ASSESS DOC 0-1 FALLS W/OUT INJ PAST YR: ICD-10-PCS | Mod: HCNC,CPTII,S$GLB, | Performed by: PEDIATRICS

## 2022-12-09 PROCEDURE — 1159F PR MEDICATION LIST DOCUMENTED IN MEDICAL RECORD: ICD-10-PCS | Mod: HCNC,CPTII,S$GLB, | Performed by: OPHTHALMOLOGY

## 2022-12-09 PROCEDURE — 99499 RISK ADDL DX/OHS AUDIT: ICD-10-PCS | Mod: S$GLB,,, | Performed by: PEDIATRICS

## 2022-12-09 PROCEDURE — 99499 UNLISTED E&M SERVICE: CPT | Mod: S$GLB,,, | Performed by: PEDIATRICS

## 2022-12-09 PROCEDURE — 1159F PR MEDICATION LIST DOCUMENTED IN MEDICAL RECORD: ICD-10-PCS | Mod: HCNC,CPTII,S$GLB, | Performed by: PEDIATRICS

## 2022-12-09 PROCEDURE — 1160F PR REVIEW ALL MEDS BY PRESCRIBER/CLIN PHARMACIST DOCUMENTED: ICD-10-PCS | Mod: HCNC,CPTII,S$GLB, | Performed by: PEDIATRICS

## 2022-12-09 PROCEDURE — 3078F PR MOST RECENT DIASTOLIC BLOOD PRESSURE < 80 MM HG: ICD-10-PCS | Mod: HCNC,CPTII,S$GLB, | Performed by: PEDIATRICS

## 2022-12-09 PROCEDURE — 1126F AMNT PAIN NOTED NONE PRSNT: CPT | Mod: HCNC,CPTII,S$GLB, | Performed by: PEDIATRICS

## 2022-12-09 PROCEDURE — 3074F PR MOST RECENT SYSTOLIC BLOOD PRESSURE < 130 MM HG: ICD-10-PCS | Mod: HCNC,CPTII,S$GLB, | Performed by: PEDIATRICS

## 2022-12-09 PROCEDURE — 99214 OFFICE O/P EST MOD 30 MIN: CPT | Mod: HCNC,S$GLB,, | Performed by: PEDIATRICS

## 2022-12-09 PROCEDURE — 99999 PR PBB SHADOW E&M-EST. PATIENT-LVL I: CPT | Mod: PBBFAC,HCNC,, | Performed by: OPHTHALMOLOGY

## 2022-12-09 PROCEDURE — 1160F PR REVIEW ALL MEDS BY PRESCRIBER/CLIN PHARMACIST DOCUMENTED: ICD-10-PCS | Mod: HCNC,CPTII,S$GLB, | Performed by: OPHTHALMOLOGY

## 2022-12-09 PROCEDURE — 99999 PR PBB SHADOW E&M-EST. PATIENT-LVL V: ICD-10-PCS | Mod: PBBFAC,HCNC,, | Performed by: PEDIATRICS

## 2022-12-09 NOTE — PATIENT INSTRUCTIONS
At Pharmacy get last shingles, tdap, and bivalent covid    For obesity: call insurance and ask if they cover for OBESITY, not diabetes, Trulicity, ozempic, mounjaro, rybelsus

## 2022-12-09 NOTE — PROGRESS NOTES
Subjective:       Patient ID: Hiwot Coulter is a 77 y.o. female.    Chief Complaint: Annual Exam (PT PRESENTS TO CLINIC FOR ANNUAL EXAM, PT STATES SHE NEED HELP LOSING WEIGHT.)    Hiwot Coulter is a 77 y.o. female who presents to clinic for her annual. Couple months of coughing. Says its random and episodic.     1) LIPIDS: following D&E (low salt diet), wants to explore options, contrave did not work.   2) Obesity: see above  3) Osteoporosis: on boniva recent dexascan showed interval improvement   4) Quiet anxiety. No buspar/lexapro. [place] [facility] website shows no interval xanax/narcotic use.   5) GERD Quiet on treatment, rare pepcid use.  6) Vitamin D: using twice weekly D   7) Osteoarthritis: overall better, is seeing sports med/ortho, [place] [facility] website reviewed no hydrocodone use. Improved knees with injections.  8) JAMAL/sleep/RLS: overdue to see pulmonary. Still has occasional issues with falling asleep.  9) Chronic rhinitis: quiet with flonase and claritin D. Does have chronic cough without SOB/CONNOLLY     LABS REVIEWED AND DISCUSSED WITH PATIENT: CMP and lipids     Review of Systems   Constitutional:  Negative for activity change, appetite change, chills, diaphoresis, fatigue, fever and unexpected weight change.   HENT:  Negative for nasal congestion, ear pain, mouth sores, nosebleeds, postnasal drip, rhinorrhea, sneezing and sore throat.    Eyes:  Negative for photophobia, pain, discharge, redness and visual disturbance.   Respiratory:  Positive for cough. Negative for chest tightness, shortness of breath, wheezing and stridor.    Cardiovascular:  Negative for chest pain, palpitations and leg swelling.   Gastrointestinal:  Negative for constipation, diarrhea, nausea and vomiting.   Genitourinary:  Negative for decreased urine volume, difficulty urinating, dysuria, flank pain, frequency, hematuria and urgency.   Musculoskeletal:  Positive for arthralgias and myalgias. Negative for back pain, joint swelling, neck pain and  neck stiffness.   Integumentary:  Positive for mole/lesion (slowly growing moles). Negative for color change and rash.   Neurological:  Negative for dizziness, syncope, speech difficulty, weakness, light-headedness and headaches.   Hematological:  Negative for adenopathy. Does not bruise/bleed easily.   Psychiatric/Behavioral:  Negative for confusion, decreased concentration, dysphoric mood, hallucinations, sleep disturbance and suicidal ideas. The patient is not nervous/anxious.    All other systems reviewed and are negative.      Objective:      Physical Exam  Vitals and nursing note reviewed.   Constitutional:       General: She is not in acute distress.     Appearance: She is well-developed.   Neck:      Thyroid: No thyromegaly.      Vascular: No JVD.   Cardiovascular:      Rate and Rhythm: Normal rate and regular rhythm.      Heart sounds: Normal heart sounds. No murmur heard.  Pulmonary:      Effort: Pulmonary effort is normal. No respiratory distress.      Breath sounds: Normal breath sounds. No wheezing or rales.   Abdominal:      General: There is no distension.      Palpations: Abdomen is soft. There is no mass.      Tenderness: There is no abdominal tenderness. There is no guarding.   Musculoskeletal:      Right lower leg: No edema.      Left lower leg: No edema.   Lymphadenopathy:      Cervical: No cervical adenopathy.   Skin:     Capillary Refill: Capillary refill takes less than 2 seconds.      Findings: Lesion (several benign moles/papulues all <3mm) present. No rash.   Neurological:      General: No focal deficit present.      Mental Status: She is alert and oriented to person, place, and time.      Cranial Nerves: No cranial nerve deficit.      Coordination: Coordination normal.   Psychiatric:         Mood and Affect: Mood normal.         Behavior: Behavior normal.         Thought Content: Thought content normal.         Judgment: Judgment normal.       Assessment:       Problem List Items Addressed  This Visit       GERD (gastroesophageal reflux disease) (Chronic)    Anxiety    Chronic rhinitis    Hyperlipidemia    Relevant Orders    Lipid Panel    Comprehensive Metabolic Panel    JAMAL on CPAP    Relevant Orders    Ambulatory referral/consult to Pulmonology    Osteopenia of multiple sites    Primary osteoarthritis involving multiple joints    Severe obesity (BMI 35.0-39.9) with comorbidity    Relevant Orders    Ambulatory referral/consult to [facility]    Vitamin D deficiency    Relevant Orders    Vitamin D     Other Visit Diagnoses       Well adult exam    -  Primary    Encounter for screening mammogram for breast cancer        Relevant Orders    Mammo Digital Screening Bilat w/ [facility]    Chronic cough        Relevant Orders    X-Ray Chest PA And Lateral (Completed)              Plan:     Well adult exam    Severe obesity (BMI 35.0-39.9) with comorbidity  -     Ambulatory referral/consult to [facility]; Future; Expected date: 12/16/2022    Osteopenia of multiple sites    JAMAL on CPAP  -     Ambulatory referral/consult to Pulmonology; Future; Expected date: 12/16/2022    Chronic rhinitis    Anxiety    Gastroesophageal reflux disease without esophagitis    Vitamin D deficiency  -     Vitamin D; Future; Expected date: 12/09/2022    Primary osteoarthritis involving multiple joints    Hyperlipidemia, unspecified hyperlipidemia type  -     Lipid Panel; Future; Expected date: 12/09/2022  -     Comprehensive Metabolic Panel; Future; Expected date: 12/09/2022    Encounter for screening mammogram for breast cancer  -     Mammo Digital Screening Bilat w/ [facility]; Future; Expected date: 12/09/2022    Chronic cough  -     X-Ray Chest PA And Lateral; Future; Expected date: 12/09/2022      Lipids have increased, lifestyle mod discussed. Vaccines reviewed. HMI reviewed. Await CXR. Obesity strategies discussed with Pt. We discussed GLP1 inhibitor use. No CI. She will check if insurance coverage. ABCD  of moles discussed. Follow up yearly.     Scribe Attestation:   I, Candido Gomes, am scribing for, and in the presence of, Dr. Kumar Ramirez Jr. I performed the above scribed service and the documentation accurately describes the services I performed. I attest to the accuracy of the note.    I, Dr. Kumar Ramirez Jr, reviewed documentation as scribed above. I personally performed the services described in this documentation.  I agree that the record reflects my personal performance and is accurate and complete. Kumar Ramirez Jr., MD.  12/09/2022

## 2022-12-09 NOTE — PROGRESS NOTES
HPI     Glaucoma Suspect            Comments: 1 Year HVF/ GOCT          Comments    Patient states that va is stable OU at Distance - wears Rx Computer/   Reading glasses prn - denies pain/d iscomfort OU - no eye drops       1. PCIOL OD 7/18/19 +20.0WF/CDE 10.73   PCIOL OS +21.0 SN60WF / CDE: 12.02 / 08/22/19   2. Glaucoma Suspect   3. Negative FH glaucoma   SLT OD 11/5/20   + CPAP     No drops            Last edited by Magui Dozier MA on 12/9/2022  9:28 AM.            Assessment /Plan     For exam results, see Encounter Report.      ICD-10-CM ICD-9-CM    1. OAG (open angle glaucoma) suspect, high risk, bilateral  H40.023 365.05 Paz Visual Field - OU - Extended - Both Eyes      Posterior Segment OCT Optic Nerve- Both eyes    Doing well - intraocular pressure is within acceptable range relative to target pressure with no evidence of progression.   Continue current treatment.  Reviewed importance of continued compliance with treatment and follow up.       2. Age-related macular degeneration  H35.30 362.50 Exam consistent with mild age related macular degeneration. Discussed clinical condition and recommend avoidance of tobacco products. Patient advised to call immediately if any visual changes occur.       3. Pseudophakia  Z96.1 V43.1 Stable, monitor       4. Refraction disorder  H52.7 367.9           Return to clinic 1 year with HVF 24-2 and GOCT

## 2022-12-14 RX ORDER — TIRZEPATIDE 2.5 MG/.5ML
2.5 INJECTION, SOLUTION SUBCUTANEOUS
Qty: 4 PEN | Refills: 11 | Status: SHIPPED | OUTPATIENT
Start: 2022-12-14 | End: 2023-01-13

## 2022-12-16 ENCOUNTER — OFFICE VISIT (OUTPATIENT)
Dept: PRIMARY CARE CLINIC | Facility: CLINIC | Age: 77
End: 2022-12-16
Payer: MEDICARE

## 2022-12-16 VITALS
HEIGHT: 61 IN | DIASTOLIC BLOOD PRESSURE: 66 MMHG | TEMPERATURE: 98 F | RESPIRATION RATE: 16 BRPM | SYSTOLIC BLOOD PRESSURE: 128 MMHG | HEART RATE: 67 BPM | BODY MASS INDEX: 34.13 KG/M2 | OXYGEN SATURATION: 97 % | WEIGHT: 180.75 LBS

## 2022-12-16 DIAGNOSIS — E78.5 HYPERLIPIDEMIA, UNSPECIFIED HYPERLIPIDEMIA TYPE: ICD-10-CM

## 2022-12-16 DIAGNOSIS — E66.09 CLASS 1 OBESITY DUE TO EXCESS CALORIES WITH SERIOUS COMORBIDITY AND BODY MASS INDEX (BMI) OF 33.0 TO 33.9 IN ADULT: Primary | ICD-10-CM

## 2022-12-16 PROCEDURE — 1101F PR PT FALLS ASSESS DOC 0-1 FALLS W/OUT INJ PAST YR: ICD-10-PCS | Mod: HCNC,CPTII,S$GLB, | Performed by: PHYSICIAN ASSISTANT

## 2022-12-16 PROCEDURE — 1159F MED LIST DOCD IN RCRD: CPT | Mod: HCNC,CPTII,S$GLB, | Performed by: PHYSICIAN ASSISTANT

## 2022-12-16 PROCEDURE — 3074F PR MOST RECENT SYSTOLIC BLOOD PRESSURE < 130 MM HG: ICD-10-PCS | Mod: HCNC,CPTII,S$GLB, | Performed by: PHYSICIAN ASSISTANT

## 2022-12-16 PROCEDURE — 1101F PT FALLS ASSESS-DOCD LE1/YR: CPT | Mod: HCNC,CPTII,S$GLB, | Performed by: PHYSICIAN ASSISTANT

## 2022-12-16 PROCEDURE — 3288F PR FALLS RISK ASSESSMENT DOCUMENTED: ICD-10-PCS | Mod: HCNC,CPTII,S$GLB, | Performed by: PHYSICIAN ASSISTANT

## 2022-12-16 PROCEDURE — 3074F SYST BP LT 130 MM HG: CPT | Mod: HCNC,CPTII,S$GLB, | Performed by: PHYSICIAN ASSISTANT

## 2022-12-16 PROCEDURE — 1159F PR MEDICATION LIST DOCUMENTED IN MEDICAL RECORD: ICD-10-PCS | Mod: HCNC,CPTII,S$GLB, | Performed by: PHYSICIAN ASSISTANT

## 2022-12-16 PROCEDURE — 99214 OFFICE O/P EST MOD 30 MIN: CPT | Mod: HCNC,S$GLB,, | Performed by: PHYSICIAN ASSISTANT

## 2022-12-16 PROCEDURE — 99214 PR OFFICE/OUTPT VISIT, EST, LEVL IV, 30-39 MIN: ICD-10-PCS | Mod: HCNC,S$GLB,, | Performed by: PHYSICIAN ASSISTANT

## 2022-12-16 PROCEDURE — 1126F PR PAIN SEVERITY QUANTIFIED, NO PAIN PRESENT: ICD-10-PCS | Mod: HCNC,CPTII,S$GLB, | Performed by: PHYSICIAN ASSISTANT

## 2022-12-16 PROCEDURE — 1126F AMNT PAIN NOTED NONE PRSNT: CPT | Mod: HCNC,CPTII,S$GLB, | Performed by: PHYSICIAN ASSISTANT

## 2022-12-16 PROCEDURE — 3078F DIAST BP <80 MM HG: CPT | Mod: HCNC,CPTII,S$GLB, | Performed by: PHYSICIAN ASSISTANT

## 2022-12-16 PROCEDURE — 3078F PR MOST RECENT DIASTOLIC BLOOD PRESSURE < 80 MM HG: ICD-10-PCS | Mod: HCNC,CPTII,S$GLB, | Performed by: PHYSICIAN ASSISTANT

## 2022-12-16 PROCEDURE — 3288F FALL RISK ASSESSMENT DOCD: CPT | Mod: HCNC,CPTII,S$GLB, | Performed by: PHYSICIAN ASSISTANT

## 2022-12-16 PROCEDURE — 99999 PR PBB SHADOW E&M-EST. PATIENT-LVL III: ICD-10-PCS | Mod: PBBFAC,HCNC,, | Performed by: PHYSICIAN ASSISTANT

## 2022-12-16 PROCEDURE — 99999 PR PBB SHADOW E&M-EST. PATIENT-LVL III: CPT | Mod: PBBFAC,HCNC,, | Performed by: PHYSICIAN ASSISTANT

## 2022-12-16 NOTE — PROGRESS NOTES
Subjective:       Patient ID: Hiwot Coulter is a 77 y.o. female.    HPI    Lifestyle related medical issues:     Weight       Past Medical History:   Diagnosis Date    Anxiety     Arthritis     hand, neck, back    Behaviorally induced insufficient sleep syndrome     Cataract     Depression     GERD (gastroesophageal reflux disease)     Hyperlipidemia     Obesity     Osteopenia     Pollen allergies     Sleep apnea      Social Determinants of Health with Concerns     Alcohol Use: Not on file   Financial Resource Strain: Not on file   Food Insecurity: Not on file   Transportation Needs: Not on file   Physical Activity: Not on file   Stress: Not on file   Social Connections: Not on file   Housing Stability: Not on file     Past Surgical History:   Procedure Laterality Date    CATARACT EXTRACTION W/  INTRAOCULAR LENS IMPLANT Right 07/18/2019    CATARACT EXTRACTION W/  INTRAOCULAR LENS IMPLANT Left 08/22/2019    CHOLECYSTECTOMY  2012    HYSTERECTOMY       Family History   Problem Relation Age of Onset    Leukemia Father     Heart disease Father     Cancer Father         leukemia    Cataracts Mother     Melanoma Neg Hx     Psoriasis Neg Hx     Lupus Neg Hx     Eczema Neg Hx          Physical activity: low as of late   Diet: improving   Focusing on stopping sugar cravings   Had mounjaro filled   Sleep: ok   Stress: no issues   Overall wellbeing: good   Social support: good       Wt Readings from Last 3 Encounters:   12/16/22 82 kg (180 lb 12.4 oz)   12/09/22 80.5 kg (177 lb 7.5 oz)   05/24/22 82.1 kg (181 lb)         Current Outpatient Medications   Medication Instructions    acetaminophen (TYLENOL) 500 mg Cap As needed (PRN)    EUFLEXXA 10 mg/mL(mw 2.4 -3.6 million) IAtc injection No dose, route, or frequency recorded.    flu vac 2022 65up-wooVJ59B,PF, (FLUAD QUAD 2022-23,65Y UP,,PF,) 60 mcg (15 mcg x 4)/0.5 mL Syrg 0.5 mLs, Intramuscular    fluticasone propionate (FLONASE) 100 mcg, Each Nostril, Daily    ibandronate  "(BONIVA) 150 mg tablet TAKE 1 TABLET(150 MG) BY MOUTH EVERY 30 DAYS    montelukast (SINGULAIR) 10 mg tablet TAKE 1 TABLET(10 MG) BY MOUTH EVERY EVENING    MOUNJARO 2.5 mg, Subcutaneous, Every 7 days            Review of Systems   Constitutional:  Negative for fatigue, fever and unexpected weight change.   HENT:  Negative for sore throat and trouble swallowing.    Respiratory:  Negative for cough and shortness of breath.    Cardiovascular:  Negative for chest pain.   Gastrointestinal:  Negative for abdominal pain.   Hematological:  Negative for adenopathy. Does not bruise/bleed easily.   All other systems reviewed and are negative.    Objective:   /66 (BP Location: Left arm, Patient Position: Sitting, BP Method: Large (Manual))   Pulse 67   Temp 98.1 °F (36.7 °C) (Tympanic)   Resp 16   Ht 5' 1" (1.549 m)   Wt 82 kg (180 lb 12.4 oz)   SpO2 97%   BMI 34.16 kg/m²      Physical Exam  HENT:      Head: Normocephalic and atraumatic.   Pulmonary:      Effort: Pulmonary effort is normal.   Neurological:      General: No focal deficit present.      Mental Status: She is oriented to person, place, and time.         Lab Results   Component Value Date    WBC 6.86 05/16/2012    HGB 14.2 11/12/2012    HCT 42.0 11/12/2012     05/16/2012    CHOL 216 (H) 12/02/2022    TRIG 63 12/02/2022    HDL 63 12/02/2022    ALT 12 12/02/2022    AST 18 12/02/2022     12/02/2022    K 4.6 12/02/2022     12/02/2022    CREATININE 0.8 12/02/2022    BUN 15 12/02/2022    CO2 28 12/02/2022    TSH 1.697 11/12/2012    GLUF 73 04/23/2014       Assessment:       1. Class 1 obesity due to excess calories with serious comorbidity and body mass index (BMI) of 33.0 to 33.9 in adult    2. Hyperlipidemia, unspecified hyperlipidemia type          Plan:   Class 1 obesity due to excess calories with serious comorbidity and body mass index (BMI) of 33.0 to 33.9 in adult    Hyperlipidemia, unspecified hyperlipidemia type         Reviewed " mounjaro, side effects.  Sugar cravings    Patient has a great resource who has been teachings her about better foods    Candy is her weakness    Reviewed low cholesterol focused diet    Plant based   Time spent: 60 minutes in face to face and with review prior and after of chart notes from specialists, in regards to diagnosis, prognosis, review of lab and test results, benefits of treatment as well as management of disease, counseling of patient and coordination of care between various health care providers .    1/13/2023

## 2023-01-01 ENCOUNTER — PATIENT MESSAGE (OUTPATIENT)
Dept: INTERNAL MEDICINE | Facility: CLINIC | Age: 78
End: 2023-01-01
Payer: MEDICARE

## 2023-01-01 DIAGNOSIS — E66.01 SEVERE OBESITY: ICD-10-CM

## 2023-01-06 ENCOUNTER — IMMUNIZATION (OUTPATIENT)
Dept: PRIMARY CARE CLINIC | Facility: CLINIC | Age: 78
End: 2023-01-06
Payer: MEDICARE

## 2023-01-06 DIAGNOSIS — Z23 NEED FOR VACCINATION: Primary | ICD-10-CM

## 2023-01-06 PROCEDURE — 0124A COVID-19, MRNA, LNP-S, BIVALENT BOOSTER, PF, 30 MCG/0.3 ML DOSE: CPT | Mod: CV19,PBBFAC | Performed by: FAMILY MEDICINE

## 2023-01-06 PROCEDURE — 91312 COVID-19, MRNA, LNP-S, BIVALENT BOOSTER, PF, 30 MCG/0.3 ML DOSE: CPT | Mod: PBBFAC | Performed by: FAMILY MEDICINE

## 2023-01-13 ENCOUNTER — OFFICE VISIT (OUTPATIENT)
Dept: PRIMARY CARE CLINIC | Facility: CLINIC | Age: 78
End: 2023-01-13
Payer: MEDICARE

## 2023-01-13 VITALS
DIASTOLIC BLOOD PRESSURE: 68 MMHG | HEART RATE: 68 BPM | RESPIRATION RATE: 18 BRPM | BODY MASS INDEX: 31.99 KG/M2 | SYSTOLIC BLOOD PRESSURE: 112 MMHG | WEIGHT: 169.31 LBS | OXYGEN SATURATION: 100 %

## 2023-01-13 DIAGNOSIS — E66.9 CLASS 1 OBESITY WITH SERIOUS COMORBIDITY AND BODY MASS INDEX (BMI) OF 31.0 TO 31.9 IN ADULT, UNSPECIFIED OBESITY TYPE: ICD-10-CM

## 2023-01-13 DIAGNOSIS — E78.5 HYPERLIPIDEMIA, UNSPECIFIED HYPERLIPIDEMIA TYPE: Primary | ICD-10-CM

## 2023-01-13 PROCEDURE — 99214 PR OFFICE/OUTPT VISIT, EST, LEVL IV, 30-39 MIN: ICD-10-PCS | Mod: HCNC,S$GLB,, | Performed by: PHYSICIAN ASSISTANT

## 2023-01-13 PROCEDURE — 1101F PR PT FALLS ASSESS DOC 0-1 FALLS W/OUT INJ PAST YR: ICD-10-PCS | Mod: HCNC,CPTII,S$GLB, | Performed by: PHYSICIAN ASSISTANT

## 2023-01-13 PROCEDURE — 1159F PR MEDICATION LIST DOCUMENTED IN MEDICAL RECORD: ICD-10-PCS | Mod: HCNC,CPTII,S$GLB, | Performed by: PHYSICIAN ASSISTANT

## 2023-01-13 PROCEDURE — 3074F PR MOST RECENT SYSTOLIC BLOOD PRESSURE < 130 MM HG: ICD-10-PCS | Mod: HCNC,CPTII,S$GLB, | Performed by: PHYSICIAN ASSISTANT

## 2023-01-13 PROCEDURE — 3078F PR MOST RECENT DIASTOLIC BLOOD PRESSURE < 80 MM HG: ICD-10-PCS | Mod: HCNC,CPTII,S$GLB, | Performed by: PHYSICIAN ASSISTANT

## 2023-01-13 PROCEDURE — 3288F PR FALLS RISK ASSESSMENT DOCUMENTED: ICD-10-PCS | Mod: HCNC,CPTII,S$GLB, | Performed by: PHYSICIAN ASSISTANT

## 2023-01-13 PROCEDURE — 1126F AMNT PAIN NOTED NONE PRSNT: CPT | Mod: HCNC,CPTII,S$GLB, | Performed by: PHYSICIAN ASSISTANT

## 2023-01-13 PROCEDURE — 3074F SYST BP LT 130 MM HG: CPT | Mod: HCNC,CPTII,S$GLB, | Performed by: PHYSICIAN ASSISTANT

## 2023-01-13 PROCEDURE — 3288F FALL RISK ASSESSMENT DOCD: CPT | Mod: HCNC,CPTII,S$GLB, | Performed by: PHYSICIAN ASSISTANT

## 2023-01-13 PROCEDURE — 1101F PT FALLS ASSESS-DOCD LE1/YR: CPT | Mod: HCNC,CPTII,S$GLB, | Performed by: PHYSICIAN ASSISTANT

## 2023-01-13 PROCEDURE — 1159F MED LIST DOCD IN RCRD: CPT | Mod: HCNC,CPTII,S$GLB, | Performed by: PHYSICIAN ASSISTANT

## 2023-01-13 PROCEDURE — 99999 PR PBB SHADOW E&M-EST. PATIENT-LVL III: ICD-10-PCS | Mod: PBBFAC,HCNC,, | Performed by: PHYSICIAN ASSISTANT

## 2023-01-13 PROCEDURE — 99999 PR PBB SHADOW E&M-EST. PATIENT-LVL III: CPT | Mod: PBBFAC,HCNC,, | Performed by: PHYSICIAN ASSISTANT

## 2023-01-13 PROCEDURE — 1126F PR PAIN SEVERITY QUANTIFIED, NO PAIN PRESENT: ICD-10-PCS | Mod: HCNC,CPTII,S$GLB, | Performed by: PHYSICIAN ASSISTANT

## 2023-01-13 PROCEDURE — 99214 OFFICE O/P EST MOD 30 MIN: CPT | Mod: HCNC,S$GLB,, | Performed by: PHYSICIAN ASSISTANT

## 2023-01-13 PROCEDURE — 3078F DIAST BP <80 MM HG: CPT | Mod: HCNC,CPTII,S$GLB, | Performed by: PHYSICIAN ASSISTANT

## 2023-01-13 RX ORDER — TIRZEPATIDE 5 MG/.5ML
5 INJECTION, SOLUTION SUBCUTANEOUS
Qty: 4 PEN | Refills: 11 | Status: SHIPPED | OUTPATIENT
Start: 2023-01-13 | End: 2023-06-30

## 2023-01-17 PROBLEM — Z78.9 SELF-CARE DEFICIT: Status: RESOLVED | Noted: 2021-12-10 | Resolved: 2023-01-17

## 2023-02-09 DIAGNOSIS — Z00.00 ENCOUNTER FOR MEDICARE ANNUAL WELLNESS EXAM: ICD-10-CM

## 2023-02-24 ENCOUNTER — OFFICE VISIT (OUTPATIENT)
Dept: PULMONOLOGY | Facility: CLINIC | Age: 78
End: 2023-02-24
Payer: MEDICARE

## 2023-02-24 ENCOUNTER — HOSPITAL ENCOUNTER (OUTPATIENT)
Dept: RADIOLOGY | Facility: HOSPITAL | Age: 78
Discharge: HOME | End: 2023-02-24
Attending: PEDIATRICS
Payer: MEDICARE

## 2023-02-24 VITALS
OXYGEN SATURATION: 97 % | WEIGHT: 156.5 LBS | DIASTOLIC BLOOD PRESSURE: 84 MMHG | BODY MASS INDEX: 29.55 KG/M2 | RESPIRATION RATE: 18 BRPM | HEIGHT: 61 IN | BODY MASS INDEX: 31.97 KG/M2 | HEART RATE: 81 BPM | SYSTOLIC BLOOD PRESSURE: 128 MMHG | WEIGHT: 169.31 LBS | HEIGHT: 61 IN

## 2023-02-24 DIAGNOSIS — G47.61 PERIODIC LIMB MOVEMENT DISORDER (PLMD): ICD-10-CM

## 2023-02-24 DIAGNOSIS — Z12.31 ENCOUNTER FOR SCREENING MAMMOGRAM FOR BREAST CANCER: ICD-10-CM

## 2023-02-24 DIAGNOSIS — K21.9 GASTROESOPHAGEAL REFLUX DISEASE WITHOUT ESOPHAGITIS: Chronic | ICD-10-CM

## 2023-02-24 DIAGNOSIS — G47.33 OSA ON CPAP: Primary | ICD-10-CM

## 2023-02-24 DIAGNOSIS — E66.3 OVERWEIGHT (BMI 25.0-29.9): ICD-10-CM

## 2023-02-24 PROCEDURE — 1126F AMNT PAIN NOTED NONE PRSNT: CPT | Mod: HCNC,CPTII,S$GLB, | Performed by: NURSE PRACTITIONER

## 2023-02-24 PROCEDURE — 1160F RVW MEDS BY RX/DR IN RCRD: CPT | Mod: HCNC,CPTII,S$GLB, | Performed by: NURSE PRACTITIONER

## 2023-02-24 PROCEDURE — 99999 PR PBB SHADOW E&M-EST. PATIENT-LVL IV: ICD-10-PCS | Mod: PBBFAC,HCNC,, | Performed by: NURSE PRACTITIONER

## 2023-02-24 PROCEDURE — 77063 BREAST TOMOSYNTHESIS BI: CPT | Mod: 26,HCNC,, | Performed by: RADIOLOGY

## 2023-02-24 PROCEDURE — 99203 PR OFFICE/OUTPT VISIT, NEW, LEVL III, 30-44 MIN: ICD-10-PCS | Mod: HCNC,S$GLB,, | Performed by: NURSE PRACTITIONER

## 2023-02-24 PROCEDURE — 77067 MAMMO DIGITAL SCREENING BILAT WITH TOMO: ICD-10-PCS | Mod: 26,HCNC,, | Performed by: RADIOLOGY

## 2023-02-24 PROCEDURE — 77067 SCR MAMMO BI INCL CAD: CPT | Mod: 26,HCNC,, | Performed by: RADIOLOGY

## 2023-02-24 PROCEDURE — 1159F MED LIST DOCD IN RCRD: CPT | Mod: HCNC,CPTII,S$GLB, | Performed by: NURSE PRACTITIONER

## 2023-02-24 PROCEDURE — 1101F PT FALLS ASSESS-DOCD LE1/YR: CPT | Mod: HCNC,CPTII,S$GLB, | Performed by: NURSE PRACTITIONER

## 2023-02-24 PROCEDURE — 3288F PR FALLS RISK ASSESSMENT DOCUMENTED: ICD-10-PCS | Mod: HCNC,CPTII,S$GLB, | Performed by: NURSE PRACTITIONER

## 2023-02-24 PROCEDURE — 99999 PR PBB SHADOW E&M-EST. PATIENT-LVL IV: CPT | Mod: PBBFAC,HCNC,, | Performed by: NURSE PRACTITIONER

## 2023-02-24 PROCEDURE — 1159F PR MEDICATION LIST DOCUMENTED IN MEDICAL RECORD: ICD-10-PCS | Mod: HCNC,CPTII,S$GLB, | Performed by: NURSE PRACTITIONER

## 2023-02-24 PROCEDURE — 3074F PR MOST RECENT SYSTOLIC BLOOD PRESSURE < 130 MM HG: ICD-10-PCS | Mod: HCNC,CPTII,S$GLB, | Performed by: NURSE PRACTITIONER

## 2023-02-24 PROCEDURE — 3074F SYST BP LT 130 MM HG: CPT | Mod: HCNC,CPTII,S$GLB, | Performed by: NURSE PRACTITIONER

## 2023-02-24 PROCEDURE — 3079F DIAST BP 80-89 MM HG: CPT | Mod: HCNC,CPTII,S$GLB, | Performed by: NURSE PRACTITIONER

## 2023-02-24 PROCEDURE — 77067 SCR MAMMO BI INCL CAD: CPT | Mod: TC,HCNC

## 2023-02-24 PROCEDURE — 1126F PR PAIN SEVERITY QUANTIFIED, NO PAIN PRESENT: ICD-10-PCS | Mod: HCNC,CPTII,S$GLB, | Performed by: NURSE PRACTITIONER

## 2023-02-24 PROCEDURE — 1101F PR PT FALLS ASSESS DOC 0-1 FALLS W/OUT INJ PAST YR: ICD-10-PCS | Mod: HCNC,CPTII,S$GLB, | Performed by: NURSE PRACTITIONER

## 2023-02-24 PROCEDURE — 1160F PR REVIEW ALL MEDS BY PRESCRIBER/CLIN PHARMACIST DOCUMENTED: ICD-10-PCS | Mod: HCNC,CPTII,S$GLB, | Performed by: NURSE PRACTITIONER

## 2023-02-24 PROCEDURE — 77063 MAMMO DIGITAL SCREENING BILAT WITH TOMO: ICD-10-PCS | Mod: 26,HCNC,, | Performed by: RADIOLOGY

## 2023-02-24 PROCEDURE — 99203 OFFICE O/P NEW LOW 30 MIN: CPT | Mod: HCNC,S$GLB,, | Performed by: NURSE PRACTITIONER

## 2023-02-24 PROCEDURE — 3079F PR MOST RECENT DIASTOLIC BLOOD PRESSURE 80-89 MM HG: ICD-10-PCS | Mod: HCNC,CPTII,S$GLB, | Performed by: NURSE PRACTITIONER

## 2023-02-24 PROCEDURE — 3288F FALL RISK ASSESSMENT DOCD: CPT | Mod: HCNC,CPTII,S$GLB, | Performed by: NURSE PRACTITIONER

## 2023-02-24 NOTE — ASSESSMENT & PLAN NOTE
Continue to encourage exercise and dietary modification to obtain normal weight.   Engaged in weight loss and regular exercise  Present goal 130 lbs  Lost from about 197 lbs to current weight 156 lbs  On Mounjaro for diabetes   Wt Readings from Last 9 Encounters:   02/24/23 71 kg (156 lb 8.4 oz)   02/24/23 76.8 kg (169 lb 5 oz)   01/13/23 76.8 kg (169 lb 5 oz)   12/16/22 82 kg (180 lb 12.4 oz)   12/09/22 80.5 kg (177 lb 7.5 oz)   05/24/22 82.1 kg (181 lb)   05/20/22 82.1 kg (181 lb)   05/16/22 82.1 kg (181 lb)   05/09/22 82.1 kg (181 lb)   Body mass index is 29.58 kg/m².

## 2023-02-24 NOTE — ASSESSMENT & PLAN NOTE
Benefits Auto CPAP 4-8 cm with optimal control AHI 3.6   CPAP use off and on after Valorie recall in 2021 Jan 2023 obtained Dreamstation 2 Valorie machine replacement related to recall  She will resume CPAP use since found benefit.  Working on weight loss.   Would like to consider repeat sleep study after she reaches her weight loss goal of about 130 lbs.   Current weight 156 lbs. Lost from about 197 lbs   HME: [facility]   Updated supply order  Full face mask    Continue Auto CPAP 4-8 cm   Follow up annually

## 2023-02-24 NOTE — PROGRESS NOTES
Subjective:      Patient ID: Hiwot Coulter is a 77 y.o. female.    Chief Complaint: Sleep Apnea    HPI: Hiwot Coulter is here to re-establish care as new patient visit to follow up for JAMAL with CPAP complaince assessment.  Last seen for obstructive sleep apnea 9/10/2019 by me.  CPAP use off and on after Valorie recall in 2021 Jan 2023 obtained Dreamstation 2 Valorie machine replacement related to recall. Which is on default setting of 4-20 cm.   Patient wants no higher pressure than CPAP 8 cm  Changed settings on Dream station 2 machine in clinic to Auto CPAP 4-8 cm  She is ready to resume CPAP use since found benefit.  Working on weight loss. Lost from about 197 lbs to current weight 156 lbs with goal weight 130 lbs  She would like to consider repeat sleep study after she reaches her weight loss goal of about 130 lbs.   Current weight 156 lbs.   Full face mask is tolerated.     3/17/2015 mild obstructive sleep apnea PSG AHI 12.2     Compliance Summary  Auto CPAP 4-8 cm   10/29/2022 - 11/27/2022 (30 days)  Days with Device Usage 23 days  Days without Device Usage 7 days  Percent Days with Device Usage 76.7%  Cumulative Usage 6 days 8 hrs. 3 mins. 42 secs.  Maximum Usage (1 Day) 10 hrs. 32 mins. 40 secs.  Average Usage (All Days) 5 hrs. 4 mins. 7 secs.  Average Usage (Days Used) 6 hrs. 36 mins. 40 secs.  Minimum Usage (1 Day) 2 hrs. 56 mins. 53 secs.  Percent of Days with Usage >= 4 Hours 63.3%  Percent of Days with Usage < 4 Hours 36.7%  Date Range  Total Blower Time 6 days 8 hrs. 3 mins. 42 secs.  Average AHI 3.6  Auto-CPAP Summary (Valorie Respironics)  Auto-CPAP Mean Pressure 6.1 cmH2O  Auto-CPAP Peak Average Pressure 7.2 cmH2O  Device Pressure <= 90% of Time 8.0 cmH2O  Average Time in Large Leak Per Day 21 secs.    Occupational History:   Employed full time as   with [employer].       Previous Report Reviewed: lab reports and office notes     Past Medical History: The following portions  of the patient's history were reviewed and updated as appropriate:   She  has a past surgical history that includes Cholecystectomy (2012); Hysterectomy; Cataract extraction w/  intraocular lens implant (Right, 07/18/2019); and Cataract extraction w/  intraocular lens implant (Left, 08/22/2019).  Her family history includes Cancer in her father; Cataracts in her mother; Heart disease in her father; Leukemia in her father.  She  reports that she has never smoked. She has never used smokeless tobacco. She reports that she does not drink alcohol and does not use drugs.  She has a current medication list which includes the following prescription(s): acetaminophen, euflexxa, fluad quad 2022-23(65y up)(pf), fluticasone propionate, ibandronate, montelukast, and mounjaro.  She has No Known Allergies..    The following portions of the patient's history were reviewed and updated as appropriate: allergies, current medications, past family history, past medical history, past social history, past surgical history and problem list.    Review of Systems   Constitutional:  Negative for fever, chills, weight loss, weight gain, activity change, appetite change, fatigue and night sweats.   HENT:  Negative for postnasal drip, rhinorrhea, sinus pressure, voice change and congestion.    Eyes:  Negative for redness and itching.   Respiratory:  Negative for snoring, cough, sputum production, chest tightness, shortness of breath, wheezing, orthopnea, asthma nighttime symptoms, dyspnea on extertion, use of rescue inhaler and somnolence.    Cardiovascular: Negative.  Negative for chest pain, palpitations and leg swelling.   Genitourinary:  Negative for difficulty urinating and hematuria.   Endocrine:  Negative for cold intolerance and heat intolerance.    Musculoskeletal:  Negative for arthralgias, gait problem, joint swelling and myalgias.   Skin: Negative.    Gastrointestinal:  Negative for nausea, vomiting, abdominal pain and acid reflux.  "  Neurological:  Negative for dizziness, weakness, light-headedness and headaches.   Hematological:  Negative for adenopathy. No excessive bruising.   All other systems reviewed and are negative.   Objective:   /84   Pulse 81   Resp 18   Ht 5' 1" (1.549 m)   Wt 71 kg (156 lb 8.4 oz)   SpO2 97%   BMI 29.58 kg/m²   Physical Exam  Vitals reviewed.   Constitutional:       General: She is not in acute distress.     Appearance: She is well-developed. She is not ill-appearing or toxic-appearing.   HENT:      Head: Normocephalic.      Right Ear: External ear normal.      Left Ear: External ear normal.      Nose: Nose normal.      Mouth/Throat:      Pharynx: No oropharyngeal exudate.   Eyes:      Conjunctiva/sclera: Conjunctivae normal.   Cardiovascular:      Rate and Rhythm: Normal rate and regular rhythm.      Heart sounds: Normal heart sounds.   Pulmonary:      Effort: Pulmonary effort is normal.      Breath sounds: Normal breath sounds. No stridor.   Abdominal:      Palpations: Abdomen is soft.   Musculoskeletal:         General: Normal range of motion.      Cervical back: Normal range of motion and neck supple.   Lymphadenopathy:      Cervical: No cervical adenopathy.   Skin:     General: Skin is warm and dry.   Neurological:      Mental Status: She is alert and oriented to person, place, and time.   Psychiatric:         Behavior: Behavior normal. Behavior is cooperative.         Thought Content: Thought content normal.         Judgment: Judgment normal.     Personal Diagnostic Review  CPAP download     3/17/2015 mild obstructive sleep apnea PSG AHI 12.2     Assessment:     1. JAMAL on CPAP    2. Periodic limb movement disorder (PLMD)    3. Overweight (BMI 25.0-29.9)    4. Gastroesophageal reflux disease without esophagitis        Orders Placed This Encounter   Procedures    CPAP/BIPAP SUPPLIES     Benefits and compliant  90 day supply. 4 refills  HME: [facility]    Has a Dreamstation  2 machine from [employer].     " Order Specific Question:   Length of need (1-99 months):     Answer:   99     Order Specific Question:   Choose ONE mask type and its corresponding cushions and/or pillows:     Answer:    Full Face Mask, 1 per 90 days:  Full Face Cushion, (3 per 90 days)     Order Specific Question:   Choose EITHER Heated or Non-Heated Tubjing     Answer:    Non-Heated Tubing, 1 per 90 days     Order Specific Question:   Number of Days Needed:     Answer:   99     Order Specific Question:   All other supplies as needed as listed below:     Answer:    Headgear, 1 per 180 days     Order Specific Question:   All other supplies as needed as listed below:     Answer:    Chin Strap, 1 per 180 days     Order Specific Question:   All other supplies as needed as listed below:     Answer:    Disposable Filter, 6 per 90 days     Order Specific Question:   All other supplies as needed as listed below:     Answer:    Humidifier Chamber, 1 per 180 days     Order Specific Question:   All other supplies as needed as listed below:     Answer:    Non-Disposable Filter, 1 per 180 days     Plan:     Problem List Items Addressed This Visit       GERD (gastroesophageal reflux disease) (Chronic)     Improved with diet         Periodic limb movement disorder (PLMD)     Controlled, on xanax 2mg            Overweight (BMI 25.0-29.9)     Continue to encourage exercise and dietary modification to obtain normal weight.   Engaged in weight loss and regular exercise  Present goal 130 lbs  Lost from about 197 lbs to current weight 156 lbs  On Mounjaro for diabetes   Wt Readings from Last 9 Encounters:   02/24/23 71 kg (156 lb 8.4 oz)   02/24/23 76.8 kg (169 lb 5 oz)   01/13/23 76.8 kg (169 lb 5 oz)   12/16/22 82 kg (180 lb 12.4 oz)   12/09/22 80.5 kg (177 lb 7.5 oz)   05/24/22 82.1 kg (181 lb)   05/20/22 82.1 kg (181 lb)   05/16/22 82.1 kg (181 lb)   05/09/22 82.1 kg (181 lb)   Body mass index is 29.58 kg/m².               JAMAL on CPAP - Primary     Benefits Auto CPAP 4-8 cm with optimal control AHI 3.6   CPAP use off and on after Valorie recall in 2021 Jan 2023 obtained Dreamstation 2 Valorie machine replacement related to recall  She will resume CPAP use since found benefit.  Working on weight loss.   Would like to consider repeat sleep study after she reaches her weight loss goal of about 130 lbs.   Current weight 156 lbs. Lost from about 197 lbs   HME: [facility]   Updated supply order  Full face mask    Continue Auto CPAP 4-8 cm   Follow up annually              Relevant Orders    CPAP/BIPAP SUPPLIES     I spent a total of 34 minutes on the day of the visit.  This includes face to face time and non-face to face time preparing to see the patient (eg, review of tests), obtaining and/or reviewing separately obtained history, documenting clinical information in the electronic or other health record, independently interpreting results and communicating results to the patient/family/caregiver, or care coordinator.      Follow up in about 1 year (around 2/24/2024) for CPAP 1 year compliance download.

## 2023-03-02 ENCOUNTER — OFFICE VISIT (OUTPATIENT)
Dept: PRIMARY CARE CLINIC | Facility: CLINIC | Age: 78
End: 2023-03-02
Payer: MEDICARE

## 2023-03-02 VITALS
SYSTOLIC BLOOD PRESSURE: 110 MMHG | DIASTOLIC BLOOD PRESSURE: 70 MMHG | HEIGHT: 61 IN | HEART RATE: 74 BPM | WEIGHT: 155 LBS | TEMPERATURE: 98 F | BODY MASS INDEX: 29.27 KG/M2 | OXYGEN SATURATION: 98 %

## 2023-03-02 DIAGNOSIS — E78.5 HYPERLIPIDEMIA, UNSPECIFIED HYPERLIPIDEMIA TYPE: ICD-10-CM

## 2023-03-02 DIAGNOSIS — G47.33 OSA ON CPAP: Primary | ICD-10-CM

## 2023-03-02 DIAGNOSIS — E66.3 OVERWEIGHT (BMI 25.0-29.9): ICD-10-CM

## 2023-03-02 PROCEDURE — 1159F MED LIST DOCD IN RCRD: CPT | Mod: HCNC,CPTII,S$GLB, | Performed by: PHYSICIAN ASSISTANT

## 2023-03-02 PROCEDURE — 99999 PR PBB SHADOW E&M-EST. PATIENT-LVL III: CPT | Mod: PBBFAC,HCNC,, | Performed by: PHYSICIAN ASSISTANT

## 2023-03-02 PROCEDURE — 3078F PR MOST RECENT DIASTOLIC BLOOD PRESSURE < 80 MM HG: ICD-10-PCS | Mod: HCNC,CPTII,S$GLB, | Performed by: PHYSICIAN ASSISTANT

## 2023-03-02 PROCEDURE — 1126F AMNT PAIN NOTED NONE PRSNT: CPT | Mod: HCNC,CPTII,S$GLB, | Performed by: PHYSICIAN ASSISTANT

## 2023-03-02 PROCEDURE — 3074F SYST BP LT 130 MM HG: CPT | Mod: HCNC,CPTII,S$GLB, | Performed by: PHYSICIAN ASSISTANT

## 2023-03-02 PROCEDURE — 99213 OFFICE O/P EST LOW 20 MIN: CPT | Mod: HCNC,S$GLB,, | Performed by: PHYSICIAN ASSISTANT

## 2023-03-02 PROCEDURE — 3074F PR MOST RECENT SYSTOLIC BLOOD PRESSURE < 130 MM HG: ICD-10-PCS | Mod: HCNC,CPTII,S$GLB, | Performed by: PHYSICIAN ASSISTANT

## 2023-03-02 PROCEDURE — 1159F PR MEDICATION LIST DOCUMENTED IN MEDICAL RECORD: ICD-10-PCS | Mod: HCNC,CPTII,S$GLB, | Performed by: PHYSICIAN ASSISTANT

## 2023-03-02 PROCEDURE — 1126F PR PAIN SEVERITY QUANTIFIED, NO PAIN PRESENT: ICD-10-PCS | Mod: HCNC,CPTII,S$GLB, | Performed by: PHYSICIAN ASSISTANT

## 2023-03-02 PROCEDURE — 1101F PR PT FALLS ASSESS DOC 0-1 FALLS W/OUT INJ PAST YR: ICD-10-PCS | Mod: HCNC,CPTII,S$GLB, | Performed by: PHYSICIAN ASSISTANT

## 2023-03-02 PROCEDURE — 3078F DIAST BP <80 MM HG: CPT | Mod: HCNC,CPTII,S$GLB, | Performed by: PHYSICIAN ASSISTANT

## 2023-03-02 PROCEDURE — 3288F FALL RISK ASSESSMENT DOCD: CPT | Mod: HCNC,CPTII,S$GLB, | Performed by: PHYSICIAN ASSISTANT

## 2023-03-02 PROCEDURE — 3288F PR FALLS RISK ASSESSMENT DOCUMENTED: ICD-10-PCS | Mod: HCNC,CPTII,S$GLB, | Performed by: PHYSICIAN ASSISTANT

## 2023-03-02 PROCEDURE — 99213 PR OFFICE/OUTPT VISIT, EST, LEVL III, 20-29 MIN: ICD-10-PCS | Mod: HCNC,S$GLB,, | Performed by: PHYSICIAN ASSISTANT

## 2023-03-02 PROCEDURE — 99999 PR PBB SHADOW E&M-EST. PATIENT-LVL III: ICD-10-PCS | Mod: PBBFAC,HCNC,, | Performed by: PHYSICIAN ASSISTANT

## 2023-03-02 PROCEDURE — 1101F PT FALLS ASSESS-DOCD LE1/YR: CPT | Mod: HCNC,CPTII,S$GLB, | Performed by: PHYSICIAN ASSISTANT

## 2023-03-02 RX ORDER — FLASH GLUCOSE SENSOR
KIT MISCELLANEOUS
Qty: 2 KIT | Refills: 11 | Status: SHIPPED | OUTPATIENT
Start: 2023-03-02

## 2023-03-02 NOTE — PATIENT INSTRUCTIONS
1235 Calories Per Day        Carbohydrate 89     28.8%  Protein 127 g -       41.2%  Fat 41 g -------30.0%

## 2023-03-06 NOTE — PROGRESS NOTES
Subjective:       Patient ID: Hiwot Coulter is a 77 y.o. female.    Chief Complaint:     Patient comes in today for follow up   Doing very well and implementing healthy habits daily   She is improving lifestyle/behaviors and along with medications, has lost a good bit of weight     Review of Systems   Constitutional:  Negative for fatigue, fever and unexpected weight change.   HENT:  Negative for sore throat and trouble swallowing.    Respiratory:  Negative for cough and shortness of breath.    Cardiovascular:  Negative for chest pain.   Gastrointestinal:  Negative for abdominal pain.   Hematological:  Negative for adenopathy. Does not bruise/bleed easily.   All other systems reviewed and are negative.      Objective:      Physical Exam  Constitutional:       Appearance: Normal appearance. She is obese.   HENT:      Head: Normocephalic and atraumatic.   Cardiovascular:      Pulses: Normal pulses.   Pulmonary:      Effort: Pulmonary effort is normal.   Neurological:      General: No focal deficit present.      Mental Status: She is alert and oriented to person, place, and time.       Assessment:       Problem List Items Addressed This Visit       Hyperlipidemia    Overweight (BMI 25.0-29.9)    JAMAL on CPAP - Primary         Plan:       JAMAL on CPAP    Overweight (BMI 25.0-29.9)    Hyperlipidemia, unspecified hyperlipidemia type    Other orders  -     flash glucose sensor (FREESTYLE AMELIA 2 SENSOR) Kit; .  Dispense: 2 kit; Refill: 11

## 2023-03-31 ENCOUNTER — PATIENT MESSAGE (OUTPATIENT)
Dept: ORTHOPEDICS | Facility: CLINIC | Age: 78
End: 2023-03-31
Payer: MEDICARE

## 2023-04-26 ENCOUNTER — HOSPITAL ENCOUNTER (OUTPATIENT)
Dept: RADIOLOGY | Facility: HOSPITAL | Age: 78
Discharge: HOME | End: 2023-04-26
Attending: FAMILY MEDICINE
Payer: MEDICARE

## 2023-04-26 ENCOUNTER — OFFICE VISIT (OUTPATIENT)
Dept: SPORTS MEDICINE | Facility: CLINIC | Age: 78
End: 2023-04-26
Payer: MEDICARE

## 2023-04-26 VITALS — BODY MASS INDEX: 26.81 KG/M2 | WEIGHT: 142 LBS | HEIGHT: 61 IN

## 2023-04-26 DIAGNOSIS — M25.561 PAIN IN BOTH KNEES, UNSPECIFIED CHRONICITY: ICD-10-CM

## 2023-04-26 DIAGNOSIS — M25.562 PAIN IN BOTH KNEES, UNSPECIFIED CHRONICITY: ICD-10-CM

## 2023-04-26 DIAGNOSIS — M25.561 RIGHT KNEE PAIN, UNSPECIFIED CHRONICITY: Primary | ICD-10-CM

## 2023-04-26 DIAGNOSIS — M25.561 PAIN IN BOTH KNEES, UNSPECIFIED CHRONICITY: Primary | ICD-10-CM

## 2023-04-26 DIAGNOSIS — M25.562 PAIN IN BOTH KNEES, UNSPECIFIED CHRONICITY: Primary | ICD-10-CM

## 2023-04-26 PROCEDURE — 99999 PR PBB SHADOW E&M-EST. PATIENT-LVL III: ICD-10-PCS | Mod: PBBFAC,HCNC,, | Performed by: FAMILY MEDICINE

## 2023-04-26 PROCEDURE — 73564 X-RAY EXAM KNEE 4 OR MORE: CPT | Mod: TC,50,HCNC

## 2023-04-26 PROCEDURE — 73564 X-RAY EXAM KNEE 4 OR MORE: CPT | Mod: 26,50,HCNC, | Performed by: RADIOLOGY

## 2023-04-26 PROCEDURE — 3288F FALL RISK ASSESSMENT DOCD: CPT | Mod: HCNC,CPTII,S$GLB, | Performed by: FAMILY MEDICINE

## 2023-04-26 PROCEDURE — 1101F PT FALLS ASSESS-DOCD LE1/YR: CPT | Mod: HCNC,CPTII,S$GLB, | Performed by: FAMILY MEDICINE

## 2023-04-26 PROCEDURE — 1125F PR PAIN SEVERITY QUANTIFIED, PAIN PRESENT: ICD-10-PCS | Mod: HCNC,CPTII,S$GLB, | Performed by: FAMILY MEDICINE

## 2023-04-26 PROCEDURE — 73564 XR KNEE ORTHO BILAT WITH FLEXION: ICD-10-PCS | Mod: 26,50,HCNC, | Performed by: RADIOLOGY

## 2023-04-26 PROCEDURE — 1125F AMNT PAIN NOTED PAIN PRSNT: CPT | Mod: HCNC,CPTII,S$GLB, | Performed by: FAMILY MEDICINE

## 2023-04-26 PROCEDURE — 99214 PR OFFICE/OUTPT VISIT, EST, LEVL IV, 30-39 MIN: ICD-10-PCS | Mod: HCNC,S$GLB,, | Performed by: FAMILY MEDICINE

## 2023-04-26 PROCEDURE — 99214 OFFICE O/P EST MOD 30 MIN: CPT | Mod: HCNC,S$GLB,, | Performed by: FAMILY MEDICINE

## 2023-04-26 PROCEDURE — 1159F MED LIST DOCD IN RCRD: CPT | Mod: HCNC,CPTII,S$GLB, | Performed by: FAMILY MEDICINE

## 2023-04-26 PROCEDURE — 3288F PR FALLS RISK ASSESSMENT DOCUMENTED: ICD-10-PCS | Mod: HCNC,CPTII,S$GLB, | Performed by: FAMILY MEDICINE

## 2023-04-26 PROCEDURE — 1159F PR MEDICATION LIST DOCUMENTED IN MEDICAL RECORD: ICD-10-PCS | Mod: HCNC,CPTII,S$GLB, | Performed by: FAMILY MEDICINE

## 2023-04-26 PROCEDURE — 1101F PR PT FALLS ASSESS DOC 0-1 FALLS W/OUT INJ PAST YR: ICD-10-PCS | Mod: HCNC,CPTII,S$GLB, | Performed by: FAMILY MEDICINE

## 2023-04-26 PROCEDURE — 99999 PR PBB SHADOW E&M-EST. PATIENT-LVL III: CPT | Mod: PBBFAC,HCNC,, | Performed by: FAMILY MEDICINE

## 2023-04-26 NOTE — PROGRESS NOTES
Subjective:     Patient ID: Hiwot Coulter is a 78 y.o. female.    Chief Complaint: Follow-up of the Left Knee and Follow-up of the Right Knee      HPI: Patient is being seen for bilateral knee follow up since her last office visit with Dr. Hilton on 5/24/22. Received Orthovisc in her right knee in May 2022. Says those injections are still working for her. Rating pain 3/10 at today's office visit. Takes otc tylenol and ibuprofen 800 that she received from her PCP, Dr. Ramirez, as needed to help with pain relief. Participated in physical therapy at the [facility] location at the beginning of 2022.    Wants to renew her handicap hand tag since she sometimes has to walk over 100 yd at work through the parking lot.      Past Medical History:   Diagnosis Date    Anxiety     Arthritis     hand, neck, back    Behaviorally induced insufficient sleep syndrome     Behaviorally induced insufficient sleep syndrome     Cataract     Depression     GERD (gastroesophageal reflux disease)     Hyperlipidemia     Obesity     Osteopenia     Pollen allergies     Self-care deficit 12/10/2021    Sleep apnea      Past Surgical History:   Procedure Laterality Date    CATARACT EXTRACTION W/  INTRAOCULAR LENS IMPLANT Right 07/18/2019    CATARACT EXTRACTION W/  INTRAOCULAR LENS IMPLANT Left 08/22/2019    CHOLECYSTECTOMY  2012    HYSTERECTOMY       Family History   Problem Relation Age of Onset    Leukemia Father     Heart disease Father     Cancer Father         leukemia    Cataracts Mother     Melanoma Neg Hx     Psoriasis Neg Hx     Lupus Neg Hx     Eczema Neg Hx      Social History     Socioeconomic History    Marital status:    Occupational History    Occupation:      Employer: [employer]   Tobacco Use    Smoking status: Never    Smokeless tobacco: Never   Substance and Sexual Activity    Alcohol use: No    Drug use: No    Sexual activity: Yes     Partners: Male     Birth control/protection: None   Other Topics Concern     Are you pregnant or think you may be? No    Breast-feeding No   Social History Narrative    1 dog, no smokers in household.       Current Outpatient Medications:     tirzepatide (MOUNJARO) 5 mg/0.5 mL PnIj, Inject 5 mg into the skin every 7 days., Disp: 4 pen, Rfl: 11    acetaminophen (TYLENOL) 500 mg Cap, as needed. , Disp: , Rfl:     EUFLEXXA 10 mg/mL(mw 2.4 -3.6 million) IAtc injection, , Disp: , Rfl:     flash glucose sensor (FREESTYLE AMELIA 2 SENSOR) Kit, . (Patient not taking: Reported on 4/26/2023), Disp: 2 kit, Rfl: 11    flu vac 2022 65up-oebWX26V,PF, (FLUAD QUAD 2022-23,65Y UP,,PF,) 60 mcg (15 mcg x 4)/0.5 mL Syrg, Inject 0.5 mLs into the muscle. (Patient not taking: Reported on 4/26/2023), Disp: 0.5 mL, Rfl: 0    fluticasone propionate (FLONASE) 50 mcg/actuation nasal spray, 2 sprays (100 mcg total) by Each Nostril route once daily. (Patient not taking: Reported on 4/26/2023), Disp: 3 Bottle, Rfl: 3    ibandronate (BONIVA) 150 mg tablet, TAKE 1 TABLET(150 MG) BY MOUTH EVERY 30 DAYS (Patient not taking: Reported on 4/26/2023), Disp: 3 tablet, Rfl: 3    montelukast (SINGULAIR) 10 mg tablet, TAKE 1 TABLET(10 MG) BY MOUTH EVERY EVENING (Patient not taking: Reported on 4/26/2023), Disp: 90 tablet, Rfl: 3  Review of patient's allergies indicates:  No Known Allergies  Review of Systems   Constitutional:  Negative for chills, fever and weight loss.   Respiratory:  Negative for shortness of breath.    Cardiovascular:  Negative for chest pain and palpitations.     Objective:   Body mass index is 26.83 kg/m².  There were no vitals filed for this visit.        Ortho/SPM Exam-alert and oriented well-nourished well-developed ambulatory no acute distress     Respiratory effort appears normal     Knees-nontender palpation no acute deformity or swelling  Range of motion 0-110 degrees line joints are stable with negative Lachman's   Strength is 4/5   Neurovascular intact     Psychiatric good affect  cognition    Plan-continue home exercise program.  Plan on Visco gel injections again at appropriate timing [place] for her.  Provide temporary 1 year handicap form.    There are no Patient Instructions on file for this visit.    IMAGING: X-ray Knee Ortho Bilateral with Flexion  Narrative: EXAM:  XR KNEE ORTHO BILAT WITH FLEXION    INDICATIONS:  Bilateral knee pain    TECHNIQUE:  4 provided views of each knee    COMPARISONS:  None available.    FINDINGS:  No fractures nor dislocations.  Early peripheral osteophytes are present primarily at the medial tibial femoral compartments with early joint space loss in both knees medial greater than lateral.  No joint effusions.  No soft tissue abnormalities.  Impression:  Early osteoarthritic changes with medial joint space primarily involved in both knees, right knee worse than left    Finalized on: 4/26/2023 3:44 PM By:  Ortega Mejia MD  R# [id number]      2023-04-26 15:[id number]    [facility]       Radiographs / Imaging : Relevant imaging results reviewed by me and interpreted by me, discussed with the patient and / or family -agree with knee x-ray report      Assessment:     Encounter Diagnosis   Name Primary?    Right knee pain, unspecified chronicity Yes        Plan:   Right knee pain, unspecified chronicity        The patient verbalized good understanding of the medical issues discussed today and expressed appreciation for the care provided.  Patient was given the opportunity to ask questions and be an active participant in their medical care. Patient had no further questions or concerns at this time.     The patient was encouraged to participate in appropriate physical activity.      Tobias Kaye M.D.  [facility]        This note was dictated using voice recognition software and may have sound a like errors.

## 2023-04-26 NOTE — PATIENT INSTRUCTIONS
"Apply ice to affected area three times a day for (15) fifteen minutes for the next 48 hours, and reduce activity during that time.  Voltaren gel three times a day to affected area if recommended.  Oral medication if recommended.  Physical therapy if recommended at home or at clinic.  Keep recheck visit. Patient Education       Chronic Knee Pain   About this topic   The knee is a large, complex joint. It is made up of 4 bones: The thigh bone, two lower leg bones, and the kneecap. There is a capsule around the joint. Cartilage acts like a shock absorber in the knee and reduces friction, which helps the knee move more smoothly. The knee also has ligaments and tendons. Ligaments are bands of tissue that join one bone to another. Tendons are bands of tissue that join muscles to the bone. There are many muscles around the knee and in front and back of the thigh. If there is a problem with any of these parts of the joint, you can have pain in your knee.       What are the causes?   Arthritis ? There are a few types of arthritis which all cause swelling of a joint. Osteoarthritis is the most common and happens over time with "wear and tear" on the joint.  Overuse ? Repeat motions or too much bending at the knee can lead to pain.  Bursitis ? Bursae are small fluid-filled sacs that help tendons glide easier. These can get swollen and hurt. This often happens to people who do work on their knees, like gardeners, roofers, and carpet layers.  Ligament tear or sprain ? Ligament injuries can make the knee shaky or unstable and painful.  Meniscus tear ? Injuries to the meniscus or cartilage can make your knee lock and cause pain with some movements.  Muscle strain ? Injuries to the muscles near the knee happen often with sports. If these do not heal the right way, ongoing pain can happen.  Patellar tendinopathy ? The tendon that goes over your kneecap can get swollen and hurt due to overuse and repetitive stress on the " knee.  Chondromalacia patella ? This is a health problem where there is pain under the kneecap from cartilage being worn.  Dislocating kneecap ? If your kneecap slips out of its groove, pain can happen.  Baker's cyst ? This is a lump that is behind the knee. This is also known as a popliteal cyst.  Hip, ankle, back problems ? Problems in the back and in nearby joints, such as the hip and ankle, can cause pain in the knee.  Fluid collection ? Fluid can collect in the knee joint after a knee injury and lead to pain if not treated.  Osgood-Schlatter disease ? This is a health problem seen in children and teens. The front of the knee below the kneecap is bothered by repeat movements.  Osteochondritis dissecans ? This is a health problem seen in children and teens. There is a problem with the blood flow to the bone and cartilage of the knee.  Plica syndrome ? Plica are folds of tissue that are left over from growth before birth. These can get sore and cause pain.  Patellofemoral pain syndrome - This happens when you have pain in front of your knee or around or behind your kneecap.  Iliotibial band syndrome - This happens when the iliotibial band, tissues that runs down the outside of the thigh from the hip to the shin, is overused. It causes pain on the outside of the knee.  What are the main signs?   Pain or stiffness  Swelling  Warmth or redness  Visible deformity  Hard to walk, go up or down stairs, or put weight on your leg  Knee locking ? not able to bend or straighten your knee fully  Knee is weak, tabatha, or gives way  Crunching and popping noises in the knee  How does the doctor diagnose this health problem?   Your doctor will look at your knee. Your doctor will feel all over your knee to find where the pain is. The doctor may move your knee and push or pull on many parts to check your motion and strength. Your doctor may have you stand and walk to see if your knee is stable. The doctor may order:  Blood  tests  X-ray  CT or MRI scan  Ultrasound  Surgery ? At times, arthroscopic surgery may be needed to find the cause of the pain.  How does the doctor treat this health problem?   Finding out the true cause of your knee pain is the most important step to fix the problem and lower your pain. Some things that may lessen your knee pain are:  Rest  Ice  Compression  Elevation - keeping the knee raised  Braces or supports  Heat may be used later but not right away. Heat can make swelling worse.  Exercises to stretch and strengthen the knee  Physical therapy (PT)  Surgery  What drugs may be needed?   The doctor may order drugs to:  Help with pain and swelling  Fight an infection  The doctor may give you a shot of an anti-inflammatory drug called a corticosteroid. This will help with swelling. Talk with your doctor about the risks of this shot.  What can be done to prevent this health problem?   If your knee pain is due to overuse, do not do repetitive movements that caused the problem if possible.  Take breaks often when doing things that use repeat movements.  Do not sit or keep your knee in one position for long periods of time.  If you sleep on your side, use a pillow in between your legs. This can help take stress off of the knee.  Always warm up and stretch before a workout and cool down after.  If you are a runner, actively stretch before a run. Talk to your doctor to make sure you understand the difference between active and passive stretching. Use good ways to train, such as slowly adding to how far you run.  Run on softer surfaces such as a track. This is easier on your knee than a hard surface like cement.  Try activities like swimming or biking rather than running. Running can put a lot of stress on your knee joint.  Stay away from activities that could result in twisting, sudden stops and starts, and blows to the knee. Sports such as basketball, skiing, football, and jogging are some common sports that can lead  to knee injuries.  Wear shoes with good support. Replace your shoes often.  Keep a healthy weight. Being too heavy puts more stress on the knee joint. This makes the knee more at risk for injury.  Stay active and work out to keep your muscles strong and flexible.  Where can I learn more?   NHS Choices  http://www.nhs.uk/conditions/knee-pain/Pages/Introduction.aspx   Last Reviewed Date   2020-04-23  Consumer Information Use and Disclaimer   This information is not specific medical advice and does not replace information you receive from your health care provider. This is only a brief summary of general information. It does NOT include all information about conditions, illnesses, injuries, tests, procedures, treatments, therapies, discharge instructions or life-style choices that may apply to you. You must talk with your health care provider for complete information about your health and treatment options. This information should not be used to decide whether or not to accept your health care providers advice, instructions or recommendations. Only your health care provider has the knowledge and training to provide advice that is right for you.  Copyright   Copyright © 2021 UpToDate, Inc. and its affiliates and/or licensors. All rights reserved.  Patient Education       Viscosupplementation   Why is this procedure done?   Your joints are where two bones meet. The ends of the bones are covered with a protective coating of cartilage. This helps them glide smoothly during movement. A fluid also helps the joint move more smoothly. With years of use, the cartilage may begin to wear away. This causes pain in the joints. This procedure is done to relieve the pain. A special fluid is used to help the bones glide more easily. It also acts like a shock absorber. This is most often done on the knee joints.  What will the results be?   Lubricates the joint  Less pain in the joints during movement or weight bearing  Improved joint  mobility or movement  What happens before the procedure?   Your doctor may ask you to try other ways to treat osteoarthritis or OA, such as exercise, physical therapy, drugs for pain, applying hot compress, and losing weight.  Talk to your doctor about all the drugs you are taking. Be sure to include all prescription and over-the-counter (OTC) drugs, and herbal supplements. Tell the doctor about any drug allergy. Bring a list of drugs you take with you. Some drugs may interact with the injection.  What happens during the procedure?   Your doctor will clean the skin area where the injection will be given. You will be given a drug called local anesthesia. This will numb your skin and you will not feel any pain.  In some cases, your doctor might use imaging like x-ray or ultrasound to make sure they inject the right spot.  If you have excess fluid in your joint, your doctor may remove the fluid before beginning.  A needle will be used to inject the hyaluronic acid into the joint. Hyaluronic acid is a natural fluid in your body. It lubricates the joint to ease body movements.  The doctor will cover the injection site with a small bandage to prevent infection.  The procedure may only take a couple of minutes.  What happens after the procedure?   You may feel slight pain, warmth, and swelling around the joint area.  You will be able to go home after the injection.  What care is needed at home?   Ask your doctor what you need to do when you go home. Make sure you ask questions if you do not understand what the doctor says. This way you will know what you need to do.  Place an ice pack or a bag of frozen peas wrapped in a towel over the painful part. Never put ice right on the skin. Do not leave the ice on more than 10 to 15 minutes at a time.  Rest for the first few days after the procedure. Avoid strenuous activities like heavy lifting, jogging, standing for a long time, and hard exercise.  What follow-up care is needed?    Your doctor may ask you to make visits to the office to check on your progress. Be sure to keep these visits. Your doctor may want you to have more injections.  What lifestyle changes are needed?   Ask your doctor about when you can go back to your normal activities like exercise or work.  What problems could happen?   Pain, redness, and swelling around the injection site  Infection of the joint  Fever  Allergic reaction  Itching  Rash  Bruising  Bleeding in the joint  No change in pain after injection  Where can I learn more?   American Academy of Orth[employer]geons  http://orthoinfo.aaos.org/topic.cfm?hrxux=z83355   Last Reviewed Date   2021-03-30  Consumer Information Use and Disclaimer   This information is not specific medical advice and does not replace information you receive from your health care provider. This is only a brief summary of general information. It does NOT include all information about conditions, illnesses, injuries, tests, procedures, treatments, therapies, discharge instructions or life-style choices that may apply to you. You must talk with your health care provider for complete information about your health and treatment options. This information should not be used to decide whether or not to accept your health care providers advice, instructions or recommendations. Only your health care provider has the knowledge and training to provide advice that is right for you.  Copyright   Copyright © 2021 UpToDate, Inc. and its affiliates and/or licensors. All rights reserved.

## 2023-05-14 ENCOUNTER — PATIENT MESSAGE (OUTPATIENT)
Dept: PRIMARY CARE CLINIC | Facility: CLINIC | Age: 78
End: 2023-05-14
Payer: MEDICARE

## 2023-06-20 ENCOUNTER — PES CALL (OUTPATIENT)
Dept: ADMINISTRATIVE | Facility: CLINIC | Age: 78
End: 2023-06-20
Payer: MEDICARE

## 2023-06-30 ENCOUNTER — TELEPHONE (OUTPATIENT)
Dept: PRIMARY CARE CLINIC | Facility: CLINIC | Age: 78
End: 2023-06-30
Payer: MEDICARE

## 2023-06-30 DIAGNOSIS — E66.9 CLASS 1 OBESITY WITH SERIOUS COMORBIDITY AND BODY MASS INDEX (BMI) OF 31.0 TO 31.9 IN ADULT, UNSPECIFIED OBESITY TYPE: Primary | ICD-10-CM

## 2023-06-30 DIAGNOSIS — E66.09 CLASS 1 OBESITY DUE TO EXCESS CALORIES WITH SERIOUS COMORBIDITY AND BODY MASS INDEX (BMI) OF 33.0 TO 33.9 IN ADULT: ICD-10-CM

## 2023-06-30 NOTE — TELEPHONE ENCOUNTER
Called and spoke with pt; doing 5 mg but is out of stock.   She would like to try the 7.5 mg . She denies any se's.   Pt cautioned to monitor any dehydration or se's. NO renal issues. No se's at all. She is aware to seek care if any issues.   NO fam hx of pancreatitis, med thyroid ca,  Pt to call for f/u appt.   She will likely decrease to 5 mg. Has done well on it.    rdf  ----- Message from Stacy Little MA sent at 6/30/2023 10:04 AM CDT -----  Contact: Hiwot    ----- Message -----  From: Bev Adames  Sent: 6/30/2023   9:35 AM CDT  To: [facility] Staff    Patient is calling in regards to tirzepatide (MOUNJARO) 5 mg/0.5 mL PnIj. Reports drug store is having a shortage on the 2.5 and 5 mg dosages on the medication. Reports that they have the 7.5mg in stock and is wondering if that can be called in, as she is out of the 5mg and will be going out f [facility]. Return call at .[phone]      .  [id number] - [place] - [address] [facility] [address]  [address] [address]  Phone: [phone] Fax: [phone]

## 2023-07-07 ENCOUNTER — PATIENT MESSAGE (OUTPATIENT)
Dept: INFECTIOUS DISEASES | Facility: CLINIC | Age: 78
End: 2023-07-07
Payer: MEDICARE

## 2023-07-10 ENCOUNTER — PATIENT MESSAGE (OUTPATIENT)
Dept: INTERNAL MEDICINE | Facility: CLINIC | Age: 78
End: 2023-07-10
Payer: MEDICARE

## 2023-07-10 RX ORDER — IBUPROFEN 800 MG/1
800 TABLET ORAL EVERY 6 HOURS PRN
COMMUNITY
End: 2023-07-10 | Stop reason: SDUPTHER

## 2023-07-10 RX ORDER — ERGOCALCIFEROL 1.25 MG/1
50000 CAPSULE ORAL
COMMUNITY
End: 2023-07-10 | Stop reason: SDUPTHER

## 2023-07-11 RX ORDER — ERGOCALCIFEROL 1.25 MG/1
50000 CAPSULE ORAL
Qty: 24 CAPSULE | Refills: 3 | Status: SHIPPED | OUTPATIENT
Start: 2023-07-13

## 2023-07-11 RX ORDER — IBUPROFEN 800 MG/1
800 TABLET ORAL EVERY 6 HOURS PRN
Qty: 270 TABLET | Refills: 3 | Status: SHIPPED | OUTPATIENT
Start: 2023-07-11

## 2023-10-14 ENCOUNTER — OFFICE VISIT (OUTPATIENT)
Dept: URGENT CARE | Facility: CLINIC | Age: 78
End: 2023-10-14
Payer: MEDICARE

## 2023-10-14 VITALS
HEART RATE: 67 BPM | DIASTOLIC BLOOD PRESSURE: 71 MMHG | TEMPERATURE: 98 F | BODY MASS INDEX: 24.55 KG/M2 | OXYGEN SATURATION: 98 % | SYSTOLIC BLOOD PRESSURE: 147 MMHG | HEIGHT: 61 IN | WEIGHT: 130 LBS | RESPIRATION RATE: 20 BRPM

## 2023-10-14 DIAGNOSIS — R30.0 DYSURIA: Primary | ICD-10-CM

## 2023-10-14 DIAGNOSIS — R82.90 ABNORMAL URINALYSIS: ICD-10-CM

## 2023-10-14 LAB
BILIRUB UR QL STRIP: NEGATIVE
GLUCOSE UR QL STRIP: NEGATIVE
KETONES UR QL STRIP: NEGATIVE
LEUKOCYTE ESTERASE UR QL STRIP: POSITIVE
PH, POC UA: 6.5
POC BLOOD, URINE: POSITIVE
POC NITRATES, URINE: NEGATIVE
PROT UR QL STRIP: POSITIVE
SP GR UR STRIP: 1 (ref 1–1.03)
UROBILINOGEN UR STRIP-ACNC: ABNORMAL (ref 0.1–1.1)

## 2023-10-14 PROCEDURE — 81003 URINALYSIS AUTO W/O SCOPE: CPT | Mod: QW,S$GLB,ICN, | Performed by: NURSE PRACTITIONER

## 2023-10-14 PROCEDURE — 99214 OFFICE O/P EST MOD 30 MIN: CPT | Mod: S$GLB,ICN,, | Performed by: NURSE PRACTITIONER

## 2023-10-14 PROCEDURE — 87086 URINE CULTURE/COLONY COUNT: CPT | Mod: HCNC | Performed by: NURSE PRACTITIONER

## 2023-10-14 PROCEDURE — 99214 PR OFFICE/OUTPT VISIT, EST, LEVL IV, 30-39 MIN: ICD-10-PCS | Mod: S$GLB,ICN,, | Performed by: NURSE PRACTITIONER

## 2023-10-14 PROCEDURE — 81003 POCT URINALYSIS, DIPSTICK, AUTOMATED, W/O SCOPE: ICD-10-PCS | Mod: QW,S$GLB,ICN, | Performed by: NURSE PRACTITIONER

## 2023-10-14 RX ORDER — CEPHALEXIN 500 MG/1
500 CAPSULE ORAL EVERY 12 HOURS
Qty: 14 CAPSULE | Refills: 0 | Status: SHIPPED | OUTPATIENT
Start: 2023-10-14 | End: 2023-10-21

## 2023-10-14 RX ORDER — TIRZEPATIDE 5 MG/.5ML
INJECTION, SOLUTION SUBCUTANEOUS
COMMUNITY
Start: 2023-07-26 | End: 2023-10-14

## 2023-10-14 RX ORDER — CEPHALEXIN 500 MG/1
500 CAPSULE ORAL EVERY 12 HOURS
Qty: 14 CAPSULE | Refills: 0 | Status: CANCELLED | OUTPATIENT
Start: 2023-10-14 | End: 2023-10-21

## 2023-10-14 NOTE — PROGRESS NOTES
"Subjective:      Patient ID: Hiwot Coulter is a 78 y.o. female.    Vitals:  height is 5' 1" (1.549 m) and weight is 59 kg (130 lb). Her oral temperature is 98.1 °F (36.7 °C). Her blood pressure is 147/71 (abnormal) and her pulse is 67. Her respiration is 20 and oxygen saturation is 98%.     Chief Complaint: Dysuria    Dysuria   This is a new problem. The current episode started acute onset. The problem occurs every urination. The quality of the pain is described as burning. The pain is at a severity of 2/10. The pain is mild. There has been no fever. She is Not sexually active. There is No history of pyelonephritis. Associated symptoms include frequency, hematuria, sweats and urgency. Pertinent negatives include no chills, discharge, flank pain, nausea or constipation. She has tried nothing (cranberry juice) for the symptoms. There is no history of catheterization, diabetes mellitus, hypertension, kidney stones, recurrent UTIs or a single kidney.       Constitution: Negative for chills.   Gastrointestinal:  Negative for nausea and constipation.   Genitourinary:  Positive for dysuria, frequency, urgency and hematuria. Negative for flank pain.      Objective:     Vitals:    10/14/23 0930   BP: (!) 147/71   BP Location: Left arm   Patient Position: Sitting   BP Method: Medium (Automatic)   Pulse: 67   Resp: 20   Temp: 98.1 °F (36.7 °C)   TempSrc: Oral   SpO2: 98%   Weight: 59 kg (130 lb)   Height: 5' 1" (1.549 m)         Physical Exam   Constitutional: She is oriented to person, place, and time. She appears well-developed.   HENT:   Head: Normocephalic and atraumatic.   Ears:   Right Ear: External ear normal.   Left Ear: External ear normal.   Nose: Nose normal. No nasal deformity. No epistaxis.   Mouth/Throat: Oropharynx is clear and moist and mucous membranes are normal.   Eyes: Lids are normal.   Neck: Trachea normal and phonation normal. Neck supple.   Cardiovascular: Normal pulses.   Pulmonary/Chest: Effort " normal.   Abdominal: Normal appearance and bowel sounds are normal. She exhibits no distension. Soft. There is no abdominal tenderness.      Comments: Suprapubic tenderness    Neurological: She is alert and oriented to person, place, and time.   Skin: Skin is warm, dry and intact.   Psychiatric: Her speech is normal and behavior is normal.   Nursing note and vitals reviewed.      Assessment:     1. Dysuria    2. Abnormal urinalysis      Results for orders placed or performed in visit on 10/14/23   POCT Urinalysis, Dipstick, Automated, W/O Scope   Result Value Ref Range    POC Blood, Urine Positive (A) Negative    POC Bilirubin, Urine Negative Negative    POC Urobilinogen, Urine norm 0.1 - 1.1    POC Ketones, Urine Negative Negative    POC Protein, Urine Positive (A) Negative    POC Nitrates, Urine Negative Negative    POC Glucose, Urine Negative Negative    pH, UA 6.5     POC Specific Gravity, Urine 1.005 1.003 - 1.029    POC Leukocytes, Urine Positive (A) Negative       Plan:   Patient stable for discharge and home management of condition      Dysuria  -     POCT Urinalysis, Dipstick, Automated, W/O Scope  -     Urine culture    Abnormal urinalysis  -     Urine culture    Other orders  -     cephALEXin (KEFLEX) 500 MG capsule; Take 1 capsule (500 mg total) by mouth every 12 (twelve) hours. for 7 days  Dispense: 14 capsule; Refill: 0      Patient Instructions   Increase fluids   Void promptly   Urine culture pending 3-5 days   Wipe front to back   Any fever, chills, nausea/vomiting, > flank pain to ER

## 2023-10-18 ENCOUNTER — TELEPHONE (OUTPATIENT)
Dept: URGENT CARE | Facility: CLINIC | Age: 78
End: 2023-10-18
Payer: MEDICARE

## 2023-10-18 LAB — BACTERIA UR CULT: NORMAL

## 2023-10-18 NOTE — TELEPHONE ENCOUNTER
Called regarding urine culture results. (No growth). Pt reports symptoms have improved with abx. No questions or concerns at this time.

## 2023-12-01 ENCOUNTER — LAB VISIT (OUTPATIENT)
Dept: LAB | Facility: HOSPITAL | Age: 78
End: 2023-12-01
Attending: PEDIATRICS
Payer: MEDICARE

## 2023-12-01 DIAGNOSIS — E55.9 VITAMIN D DEFICIENCY: ICD-10-CM

## 2023-12-01 DIAGNOSIS — E78.5 HYPERLIPIDEMIA, UNSPECIFIED HYPERLIPIDEMIA TYPE: ICD-10-CM

## 2023-12-01 LAB
25(OH)D3+25(OH)D2 SERPL-MCNC: 34 NG/ML (ref 30–96)
ALBUMIN SERPL BCP-MCNC: 3.9 G/DL (ref 3.5–5.2)
ALP SERPL-CCNC: 49 U/L (ref 55–135)
ALT SERPL W/O P-5'-P-CCNC: 10 U/L (ref 10–44)
ANION GAP SERPL CALC-SCNC: 6 MMOL/L (ref 8–16)
AST SERPL-CCNC: 19 U/L (ref 10–40)
BILIRUB SERPL-MCNC: 0.6 MG/DL (ref 0.1–1)
BUN SERPL-MCNC: 23 MG/DL (ref 8–23)
CALCIUM SERPL-MCNC: 9.6 MG/DL (ref 8.7–10.5)
CHLORIDE SERPL-SCNC: 105 MMOL/L (ref 95–110)
CHOLEST SERPL-MCNC: 193 MG/DL (ref 120–199)
CHOLEST/HDLC SERPL: 2.8 {RATIO} (ref 2–5)
CO2 SERPL-SCNC: 26 MMOL/L (ref 23–29)
CREAT SERPL-MCNC: 0.8 MG/DL (ref 0.5–1.4)
EST. GFR  (NO RACE VARIABLE): >60 ML/MIN/1.73 M^2
GLUCOSE SERPL-MCNC: 79 MG/DL (ref 70–110)
HDLC SERPL-MCNC: 68 MG/DL (ref 40–75)
HDLC SERPL: 35.2 % (ref 20–50)
LDLC SERPL CALC-MCNC: 115.6 MG/DL (ref 63–159)
NONHDLC SERPL-MCNC: 125 MG/DL
POTASSIUM SERPL-SCNC: 4.4 MMOL/L (ref 3.5–5.1)
PROT SERPL-MCNC: 6.8 G/DL (ref 6–8.4)
SODIUM SERPL-SCNC: 137 MMOL/L (ref 136–145)
TRIGL SERPL-MCNC: 47 MG/DL (ref 30–150)

## 2023-12-01 PROCEDURE — 80053 COMPREHEN METABOLIC PANEL: CPT | Mod: HCNC | Performed by: PEDIATRICS

## 2023-12-01 PROCEDURE — 36415 COLL VENOUS BLD VENIPUNCTURE: CPT | Mod: HCNC | Performed by: PEDIATRICS

## 2023-12-01 PROCEDURE — 82306 VITAMIN D 25 HYDROXY: CPT | Mod: HCNC | Performed by: PEDIATRICS

## 2023-12-01 PROCEDURE — 80061 LIPID PANEL: CPT | Mod: HCNC | Performed by: PEDIATRICS

## 2023-12-08 ENCOUNTER — OFFICE VISIT (OUTPATIENT)
Dept: OPHTHALMOLOGY | Facility: CLINIC | Age: 78
End: 2023-12-08
Payer: MEDICARE

## 2023-12-08 ENCOUNTER — OFFICE VISIT (OUTPATIENT)
Dept: INTERNAL MEDICINE | Facility: CLINIC | Age: 78
End: 2023-12-08
Payer: MEDICARE

## 2023-12-08 VITALS
HEART RATE: 61 BPM | OXYGEN SATURATION: 97 % | SYSTOLIC BLOOD PRESSURE: 124 MMHG | TEMPERATURE: 97 F | RESPIRATION RATE: 16 BRPM | HEIGHT: 61 IN | WEIGHT: 136.25 LBS | BODY MASS INDEX: 25.72 KG/M2 | DIASTOLIC BLOOD PRESSURE: 72 MMHG

## 2023-12-08 DIAGNOSIS — K21.9 GASTROESOPHAGEAL REFLUX DISEASE WITHOUT ESOPHAGITIS: Chronic | ICD-10-CM

## 2023-12-08 DIAGNOSIS — H52.7 REFRACTION DISORDER: ICD-10-CM

## 2023-12-08 DIAGNOSIS — M47.9 OSTEOARTHRITIS OF LOWER BACK: ICD-10-CM

## 2023-12-08 DIAGNOSIS — M85.89 OSTEOPENIA OF MULTIPLE SITES: ICD-10-CM

## 2023-12-08 DIAGNOSIS — G47.33 OSA ON CPAP: ICD-10-CM

## 2023-12-08 DIAGNOSIS — H40.023 OAG (OPEN ANGLE GLAUCOMA) SUSPECT, HIGH RISK, BILATERAL: Primary | ICD-10-CM

## 2023-12-08 DIAGNOSIS — F41.9 ANXIETY: ICD-10-CM

## 2023-12-08 DIAGNOSIS — Z12.31 BREAST CANCER SCREENING BY MAMMOGRAM: ICD-10-CM

## 2023-12-08 DIAGNOSIS — Z00.00 WELL ADULT EXAM: Primary | ICD-10-CM

## 2023-12-08 DIAGNOSIS — H35.30 AGE-RELATED MACULAR DEGENERATION: ICD-10-CM

## 2023-12-08 DIAGNOSIS — M15.9 PRIMARY OSTEOARTHRITIS INVOLVING MULTIPLE JOINTS: ICD-10-CM

## 2023-12-08 DIAGNOSIS — Z96.1 PSEUDOPHAKIA: ICD-10-CM

## 2023-12-08 DIAGNOSIS — J31.0 CHRONIC RHINITIS: ICD-10-CM

## 2023-12-08 DIAGNOSIS — E78.5 HYPERLIPIDEMIA, UNSPECIFIED HYPERLIPIDEMIA TYPE: ICD-10-CM

## 2023-12-08 DIAGNOSIS — E55.9 VITAMIN D DEFICIENCY: ICD-10-CM

## 2023-12-08 PROCEDURE — 1159F MED LIST DOCD IN RCRD: CPT | Mod: HCNC,CPTII,S$GLB, | Performed by: PEDIATRICS

## 2023-12-08 PROCEDURE — 3074F SYST BP LT 130 MM HG: CPT | Mod: HCNC,CPTII,S$GLB, | Performed by: PEDIATRICS

## 2023-12-08 PROCEDURE — 1101F PT FALLS ASSESS-DOCD LE1/YR: CPT | Mod: HCNC,CPTII,S$GLB, | Performed by: PEDIATRICS

## 2023-12-08 PROCEDURE — 1101F PR PT FALLS ASSESS DOC 0-1 FALLS W/OUT INJ PAST YR: ICD-10-PCS | Mod: HCNC,CPTII,S$GLB, | Performed by: PEDIATRICS

## 2023-12-08 PROCEDURE — 92133 CPTRZD OPH DX IMG PST SGM ON: CPT | Mod: HCNC,S$GLB,, | Performed by: OPHTHALMOLOGY

## 2023-12-08 PROCEDURE — 1160F RVW MEDS BY RX/DR IN RCRD: CPT | Mod: HCNC,CPTII,S$GLB, | Performed by: PEDIATRICS

## 2023-12-08 PROCEDURE — 99999 PR PBB SHADOW E&M-EST. PATIENT-LVL IV: CPT | Mod: PBBFAC,HCNC,, | Performed by: PEDIATRICS

## 2023-12-08 PROCEDURE — 92133 POSTERIOR SEGMENT OCT OPTIC NERVE(OCULAR COHERENCE TOMOGRAPHY) - OU - BOTH EYES: ICD-10-PCS | Mod: HCNC,S$GLB,, | Performed by: OPHTHALMOLOGY

## 2023-12-08 PROCEDURE — 3078F PR MOST RECENT DIASTOLIC BLOOD PRESSURE < 80 MM HG: ICD-10-PCS | Mod: HCNC,CPTII,S$GLB, | Performed by: PEDIATRICS

## 2023-12-08 PROCEDURE — 1159F PR MEDICATION LIST DOCUMENTED IN MEDICAL RECORD: ICD-10-PCS | Mod: HCNC,CPTII,S$GLB, | Performed by: PEDIATRICS

## 2023-12-08 PROCEDURE — 3288F FALL RISK ASSESSMENT DOCD: CPT | Mod: HCNC,CPTII,S$GLB, | Performed by: PEDIATRICS

## 2023-12-08 PROCEDURE — 3078F DIAST BP <80 MM HG: CPT | Mod: HCNC,CPTII,S$GLB, | Performed by: PEDIATRICS

## 2023-12-08 PROCEDURE — 92015 DETERMINE REFRACTIVE STATE: CPT | Mod: HCNC,S$GLB,, | Performed by: OPHTHALMOLOGY

## 2023-12-08 PROCEDURE — 3074F PR MOST RECENT SYSTOLIC BLOOD PRESSURE < 130 MM HG: ICD-10-PCS | Mod: HCNC,CPTII,S$GLB, | Performed by: PEDIATRICS

## 2023-12-08 PROCEDURE — 92015 PR REFRACTION: ICD-10-PCS | Mod: HCNC,S$GLB,, | Performed by: OPHTHALMOLOGY

## 2023-12-08 PROCEDURE — 1126F PR PAIN SEVERITY QUANTIFIED, NO PAIN PRESENT: ICD-10-PCS | Mod: HCNC,CPTII,S$GLB, | Performed by: PEDIATRICS

## 2023-12-08 PROCEDURE — 99999 PR PBB SHADOW E&M-EST. PATIENT-LVL IV: ICD-10-PCS | Mod: PBBFAC,HCNC,, | Performed by: PEDIATRICS

## 2023-12-08 PROCEDURE — 92083 HUMPHREY VISUAL FIELD - OU - BOTH EYES: ICD-10-PCS | Mod: HCNC,S$GLB,, | Performed by: OPHTHALMOLOGY

## 2023-12-08 PROCEDURE — 1126F AMNT PAIN NOTED NONE PRSNT: CPT | Mod: HCNC,CPTII,S$GLB, | Performed by: PEDIATRICS

## 2023-12-08 PROCEDURE — 99214 OFFICE O/P EST MOD 30 MIN: CPT | Mod: HCNC,S$GLB,, | Performed by: OPHTHALMOLOGY

## 2023-12-08 PROCEDURE — 99214 PR OFFICE/OUTPT VISIT, EST, LEVL IV, 30-39 MIN: ICD-10-PCS | Mod: HCNC,S$GLB,, | Performed by: OPHTHALMOLOGY

## 2023-12-08 PROCEDURE — 99397 PR PREVENTIVE VISIT,EST,65 & OVER: ICD-10-PCS | Mod: HCNC,S$GLB,, | Performed by: PEDIATRICS

## 2023-12-08 PROCEDURE — 3288F PR FALLS RISK ASSESSMENT DOCUMENTED: ICD-10-PCS | Mod: HCNC,CPTII,S$GLB, | Performed by: PEDIATRICS

## 2023-12-08 PROCEDURE — 92083 EXTENDED VISUAL FIELD XM: CPT | Mod: HCNC,S$GLB,, | Performed by: OPHTHALMOLOGY

## 2023-12-08 PROCEDURE — 1160F PR REVIEW ALL MEDS BY PRESCRIBER/CLIN PHARMACIST DOCUMENTED: ICD-10-PCS | Mod: HCNC,CPTII,S$GLB, | Performed by: PEDIATRICS

## 2023-12-08 PROCEDURE — 99397 PER PM REEVAL EST PAT 65+ YR: CPT | Mod: HCNC,S$GLB,, | Performed by: PEDIATRICS

## 2023-12-08 RX ORDER — TIRZEPATIDE 5 MG/.5ML
INJECTION, SOLUTION SUBCUTANEOUS
COMMUNITY
Start: 2023-12-05

## 2023-12-08 NOTE — PROGRESS NOTES
HPI     Glaucoma Suspect            Comments: IOP check with [facility] and [facility]      Patient states OU is doing well, but feel VA has decreased at all ranges   gradually over the last year, she currently has an RX for computer   bifocals and would like to get a RX for distance bifocals and computer   bifocals.          Comments    1. PCIOL OD 7/18/19 +20.0WF/CDE 10.73   PCIOL OS +21.0 SN60WF / CDE: 12.02 / 08/22/19   2. Glaucoma Suspect   3. Negative FH glaucoma   SLT OD 11/5/20   + CPAP     No drops             Last edited by Marjorie Zuniga, Patient Care Assistant on 12/8/2023  9:06   AM.            Assessment /Plan     For exam results, see Encounter Report.      ICD-10-CM ICD-9-CM    1. OAG (open angle glaucoma) suspect, high risk, bilateral  H40.023 365.05 Paz Visual Field - OU - Extended - Both Eyes      Posterior Segment OCT Optic Nerve- Both eyes      2. Age-related macular degeneration  H35.30 362.50 Mild, follow       3. Pseudophakia  Z96.1 V43.1 Doing well       4. Refraction - disp MR - pt request computer glasses and TF Rx     RETURN TO CLINIC 1 year with GOCT   Disp MR

## 2023-12-08 NOTE — PROGRESS NOTES
Subjective:       Patient ID: Hiwot Coulter is a 78 y.o. female.     Chief Complaint: Annual Exam    Hiwot Coulter is a 77 y.o. female who presents to clinic for her annual.     1) LIPIDS: following D&E (low salt diet).   2) Obesity: Has been taking Mounjaro 5 mg and has been successful without side effects.  3) Osteoporosis: on boniva recent dexascan showed interval improvement   4) Quiet anxiety. No buspar/lexapro. [place]  website shows no interval xanax/narcotic use.   5) GERD Quiet on treatment, rare pepcid use.  6) Vitamin D: using twice weekly D   7) Osteoarthritis: overall better, is seeing sports med/ortho, [place] [employer] [facility] website reviewed no hydrocodone use.   8) JAMAL/sleep/RLS: sees pulmonary. Still has occasional issues with falling asleep.  9) Chronic rhinitis: quiet with flonase and claritin D. Does have chronic cough without SOB/CONNOLLY      LABS REVIEWED AND DISCUSSED WITH PATIENT: CMP and lipids      Review of Systems   Constitutional:  Negative for activity change, appetite change, chills, diaphoresis, fatigue, fever and unexpected weight change.   HENT:  Negative for nasal congestion, ear pain, mouth sores, nosebleeds, postnasal drip, rhinorrhea, sneezing and sore throat.    Eyes:  Negative for photophobia, pain, discharge, redness and visual disturbance.   Respiratory: Negative for chest tightness, shortness of breath, wheezing and stridor.    Cardiovascular:  Negative for chest pain, palpitations and leg swelling.   Gastrointestinal:  Negative for constipation, diarrhea, nausea and vomiting.   Genitourinary:  Negative for decreased urine volume, difficulty urinating, dysuria, flank pain, frequency, hematuri. Has urgency and leakage.   Musculoskeletal:  Positive for arthralgias and myalgias. Negative for back pain, joint swelling, neck pain and neck stiffness.   Integumentary:   Negative for color change and rash.   Neurological:  Negative for dizziness, syncope, speech difficulty, weakness, light-headedness  and headaches.   Hematological:  Negative for adenopathy. Does not bruise/bleed easily.   Psychiatric/Behavioral:  Negative for confusion, decreased concentration, dysphoric mood, hallucinations, sleep disturbance and suicidal ideas. The patient is not nervous/anxious.    All other systems reviewed and are negative.      Objective:   Physical Exam  Vitals and nursing note reviewed.   Constitutional:       General: She is not in acute distress.     Appearance: She is well-developed.   Neck:      Thyroid: No thyromegaly.      Vascular: No JVD.   Cardiovascular:      Rate and Rhythm: Normal rate and regular rhythm.      Heart sounds: Normal heart sounds. No murmur heard.  Pulmonary:      Effort: Pulmonary effort is normal. No respiratory distress.      Breath sounds: Normal breath sounds. No wheezing or rales.   Abdominal:      General: There is no distension.      Palpations: Abdomen is soft. There is no mass.      Tenderness: There is no abdominal tenderness. There is no guarding.   Musculoskeletal:      Right lower leg: No edema.      Left lower leg: No edema.   Lymphadenopathy:      Cervical: No cervical adenopathy.   Skin:     Capillary Refill: Capillary refill takes less than 2 seconds.      Findings: No rash.   Neurological:      General: No focal deficit present.      Mental Status: She is alert and oriented to person, place, and time.      Cranial Nerves: No cranial nerve deficit.      Coordination: Coordination normal.   Psychiatric:         Mood and Affect: Mood normal.         Behavior: Behavior normal.         Thought Content: Thought content normal.         Judgment: Judgment normal.       Assessment:       Problem List Items Addressed This Visit         GERD (gastroesophageal reflux disease) (Chronic)     Anxiety     Chronic rhinitis     Hyperlipidemia     Relevant Orders     Lipid Panel     Comprehensive Metabolic Panel     JAMAL on CPAP     Relevant Orders     Ambulatory referral/consult to Pulmonology      Osteopenia of multiple sites     Primary osteoarthritis involving multiple joints     Severe obesity (BMI 35.0-39.9) with comorbidity     Relevant Orders     Ambulatory referral/consult to [facility]     Vitamin D deficiency     Relevant Orders     Vitamin D      Other Visit Diagnoses         Well adult exam    -  Primary     Encounter for screening mammogram for breast cancer         Relevant Orders     Mammo Digital Screening Bilat w/ Dominick                            Plan:     Well adult exam     Severe obesity (BMI 35.0-39.9) with comorbidity  -     Ambulatory referral/consult to [facility]; Future; Expected date: 12/16/2022     Osteopenia of multiple sites     JAMAL on CPAP  -     Ambulatory referral/consult to Pulmonology; Future; Expected date: 12/16/2022     Chronic rhinitis     Anxiety     Gastroesophageal reflux disease without esophagitis     Vitamin D deficiency  -     Vitamin D; Future; Expected date: 12/09/2022     Primary osteoarthritis involving multiple joints     Hyperlipidemia, unspecified hyperlipidemia type  -     Lipid Panel; Future; Expected date: 12/09/2022  -     Comprehensive Metabolic Panel; Future; Expected date: 12/09/2022     Encounter for screening mammogram for breast cancer  -     Mammo Digital Screening Bilat w/ Dominick; Future; Expected date: 12/09/2022             Lipids have improved with weight loss, lifestyle mod discussed, she elects to continue Mounjaro 5 mg. Vaccines reviewed. HMI reviewed. Follow up yearly.

## 2024-01-17 ENCOUNTER — PATIENT MESSAGE (OUTPATIENT)
Dept: INTERNAL MEDICINE | Facility: CLINIC | Age: 79
End: 2024-01-17
Payer: MEDICARE

## 2024-02-02 ENCOUNTER — PATIENT MESSAGE (OUTPATIENT)
Dept: INTERNAL MEDICINE | Facility: CLINIC | Age: 79
End: 2024-02-02
Payer: MEDICARE

## 2024-02-02 ENCOUNTER — PATIENT MESSAGE (OUTPATIENT)
Dept: OPHTHALMOLOGY | Facility: CLINIC | Age: 79
End: 2024-02-02
Payer: MEDICARE

## 2024-03-28 ENCOUNTER — OFFICE VISIT (OUTPATIENT)
Dept: INTERNAL MEDICINE | Facility: CLINIC | Age: 79
End: 2024-03-28
Payer: MEDICARE

## 2024-03-28 VITALS
SYSTOLIC BLOOD PRESSURE: 106 MMHG | OXYGEN SATURATION: 98 % | BODY MASS INDEX: 26.02 KG/M2 | HEIGHT: 61 IN | WEIGHT: 137.81 LBS | DIASTOLIC BLOOD PRESSURE: 62 MMHG | HEART RATE: 73 BPM | TEMPERATURE: 97 F

## 2024-03-28 DIAGNOSIS — N39.46 MIXED STRESS AND URGE URINARY INCONTINENCE: Primary | ICD-10-CM

## 2024-03-28 DIAGNOSIS — S16.1XXA STRAIN OF NECK MUSCLE, INITIAL ENCOUNTER: ICD-10-CM

## 2024-03-28 PROCEDURE — 1160F RVW MEDS BY RX/DR IN RCRD: CPT | Mod: HCNC,CPTII,S$GLB, | Performed by: PEDIATRICS

## 2024-03-28 PROCEDURE — 99999 PR PBB SHADOW E&M-EST. PATIENT-LVL IV: CPT | Mod: PBBFAC,HCNC,, | Performed by: PEDIATRICS

## 2024-03-28 PROCEDURE — 1159F MED LIST DOCD IN RCRD: CPT | Mod: HCNC,CPTII,S$GLB, | Performed by: PEDIATRICS

## 2024-03-28 PROCEDURE — 99214 OFFICE O/P EST MOD 30 MIN: CPT | Mod: HCNC,S$GLB,, | Performed by: PEDIATRICS

## 2024-03-28 PROCEDURE — 1125F AMNT PAIN NOTED PAIN PRSNT: CPT | Mod: HCNC,CPTII,S$GLB, | Performed by: PEDIATRICS

## 2024-03-28 PROCEDURE — 3288F FALL RISK ASSESSMENT DOCD: CPT | Mod: HCNC,CPTII,S$GLB, | Performed by: PEDIATRICS

## 2024-03-28 PROCEDURE — 3074F SYST BP LT 130 MM HG: CPT | Mod: HCNC,CPTII,S$GLB, | Performed by: PEDIATRICS

## 2024-03-28 PROCEDURE — 3078F DIAST BP <80 MM HG: CPT | Mod: HCNC,CPTII,S$GLB, | Performed by: PEDIATRICS

## 2024-03-28 PROCEDURE — 1101F PT FALLS ASSESS-DOCD LE1/YR: CPT | Mod: HCNC,CPTII,S$GLB, | Performed by: PEDIATRICS

## 2024-03-28 RX ORDER — MELOXICAM 15 MG/1
15 TABLET ORAL DAILY
Qty: 90 TABLET | Refills: 3 | Status: SHIPPED | OUTPATIENT
Start: 2024-03-28

## 2024-03-28 NOTE — PROGRESS NOTES
Subjective     Patient ID: Hiwot Coulter is a 78 y.o. female.    Chief Complaint: Neck Pain    Hiwot Coulter is a 78 year old female here for neck pain which onset in the past few weeks. Pain is worsened with movement. Prior tx includes ibuprofen 800 mg which provides some relief. Pt denies any numbness or tingling in her extremities. Pt expresses interest in starting physical therapy. No direct trauma. Pain is waking her up in her sleep.       Review of Systems   Constitutional:  Negative for chills and fever.   HENT:  Negative for nasal congestion and rhinorrhea.    Respiratory:  Negative for cough and shortness of breath.    Cardiovascular:  Negative for chest pain.   Gastrointestinal:  Negative for abdominal pain, blood in stool, change in bowel habit, constipation, diarrhea and nausea.   Endocrine: Negative for cold intolerance, heat intolerance, polydipsia, polyphagia and polyuria.   Genitourinary:  Negative for dysuria.   Musculoskeletal:  Positive for neck pain.   Neurological:  Negative for weakness.          Objective     Physical Exam  Constitutional:       General: She is not in acute distress.     Appearance: Normal appearance. She is normal weight. She is not ill-appearing or toxic-appearing.   Cardiovascular:      Rate and Rhythm: Normal rate and regular rhythm.      Pulses: Normal pulses.      Heart sounds: Normal heart sounds. No murmur heard.     No gallop.   Pulmonary:      Effort: Pulmonary effort is normal. No respiratory distress.      Breath sounds: Normal breath sounds. No stridor. No wheezing, rhonchi or rales.   Chest:      Chest wall: No tenderness.   Abdominal:      General: There is no distension.      Palpations: There is no mass.      Tenderness: There is no abdominal tenderness.      Hernia: No hernia is present.   Musculoskeletal:      Comments: Trapezius tight, limited ROM   Neurological:      General: No focal deficit present.      Mental Status: She is alert and oriented to  person, place, and time. Mental status is at baseline.      Cranial Nerves: No cranial nerve deficit.      Motor: No weakness.      Gait: Gait normal.   Psychiatric:         Mood and Affect: Mood normal.         Behavior: Behavior normal.         Thought Content: Thought content normal.         Judgment: Judgment normal.            Assessment and Plan     1. Mixed stress and urge urinary incontinence  -     Ambulatory referral/consult to Physical/Occupational Therapy; Future; Expected date: 04/04/2024    2. Strain of neck muscle, initial encounter  -     Ambulatory referral/consult to Physical/Occupational Therapy; Future; Expected date: 04/04/2024    Other orders  -     meloxicam (MOBIC) 15 MG tablet; Take 1 tablet (15 mg total) by mouth once daily.  Dispense: 90 tablet; Refill: 3        Order placed for physical therapy. Tumeric and glucosamine recommended. Meloxicam instead of ibuprofen prescribed. Follow up as needed.         No follow-ups on file.

## 2024-04-03 ENCOUNTER — PATIENT MESSAGE (OUTPATIENT)
Dept: PULMONOLOGY | Facility: CLINIC | Age: 79
End: 2024-04-03
Payer: MEDICARE

## 2024-04-19 ENCOUNTER — CLINICAL SUPPORT (OUTPATIENT)
Dept: REHABILITATION | Facility: HOSPITAL | Age: 79
End: 2024-04-19
Attending: PEDIATRICS
Payer: MEDICARE

## 2024-04-19 DIAGNOSIS — M62.89 PELVIC FLOOR DYSFUNCTION: Primary | ICD-10-CM

## 2024-04-19 PROCEDURE — 97530 THERAPEUTIC ACTIVITIES: CPT | Mod: PN

## 2024-04-19 PROCEDURE — 97161 PT EVAL LOW COMPLEX 20 MIN: CPT | Mod: PN

## 2024-04-19 NOTE — PROGRESS NOTES
[facility]   Pelvic Health Physical Therapy Initial Evaluation     Date: 2024   Name: Hiwot Coulter  [facility] Number: [id number]    Therapy Diagnosis:   Encounter Diagnosis   Name Primary?    Pelvic floor dysfunction Yes     Physician: Kumar Ramirez MD    Physician Orders: PT Eval and Treat   Medical Diagnosis from Referral: Mixed stress and urge urinary incontinence [N39.46]   Evaluation Date: 2024  Authorization Period Expiration: 2024  Plan of Care Expiration: 2024  Progress Note Due: 2024  Visit # / Visits authorized:  eval   FOTO: Issued Visit #: 1 /3    Precautions: Standard    Time In: 1:30 PM  Time Out: 2:15 PM  Total Appointment Time (timed & untimed codes): 45 minutes    SUBJECTIVE     Date of onset: 3-4 years    History of current condition - Hiwot reports: issues with bladder control and she would like to increase in the strength in her pelvic floor muscles. She has some leakage on the way to the restroom when she has a full bladder. She reports difficulty controlling her bladder with a strong urge. If she waits 30 minutes or longer she will have leakage. She has also been having neck pain that we will refer to at her next visit.     OB/GYN History:  , vaginal delivery  Sexually active? Yes  Pain with vaginal exams, intercourse or tampon use? No    Bladder/Bowel History: urinary incontinence and difficulty stopping the urine stream  Frequency of urination:   Daytime: 3-4           Nighttime: 1  Difficulty initiating urine stream: No  Urine stream: strong  Complete emptying: Yes  Bladder leakage: Yes  Frequency of incidents: urge incontinence  Amount leaked (urine): small squirt  and large squirt  Urinary Urgency: Yes, Able to delay the urge for at least 20-30 minute(s).  Frequency of bowel movements: once a day  Difficulty initiating BM: No  Quality/Shape of BM: [place] Stool Chart Type 4  Does Patient Feel Empty after BM? Yes  Fiber  Supplements or Laxative Use? No  Colon leakage: No  Frequency of incidents: none   Amount leaked (bowels):  none  Form of protection: none  Number of pads required in 24 hours: 0    Pain:  No pain reported today.     Imaging: see chart     Prior Therapy: yes, PT  Social History:  lives alone  Current exercise: walking  Occupation:  1/2 sitting and standing  Prior Level of Function: Pt was independent with all ADLs and iADLs without pain, no reports of incontinence of bowel or bladder.  Current Level of Function: Unable to control bladder when on the way to the restroom which is leading to urinary leakage.     Types of fluid intake: 3 cups of coffee a day, flavored twice a week, tea  Diet: regular , watching carb intake, having protein and vegetables  Habitus: well developed, well nourished  Abuse/Neglect: No     Patients goals: control bladder so she can decrease urge incontinence.      Medical History: Hiwot  has a past medical history of Anxiety, Arthritis, Behaviorally induced insufficient sleep syndrome, Behaviorally induced insufficient sleep syndrome, Cataract, Depression, GERD (gastroesophageal reflux disease), Hyperlipidemia, Obesity, Osteopenia, Pollen allergies, Self-care deficit (12/10/2021), and Sleep apnea.     Surgical History: Hiwot Coulter  has a past surgical history that includes Cholecystectomy (2012); Hysterectomy; Cataract extraction w/  intraocular lens implant (Right, 07/18/2019); and Cataract extraction w/  intraocular lens implant (Left, 08/22/2019).    Medications: Hiwot has a current medication list which includes the following prescription(s): acetaminophen, ergocalciferol, euflexxa, freestyle lana 2 sensor, fluad quad 2022-23(65y up)(pf), fluticasone propionate, ibandronate, ibuprofen, meloxicam, montelukast, mounjaro, and tirzepatide.    Allergies:   Review of patient's allergies indicates:  No Known Allergies     OBJECTIVE     See EMR under MEDIA for written consent  provided 4/19/2024  Chaperone: declined    ORTHO SCREEN  Posture in sitting: sacral sitting  Posture in standing: forward and rounded shoulders   Pelvic alignment: no sign of deviations noted in supine   SI Joint Palpation:  Not assessed today   Adductor Palpation: Not assessed today     ABDOMINAL WALL ASSESSMENT  Palpation: increased tension  and hypertonic  Abdominal strength: Transverse abdominus: fair  Scarring: none noted   Diastasis: absent with curl up test      BREATHING MECHANICS ASSESSMENT   Thorax Assessment During Deep Respiration: WNL excursion of abdominal wall and Decreased excursion bilaterally of lateral ribs     VAGINAL PELVIC FLOOR EXAM    EXTERNAL ASSESSMENT  Introitus: WNL  Skin condition: WNL  Scarring: none noted  Sensation: WNL   Pain: none  Voluntary contraction: visible lift  Voluntary relaxation: visible drop  Involuntary contraction: nil  Bearing down: bulge  Perineal descent: present      INTERNAL ASSESSMENT  Pain: none   Sensation: able to localized pressure appropriately   Vaginal vault: WNL   Muscle Bulk: atrophy   Muscle Power: 3/5  Muscle Endurance: 2 sec  # Reps To Fatigue: 4    Fast Contractions in 10 seconds: Not assessed today      Quality of contraction: decreased hold   Specificity: WNL   Coordination: tends to hold breath during PFM contration   Prolapse check: Not assessed today     Limitation/Restriction for FOTO Pelvic Floor  Survey    Therapist reviewed FOTO scores for Hiwot Coulter on 4/19/2024.   FOTO documents entered into [employer] - see Media section.    Limitation Score:        TREATMENT     Total Treatment time (time-based codes) separate from Evaluation: 10 minutes     Therapeutic Activity to improve dynamic functional performance for 10 minutes including:    Education on urge control techniques    Reviewed HEP:  Diaphragmatic breathing  TA contraction   Kegels with Layers       PATIENT EDUCATION AND HOME EXERCISES     Education provided:   general anatomy/physiology  of urinary/ bowel  system, benefits of treatment, risks of treatment, and alternative methods of treatment were discussed with the patient. Additionally, anatomy/physiology of pelvic floor, posture/body mechanices, bladder retraining, diaphragmatic breathing, double voiding techniques, isometric abdominal exercises, kegels, proper bearing down techniques, fluid intake/dietary modifications, and behavior modifications were reviewed.     Written Home Exercises provided: yes.  Exercises were reviewed and Hiwot was able to demonstrate them prior to the end of the session. Hiwot demonstrated good  understanding of the education provided. See EMR under Patient Instructions for exercises provided during therapy sessions.    ASSESSMENT     Hiwot is a 78 y.o. female referred to outpatient Physical Therapy with a medical diagnosis of Mixed stress and urge urinary incontinence [N39.46] . Pt presents with altered posture, poor knowledge of body mechanics and posture, poor trunk stability, decreased endurance of the pelvic muscles, decreased phasic ability of the pelvic muscles, and poor quality of pelvic muscle contraction.       Patient prognosis is Good.   Patient will benefit from skilled outpatient Physical Therapy to address the deficits stated above and in the chart below, provide patient/family education, and to maximize patient's level of independence.     Plan of care discussed with patient: Yes  Patient's spiritual, cultural and educational needs considered and patient is agreeable to the plan of care and goals as stated below:     Anticipated Barriers for therapy: see PMHx    Medical Necessity is demonstrated by the following:    History  Co-morbidities and personal factors that may impact the plan of care [x] LOW: no personal factors / co-morbidities  [] MODERATE: 1-2 personal factors / co-morbidities  [] HIGH: 3+ personal factors / co-morbidities    Moderate / High Support Documentation:   Co-morbidities  affecting plan of care: n/a    Personal Factors:   no deficits     Examination  Body Structures and Functions, activity limitations and participation restrictions that may impact the plan of care [x] LOW: addressing 1-2 elements  [] MODERATE: 3+ elements  [] HIGH: 4+ elements (please support below)    Moderate / High Support Documentation: n/a     Clinical Presentation [x] LOW: stable  [] MODERATE: Evolving  [] HIGH: Unstable     Decision Making/ Complexity Score: low        Goals:  Short Term Goals: 4 weeks   - Pt will demonstrate excellent knowledge and adherence to HEP to facilitate optimal recovery.  - Pt will demonstrate proper PFM contraction, relaxation, and lengthening coordinated with TA and breath for improved muscle coordination needed for functional activity.  - Pt will report a 25% reduction in frequency of leakage to demonstrate improved pelvic floor coordination needed for continence with ADLs.    Long Term Goals: 10 weeks   - Pt will demonstrate excellent knowledge and adherence to HEP for continued self-maintenance of symptoms.  - Pt will report PFDI Urinary FOTO score of 5% limited or less indicating clinically relevant increase in function.  - Pt to be able to perform a 5 second kegel x 10 reps with good quality to demonstrate improving strength and endurance needed for continence.  - Pt will be able to correctly and consistently explain urge control strategies to demonstrate understanding of these strategies and decrease likelihood of leakage.    PLAN     Plan of care Certification: 4/19/2024 to 06/28/2024.    Outpatient Physical Therapy 1-2 time(s) every week for 10 weeks to include the following interventions: Cervical/Lumbar Traction, Electrical Stimulation TENS/IFC, Manual Therapy, Moist Heat/ Ice, Neuromuscular Re-ed, Patient Education, Self Care, Therapeutic Activities, Therapeutic Exercise, and ASTYM, and FDN .     Dread Chin, PT      I CERTIFY THE NEED FOR THESE SERVICES FURNISHED  UNDER THIS PLAN OF TREATMENT AND WHILE UNDER MY CARE   Physician's comments:     Physician's Signature: ___________________________________________________

## 2024-04-19 NOTE — PATIENT INSTRUCTIONS
([email or web address])    Initially it may be useful to practice squeezing at different layers of your pelvic floor muscles to help you find them, as each layer plays an important role in pelvic floor function. Eventually you want to be doing your kegel contractions to include squeezing at all 3 layers, and this should become one smooth movement.    To isolate layer 1: think about closing your openings (around vagina and anus) and nodding the clitoris    To isolate layer 2: imagine a drawstring in your urethra that you are pulling up inside or that you are telescoping the urethra in on itself.     To isolate layer 3: pull your tailbone up and forward towards your pubic bone    When putting it all together, it can be helpful to think about closing your openings and lifting up and in.      So when practicing this at home:   1. Inhale and let the muscles relax   2. Exhale and perform a kegel (closing your openings and lifting up and in)   3. Hold for 5 seconds without holding your breath   4. Inhale and release. Drop these muscles away fully before repeating   5. Perform 3x/day spread throughout the day (10 laying down, 10 seated, 10 standing)

## 2024-04-23 NOTE — PLAN OF CARE
[facility]   Pelvic Health Physical Therapy Initial Evaluation     Date: 2024   Name: Hiwot Coulter  [facility] Number: [id number]    Therapy Diagnosis:   Encounter Diagnosis   Name Primary?    Pelvic floor dysfunction Yes     Physician: Kumar Ramirez MD    Physician Orders: PT Eval and Treat   Medical Diagnosis from Referral: Mixed stress and urge urinary incontinence [N39.46]   Evaluation Date: 2024  Authorization Period Expiration: 2024  Plan of Care Expiration: 2024  Progress Note Due: 2024  Visit # / Visits authorized:  eval   FOTO: Issued Visit #: 1 /3    Precautions: Standard    Time In: 1:30 PM  Time Out: 2:15 PM  Total Appointment Time (timed & untimed codes): 45 minutes    SUBJECTIVE     Date of onset: 3-4 years    History of current condition - Hiwot reports: issues with bladder control and she would like to increase in the strength in her pelvic floor muscles. She has some leakage on the way to the restroom when she has a full bladder. She reports difficulty controlling her bladder with a strong urge. If she waits 30 minutes or longer she will have leakage. She has also been having neck pain that we will refer to at her next visit.     OB/GYN History: , vaginal delivery  Sexually active? Yes  Pain with vaginal exams, intercourse or tampon use? No    Bladder/Bowel History: urinary incontinence and difficulty stopping the urine stream  Frequency of urination:   Daytime: 3-4           Nighttime: 1  Difficulty initiating urine stream: No  Urine stream: strong  Complete emptying: Yes  Bladder leakage: Yes  Frequency of incidents: urge incontinence  Amount leaked (urine): small squirt  and large squirt  Urinary Urgency: Yes, Able to delay the urge for at least 20-30 minute(s).  Frequency of bowel movements: once a day  Difficulty initiating BM: No  Quality/Shape of BM: [place] Stool Chart Type 4  Does Patient Feel Empty after BM? Yes  Fiber  Supplements or Laxative Use? No  Colon leakage: No  Frequency of incidents: none   Amount leaked (bowels): none  Form of protection: none  Number of pads required in 24 hours: 0    Pain:  No pain reported today.     Imaging: see chart     Prior Therapy: yes, PT  Social History:  lives alone  Current exercise: walking  Occupation:  1/2 sitting and standing  Prior Level of Function: Pt was independent with all ADLs and iADLs without pain, no reports of incontinence of bowel or bladder.  Current Level of Function: Unable to control bladder when on the way to the restroom which is leading to urinary leakage.     Types of fluid intake: 3 cups of coffee a day, flavored twice a week, tea  Diet: regular , watching carb intake, having protein and vegetables  Habitus: well developed, well nourished  Abuse/Neglect: No     Patients goals: control bladder so she can decrease urge incontinence.      Medical History: Hiwot  has a past medical history of Anxiety, Arthritis, Behaviorally induced insufficient sleep syndrome, Behaviorally induced insufficient sleep syndrome, Cataract, Depression, GERD (gastroesophageal reflux disease), Hyperlipidemia, Obesity, Osteopenia, Pollen allergies, Self-care deficit (12/10/2021), and Sleep apnea.     Surgical History: Hiwot Coulter  has a past surgical history that includes Cholecystectomy (2012); Hysterectomy; Cataract extraction w/  intraocular lens implant (Right, 07/18/2019); and Cataract extraction w/  intraocular lens implant (Left, 08/22/2019).    Medications: Hiwot has a current medication list which includes the following prescription(s): acetaminophen, ergocalciferol, euflexxa, freestyle lana 2 sensor, fluad quad 2022-23(65y up)(pf), fluticasone propionate, ibandronate, ibuprofen, meloxicam, montelukast, mounjaro, and tirzepatide.    Allergies:   Review of patient's allergies indicates:  No Known Allergies     OBJECTIVE     See EMR under MEDIA for written consent  provided 4/19/2024  Chaperone: declined    ORTHO SCREEN  Posture in sitting: sacral sitting  Posture in standing: forward and rounded shoulders   Pelvic alignment: no sign of deviations noted in supine   SI Joint Palpation: Not assessed today   Adductor Palpation: Not assessed today     ABDOMINAL WALL ASSESSMENT  Palpation: increased tension  and hypertonic  Abdominal strength: Transverse abdominus: fair  Scarring: none noted   Diastasis: absent with curl up test      BREATHING MECHANICS ASSESSMENT   Thorax Assessment During Deep Respiration: WNL excursion of abdominal wall and Decreased excursion bilaterally of lateral ribs     VAGINAL PELVIC FLOOR EXAM    EXTERNAL ASSESSMENT  Introitus: WNL  Skin condition: WNL  Scarring: none noted  Sensation: WNL   Pain: none  Voluntary contraction: visible lift  Voluntary relaxation: visible drop  Involuntary contraction: nil  Bearing down: bulge  Perineal descent: present      INTERNAL ASSESSMENT  Pain: none   Sensation: able to localized pressure appropriately   Vaginal vault: WNL   Muscle Bulk: atrophy   Muscle Power: 3/5  Muscle Endurance: 2 sec  # Reps To Fatigue: 4    Fast Contractions in 10 seconds: Not assessed today      Quality of contraction: decreased hold   Specificity: WNL   Coordination: tends to hold breath during PFM contration   Prolapse check: Not assessed today     Limitation/Restriction for FOTO Pelvic Floor  Survey    Therapist reviewed FOTO scores for Hiwot Coulter on 4/19/2024.   FOTO documents entered into [employer] - see Media section.    Limitation Score:        TREATMENT     Total Treatment time (time-based codes) separate from Evaluation: 10 minutes     Therapeutic Activity to improve dynamic functional performance for 10 minutes including:    Education on urge control techniques    Reviewed HEP:  Diaphragmatic breathing  TA contraction   Kegels with Layers       PATIENT EDUCATION AND HOME EXERCISES     Education provided:   general anatomy/physiology  of urinary/ bowel  system, benefits of treatment, risks of treatment, and alternative methods of treatment were discussed with the patient. Additionally, anatomy/physiology of pelvic floor, posture/body mechanices, bladder retraining, diaphragmatic breathing, double voiding techniques, isometric abdominal exercises, kegels, proper bearing down techniques, fluid intake/dietary modifications, and behavior modifications were reviewed.     Written Home Exercises provided: yes.  Exercises were reviewed and Hiwot was able to demonstrate them prior to the end of the session. Hiwot demonstrated good  understanding of the education provided. See EMR under Patient Instructions for exercises provided during therapy sessions.    ASSESSMENT     Hiwto is a 78 y.o. female referred to outpatient Physical Therapy with a medical diagnosis of Mixed stress and urge urinary incontinence [N39.46] . Pt presents with altered posture, poor knowledge of body mechanics and posture, poor trunk stability, decreased endurance of the pelvic muscles, decreased phasic ability of the pelvic muscles, and poor quality of pelvic muscle contraction.       Patient prognosis is Good.   Patient will benefit from skilled outpatient Physical Therapy to address the deficits stated above and in the chart below, provide patient/family education, and to maximize patient's level of independence.     Plan of care discussed with patient: Yes  Patient's spiritual, cultural and educational needs considered and patient is agreeable to the plan of care and goals as stated below:     Anticipated Barriers for therapy: see PMHx    Medical Necessity is demonstrated by the following:    History  Co-morbidities and personal factors that may impact the plan of care [x] LOW: no personal factors / co-morbidities  [] MODERATE: 1-2 personal factors / co-morbidities  [] HIGH: 3+ personal factors / co-morbidities    Moderate / High Support Documentation:   Co-morbidities  affecting plan of care: n/a    Personal Factors:   no deficits     Examination  Body Structures and Functions, activity limitations and participation restrictions that may impact the plan of care [x] LOW: addressing 1-2 elements  [] MODERATE: 3+ elements  [] HIGH: 4+ elements (please support below)    Moderate / High Support Documentation: n/a     Clinical Presentation [x] LOW: stable  [] MODERATE: Evolving  [] HIGH: Unstable     Decision Making/ Complexity Score: low        Goals:  Short Term Goals: 4 weeks   - Pt will demonstrate excellent knowledge and adherence to HEP to facilitate optimal recovery.  - Pt will demonstrate proper PFM contraction, relaxation, and lengthening coordinated with TA and breath for improved muscle coordination needed for functional activity.  - Pt will report a 25% reduction in frequency of leakage to demonstrate improved pelvic floor coordination needed for continence with ADLs.    Long Term Goals: 10 weeks   - Pt will demonstrate excellent knowledge and adherence to HEP for continued self-maintenance of symptoms.  - Pt will report PFDI Urinary FOTO score of 5% limited or less indicating clinically relevant increase in function.  - Pt to be able to perform a 5 second kegel x 10 reps with good quality to demonstrate improving strength and endurance needed for continence.  - Pt will be able to correctly and consistently explain urge control strategies to demonstrate understanding of these strategies and decrease likelihood of leakage.    PLAN     Plan of care Certification: 4/19/2024 to 06/28/2024.    Outpatient Physical Therapy 1-2 time(s) every week for 10 weeks to include the following interventions: Cervical/Lumbar Traction, Electrical Stimulation TENS/IFC, Manual Therapy, Moist Heat/ Ice, Neuromuscular Re-ed, Patient Education, Self Care, Therapeutic Activities, Therapeutic Exercise, and ASTYM, and FDN.     Dread Chin, PT      I CERTIFY THE NEED FOR THESE SERVICES FURNISHED  UNDER THIS PLAN OF TREATMENT AND WHILE UNDER MY CARE   Physician's comments:     Physician's Signature: ___________________________________________________

## 2024-04-25 ENCOUNTER — CLINICAL SUPPORT (OUTPATIENT)
Dept: REHABILITATION | Facility: HOSPITAL | Age: 79
End: 2024-04-25
Payer: MEDICARE

## 2024-04-25 DIAGNOSIS — M53.82 DECREASED RANGE OF MOTION OF INTERVERTEBRAL DISCS OF CERVICAL SPINE: ICD-10-CM

## 2024-04-25 DIAGNOSIS — M62.89 PELVIC FLOOR DYSFUNCTION: Primary | ICD-10-CM

## 2024-04-25 PROCEDURE — 97110 THERAPEUTIC EXERCISES: CPT | Mod: PN

## 2024-04-25 PROCEDURE — 97112 NEUROMUSCULAR REEDUCATION: CPT | Mod: PN

## 2024-04-25 PROCEDURE — 97140 MANUAL THERAPY 1/> REGIONS: CPT | Mod: PN

## 2024-04-25 NOTE — PROGRESS NOTES
Pelvic Health Physical Therapy   Treatment Note     Name: Hiwot Hdzter  Clinic Number: [id number]    Therapy Diagnosis:   Encounter Diagnoses   Name Primary?    Pelvic floor dysfunction Yes    Decreased range of motion of intervertebral discs of cervical spine      Physician: Kumar Ramirez MD    Visit Date: 4/25/2024       Physician Orders: PT Eval and Treat   Medical Diagnosis from Referral: Mixed stress and urge urinary incontinence [N39.46]   Evaluation Date: 4/19/2024  Authorization Period Expiration: 12/31/2024  Plan of Care Expiration: 06/28/2024  Progress Note Due: 05/19/2024  Visit # / Visits authorized: 1/20 + eval   FOTO: Issued Visit #: 1 /3     Precautions: Standard    Time In: 5:26 PM  Time Out: 6:00 PM  Total Billable Time: 34 minutes    Subjective     Pt reports: increased neck pain and notices that she is losing her range of motion. She thinks it all started after difficulty sleeping one night. She has 0/10 pain currently, but it will increase to 6/10 with movement. She describes the pain as sharp shooting.  She reports slight improvement in pelvic floor symptoms.   She was compliant with home exercise program.  Response to previous treatment: 1st treatment since initial evaluation  Functional change: no change     Pain: 0/10  Location: neck     Objective     Objective Measures updated at progress report unless specified.     Posture: Pt noted to present with forward head/rounded shoulder posture, increased thoracic Kyphosis. B scapula elevated and protracted.       ROM:  Shoulder  AROM/PROM Right Left Pain/Dysfunction with Movement   flexion WNL WNL    extension WNL WNL    abduction WNL WNL    Internal rotation WNL WNL    ER WNL WNL      Scapular Control/Dyskinesis:     Normal / Subtle / Obvious  Comments    Left      Right        CERVICAL  (single inclinometer at top of head) AROM  (degrees/%) Pain/Dysfunction with Movement   Flexion 55    Extension 35    Right side bending 32    Left  side bending 30    Right rotation 50 %    Left rotation 25% painful     Strength:  U/E MMT Right Left Pain/Dysfunction with Movement   Shoulder Flexion 4+/5 4/5    Shoulder Abduction 4/5 4/5    Elbow Flexion (C5)  5/5 4+/5    Elbow Extension (C7) 5/5 4+/5    Shoulder ER  @ 0* Abduction 4/5 4/5    Shoulder IR  @ 0* Abduction 4/5 4/5      Sensation: Intact to light touch bilateral UE's, all dermatomes.      Special Test:  Drop Arm negative     Lift off  negative     Decreased MLT: Anterior chest musculature and latissimus dorsi.    Joint Mobility: hypomobility noted with AP mob to cervical spine  Palpation: tender to palpation with AP mobs to C2-4    Upper Limb Tension Test:   Median Negative   Radial Negative  Ulnar Negative     Treatment     Hiwot received therapeutic exercises to develop  strength, endurance, ROM, and flexibility for 08 minutes including:     UBE 2'/2'  Seated upper trap stretch     Hiwot received the following manual therapy techniques: to develop flexibility and extensibility for 12 minutes including:     Cervical distraction  Manual upper trap stretch  Side glides to c-spine     Hiwot participated in neuromuscular re-education activities to develop Coordination, Control, Down training, Posture, and Proprioception for 12 minutes including:     Reviewed HEP:  Seated upper trap stretch  Supine chin tucks  Openbooks  Seated scapular retraction with red TheraBand       Home Exercises Provided and Patient Education Provided     Education provided:   - anatomy/physiology of pelvic floor, posture/body mechanices, bladder retraining, diaphragmatic breathing, double voiding techniques, isometric abdominal exercises, kegels, proper bearing down techniques, fluid intake/dietary modifications, and behavior modifications  Discussed progression of plan of care with patient; educated pt in activity modification; reviewed HEP with pt. Pt demonstrated and verbalized understanding of all instruction and was  provided with a handout of HEP (see Patient Instructions).  - HEP provided for pelvic floor and neck    Written Home Exercises Provided: yes.  Exercises were reviewed and Hiwot was able to demonstrate them prior to the end of the session.  Hiwot demonstrated good  understanding of the education provided.     See EMR under Patient Instructions for exercises provided 4/25/2024 and prior visit.     Assessment     Hiwot presents to therapy today with reports of increased neck pain with movements such as rotation and side bending. She has decreased range of motion with left cervical rotation. Noted upper trap tension L > R. Performed manual upper trap stretch as well as provided stretch for HEP. Patient will continue to benefit from skilled physical therapy.   Hiwot Is progressing well towards her goals.   Pt prognosis is Good.     Pt will continue to benefit from skilled outpatient physical therapy to address the deficits listed in the problem list box on initial evaluation, provide pt/family education and to maximize pt's level of independence in the home and community environment.     Pt's spiritual, cultural and educational needs considered and pt agreeable to plan of care and goals.     Anticipated barriers to physical therapy: see PMHx  Goals:  Short Term Goals: 4 weeks   - Pt will demonstrate excellent knowledge and adherence to HEP to facilitate optimal recovery.  - Pt will demonstrate proper PFM contraction, relaxation, and lengthening coordinated with TA and breath for improved muscle coordination needed for functional activity.  - Pt will report a 25% reduction in frequency of leakage to demonstrate improved pelvic floor coordination needed for continence with ADLs.     Long Term Goals: 10 weeks   - Pt will demonstrate excellent knowledge and adherence to HEP for continued self-maintenance of symptoms.  - Pt will report PFDI Urinary FOTO score of 5% limited or less indicating clinically relevant increase  in function.  - Pt to be able to perform a 5 second kegel x 10 reps with good quality to demonstrate improving strength and endurance needed for continence.  - Pt will be able to correctly and consistently explain urge control strategies to demonstrate understanding of these strategies and decrease likelihood of leakage.     PLAN      Plan of care Certification: 4/19/2024 to 06/28/2024.     Outpatient Physical Therapy 1-2 time(s) every week for 10 weeks to include the following interventions: Cervical/Lumbar Traction, Electrical Stimulation TENS/IFC, Manual Therapy, Moist Heat/ Ice, Neuromuscular Re-ed, Patient Education, Self Care, Therapeutic Activities, Therapeutic Exercise, and ASTYM, and FDN.     Dread Chin, PT

## 2024-05-02 ENCOUNTER — CLINICAL SUPPORT (OUTPATIENT)
Dept: REHABILITATION | Facility: HOSPITAL | Age: 79
End: 2024-05-02
Payer: MEDICARE

## 2024-05-02 DIAGNOSIS — M53.82 DECREASED RANGE OF MOTION OF INTERVERTEBRAL DISCS OF CERVICAL SPINE: ICD-10-CM

## 2024-05-02 DIAGNOSIS — M62.89 PELVIC FLOOR DYSFUNCTION: Primary | ICD-10-CM

## 2024-05-02 PROCEDURE — 97140 MANUAL THERAPY 1/> REGIONS: CPT | Mod: PN

## 2024-05-02 PROCEDURE — 97112 NEUROMUSCULAR REEDUCATION: CPT | Mod: PN

## 2024-05-02 PROCEDURE — 97110 THERAPEUTIC EXERCISES: CPT | Mod: PN

## 2024-05-02 NOTE — PROGRESS NOTES
"  Pelvic Health Physical Therapy   Treatment Note     Name: Hiwot [facility] Number: [id number]    Therapy Diagnosis:   Encounter Diagnoses   Name Primary?    Pelvic floor dysfunction Yes    Decreased range of motion of intervertebral discs of cervical spine        Physician: Kumar Ramirez MD    Visit Date: 5/2/2024       Physician Orders: PT Eval and Treat   Medical Diagnosis from Referral: Mixed stress and urge urinary incontinence [N39.46]   Evaluation Date: 4/19/2024  Authorization Period Expiration: 12/31/2024  Plan of Care Expiration: 06/28/2024  Progress Note Due: 05/19/2024  Visit # / Visits authorized: 2/20 + eval   FOTO: Issued Visit #: 1 /3     Precautions: Standard    Time In: 4:25 PM  Time Out: 5:20 PM  Total Billable Time: 45 minutes + 10 minutes of heat     Subjective     Pt reports: neck feeling okay after last therapy visit, but increased pain on Saturday. The pain has went down since then.   She reports slight improvement in pelvic floor symptoms.   She was compliant with home exercise program.  Response to previous treatment: no adverse symptoms   Functional change: no change     Pain: 0/10  Location: neck     Objective     Objective Measures updated at progress report unless specified.     Treatment     Hiwot received therapeutic exercises to develop  strength, endurance, ROM, and flexibility for 08  minutes including:     UBE 4'/4' - no resistance     Hiwot received the following manual therapy techniques: to develop flexibility and extensibility for 15 minutes including:     Cervical distraction  Manual upper trap stretch  Side glides to c-spine     Hiwot participated in neuromuscular re-education activities to develop Coordination, Control, Down training, Posture, and Proprioception for 22  minutes including:     Supine chin tucks 3x10  Supine horizontal abduction red TheraBand 3x10  Seated scapular retraction red TheraBand 3x10  Doorway stretch 3x30"  SNAGs rotation x20 each "   Wall push ups x20  Openbooks x20 each   Wall angels 2x10     Hot pack 10 minutes to the neck in supine.     Home Exercises Provided and Patient Education Provided     Education provided:   - anatomy/physiology of pelvic floor, posture/body mechanices, bladder retraining, diaphragmatic breathing, double voiding techniques, isometric abdominal exercises, kegels, proper bearing down techniques, fluid intake/dietary modifications, and behavior modifications  Discussed progression of plan of care with patient; educated pt in activity modification; reviewed HEP with pt. Pt demonstrated and verbalized understanding of all instruction and was provided with a handout of HEP (see Patient Instructions).  - HEP provided for pelvic floor and neck    Written Home Exercises Provided: yes.  Exercises were reviewed and Hiwot was able to demonstrate them prior to the end of the session.  Hiwot demonstrated good  understanding of the education provided.     See EMR under Patient Instructions for exercises provided 4/25/2024 and prior visit.     Assessment     Hiwot presents to therapy today with continued loss of range of motion with cervical rotation to the left. Focused on deep neck flexor strengthening and scapular stability. Education on posture awareness at work. Continue progressing in POC as tolerated.     Hiwot Is progressing well towards her goals.   Pt prognosis is Good.     Pt will continue to benefit from skilled outpatient physical therapy to address the deficits listed in the problem list box on initial evaluation, provide pt/family education and to maximize pt's level of independence in the home and community environment.     Pt's spiritual, cultural and educational needs considered and pt agreeable to plan of care and goals.     Anticipated barriers to physical therapy: see PMHx  Goals:  Short Term Goals: 4 weeks   - Pt will demonstrate excellent knowledge and adherence to HEP to facilitate optimal recovery.  -  Pt will demonstrate proper PFM contraction, relaxation, and lengthening coordinated with TA and breath for improved muscle coordination needed for functional activity.  - Pt will report a 25% reduction in frequency of leakage to demonstrate improved pelvic floor coordination needed for continence with ADLs.     Long Term Goals: 10 weeks   - Pt will demonstrate excellent knowledge and adherence to HEP for continued self-maintenance of symptoms.  - Pt will report PFDI Urinary FOTO score of 5% limited or less indicating clinically relevant increase in function.  - Pt to be able to perform a 5 second kegel x 10 reps with good quality to demonstrate improving strength and endurance needed for continence.  - Pt will be able to correctly and consistently explain urge control strategies to demonstrate understanding of these strategies and decrease likelihood of leakage.     PLAN      Plan of care Certification: 4/19/2024 to 06/28/2024.     Outpatient Physical Therapy 1-2 time(s) every week for 10 weeks to include the following interventions: Cervical/Lumbar Traction, Electrical Stimulation TENS/IFC, Manual Therapy, Moist Heat/ Ice, Neuromuscular Re-ed, Patient Education, Self Care, Therapeutic Activities, Therapeutic Exercise, and ASTYM, and FDN.     Dread Chin, PT

## 2024-05-13 NOTE — PROGRESS NOTES
"  Pelvic Health Physical Therapy   Treatment Note     Name: Hiwot [facility] Number: [id number]    Therapy Diagnosis:   Encounter Diagnoses   Name Primary?    Pelvic floor dysfunction Yes    Decreased range of motion of intervertebral discs of cervical spine          Physician: Kumar Ramirez MD    Visit Date: 5/15/2024       Physician Orders: PT Eval and Treat   Medical Diagnosis from Referral: Mixed stress and urge urinary incontinence [N39.46]   Evaluation Date: 4/19/2024  Authorization Period Expiration: 12/31/2024  Plan of Care Expiration: 06/28/2024  Progress Note Due: 05/19/2024  Visit # / Visits authorized: 3/20 + eval   FOTO: Issued Visit #: 1 /3     Precautions: Standard    Time In: 4:32 PM  Time Out: 5:15 PM  Total Billable Time: 43 minutes     Subjective     Pt reports: neck pain and decreased neck range of motion  She was compliant with home exercise program.  Response to previous treatment: no adverse symptoms   Functional change: no change     Pain: 5/10    Location: neck     Objective     Objective Measures updated at progress report unless specified.     Treatment     Hiwot received therapeutic exercises to develop  strength, endurance, ROM, and flexibility for 9 minutes including:     UBE 4'/4' - no resistance   Openbooks x20 each     Hiwot received the following manual therapy techniques: to develop flexibility and extensibility for 9 minutes including:     Cervical distraction  Manual upper trap stretch  Side glides to c-spine     Hiwot participated in neuromuscular re-education activities to develop Coordination, Control, Down training, Posture, and Proprioception for 25 minutes including:     Prone chin tucks 3x10  Supine horizontal abduction red TheraBand 3x10  Seated scapular retraction red TheraBand 2x10  Supine deep neck flexor activation 2x10 3" holds  Prone shoulder extension 2x10  Doorway stretch 3x30" not today  Wall push ups x20  Wall angels 2x10     Hot pack 0 minutes to " the neck in supine.     Home Exercises Provided and Patient Education Provided     Education provided:   - anatomy/physiology of pelvic floor, posture/body mechanices, bladder retraining, diaphragmatic breathing, double voiding techniques, isometric abdominal exercises, kegels, proper bearing down techniques, fluid intake/dietary modifications, and behavior modifications  Discussed progression of plan of care with patient; educated pt in activity modification; reviewed HEP with pt. Pt demonstrated and verbalized understanding of all instruction and was provided with a handout of HEP (see Patient Instructions).  - HEP provided for pelvic floor and neck    Written Home Exercises Provided: yes.  Exercises were reviewed and Hiowt was able to demonstrate them prior to the end of the session.  Hiwot demonstrated good  understanding of the education provided.     See EMR under Patient Instructions for exercises provided 4/25/2024 and prior visit.     Assessment      Hiwot presents to therapy with moderate neck pain in cervical paraspinals. Increased myofascial tension noted in cervical paraspinals and left upper traps so manual therapy continued with improvement in pain noted. She continued exercises for postural stability with progressions in prone as tolerated.    Hiwot Is progressing well towards her goals.   Pt prognosis is Good.     Pt will continue to benefit from skilled outpatient physical therapy to address the deficits listed in the problem list box on initial evaluation, provide pt/family education and to maximize pt's level of independence in the home and community environment.     Pt's spiritual, cultural and educational needs considered and pt agreeable to plan of care and goals.     Anticipated barriers to physical therapy: see PMHx  Goals:  Short Term Goals: 4 weeks   - Pt will demonstrate excellent knowledge and adherence to HEP to facilitate optimal recovery.  - Pt will demonstrate proper PFM  contraction, relaxation, and lengthening coordinated with TA and breath for improved muscle coordination needed for functional activity.  - Pt will report a 25% reduction in frequency of leakage to demonstrate improved pelvic floor coordination needed for continence with ADLs.     Long Term Goals: 10 weeks   - Pt will demonstrate excellent knowledge and adherence to HEP for continued self-maintenance of symptoms.  - Pt will report PFDI Urinary FOTO score of 5% limited or less indicating clinically relevant increase in function.  - Pt to be able to perform a 5 second kegel x 10 reps with good quality to demonstrate improving strength and endurance needed for continence.  - Pt will be able to correctly and consistently explain urge control strategies to demonstrate understanding of these strategies and decrease likelihood of leakage.     PLAN      Plan of care Certification: 4/19/2024 to 06/28/2024.     Outpatient Physical Therapy 1-2 time(s) every week for 10 weeks to include the following interventions: Cervical/Lumbar Traction, Electrical Stimulation TENS/IFC, Manual Therapy, Moist Heat/ Ice, Neuromuscular Re-ed, Patient Education, Self Care, Therapeutic Activities, Therapeutic Exercise, and ASTYM, and FDN.     Michelle Mcintosh, PTA

## 2024-05-15 ENCOUNTER — DOCUMENTATION ONLY (OUTPATIENT)
Dept: REHABILITATION | Facility: HOSPITAL | Age: 79
End: 2024-05-15
Payer: MEDICARE

## 2024-05-15 ENCOUNTER — CLINICAL SUPPORT (OUTPATIENT)
Dept: REHABILITATION | Facility: HOSPITAL | Age: 79
End: 2024-05-15
Payer: MEDICARE

## 2024-05-15 DIAGNOSIS — M53.82 DECREASED RANGE OF MOTION OF INTERVERTEBRAL DISCS OF CERVICAL SPINE: ICD-10-CM

## 2024-05-15 DIAGNOSIS — M62.89 PELVIC FLOOR DYSFUNCTION: Primary | ICD-10-CM

## 2024-05-15 PROCEDURE — 97110 THERAPEUTIC EXERCISES: CPT | Mod: HCNC,PN,CQ

## 2024-05-15 PROCEDURE — 97112 NEUROMUSCULAR REEDUCATION: CPT | Mod: HCNC,PN,CQ

## 2024-05-15 PROCEDURE — 97140 MANUAL THERAPY 1/> REGIONS: CPT | Mod: HCNC,PN,CQ

## 2024-05-15 NOTE — PROGRESS NOTES
PT/PTA met face to face to discuss pt's treatment plan and progress towards established goals. Pt will be seen by a physical therapist minimally every 6th visit or every 30 days.    Michelle Mcintosh PTA

## 2024-06-03 ENCOUNTER — PATIENT MESSAGE (OUTPATIENT)
Dept: INTERNAL MEDICINE | Facility: CLINIC | Age: 79
End: 2024-06-03
Payer: MEDICARE

## 2024-06-06 ENCOUNTER — CLINICAL SUPPORT (OUTPATIENT)
Dept: REHABILITATION | Facility: HOSPITAL | Age: 79
End: 2024-06-06
Payer: MEDICARE

## 2024-06-06 DIAGNOSIS — M53.82 DECREASED RANGE OF MOTION OF INTERVERTEBRAL DISCS OF CERVICAL SPINE: ICD-10-CM

## 2024-06-06 DIAGNOSIS — M62.89 PELVIC FLOOR DYSFUNCTION: Primary | ICD-10-CM

## 2024-06-06 PROCEDURE — 97112 NEUROMUSCULAR REEDUCATION: CPT | Mod: HCNC,PN

## 2024-06-06 PROCEDURE — 97110 THERAPEUTIC EXERCISES: CPT | Mod: HCNC,PN

## 2024-06-06 NOTE — PROGRESS NOTES
"  Pelvic Health Physical Therapy   Treatment Note     Name: Hiwot [facility]ACE [facility] Number: [id number]    Therapy Diagnosis:   Encounter Diagnoses   Name Primary?    Pelvic floor dysfunction Yes    Decreased range of motion of intervertebral discs of cervical spine            Physician: Kumar Ramirez MD    Visit Date: 6/6/2024       Physician Orders: PT Eval and Treat   Medical Diagnosis from Referral: Mixed stress and urge urinary incontinence [N39.46]   Evaluation Date: 4/19/2024  Authorization Period Expiration: 12/31/2024  Plan of Care Expiration: 06/28/2024  Progress Note Due: ---  Visit # / Visits authorized: 4/20 + eval   FOTO: Issued Visit #: 1 /3 NEXT VISIT     Precautions: Standard    Time In: 4:15 PM  Time Out: 5:00 PM  Total Billable Time: 30 minutes + 15 minutes with tech assistance     Subjective     Pt reports: was prescribe ibuprofen and she reports that this has decreased her pain.   She was compliant with home exercise program.  Response to previous treatment: no adverse symptoms   Functional change: no change     Pain: -/10   (not asked today)  Location: neck     Objective     Objective Measures updated at progress report unless specified.     Shoulder abduction - 145 deg bilaterally    Treatment     Hiwot received therapeutic exercises to develop  strength, endurance, ROM, and flexibility for 10  minutes including:     UBE 5'/5' - Level 1 (standing next visit)    Hiwot received the following manual therapy techniques: to develop flexibility and extensibility for 00  minutes including:     Cervical distraction  Manual upper trap stretch  Side glides to c-spine     Hiwot participated in neuromuscular re-education activities to develop Coordination, Control, Down training, Posture, and Proprioception for 35  minutes including:     Doorway stretch 3x30"  Wall push ups x20  Wall angels 2x10   Standing scapular ER with 1/2 roll red TheraBand 3x10  Standing horizontal abduction with 1/2 roll YTB " "  Ball on wall circles ABCs capital, ABCs lower case x1 each arm   Cable rows 20# 3x10  Cable lat pull downs 3x10 20#  TRX pull ups 2x10  Waiters carry B 3# 2 laps  Standing shoulder flexion YTB 3" hold 2x10    Hot pack 0 minutes to the neck in supine.     Home Exercises Provided and Patient Education Provided     Education provided:   - anatomy/physiology of pelvic floor, posture/body mechanices, bladder retraining, diaphragmatic breathing, double voiding techniques, isometric abdominal exercises, kegels, proper bearing down techniques, fluid intake/dietary modifications, and behavior modifications  Discussed progression of plan of care with patient; educated pt in activity modification; reviewed HEP with pt. Pt demonstrated and verbalized understanding of all instruction and was provided with a handout of HEP (see Patient Instructions).  - HEP provided for pelvic floor and neck    Written Home Exercises Provided: yes.  Exercises were reviewed and Hiwot was able to demonstrate them prior to the end of the session.  Hiwot demonstrated good  understanding of the education provided.     See EMR under Patient Instructions for exercises provided 4/25/2024 and prior visit.     Assessment     Hiwot tolerated therapy session well with no complaints. Noted improvement in shoulder mobility specifically in flexion and abduction. Patient tolerated progression of scapular and shoulder strengthening exercises well.     Hiwot Is progressing well towards her goals.   Pt prognosis is Good.     Pt will continue to benefit from skilled outpatient physical therapy to address the deficits listed in the problem list box on initial evaluation, provide pt/family education and to maximize pt's level of independence in the home and community environment.     Pt's spiritual, cultural and educational needs considered and pt agreeable to plan of care and goals.     Anticipated barriers to physical therapy: see PMHx  Goals:  Short Term " Goals: 4 weeks   - Pt will demonstrate excellent knowledge and adherence to HEP to facilitate optimal recovery.  - Pt will demonstrate proper PFM contraction, relaxation, and lengthening coordinated with TA and breath for improved muscle coordination needed for functional activity.  - Pt will report a 25% reduction in frequency of leakage to demonstrate improved pelvic floor coordination needed for continence with ADLs.     Long Term Goals: 10 weeks   - Pt will demonstrate excellent knowledge and adherence to HEP for continued self-maintenance of symptoms.  - Pt will report PFDI Urinary FOTO score of 5% limited or less indicating clinically relevant increase in function.  - Pt to be able to perform a 5 second kegel x 10 reps with good quality to demonstrate improving strength and endurance needed for continence.  - Pt will be able to correctly and consistently explain urge control strategies to demonstrate understanding of these strategies and decrease likelihood of leakage.     PLAN      Plan of care Certification: 4/19/2024 to 06/28/2024.     Outpatient Physical Therapy 1-2 time(s) every week for 10 weeks to include the following interventions: Cervical/Lumbar Traction, Electrical Stimulation TENS/IFC, Manual Therapy, Moist Heat/ Ice, Neuromuscular Re-ed, Patient Education, Self Care, Therapeutic Activities, Therapeutic Exercise, and ASTYM, and FDN.     Dread Chin, PT

## 2024-06-19 ENCOUNTER — CLINICAL SUPPORT (OUTPATIENT)
Dept: REHABILITATION | Facility: HOSPITAL | Age: 79
End: 2024-06-19
Payer: MEDICARE

## 2024-06-19 DIAGNOSIS — M62.89 PELVIC FLOOR DYSFUNCTION: Primary | ICD-10-CM

## 2024-06-19 DIAGNOSIS — M53.82 DECREASED RANGE OF MOTION OF INTERVERTEBRAL DISCS OF CERVICAL SPINE: ICD-10-CM

## 2024-06-19 PROCEDURE — 97110 THERAPEUTIC EXERCISES: CPT | Mod: HCNC,PN,CQ

## 2024-06-19 PROCEDURE — 97140 MANUAL THERAPY 1/> REGIONS: CPT | Mod: HCNC,PN,CQ

## 2024-06-19 PROCEDURE — 97112 NEUROMUSCULAR REEDUCATION: CPT | Mod: HCNC,PN,CQ

## 2024-06-19 NOTE — PROGRESS NOTES
Pelvic Health Physical Therapy   Treatment Note     Name: Hiwot [facility] Number: [id number]    Therapy Diagnosis:   Encounter Diagnoses   Name Primary?    Pelvic floor dysfunction Yes    Decreased range of motion of intervertebral discs of cervical spine        Physician: Kumar Ramirez MD    Visit Date: 6/19/2024       Physician Orders: PT Eval and Treat   Medical Diagnosis from Referral: Mixed stress and urge urinary incontinence [N39.46]   Evaluation Date: 4/19/2024  Authorization Period Expiration: 12/31/2024  Plan of Care Expiration: 06/28/2024  Progress Note Due: ---  Visit # / Visits authorized: 5/20 + eval   FOTO: Issued Visit #: 1 /3        Precautions: Standard    Time In: 4:30 PM  Time Out: 5:15 PM  Total Billable Time: 45 minutes    Subjective     Pt reports: pain is better when she takes ibuprofen  She was compliant with home exercise program.  Response to previous treatment: manual therapy helps  Functional change: no change     Pain: 6-7/10    Location: neck     Objective     Objective Measures updated at progress report unless specified.     Shoulder abduction - 145 deg bilaterally standing; full range of motion with supine    Treatment     Hiwot received therapeutic exercises to develop  strength, endurance, ROM, and flexibility for 15 minutes including:     Standing UBE 6'/6'  Side lying windwills x10 each   Standing wall slides for abduction x10 B    Hiwot received the following manual therapy techniques: to develop flexibility and extensibility for 15 minutes including:     Cervical distraction  Manual upper trap stretch  Myofascial release to upper traps, levator scap,     Hiwot participated in neuromuscular re-education activities to develop Coordination, Control, Down training, Posture, and Proprioception for 15 minutes including:     Wall push ups x20  Wall angels 2x10   Standing horizontal abduction with 1/2 roll YTB   Ball on wall abduction circles ABCs capital, ABCs lower  "case x1 each arm   Cable rows 30# 3x10  Waiters carry B 4# 2 laps  Standing shoulder flexion YTB 3" hold 2x10    Hot pack 0 minutes to the neck in supine.     Home Exercises Provided and Patient Education Provided     Education provided:   - anatomy/physiology of pelvic floor, posture/body mechanices, bladder retraining, diaphragmatic breathing, double voiding techniques, isometric abdominal exercises, kegels, proper bearing down techniques, fluid intake/dietary modifications, and behavior modifications  Discussed progression of plan of care with patient; educated pt in activity modification; reviewed HEP with pt. Pt demonstrated and verbalized understanding of all instruction and was provided with a handout of HEP (see Patient Instructions).  - HEP provided for pelvic floor and neck    Written Home Exercises Provided: yes.  Exercises were reviewed and Hiwot was able to demonstrate them prior to the end of the session.  Hiwot demonstrated good  understanding of the education provided.     See EMR under Patient Instructions for exercises provided 4/25/2024 and prior visit.     Assessment     Hiwot presents to therapy with moderate neck pain. Manual therapy consisted of decreasing myofascial tension in cervical region and upper traps. She reports limitation in shoulder abduction with wall angels so this was tested in supine with full range of motion. More strengthening exercises performed for scapular stabilization and middle deltoid activation to improve abduction range of motion    Hiwot Is progressing well towards her goals.   Pt prognosis is Good.     Pt will continue to benefit from skilled outpatient physical therapy to address the deficits listed in the problem list box on initial evaluation, provide pt/family education and to maximize pt's level of independence in the home and community environment.     Pt's spiritual, cultural and educational needs considered and pt agreeable to plan of care and goals.   "   Anticipated barriers to physical therapy: see PMHx  Goals:  Short Term Goals: 4 weeks   - Pt will demonstrate excellent knowledge and adherence to HEP to facilitate optimal recovery.  - Pt will demonstrate proper PFM contraction, relaxation, and lengthening coordinated with TA and breath for improved muscle coordination needed for functional activity.  - Pt will report a 25% reduction in frequency of leakage to demonstrate improved pelvic floor coordination needed for continence with ADLs.     Long Term Goals: 10 weeks   - Pt will demonstrate excellent knowledge and adherence to HEP for continued self-maintenance of symptoms.  - Pt will report PFDI Urinary FOTO score of 5% limited or less indicating clinically relevant increase in function.  - Pt to be able to perform a 5 second kegel x 10 reps with good quality to demonstrate improving strength and endurance needed for continence.  - Pt will be able to correctly and consistently explain urge control strategies to demonstrate understanding of these strategies and decrease likelihood of leakage.     PLAN      Plan of care Certification: 4/19/2024 to 06/28/2024.     Outpatient Physical Therapy 1-2 time(s) every week for 10 weeks to include the following interventions: Cervical/Lumbar Traction, Electrical Stimulation TENS/IFC, Manual Therapy, Moist Heat/ Ice, Neuromuscular Re-ed, Patient Education, Self Care, Therapeutic Activities, Therapeutic Exercise, and ASTYM, and FDN.     Michelle Mcintosh, PTA

## 2024-06-20 ENCOUNTER — PATIENT MESSAGE (OUTPATIENT)
Dept: INTERNAL MEDICINE | Facility: CLINIC | Age: 79
End: 2024-06-20
Payer: MEDICARE

## 2024-06-24 NOTE — PROGRESS NOTES
Physical Therapy   Treatment Note     Name: Hiwot Coulter  [facility] Number: [id number]    Therapy Diagnosis:   Encounter Diagnoses   Name Primary?    Pelvic floor dysfunction Yes    Decreased range of motion of intervertebral discs of cervical spine          Physician: Kumar Ramirez MD    Visit Date: 6/25/2024       Physician Orders: PT Eval and Treat   Medical Diagnosis from Referral: Mixed stress and urge urinary incontinence [N39.46]   Evaluation Date: 4/19/2024  Authorization Period Expiration: 12/31/2024  UPDATED Plan of Care Expiration: 07/31/2024 ; 06/28/2024   Progress Note Due: ---  Visit # / Visits authorized: 6/20 + eval   FOTO: Issued Visit #: 1 /3        Precautions: Standard    Time In: 4:30 PM  Time Out: 5:10 PM  Total Billable Time: 40 minutes    Subjective     Pt reports: feeling about the same. Still feels tension in upper traps.   She was compliant with home exercise program.  Response to previous treatment: manual therapy helps    Functional change: no change     Pain: 6-7/10     Location: neck     Objective     Objective Measures updated at progress report unless specified.     Posture: Pt noted to present with forward head/rounded shoulder posture, increased thoracic Kyphosis.                 ROM:  Shoulder  AROM/PROM Right Left Pain/Dysfunction with Movement   flexion WNL WNL     extension WNL WNL     abduction 145 145     Internal rotation WNL WNL     ER WNL WNL        CERVICAL  (single inclinometer at top of head) AROM  (degrees/%) Pain/Dysfunction with Movement   Flexion 55     Extension 35     Right side bending 45  pain in left upper trap   Left side bending 30     Right rotation 75%     Left rotation 50% painful      Strength:  U/E MMT Right Left Pain/Dysfunction with Movement   Shoulder Flexion 4+/5 4+/5     Shoulder Abduction 4+/5 4+/5     Elbow Flexion (C5)  5/5 5/5     Elbow Extension (C7) 5/5 5/5     Shoulder ER  @ 0* Abduction 4+/5 4+/5     Shoulder IR  @ 0* Abduction 4+/5  "4+/5                                   Decreased MLT: Anterior chest musculature and latissimus dorsi.       Treatment     Hiwot received therapeutic exercises to develop  strength, endurance, ROM, and flexibility for 10 minutes including:     Standing UBE 5'/5'  Chicken wing at the wall     Not today:  Side lying windwills x10 each   Standing wall slides for abduction x10 B    Hiwot received the following manual therapy techniques: to develop flexibility and extensibility for 12 minutes including:     Cervical distraction  Manual upper trap stretch  Myofascial release to upper traps, levator scap,     Hiwot participated in neuromuscular re-education activities to develop Coordination, Control, Down training, Posture, and Proprioception for 00 minutes including:     Wall push ups x20  Wall angels 2x10   Standing horizontal abduction with 1/2 roll YTB   Ball on wall abduction circles ABCs capital, ABCs lower case x1 each arm   Cable rows 30# 3x10  Waiters carry B 4# 2 laps  Standing shoulder flexion YTB 3" hold 2x10    Hot pack 0 minutes to the neck in supine.     Home Exercises Provided and Patient Education Provided     Education provided:   - anatomy/physiology of pelvic floor, posture/body mechanices, bladder retraining, diaphragmatic breathing, double voiding techniques, isometric abdominal exercises, kegels, proper bearing down techniques, fluid intake/dietary modifications, and behavior modifications  Discussed progression of plan of care with patient; educated pt in activity modification; reviewed HEP with pt. Pt demonstrated and verbalized understanding of all instruction and was provided with a handout of HEP (see Patient Instructions).  - HEP provided for pelvic floor and neck    Written Home Exercises Provided: yes.  Exercises were reviewed and Hiwot was able to demonstrate them prior to the end of the session.  Hiwot demonstrated good  understanding of the education provided.     See EMR under " Patient Instructions for exercises provided 4/25/2024 and prior visit.     Assessment     Hiwot presents to therapy today with reports of improvement in pelvic floor symptoms, however she continues to have pain in her neck throughout the day. She will continue to benefit from skilled physical therapy to improve cervical and shoulder range of motion, strength, endurance, flexibility, and posture awareness.   Hiwot Is progressing well towards her goals.   Pt prognosis is Good.     Pt will continue to benefit from skilled outpatient physical therapy to address the deficits listed in the problem list box on initial evaluation, provide pt/family education and to maximize pt's level of independence in the home and community environment.     Pt's spiritual, cultural and educational needs considered and pt agreeable to plan of care and goals.     Anticipated barriers to physical therapy: see PMHx  Goals:  Short Term Goals: 4 weeks   - Pt will demonstrate excellent knowledge and adherence to HEP to facilitate optimal recovery. MET 6/25/2024  - Pt will demonstrate proper PFM contraction, relaxation, and lengthening coordinated with TA and breath for improved muscle coordination needed for functional activity. MET 6/25/2024  - Pt will report a 25% reduction in frequency of leakage to demonstrate improved pelvic floor coordination needed for continence with ADLs. Progressing      Long Term Goals: 10 weeks   - Pt will demonstrate excellent knowledge and adherence to HEP for continued self-maintenance of symptoms. MET 6/25/2024  - Pt will report PFDI Urinary FOTO score of 5% limited or less indicating clinically relevant increase in function. Progressing  - Pt to be able to perform a 5 second kegel x 10 reps with good quality to demonstrate improving strength and endurance needed for continence. Progressing  - Pt will be able to correctly and consistently explain urge control strategies to demonstrate understanding of these  strategies and decrease likelihood of leakage. MET 6/25/2024  - NEW: Patient will improve cervical range of motion to within normal limits and pain-free.  Progressing  - NEW: Pt will demonstrate improved pain less than or equal to 3/10 in order to progress toward maximal functional ability and improve QOL. Progressing  - NEW: Patient will demonstrate ability to look over shoulder while driving to return to prior level of function. Progressing     PLAN      Plan of care Certification: 06/28/2024 to 07/31/2024     Outpatient Physical Therapy 1 time(s) every week for 5 more weeks to include the following interventions: Cervical/Lumbar Traction, Electrical Stimulation TENS/IFC, Manual Therapy, Moist Heat/ Ice, Neuromuscular Re-ed, Patient Education, Self Care, Therapeutic Activities, Therapeutic Exercise, and ASTYM, and FDN.     Dread Chin, PT

## 2024-06-25 ENCOUNTER — CLINICAL SUPPORT (OUTPATIENT)
Dept: REHABILITATION | Facility: HOSPITAL | Age: 79
End: 2024-06-25
Payer: MEDICARE

## 2024-06-25 ENCOUNTER — OFFICE VISIT (OUTPATIENT)
Dept: INTERNAL MEDICINE | Facility: CLINIC | Age: 79
End: 2024-06-25
Payer: MEDICARE

## 2024-06-25 DIAGNOSIS — B35.1 TOENAIL FUNGUS: ICD-10-CM

## 2024-06-25 DIAGNOSIS — R53.83 FATIGUE, UNSPECIFIED TYPE: Primary | ICD-10-CM

## 2024-06-25 DIAGNOSIS — M62.89 PELVIC FLOOR DYSFUNCTION: Primary | ICD-10-CM

## 2024-06-25 DIAGNOSIS — M53.82 DECREASED RANGE OF MOTION OF INTERVERTEBRAL DISCS OF CERVICAL SPINE: ICD-10-CM

## 2024-06-25 PROCEDURE — 97164 PT RE-EVAL EST PLAN CARE: CPT | Mod: HCNC,PN

## 2024-06-25 PROCEDURE — 97110 THERAPEUTIC EXERCISES: CPT | Mod: HCNC,PN

## 2024-06-25 PROCEDURE — 97140 MANUAL THERAPY 1/> REGIONS: CPT | Mod: HCNC,PN

## 2024-06-25 PROCEDURE — 1159F MED LIST DOCD IN RCRD: CPT | Mod: HCNC,CPTII,95, | Performed by: PEDIATRICS

## 2024-06-25 PROCEDURE — 99214 OFFICE O/P EST MOD 30 MIN: CPT | Mod: HCNC,95,, | Performed by: PEDIATRICS

## 2024-06-25 PROCEDURE — 1160F RVW MEDS BY RX/DR IN RCRD: CPT | Mod: HCNC,CPTII,95, | Performed by: PEDIATRICS

## 2024-06-25 NOTE — PROGRESS NOTES
TELEMEDICINE VIRTUAL VISIT    Subjective:       Patient ID: Hiwot Coulter is a 79 y.o. female.    Chief Complaint: Fatigue    Televisit for 2 reasons.    1)great toe had toe nail fungus return after nail removed in past by podiatry.  2)2 months or so of fatigue despite 6-8 hours of restful sleep. Had previous sleep problems but resolved with her weight loss(mounjaro use). Has cold intolerance, no constipation, no CP, SOB, depression, changes in bowels.    Fatigue  Associated symptoms include fatigue. Pertinent negatives include no abdominal pain, arthralgias, chest pain, coughing, headaches, joint swelling, neck pain, vomiting or weakness.     Review of Systems   Constitutional:  Positive for fatigue. Negative for activity change and unexpected weight change.   HENT:  Negative for hearing loss, rhinorrhea and trouble swallowing.    Eyes:  Negative for discharge and visual disturbance.   Respiratory:  Negative for cough, chest tightness, shortness of breath and wheezing.    Cardiovascular:  Negative for chest pain and palpitations.   Gastrointestinal:  Negative for abdominal distention, abdominal pain, blood in stool, constipation, diarrhea and vomiting.   Endocrine: Negative for polydipsia and polyuria.   Genitourinary:  Negative for difficulty urinating, dysuria, hematuria and menstrual problem.   Musculoskeletal:  Negative for arthralgias, joint swelling and neck pain.   Neurological:  Negative for weakness and headaches.   Psychiatric/Behavioral:  Negative for confusion and dysphoric mood.        Objective:      CONSTITUTIONAL: No apparent distress. Does not appear acutely ill or septic. Appears adequately hydrated.  PULM: Breathing unlabored.  PSYCHIATRIC: Alert and conversant and grossly oriented. Mood is grossly neutral. Affect appropriate. Judgment and insight grossly intact.      Assessment:       1. Fatigue, unspecified type    2. Toenail fungus        Plan:       Fatigue, unspecified type  -     TSH;  "Future; Expected date: 06/25/2024  -     CBC Auto Differential; Future; Expected date: 06/25/2024  -     Comprehensive Metabolic Panel; Future; Expected date: 06/25/2024    Toenail fungus  -     efinaconazole (JUBLIA) 10 % Renetta; Apply to toenail once a day  Dispense: 8 mL; Refill: 2       We discussed lamasil use. She does not want oral meds or liver test monitoring. Will try Jublia since confined to 1 nail. Can send back to podiatry if needed. Get labs looking for secondary causes, if all normal then send back to sleep medicine. 16 minutes spent in chart.  Documentation entered by me for this encounter may have been done in part using speech-recognition technology. Although I have made an effort to ensure accuracy, "sound like" errors may exist and should be interpreted in context. -CINDY Ramirez MD    Visit Details: This visit was a telemedicine virtual visit with synchronous audio and video. Hiwot reported that her location at the time of this visit was in the [facility]. Hiwot had the choice to come into office to receive these medical services. Hiwot chose and consented to receive these medical services by telemedicine.  "

## 2024-06-26 DIAGNOSIS — Z78.0 MENOPAUSE: ICD-10-CM

## 2024-06-26 NOTE — PLAN OF CARE
Physical Therapy   Treatment Note     Name: Hiwot Coulter  [facility] Number: [id number]    Therapy Diagnosis:   Encounter Diagnoses   Name Primary?    Pelvic floor dysfunction Yes    Decreased range of motion of intervertebral discs of cervical spine          Physician: Kumar Ramirez MD    Visit Date: 6/25/2024       Physician Orders: PT Eval and Treat   Medical Diagnosis from Referral: Mixed stress and urge urinary incontinence [N39.46]   Evaluation Date: 4/19/2024  Authorization Period Expiration: 12/31/2024  UPDATED Plan of Care Expiration: 07/31/2024 ; 06/28/2024   Progress Note Due: ---  Visit # / Visits authorized: 6/20 + eval   FOTO: Issued Visit #: 1 /3        Precautions: Standard    Time In: 4:30 PM  Time Out: 5:10 PM  Total Billable Time: 40 minutes    Subjective     Pt reports: feeling about the same. Still feels tension in upper traps.   She was compliant with home exercise program.  Response to previous treatment: manual therapy helps    Functional change: no change     Pain: 6-7/10     Location: neck     Objective     Objective Measures updated at progress report unless specified.     Posture: Pt noted to present with forward head/rounded shoulder posture, increased thoracic Kyphosis.                 ROM:  Shoulder  AROM/PROM Right Left Pain/Dysfunction with Movement   flexion WNL WNL     extension WNL WNL     abduction 145 145     Internal rotation WNL WNL     ER WNL WNL        CERVICAL  (single inclinometer at top of head) AROM  (degrees/%) Pain/Dysfunction with Movement   Flexion 55     Extension 35     Right side bending 45  pain in left upper trap   Left side bending 30     Right rotation 75%     Left rotation 50% painful      Strength:  U/E MMT Right Left Pain/Dysfunction with Movement   Shoulder Flexion 4+/5 4+/5     Shoulder Abduction 4+/5 4+/5     Elbow Flexion (C5)  5/5 5/5     Elbow Extension (C7) 5/5 5/5     Shoulder ER  @ 0* Abduction 4+/5 4+/5     Shoulder IR  @ 0* Abduction 4+/5  4+/5                                   Decreased MLT: Anterior chest musculature and latissimus dorsi.     Home Exercises Provided and Patient Education Provided     Education provided:   - anatomy/physiology of pelvic floor, posture/body mechanices, bladder retraining, diaphragmatic breathing, double voiding techniques, isometric abdominal exercises, kegels, proper bearing down techniques, fluid intake/dietary modifications, and behavior modifications  Discussed progression of plan of care with patient; educated pt in activity modification; reviewed HEP with pt. Pt demonstrated and verbalized understanding of all instruction and was provided with a handout of HEP (see Patient Instructions).  - HEP provided for pelvic floor and neck    Written Home Exercises Provided: yes.  Exercises were reviewed and Hiwot was able to demonstrate them prior to the end of the session.  Hiwot demonstrated good  understanding of the education provided.     See EMR under Patient Instructions for exercises provided 4/25/2024 and prior visit.     Assessment     Hiwot presents to therapy today with reports of improvement in pelvic floor symptoms, however she continues to have pain in her neck throughout the day. She will continue to benefit from skilled physical therapy to improve cervical and shoulder range of motion, strength, endurance, flexibility, and posture awareness.   Hiwot Is progressing well towards her goals.   Pt prognosis is Good.     Pt will continue to benefit from skilled outpatient physical therapy to address the deficits listed in the problem list box on initial evaluation, provide pt/family education and to maximize pt's level of independence in the home and community environment.     Pt's spiritual, cultural and educational needs considered and pt agreeable to plan of care and goals.     Anticipated barriers to physical therapy: see PMHx  Goals:  Short Term Goals: 4 weeks   - Pt will demonstrate excellent  knowledge and adherence to HEP to facilitate optimal recovery. MET 6/25/2024  - Pt will demonstrate proper PFM contraction, relaxation, and lengthening coordinated with TA and breath for improved muscle coordination needed for functional activity. MET 6/25/2024  - Pt will report a 25% reduction in frequency of leakage to demonstrate improved pelvic floor coordination needed for continence with ADLs. Progressing      Long Term Goals: 10 weeks   - Pt will demonstrate excellent knowledge and adherence to HEP for continued self-maintenance of symptoms. MET 6/25/2024  - Pt will report PFDI Urinary FOTO score of 5% limited or less indicating clinically relevant increase in function. Progressing  - Pt to be able to perform a 5 second kegel x 10 reps with good quality to demonstrate improving strength and endurance needed for continence. Progressing  - Pt will be able to correctly and consistently explain urge control strategies to demonstrate understanding of these strategies and decrease likelihood of leakage. MET 6/25/2024  - NEW: Patient will improve cervical range of motion to within normal limits and pain-free.  Progressing  - NEW: Pt will demonstrate improved pain less than or equal to 3/10 in order to progress toward maximal functional ability and improve QOL. Progressing  - NEW: Patient will demonstrate ability to look over shoulder while driving to return to prior level of function. Progressing     PLAN      Plan of care Certification: 06/28/2024 to 07/31/2024     Outpatient Physical Therapy 1 time(s) every week for 5 more weeks to include the following interventions: Cervical/Lumbar Traction, Electrical Stimulation TENS/IFC, Manual Therapy, Moist Heat/ Ice, Neuromuscular Re-ed, Patient Education, Self Care, Therapeutic Activities, Therapeutic Exercise, and ASTYM, and FDN.     Dread Chin, PT

## 2024-06-27 ENCOUNTER — PATIENT MESSAGE (OUTPATIENT)
Dept: INTERNAL MEDICINE | Facility: CLINIC | Age: 79
End: 2024-06-27
Payer: MEDICARE

## 2024-06-28 ENCOUNTER — LAB VISIT (OUTPATIENT)
Dept: LAB | Facility: HOSPITAL | Age: 79
End: 2024-06-28
Attending: PEDIATRICS
Payer: MEDICARE

## 2024-06-28 DIAGNOSIS — R53.83 FATIGUE, UNSPECIFIED TYPE: ICD-10-CM

## 2024-06-28 LAB
ALBUMIN SERPL BCP-MCNC: 3.9 G/DL (ref 3.5–5.2)
ALP SERPL-CCNC: 50 U/L (ref 55–135)
ALT SERPL W/O P-5'-P-CCNC: 10 U/L (ref 10–44)
ANION GAP SERPL CALC-SCNC: 8 MMOL/L (ref 8–16)
AST SERPL-CCNC: 17 U/L (ref 10–40)
BASOPHILS # BLD AUTO: 0.03 K/UL (ref 0–0.2)
BASOPHILS NFR BLD: 0.6 % (ref 0–1.9)
BILIRUB SERPL-MCNC: 0.6 MG/DL (ref 0.1–1)
BUN SERPL-MCNC: 20 MG/DL (ref 8–23)
CALCIUM SERPL-MCNC: 9.4 MG/DL (ref 8.7–10.5)
CHLORIDE SERPL-SCNC: 107 MMOL/L (ref 95–110)
CO2 SERPL-SCNC: 25 MMOL/L (ref 23–29)
CREAT SERPL-MCNC: 0.8 MG/DL (ref 0.5–1.4)
DIFFERENTIAL METHOD BLD: NORMAL
EOSINOPHIL # BLD AUTO: 0.2 K/UL (ref 0–0.5)
EOSINOPHIL NFR BLD: 3.8 % (ref 0–8)
ERYTHROCYTE [DISTWIDTH] IN BLOOD BY AUTOMATED COUNT: 12.7 % (ref 11.5–14.5)
EST. GFR  (NO RACE VARIABLE): >60 ML/MIN/1.73 M^2
GLUCOSE SERPL-MCNC: 83 MG/DL (ref 70–110)
HCT VFR BLD AUTO: 37.2 % (ref 37–48.5)
HGB BLD-MCNC: 12.4 G/DL (ref 12–16)
IMM GRANULOCYTES # BLD AUTO: 0 K/UL (ref 0–0.04)
IMM GRANULOCYTES NFR BLD AUTO: 0 % (ref 0–0.5)
LYMPHOCYTES # BLD AUTO: 2.1 K/UL (ref 1–4.8)
LYMPHOCYTES NFR BLD: 41.2 % (ref 18–48)
MCH RBC QN AUTO: 30.2 PG (ref 27–31)
MCHC RBC AUTO-ENTMCNC: 33.3 G/DL (ref 32–36)
MCV RBC AUTO: 91 FL (ref 82–98)
MONOCYTES # BLD AUTO: 0.4 K/UL (ref 0.3–1)
MONOCYTES NFR BLD: 8.2 % (ref 4–15)
NEUTROPHILS # BLD AUTO: 2.3 K/UL (ref 1.8–7.7)
NEUTROPHILS NFR BLD: 46.2 % (ref 38–73)
NRBC BLD-RTO: 0 /100 WBC
PLATELET # BLD AUTO: 261 K/UL (ref 150–450)
PMV BLD AUTO: 9.7 FL (ref 9.2–12.9)
POTASSIUM SERPL-SCNC: 5 MMOL/L (ref 3.5–5.1)
PROT SERPL-MCNC: 6.7 G/DL (ref 6–8.4)
RBC # BLD AUTO: 4.1 M/UL (ref 4–5.4)
SODIUM SERPL-SCNC: 140 MMOL/L (ref 136–145)
TSH SERPL DL<=0.005 MIU/L-ACNC: 1.09 UIU/ML (ref 0.4–4)
WBC # BLD AUTO: 5.02 K/UL (ref 3.9–12.7)

## 2024-06-28 PROCEDURE — 85025 COMPLETE CBC W/AUTO DIFF WBC: CPT | Mod: HCNC | Performed by: PEDIATRICS

## 2024-06-28 PROCEDURE — 84443 ASSAY THYROID STIM HORMONE: CPT | Mod: HCNC | Performed by: PEDIATRICS

## 2024-06-28 PROCEDURE — 36415 COLL VENOUS BLD VENIPUNCTURE: CPT | Mod: HCNC | Performed by: PEDIATRICS

## 2024-06-28 PROCEDURE — 80053 COMPREHEN METABOLIC PANEL: CPT | Mod: HCNC | Performed by: PEDIATRICS

## 2024-07-03 ENCOUNTER — CLINICAL SUPPORT (OUTPATIENT)
Dept: REHABILITATION | Facility: HOSPITAL | Age: 79
End: 2024-07-03
Payer: MEDICARE

## 2024-07-03 ENCOUNTER — DOCUMENTATION ONLY (OUTPATIENT)
Dept: REHABILITATION | Facility: HOSPITAL | Age: 79
End: 2024-07-03

## 2024-07-03 DIAGNOSIS — M53.82 DECREASED RANGE OF MOTION OF INTERVERTEBRAL DISCS OF CERVICAL SPINE: ICD-10-CM

## 2024-07-03 DIAGNOSIS — M62.89 PELVIC FLOOR DYSFUNCTION: Primary | ICD-10-CM

## 2024-07-03 PROCEDURE — 97140 MANUAL THERAPY 1/> REGIONS: CPT | Mod: HCNC,PN,CQ

## 2024-07-03 PROCEDURE — 97110 THERAPEUTIC EXERCISES: CPT | Mod: HCNC,PN,CQ

## 2024-07-03 PROCEDURE — 97112 NEUROMUSCULAR REEDUCATION: CPT | Mod: HCNC,PN,CQ

## 2024-07-03 NOTE — PROGRESS NOTES
Physical Therapy   Treatment Note     Name: Hiwot PACE [facility] Number: [id number]    Therapy Diagnosis:   Encounter Diagnoses   Name Primary?    Pelvic floor dysfunction Yes    Decreased range of motion of intervertebral discs of cervical spine            Physician: Kumar Ramirez MD    Visit Date: 7/3/2024       Physician Orders: PT Eval and Treat   Medical Diagnosis from Referral: Mixed stress and urge urinary incontinence [N39.46]   Evaluation Date: 4/19/2024  Authorization Period Expiration: 12/31/2024  UPDATED Plan of Care Expiration: 07/31/2024 ; 06/28/2024   Progress Note Due: ---  Visit # / Visits authorized: 7/20 + eval   FOTO: Issued Visit #: 1 /3        Precautions: Standard    Time In: 4:35 PM  Time Out: 5:15 PM  Total Billable Time: 40 minutes    Subjective     Pt reports: she has been massaging her neck at work and it is feeling better.  She was compliant with home exercise program.  Response to previous treatment: no complaints  Functional change: no change     Pain: 3-4/10     Location: neck     Objective     Objective Measures updated at progress report unless specified.        Treatment     Hiwot received therapeutic exercises to develop  strength, endurance, ROM, and flexibility for 20 minutes including:     Standing UBE 5'/5'  Thoracic extension over foam roll 2x10  Chicken wing at the wall   Standing wall slides for abduction x10 B    Hiwot received the following manual therapy techniques: to develop flexibility and extensibility for 10 minutes including:     Cervical distraction  Manual upper trap stretch  Myofascial release to upper traps, levator scap,     Hiwot participated in neuromuscular re-education activities to develop Coordination, Control, Down training, Posture, and Proprioception for 10 minutes including:     Wall push ups x20  Wall angels x10   Chin tucks into ball on wall 2x10  Standing horizontal abduction with 1/2 roll YTB   Ball on wall abduction backwards  circles ABCs capital, ABCs lower case x1 each arm   Cable rows 30# 3x10    Hot pack 0 minutes to the neck in supine.     Home Exercises Provided and Patient Education Provided     Education provided:   - anatomy/physiology of pelvic floor, posture/body mechanices, bladder retraining, diaphragmatic breathing, double voiding techniques, isometric abdominal exercises, kegels, proper bearing down techniques, fluid intake/dietary modifications, and behavior modifications  Discussed progression of plan of care with patient; educated pt in activity modification; reviewed HEP with pt. Pt demonstrated and verbalized understanding of all instruction and was provided with a handout of HEP (see Patient Instructions).  - HEP provided for pelvic floor and neck    Written Home Exercises Provided: yes.  Exercises were reviewed and Hiwot was able to demonstrate them prior to the end of the session.  Hiwot demonstrated good  understanding of the education provided.     See EMR under Patient Instructions for exercises provided 4/25/2024 and prior visit.     Assessment     Hiwot presents to therapy with mild neck pain and tension in cervical paraspinals. She participated In exercises to improve postural stability, cervical strength and middle deltoid strengthening with good tolerance    Hiwot Is progressing well towards her goals.   Pt prognosis is Good.     Pt will continue to benefit from skilled outpatient physical therapy to address the deficits listed in the problem list box on initial evaluation, provide pt/family education and to maximize pt's level of independence in the home and community environment.     Pt's spiritual, cultural and educational needs considered and pt agreeable to plan of care and goals.     Anticipated barriers to physical therapy: see PMHx  Goals:  Short Term Goals: 4 weeks   - Pt will demonstrate excellent knowledge and adherence to HEP to facilitate optimal recovery. MET 6/25/2024  - Pt will  demonstrate proper PFM contraction, relaxation, and lengthening coordinated with TA and breath for improved muscle coordination needed for functional activity. MET 6/25/2024  - Pt will report a 25% reduction in frequency of leakage to demonstrate improved pelvic floor coordination needed for continence with ADLs. Progressing      Long Term Goals: 10 weeks   - Pt will demonstrate excellent knowledge and adherence to HEP for continued self-maintenance of symptoms. MET 6/25/2024  - Pt will report PFDI Urinary FOTO score of 5% limited or less indicating clinically relevant increase in function. Progressing  - Pt to be able to perform a 5 second kegel x 10 reps with good quality to demonstrate improving strength and endurance needed for continence. Progressing  - Pt will be able to correctly and consistently explain urge control strategies to demonstrate understanding of these strategies and decrease likelihood of leakage. MET 6/25/2024  - NEW: Patient will improve cervical range of motion to within normal limits and pain-free.  Progressing  - NEW: Pt will demonstrate improved pain less than or equal to 3/10 in order to progress toward maximal functional ability and improve QOL. Progressing  - NEW: Patient will demonstrate ability to look over shoulder while driving to return to prior level of function. Progressing     PLAN      Plan of care Certification: 06/28/2024 to 07/31/2024     Outpatient Physical Therapy 1 time(s) every week for 5 more weeks to include the following interventions: Cervical/Lumbar Traction, Electrical Stimulation TENS/IFC, Manual Therapy, Moist Heat/ Ice, Neuromuscular Re-ed, Patient Education, Self Care, Therapeutic Activities, Therapeutic Exercise, and ASTYM, and FDN.     Michelle Mcintosh, PTA

## 2024-07-17 NOTE — PROGRESS NOTES
Physical Therapy   Treatment Note     Name: Hiwot PACE [facility] Number: [id number]    Therapy Diagnosis:   Encounter Diagnoses   Name Primary?    Pelvic floor dysfunction Yes    Decreased range of motion of intervertebral discs of cervical spine        Physician: Kumar Ramirez MD    Visit Date: 7/18/2024       Physician Orders: PT Eval and Treat   Medical Diagnosis from Referral: Mixed stress and urge urinary incontinence [N39.46]   Evaluation Date: 4/19/2024  Authorization Period Expiration: 12/31/2024  UPDATED Plan of Care Expiration: 07/31/2024 ; 06/28/2024   Progress Note Due: ---  Visit # / Visits authorized: 8/20 + eval   FOTO: Issued Visit #: 1 /3        Precautions: Standard    Time In: 5:07 PM  Time Out: 5:45 PM  Total Billable Time: 38 minutes    Subjective     Pt reports: massage makes her neck feel better. She reports that she has an increase in pain when looking down.   She was compliant with home exercise program.  Response to previous treatment: no complaints  Functional change: no change     Pain: 3-4/10      Location: neck     Objective     Objective Measures updated at progress report unless specified.      Treatment     Hiwot received therapeutic exercises to develop  strength, endurance, ROM, and flexibility for 08 minutes including:     Standing UBE 3'/3'  Wall clocks x10 each     Hiwot received the following manual therapy techniques: to develop flexibility and extensibility for 12 minutes including:     Cervical distraction  Manual upper trap stretch  Myofascial release to upper traps, levator scap,     Hiwot participated in neuromuscular re-education activities to develop Coordination, Control, Down training, Posture, and Proprioception for 18 minutes including:     Ball on wall abduction backwards circles ABCs capital, ABCs lower case x1 each arm   Cable rows 20# 3x10  Cable lat pull downs 3x10 20#  TRX pull ups 2x10    Hot pack 0 minutes to the neck in supine.     Home  Exercises Provided and Patient Education Provided     Education provided:   - anatomy/physiology of pelvic floor, posture/body mechanices, bladder retraining, diaphragmatic breathing, double voiding techniques, isometric abdominal exercises, kegels, proper bearing down techniques, fluid intake/dietary modifications, and behavior modifications  Discussed progression of plan of care with patient; educated pt in activity modification; reviewed HEP with pt. Pt demonstrated and verbalized understanding of all instruction and was provided with a handout of HEP (see Patient Instructions).  - HEP provided for pelvic floor and neck    Written Home Exercises Provided: yes.  Exercises were reviewed and Hiwot was able to demonstrate them prior to the end of the session.  Hiwot demonstrated good  understanding of the education provided.     See EMR under Patient Instructions for exercises provided 4/25/2024 and prior visit.     Assessment     Patient presents to therapy today with reports of continued neck pain especially with cervical flexion. Addressed pain with myofascial release to bilateral upper traps in the prone position. Patient reporting improvements in pain at end of session. Continued focus on neck and shoulder stability and strengthening.     Hiwot Is progressing well towards her goals.   Pt prognosis is Good.     Pt will continue to benefit from skilled outpatient physical therapy to address the deficits listed in the problem list box on initial evaluation, provide pt/family education and to maximize pt's level of independence in the home and community environment.     Pt's spiritual, cultural and educational needs considered and pt agreeable to plan of care and goals.     Anticipated barriers to physical therapy: see PMHx  Goals:  Short Term Goals: 4 weeks   - Pt will demonstrate excellent knowledge and adherence to HEP to facilitate optimal recovery. MET 6/25/2024  - Pt will demonstrate proper PFM contraction,  relaxation, and lengthening coordinated with TA and breath for improved muscle coordination needed for functional activity. MET 6/25/2024  - Pt will report a 25% reduction in frequency of leakage to demonstrate improved pelvic floor coordination needed for continence with ADLs. Progressing      Long Term Goals: 10 weeks   - Pt will demonstrate excellent knowledge and adherence to HEP for continued self-maintenance of symptoms. MET 6/25/2024  - Pt will report PFDI Urinary FOTO score of 5% limited or less indicating clinically relevant increase in function. Progressing  - Pt to be able to perform a 5 second kegel x 10 reps with good quality to demonstrate improving strength and endurance needed for continence. Progressing  - Pt will be able to correctly and consistently explain urge control strategies to demonstrate understanding of these strategies and decrease likelihood of leakage. MET 6/25/2024  - NEW: Patient will improve cervical range of motion to within normal limits and pain-free.  Progressing  - NEW: Pt will demonstrate improved pain less than or equal to 3/10 in order to progress toward maximal functional ability and improve QOL. Progressing  - NEW: Patient will demonstrate ability to look over shoulder while driving to return to prior level of function. Progressing     PLAN      Plan of care Certification: 06/28/2024 to 07/31/2024     Outpatient Physical Therapy 1 time(s) every week for 5 more weeks to include the following interventions: Cervical/Lumbar Traction, Electrical Stimulation TENS/IFC, Manual Therapy, Moist Heat/ Ice, Neuromuscular Re-ed, Patient Education, Self Care, Therapeutic Activities, Therapeutic Exercise, and ASTYM, and FDN.     Dread Chin, PT

## 2024-07-18 ENCOUNTER — CLINICAL SUPPORT (OUTPATIENT)
Dept: REHABILITATION | Facility: HOSPITAL | Age: 79
End: 2024-07-18
Payer: MEDICARE

## 2024-07-18 DIAGNOSIS — M62.89 PELVIC FLOOR DYSFUNCTION: Primary | ICD-10-CM

## 2024-07-18 DIAGNOSIS — M53.82 DECREASED RANGE OF MOTION OF INTERVERTEBRAL DISCS OF CERVICAL SPINE: ICD-10-CM

## 2024-07-18 PROCEDURE — 97110 THERAPEUTIC EXERCISES: CPT | Mod: HCNC,PN

## 2024-07-18 PROCEDURE — 97112 NEUROMUSCULAR REEDUCATION: CPT | Mod: HCNC,PN

## 2024-07-18 PROCEDURE — 97140 MANUAL THERAPY 1/> REGIONS: CPT | Mod: HCNC,PN

## 2024-07-25 ENCOUNTER — CLINICAL SUPPORT (OUTPATIENT)
Dept: REHABILITATION | Facility: HOSPITAL | Age: 79
End: 2024-07-25
Payer: MEDICARE

## 2024-07-25 DIAGNOSIS — M53.82 DECREASED RANGE OF MOTION OF INTERVERTEBRAL DISCS OF CERVICAL SPINE: ICD-10-CM

## 2024-07-25 DIAGNOSIS — M62.89 PELVIC FLOOR DYSFUNCTION: Primary | ICD-10-CM

## 2024-07-25 PROCEDURE — 97110 THERAPEUTIC EXERCISES: CPT | Mod: HCNC,PN

## 2024-07-25 PROCEDURE — 97140 MANUAL THERAPY 1/> REGIONS: CPT | Mod: HCNC,PN

## 2024-07-25 PROCEDURE — 97112 NEUROMUSCULAR REEDUCATION: CPT | Mod: HCNC,PN

## 2024-07-25 NOTE — PROGRESS NOTES
Physical Therapy   Treatment Note     Name: Hiwot PACE [facility] Number: [id number]    Therapy Diagnosis:   Encounter Diagnoses   Name Primary?    Pelvic floor dysfunction Yes    Decreased range of motion of intervertebral discs of cervical spine        Physician: Kumar Ramirez MD    Visit Date: 7/25/2024       Physician Orders: PT Eval and Treat   Medical Diagnosis from Referral: Mixed stress and urge urinary incontinence [N39.46]   Evaluation Date: 4/19/2024  Authorization Period Expiration: 12/31/2024  UPDATED Plan of Care Expiration: 07/31/2024 ; 06/28/2024   Progress Note Due: ---  Visit # / Visits authorized: 8/20 + eval   FOTO: Issued Visit #: 1 /3        Precautions: Standard    Time In: 4:50 PM  Time Out: 5:30 PM  Total Billable Time: 40 minutes    Subjective     Pt reports: best her neck has felt in awhile after the massage last visit.   She was compliant with home exercise program.  Response to previous treatment: improvements in neck symptoms  Functional change: no change     Pain: 3-4/10       Location: neck     Objective     Objective Measures updated at progress report unless specified.      Treatment     Hiwot received therapeutic exercises to develop  strength, endurance, ROM, and flexibility for 15 minutes including:     UBE 3'/3' Level 2  Elliptical 4 minutes using UE bars as well  Matrix rows machine 30# 3x10     Hiwot received the following manual therapy techniques: to develop flexibility and extensibility for 12 minutes including:     Manual upper trap stretch  Myofascial release to upper traps, levator scap,     Hiwot participated in neuromuscular re-education activities to develop Coordination, Control, Down training, Posture, and Proprioception for 13 minutes including:     Lat pulldowns at cables 20# 3x10  Walking overhead carry ANCA 3# each 4 laps   Foam roll lat stretch 3x10  Stability circles 2x10 each   Resisted chin tuck red TheraBand 2x10     Hot pack 0 minutes to the  neck in supine.     Home Exercises Provided and Patient Education Provided     Education provided:   - anatomy/physiology of pelvic floor, posture/body mechanices, bladder retraining, diaphragmatic breathing, double voiding techniques, isometric abdominal exercises, kegels, proper bearing down techniques, fluid intake/dietary modifications, and behavior modifications  Discussed progression of plan of care with patient; educated pt in activity modification; reviewed HEP with pt. Pt demonstrated and verbalized understanding of all instruction and was provided with a handout of HEP (see Patient Instructions).  - HEP provided for pelvic floor and neck    Written Home Exercises Provided: yes.  Exercises were reviewed and Hiwot was able to demonstrate them prior to the end of the session.  Hiwot demonstrated good  understanding of the education provided.     See EMR under Patient Instructions for exercises provided 4/25/2024 and prior visit.     Assessment     Hiwot presents to therapy today with reports of improvement in neck symptoms since last visit. Addressed tension in upper traps by performing myofascial release to bilateral upper traps and levator scapula muscles. Patient reporting increased relief following manual treatment. Continued focus on scapular mobility and strengthening as tolerated.     Hiwot Is progressing well towards her goals.   Pt prognosis is Good.     Pt will continue to benefit from skilled outpatient physical therapy to address the deficits listed in the problem list box on initial evaluation, provide pt/family education and to maximize pt's level of independence in the home and community environment.     Pt's spiritual, cultural and educational needs considered and pt agreeable to plan of care and goals.     Anticipated barriers to physical therapy: see PMHx  Goals:  Short Term Goals: 4 weeks   - Pt will demonstrate excellent knowledge and adherence to HEP to facilitate optimal recovery.  MET 6/25/2024  - Pt will demonstrate proper PFM contraction, relaxation, and lengthening coordinated with TA and breath for improved muscle coordination needed for functional activity. MET 6/25/2024  - Pt will report a 25% reduction in frequency of leakage to demonstrate improved pelvic floor coordination needed for continence with ADLs. Progressing      Long Term Goals: 10 weeks   - Pt will demonstrate excellent knowledge and adherence to HEP for continued self-maintenance of symptoms. MET 6/25/2024  - Pt will report PFDI Urinary FOTO score of 5% limited or less indicating clinically relevant increase in function. Progressing  - Pt to be able to perform a 5 second kegel x 10 reps with good quality to demonstrate improving strength and endurance needed for continence. Progressing  - Pt will be able to correctly and consistently explain urge control strategies to demonstrate understanding of these strategies and decrease likelihood of leakage. MET 6/25/2024  - NEW: Patient will improve cervical range of motion to within normal limits and pain-free.  Progressing  - NEW: Pt will demonstrate improved pain less than or equal to 3/10 in order to progress toward maximal functional ability and improve QOL. Progressing  - NEW: Patient will demonstrate ability to look over shoulder while driving to return to prior level of function. Progressing     PLAN      Plan of care Certification: 06/28/2024 to 07/31/2024     Outpatient Physical Therapy 1 time(s) every week for 5 more weeks to include the following interventions: Cervical/Lumbar Traction, Electrical Stimulation TENS/IFC, Manual Therapy, Moist Heat/ Ice, Neuromuscular Re-ed, Patient Education, Self Care, Therapeutic Activities, Therapeutic Exercise, and ASTYM, and FDN.     Dread Chin, PT

## 2024-08-01 ENCOUNTER — CLINICAL SUPPORT (OUTPATIENT)
Dept: REHABILITATION | Facility: HOSPITAL | Age: 79
End: 2024-08-01
Payer: MEDICARE

## 2024-08-01 DIAGNOSIS — M62.89 PELVIC FLOOR DYSFUNCTION: Primary | ICD-10-CM

## 2024-08-01 DIAGNOSIS — M53.82 DECREASED RANGE OF MOTION OF INTERVERTEBRAL DISCS OF CERVICAL SPINE: ICD-10-CM

## 2024-08-01 PROCEDURE — 97110 THERAPEUTIC EXERCISES: CPT | Mod: HCNC,PN

## 2024-08-01 PROCEDURE — 97140 MANUAL THERAPY 1/> REGIONS: CPT | Mod: HCNC,PN

## 2024-08-01 PROCEDURE — 97112 NEUROMUSCULAR REEDUCATION: CPT | Mod: HCNC,PN

## 2024-08-01 NOTE — PLAN OF CARE
Physical Therapy   Treatment Note     Name: Hiwot PACE [facility] Number: [id number]    Therapy Diagnosis:   Encounter Diagnoses   Name Primary?    Pelvic floor dysfunction Yes    Decreased range of motion of intervertebral discs of cervical spine          Physician: Kumar Ramirez MD    Visit Date: 8/1/2024       Physician Orders: PT Eval and Treat   Medical Diagnosis from Referral: Mixed stress and urge urinary incontinence [N39.46]   Evaluation Date: 4/19/2024  Authorization Period Expiration: 12/31/2024  UPDATED Plan of Care Expiration: 08/29/2024 ; 07/31/2024 ; 06/28/2024   Progress Note Due: ---  Visit # / Visits authorized: 10/20 + eval   FOTO: Issued Visit #: 2 /3        Precautions: Standard    Time In: 5:15 PM  Time Out: 5:55 PM  Total Billable Time: 40 minutes    Subjective     Pt reports: neck is feeling great today.   She was compliant with home exercise program.  Response to previous treatment: improvements in neck symptoms  Functional change: no change     Pain: 4/10        Location: neck     Objective       CERVICAL  (single inclinometer at top of head) AROM  (degrees/%) Pain/Dysfunction with Movement   Flexion 60     Extension 40     Right side bending 35     Left side bending 40     Right rotation 50%  painful    Left rotation 75% painful      Treatment     Hiwot received therapeutic exercises to develop  strength, endurance, ROM, and flexibility for 15 minutes including:     UBE 2'/2' Level 2  Elliptical 5 minutes using UE bars as well  Matrix rows machine 30# 3x10     Hiwot received the following manual therapy techniques: to develop flexibility and extensibility for 10 minutes including:     Manual upper trap stretch  Myofascial release to upper traps, levator scap,     Hiwot participated in neuromuscular re-education activities to develop Coordination, Control, Down training, Posture, and Proprioception for 15 minutes including:     Lat pulldowns at cables 20# 3x10  Wall push ups  3x10   Walking overhead carry ANCA 4# each 3 laps     Not today:  Foam roll lat stretch 3x10  Stability circles 2x10 each   Resisted chin tuck red TheraBand 2x10     Hot pack 0 minutes to the neck in supine.     Home Exercises Provided and Patient Education Provided     Education provided:   - anatomy/physiology of pelvic floor, posture/body mechanices, bladder retraining, diaphragmatic breathing, double voiding techniques, isometric abdominal exercises, kegels, proper bearing down techniques, fluid intake/dietary modifications, and behavior modifications  Discussed progression of plan of care with patient; educated pt in activity modification; reviewed HEP with pt. Pt demonstrated and verbalized understanding of all instruction and was provided with a handout of HEP (see Patient Instructions).  - HEP provided for pelvic floor and neck    Written Home Exercises Provided: yes.  Exercises were reviewed and Hiwot was able to demonstrate them prior to the end of the session.  Hiwot demonstrated good  understanding of the education provided.     See EMR under Patient Instructions for exercises provided 4/25/2024 and prior visit.     Assessment     Hiwot presents to therapy today with reports of continued improvement in neck symptoms. She reports minimal pain with cervical rotation. Noted improvement in all cervical range of motion directions. Possible discharge in 2 weeks depending on patients maintenance with HEP. Patient will continue to benefit from skilled physical therapy to improve compliance with HEP, decrease pain with cervical rotation, and improving shoulder stability.     Hiwot Is progressing well towards her goals.   Pt prognosis is Good.     Pt will continue to benefit from skilled outpatient physical therapy to address the deficits listed in the problem list box on initial evaluation, provide pt/family education and to maximize pt's level of independence in the home and community environment.     Pt's  spiritual, cultural and educational needs considered and pt agreeable to plan of care and goals.     Anticipated barriers to physical therapy: see PMHx  Goals:  Short Term Goals: 4 weeks   - Pt will demonstrate excellent knowledge and adherence to HEP to facilitate optimal recovery. MET 6/25/2024  - Pt will demonstrate proper PFM contraction, relaxation, and lengthening coordinated with TA and breath for improved muscle coordination needed for functional activity. MET 6/25/2024  - Pt will report a 25% reduction in frequency of leakage to demonstrate improved pelvic floor coordination needed for continence with ADLs. Progressing      Long Term Goals: 10 weeks   - Pt will demonstrate excellent knowledge and adherence to HEP for continued self-maintenance of symptoms. MET 6/25/2024  - Pt will report PFDI Urinary FOTO score of 5% limited or less indicating clinically relevant increase in function. Progressing  - Pt to be able to perform a 5 second kegel x 10 reps with good quality to demonstrate improving strength and endurance needed for continence. Progressing  - Pt will be able to correctly and consistently explain urge control strategies to demonstrate understanding of these strategies and decrease likelihood of leakage. MET 6/25/2024  - NEW: Patient will improve cervical range of motion to within normal limits and pain-free.  Progressing  - NEW: Pt will demonstrate improved pain less than or equal to 3/10 in order to progress toward maximal functional ability and improve QOL. Progressing  - NEW: Patient will demonstrate ability to look over shoulder while driving to return to prior level of function. Progressing     PLAN      Plan of care Certification: 08/01/2024 to 08/29/2024     Outpatient Physical Therapy 1 time(s) every 2 weeks for 4 more weeks to include the following interventions: Cervical/Lumbar Traction, Electrical Stimulation TENS/IFC, Manual Therapy, Moist Heat/ Ice, Neuromuscular Re-ed, Patient  Education, Self Care, Therapeutic Activities, Therapeutic Exercise, and ASTYM, and FDN.     Dread Chin, PT

## 2024-08-01 NOTE — PROGRESS NOTES
Physical Therapy   Treatment Note     Name: Hiwot PACE [facility] Number: [id number]    Therapy Diagnosis:   Encounter Diagnoses   Name Primary?    Pelvic floor dysfunction Yes    Decreased range of motion of intervertebral discs of cervical spine          Physician: Kumar Ramirez MD    Visit Date: 8/1/2024       Physician Orders: PT Eval and Treat   Medical Diagnosis from Referral: Mixed stress and urge urinary incontinence [N39.46]   Evaluation Date: 4/19/2024  Authorization Period Expiration: 12/31/2024  UPDATED Plan of Care Expiration: 08/29/2024 ; 07/31/2024 ; 06/28/2024   Progress Note Due: ---  Visit # / Visits authorized: 10/20 + eval   FOTO: Issued Visit #: 2 /3        Precautions: Standard    Time In: 5:15 PM  Time Out: 5:55 PM  Total Billable Time: 40 minutes    Subjective     Pt reports: neck is feeling great today.   She was compliant with home exercise program.  Response to previous treatment: improvements in neck symptoms  Functional change: no change     Pain: 4/10        Location: neck     Objective       CERVICAL  (single inclinometer at top of head) AROM  (degrees/%) Pain/Dysfunction with Movement   Flexion 60     Extension 40     Right side bending 35     Left side bending 40     Right rotation 50%  painful    Left rotation 75% painful      Treatment     Hiwot received therapeutic exercises to develop  strength, endurance, ROM, and flexibility for 15 minutes including:     UBE 2'/2' Level 2  Elliptical 5 minutes using UE bars as well  Matrix rows machine 30# 3x10     Hiwot received the following manual therapy techniques: to develop flexibility and extensibility for 10 minutes including:     Manual upper trap stretch  Myofascial release to upper traps, levator scap,     Hiwot participated in neuromuscular re-education activities to develop Coordination, Control, Down training, Posture, and Proprioception for 15 minutes including:     Lat pulldowns at cables 20# 3x10  Wall push ups  3x10   Walking overhead carry ANCA 4# each 3 laps     Not today:  Foam roll lat stretch 3x10  Stability circles 2x10 each   Resisted chin tuck red TheraBand 2x10     Hot pack 0 minutes to the neck in supine.     Home Exercises Provided and Patient Education Provided     Education provided:   - anatomy/physiology of pelvic floor, posture/body mechanices, bladder retraining, diaphragmatic breathing, double voiding techniques, isometric abdominal exercises, kegels, proper bearing down techniques, fluid intake/dietary modifications, and behavior modifications  Discussed progression of plan of care with patient; educated pt in activity modification; reviewed HEP with pt. Pt demonstrated and verbalized understanding of all instruction and was provided with a handout of HEP (see Patient Instructions).  - HEP provided for pelvic floor and neck    Written Home Exercises Provided: yes.  Exercises were reviewed and Hiwot was able to demonstrate them prior to the end of the session.  Hiwot demonstrated good  understanding of the education provided.     See EMR under Patient Instructions for exercises provided 4/25/2024 and prior visit.     Assessment     Hiwot presents to therapy today with reports of continued improvement in neck symptoms. She reports minimal pain with cervical rotation. Noted improvement in all cervical range of motion directions. Possible discharge in 2 weeks depending on patients maintenance with HEP. Patient will continue to benefit from skilled physical therapy to improve compliance with HEP, decrease pain with cervical rotation, and improving shoulder stability.     Hiwot Is progressing well towards her goals.   Pt prognosis is Good.     Pt will continue to benefit from skilled outpatient physical therapy to address the deficits listed in the problem list box on initial evaluation, provide pt/family education and to maximize pt's level of independence in the home and community environment.     Pt's  spiritual, cultural and educational needs considered and pt agreeable to plan of care and goals.     Anticipated barriers to physical therapy: see PMHx  Goals:  Short Term Goals: 4 weeks   - Pt will demonstrate excellent knowledge and adherence to HEP to facilitate optimal recovery. MET 6/25/2024  - Pt will demonstrate proper PFM contraction, relaxation, and lengthening coordinated with TA and breath for improved muscle coordination needed for functional activity. MET 6/25/2024  - Pt will report a 25% reduction in frequency of leakage to demonstrate improved pelvic floor coordination needed for continence with ADLs. Progressing      Long Term Goals: 10 weeks   - Pt will demonstrate excellent knowledge and adherence to HEP for continued self-maintenance of symptoms. MET 6/25/2024  - Pt will report PFDI Urinary FOTO score of 5% limited or less indicating clinically relevant increase in function. Progressing  - Pt to be able to perform a 5 second kegel x 10 reps with good quality to demonstrate improving strength and endurance needed for continence. Progressing  - Pt will be able to correctly and consistently explain urge control strategies to demonstrate understanding of these strategies and decrease likelihood of leakage. MET 6/25/2024  - NEW: Patient will improve cervical range of motion to within normal limits and pain-free.  Progressing  - NEW: Pt will demonstrate improved pain less than or equal to 3/10 in order to progress toward maximal functional ability and improve QOL. Progressing  - NEW: Patient will demonstrate ability to look over shoulder while driving to return to prior level of function. Progressing     PLAN      Plan of care Certification: 08/01/2024 to 08/29/2024     Outpatient Physical Therapy 1 time(s) every 2 weeks for 4 more weeks to include the following interventions: Cervical/Lumbar Traction, Electrical Stimulation TENS/IFC, Manual Therapy, Moist Heat/ Ice, Neuromuscular Re-ed, Patient  Education, Self Care, Therapeutic Activities, Therapeutic Exercise, and ASTYM, and FDN.     Dread Chin, PT

## 2024-08-21 NOTE — PROGRESS NOTES
Subjective:       Patient ID: Hiwot Coulter is a 77 y.o. female.    Chief Complaint: Follow-up    Patient comes in today for follow up   Doing well   On mounarjo, eating better - next goal is exercise     Review of Systems   Constitutional:  Negative for fatigue, fever and unexpected weight change.   HENT:  Negative for sore throat and trouble swallowing.    Respiratory:  Negative for cough and shortness of breath.    Cardiovascular:  Negative for chest pain.   Gastrointestinal:  Negative for abdominal pain.   Hematological:  Negative for adenopathy. Does not bruise/bleed easily.   All other systems reviewed and are negative.        Objective:      Physical Exam  Constitutional:       Appearance: Normal appearance. She is obese.   Pulmonary:      Effort: Pulmonary effort is normal.   Neurological:      General: No focal deficit present.      Mental Status: She is alert and oriented to person, place, and time.       Assessment:       Problem List Items Addressed This Visit       Hyperlipidemia - Primary    Severe obesity (BMI 35.0-39.9) with comorbidity       Plan:       Hyperlipidemia, unspecified hyperlipidemia type    Class 1 obesity with serious comorbidity and body mass index (BMI) of 31.0 to 31.9 in adult, unspecified obesity type  Comments:  pt has lost about 12 lbs!    Other orders  -     tirzepatide (MOUNJARO) 5 mg/0.5 mL PnIj; Inject 5 mg into the skin every 7 days.  Dispense: 4 pen; Refill: 11         Cont lifestyle changes     
Concern for delay in diagnosis and treatment

## 2024-10-31 ENCOUNTER — PATIENT MESSAGE (OUTPATIENT)
Dept: INTERNAL MEDICINE | Facility: CLINIC | Age: 79
End: 2024-10-31
Payer: MEDICARE

## 2024-11-14 ENCOUNTER — OFFICE VISIT (OUTPATIENT)
Dept: INTERNAL MEDICINE | Facility: CLINIC | Age: 79
End: 2024-11-14
Payer: MEDICARE

## 2024-11-14 ENCOUNTER — PATIENT MESSAGE (OUTPATIENT)
Dept: INTERNAL MEDICINE | Facility: CLINIC | Age: 79
End: 2024-11-14

## 2024-11-14 VITALS
DIASTOLIC BLOOD PRESSURE: 70 MMHG | HEIGHT: 61 IN | RESPIRATION RATE: 18 BRPM | OXYGEN SATURATION: 99 % | BODY MASS INDEX: 27.43 KG/M2 | WEIGHT: 145.31 LBS | HEART RATE: 71 BPM | TEMPERATURE: 97 F | SYSTOLIC BLOOD PRESSURE: 112 MMHG

## 2024-11-14 DIAGNOSIS — E66.3 OVERWEIGHT (BMI 25.0-29.9): Primary | ICD-10-CM

## 2024-11-14 DIAGNOSIS — E66.811 CLASS 1 OBESITY DUE TO EXCESS CALORIES WITH SERIOUS COMORBIDITY AND BODY MASS INDEX (BMI) OF 33.0 TO 33.9 IN ADULT: ICD-10-CM

## 2024-11-14 DIAGNOSIS — E66.811 CLASS 1 OBESITY WITH SERIOUS COMORBIDITY AND BODY MASS INDEX (BMI) OF 31.0 TO 31.9 IN ADULT, UNSPECIFIED OBESITY TYPE: ICD-10-CM

## 2024-11-14 DIAGNOSIS — M50.30 DDD (DEGENERATIVE DISC DISEASE), CERVICAL: ICD-10-CM

## 2024-11-14 DIAGNOSIS — Z23 NEED FOR VACCINATION: ICD-10-CM

## 2024-11-14 DIAGNOSIS — E66.09 CLASS 1 OBESITY DUE TO EXCESS CALORIES WITH SERIOUS COMORBIDITY AND BODY MASS INDEX (BMI) OF 33.0 TO 33.9 IN ADULT: ICD-10-CM

## 2024-11-14 DIAGNOSIS — R05.1 ACUTE COUGH: ICD-10-CM

## 2024-11-14 PROCEDURE — 99999 PR PBB SHADOW E&M-EST. PATIENT-LVL IV: CPT | Mod: PBBFAC,HCNC,, | Performed by: PEDIATRICS

## 2024-11-14 RX ORDER — BROMPHENIRAMINE MALEATE, PSEUDOEPHEDRINE HYDROCHLORIDE, AND DEXTROMETHORPHAN HYDROBROMIDE 2; 30; 10 MG/5ML; MG/5ML; MG/5ML
10 SYRUP ORAL 3 TIMES DAILY PRN
Qty: 240 ML | Refills: 1 | Status: SHIPPED | OUTPATIENT
Start: 2024-11-14 | End: 2024-11-24

## 2024-11-14 NOTE — PROGRESS NOTES
Patient ID: Hiwot Coulter is a 79 y.o. female.    Chief Complaint: Follow-up    History of Present Illness    CHIEF COMPLAINT:  Patient presents today for weight management and medication refills.    WEIGHT MANAGEMENT:  She reports good response to previous Mounjaro use without side effects but on cessation, recent weight gain of 20 lbs, causing significant distress. Her weight has increased from a previous low of 120 lbs to 145 lbs currently. She expresses a strong desire to prevent further weight gain and requests a prescription for Mounjaro 7.5mg, based on previous positive experience. She indicates willingness to self-fund the medication and plans to administer it monthly to manage costs.    CHRONIC COUGH:  She requests a refill of cough syrup, which she has been using long-term. She reports taking approximately one teaspoon before bedtime to effectively manage nighttime coughing.    MUSCULOSKELETAL PAIN:  She reports a history of neck and shoulder pain. She previously attended physical therapy, noting that treatment focused on her shoulder blades in the last two weeks was more effective in alleviating pain. She expresses interest in restarting physical therapy.    VACCINATIONS:  She agrees to receive the flu vaccine.    PMH, PSH, SH, FH reviewed with patient.      ROS:  General: -fever, -chills, -fatigue, +weight gain, -weight loss  Eyes: -vision changes, -redness, -discharge  ENT: -ear pain, -nasal congestion, -sore throat  Cardiovascular: -chest pain, -palpitations, -lower extremity edema  Respiratory: -cough, -shortness of breath  Gastrointestinal: -abdominal pain, -nausea, -vomiting, -diarrhea, -constipation, -blood in stool  Genitourinary: -dysuria, -hematuria, -frequency  Musculoskeletal: -joint pain, -muscle pain, +neck pain  Skin: -rash, -lesion  Neurological: -headache, -dizziness, -numbness, -tingling  Psychiatric: -anxiety, -depression, -sleep difficulty         Exam:  Physical Exam    Vitals:  Weight: 145 lbs.  General: No acute distress. Well-developed. Well-nourished.  Eyes: EOMI. Sclerae anicteric.  HENT: Normocephalic. Atraumatic. Nares patent. Moist oral mucosa.  Cardiovascular: Regular rate. Regular rhythm. No murmurs. No rubs. No gallops. Normal S1, S2.  Respiratory: Normal respiratory effort. Clear to auscultation bilaterally. No rales. No rhonchi. No wheezing.  Abdomen: Soft. Non-tender. Non-distended. Normoactive bowel sounds.  Musculoskeletal: No  obvious deformity.  Extremities: No lower extremity edema.  Neurological: Alert & oriented x3. No slurred speech. Normal gait.  Psychiatric: Normal mood. Normal affect. Good insight. Good judgment.  Skin: Warm. Dry. No rash.         Assessment/Plan:  Overweight (BMI 25.0-29.9)    Class 1 obesity with serious comorbidity and body mass index (BMI) of 31.0 to 31.9 in adult, unspecified obesity type  -     Discontinue: tirzepatide 7.5 mg/0.5 mL PnIj; Inject 7.5 mg into the skin every 7 days.  Dispense: 4 Pen; Refill: 1  -     tirzepatide 7.5 mg/0.5 mL PnIj; Inject 7.5 mg into the skin every 7 days.  Dispense: 4 Pen; Refill: 1    Class 1 obesity due to excess calories with serious comorbidity and body mass index (BMI) of 33.0 to 33.9 in adult  -     Discontinue: tirzepatide 7.5 mg/0.5 mL PnIj; Inject 7.5 mg into the skin every 7 days.  Dispense: 4 Pen; Refill: 1  -     tirzepatide 7.5 mg/0.5 mL PnIj; Inject 7.5 mg into the skin every 7 days.  Dispense: 4 Pen; Refill: 1    Acute cough  -     brompheniramine-pseudoeph-DM (BROMFED DM) 2-30-10 mg/5 mL Syrp; Take 10 mLs by mouth 3 (three) times daily as needed.  Dispense: 240 mL; Refill: 1    DDD (degenerative disc disease), cervical  -     Ambulatory referral/consult to Physical/Occupational Therapy; Future; Expected date: 11/21/2024    Need for vaccination  -     Influenza - Trivalent (Adjuvanted)         Assessment & Plan    Considered restarting Mounjaro for weight management, opting for 7.5mg dose based on  patient's previous tolerance and efficacy  Evaluated patient's request for less frequent Mounjaro dosing  Assessed need for cough syrup refill, noting long-term periodic use for nighttime cough  Discussed physical therapy options for neck and shoulder pain management    WEIGHT MANAGEMENT:  Discussed the patient's weight gain from 120 to 145 lbs.  Addressed the patient's concern about weight gain and desire to prevent further weight gain.  Prescribed Mounjaro 7.5mg for weight management, with 4 refills.  Explained that Mounjaro contains 2 active ingredients compared to single-ingredient alternatives.  Discussed flexible dosing schedule for Mounjaro, allowing the patient to self-administer every 2-3 weeks or monthly.  Advised the patient to explore Mounjaro company's direct-to-consumer mail option for potential cost savings.  Instructed the patient to monitor for potential rebound weight gain.    COUGH:  Refilled cough syrup with pseudoephedrine 240ml, added refill.  Instructed the patient to take 1 teaspoon before bed as needed for nighttime cough.  Noted that the patient uses cough syrup sparingly.    FLU VACCINATION:  Administered flu vaccination in office.    RSV VACCINATION:  Recommend RSV one-time dose.    COVID VACCINATION:  Recommend COVID vaccination.    NECK AND SHOULDER PAIN:  Referred the patient to physical therapy for neck and shoulder pain management.  Noted that patient reports pain relief in shoulder blades after previous physical therapy.  Agreed to restart physical therapy with $20 copay per visit.  Explained insurance coverage for physical therapy visits per quarter.    FOLLOW UP:  None Follow up in December as scheduled.          Visit today included increased complexity associated with the care of the episodic problem  addressed and managing the longitudinal care of the patient due to the serious and/or complex managed problem(s) .      No follow-ups on file.    This note was generated with the  assistance of ambient listening technology. Verbal consent was obtained by the patient and accompanying visitor(s) for the recording of patient appointment to facilitate this note. I attest to having reviewed and edited the generated note for accuracy, though some syntax or spelling errors may persist. Please contact the author of this note for any clarification.

## 2024-11-15 RX ORDER — TIRZEPATIDE 5 MG/.5ML
5 INJECTION, SOLUTION SUBCUTANEOUS
Qty: 4 PEN | Refills: 11 | Status: SHIPPED | OUTPATIENT
Start: 2024-11-15

## 2024-11-15 RX ORDER — TIRZEPATIDE 5 MG/.5ML
5 INJECTION, SOLUTION SUBCUTANEOUS
Qty: 4 PEN | Refills: 11 | Status: SHIPPED | OUTPATIENT
Start: 2024-11-15 | End: 2024-11-15 | Stop reason: SDUPTHER

## 2024-11-18 ENCOUNTER — PATIENT MESSAGE (OUTPATIENT)
Dept: INTERNAL MEDICINE | Facility: CLINIC | Age: 79
End: 2024-11-18
Payer: MEDICARE

## 2024-11-18 DIAGNOSIS — E66.3 OVERWEIGHT (BMI 25.0-29.9): Primary | ICD-10-CM

## 2024-11-18 RX ORDER — TIRZEPATIDE 10 MG/.5ML
10 INJECTION, SOLUTION SUBCUTANEOUS
Qty: 4 PEN | Refills: 11 | Status: SHIPPED | OUTPATIENT
Start: 2024-11-18

## 2024-11-26 ENCOUNTER — PATIENT MESSAGE (OUTPATIENT)
Dept: INTERNAL MEDICINE | Facility: CLINIC | Age: 79
End: 2024-11-26
Payer: MEDICARE

## 2024-11-26 DIAGNOSIS — E66.3 OVERWEIGHT (BMI 25.0-29.9): Primary | ICD-10-CM

## 2024-11-27 ENCOUNTER — TELEPHONE (OUTPATIENT)
Dept: PHARMACY | Facility: CLINIC | Age: 79
End: 2024-11-27
Payer: MEDICARE

## 2024-11-27 ENCOUNTER — CLINICAL SUPPORT (OUTPATIENT)
Dept: REHABILITATION | Facility: HOSPITAL | Age: 79
End: 2024-11-27
Attending: PEDIATRICS
Payer: MEDICARE

## 2024-11-27 DIAGNOSIS — R29.898 DECREASED STRENGTH OF UPPER EXTREMITY: ICD-10-CM

## 2024-11-27 DIAGNOSIS — Z74.09 DECREASED FUNCTIONAL MOBILITY AND ENDURANCE: ICD-10-CM

## 2024-11-27 DIAGNOSIS — R29.898 DECREASED RANGE OF MOTION OF NECK: Primary | ICD-10-CM

## 2024-11-27 DIAGNOSIS — M50.30 DDD (DEGENERATIVE DISC DISEASE), CERVICAL: ICD-10-CM

## 2024-11-27 DIAGNOSIS — M25.619 DECREASED RANGE OF MOTION OF SHOULDER, UNSPECIFIED LATERALITY: ICD-10-CM

## 2024-11-27 PROCEDURE — 97162 PT EVAL MOD COMPLEX 30 MIN: CPT | Mod: HCNC

## 2024-11-27 PROCEDURE — 97110 THERAPEUTIC EXERCISES: CPT | Mod: HCNC

## 2024-11-27 RX ORDER — TIRZEPATIDE 10 MG/.5ML
10 INJECTION, SOLUTION SUBCUTANEOUS
Qty: 4 PEN | Refills: 11 | Status: SHIPPED | OUTPATIENT
Start: 2024-11-27

## 2024-11-27 NOTE — TELEPHONE ENCOUNTER
We have reviewed Ms. Coulter current medication list and/or insurance status. Unfortunately, The Pharmacy Patient Assistance Team is unable to assist at this time due to the following reasons      Patient does not meet mandate of Type 2 Diabetes diagnosis to qualify for [facility]        Jarret Ashkan  Pharmacy Patient Assistance Team

## 2024-11-27 NOTE — PLAN OF CARE
[facility]   Physical Therapy Initial Evaluation      Date: 11/27/2024   Name: Hiwot Coulter  [facility] Number: [id number]    Therapy Diagnosis:    Encounter Diagnoses   Name Primary?    DDD (degenerative disc disease), cervical     Decreased range of motion of neck Yes    Decreased range of motion of shoulder, unspecified laterality     Decreased strength of upper extremity     Decreased functional mobility and endurance       Physician: Kumar Ramirez MD     Physician Orders: Physical Therapy Evaluation and Treat  Medical Diagnosis from Referral: M50.30 (ICD-10-CM) - DDD (degenerative disc disease), cervical   Evaluation Date: 11/27/2024  Authorization Period Expiration: 11/14/2025  Plan of Care Expiration: 01/22/2025  Progress Note Due: 12/27/2024  Visit # / Visits authorized: 1/1   FOTO: 1/3     Precautions: Standard    Time In: 4:00 PM  Time Out: 4:30 PM  Total Billable Time (timed & untimed codes): 30 minutes    SUBJECTIVE   Date of onset: over past 6 months    History of current condition - Hiwot reports: returning to therapy today for similar problem she was being treated for a few months ago. Patient stated she stopped PHYSICAL THERAPY due to insurance not approving more visits at that time. Patient states she has bilateral head/neck pain near occiput that mainly occurs when sleeping at night and patient states her pain is so intense it will wake her up every couple hours and that she will struggle to lift her head up in the morning. Patient states throughout the day her pain is minimal, or at least she is not thinking about it. Patient states her RANGE OF MOTION has gotten worse and that she struggles to check her blind spot when driving.    Falls: none    Exercise Regimen: none, works out in the yard and doing in the yard     Imaging: [x] Xray [] MRI [] CT: Performed on: 01/10/2022    Pain:  Current 3/10, worst 8/10, best 2/10   Location: Bilateral posterior head near  occiput, bilateral upper trapezial, levator scapulae, sternocleidomastoid   Description: Aching, Throbbing, Grabbing, and Tight  Aggravating Factors: sleeping, turning neck  Easing Factors: activity avoidance, rest, medication    Prior Therapy:   [] N/A    [x] Yes: for similar condition  Social History: Patient lives with their family  Occupation: Patient is an  working full-time  Prior Level of Function: Independent and pain free with all ADL, IADL, community mobility and functional activities.   Current Level of Function: Independent with all ADL, IADL, community mobility and functional activities with reports of increased pain and need for increased time and frequent breaks.      Dominant Extremity:    [x] Right    [] Left      Medical History:   Past Medical History:   Diagnosis Date    Anxiety     Arthritis     hand, neck, back    Behaviorally induced insufficient sleep syndrome     Behaviorally induced insufficient sleep syndrome     Cataract     Depression     GERD (gastroesophageal reflux disease)     Hyperlipidemia     Obesity     Osteopenia     Pollen allergies     Self-care deficit 12/10/2021    Sleep apnea        Surgical History:   Hiwot Coulter  has a past surgical history that includes Cholecystectomy (2012); Hysterectomy; Cataract extraction w/  intraocular lens implant (Right, 07/18/2019); and Cataract extraction w/  intraocular lens implant (Left, 08/22/2019).    Medications:   Hiwot has a current medication list which includes the following prescription(s): acetaminophen, efinaconazole, ergocalciferol, euflexxa, freestyle lana 2 sensor, fluad quad 2022-23(65y up)(pf), fluticasone propionate, ibandronate, ibuprofen, meloxicam, montelukast, and zepbound.    Allergies:   Review of patient's allergies indicates:  No Known Allergies       OBJECTIVE     Sensation:  [x] Intact to Light Touch   [] Impaired:    Palpation: Increased tone and tenderness noted with palpation of bilateral upper  trapezial, levator scapulae, sternocleidomastoid     Posture: Patient presents with postural abnormalities which include:    [x] Forward Head   [x] Increased Lumbar Lordosis   [x] Rounded Shoulder   [] Genu Recurvatum   [x] Increased Thoracic Kyphosis [] Genu Valgus   [] Trunk Deviated    [] Pes Planus   [] Scapular Winging    [] Other:       Functional Movement  Analysis   Overhead Reach Non-painful and Dysfunctional       RANGE OF MOTION:    Cervical Right   (spine) Left    Pain/Dysfunction with Movement Goal   Cervical Flexion (80) WITHIN FUNCTIONAL LIMITS  ---     Cervical Extension (70) 40 --- Familiar pain 60   Cervical Side Bending (45) 25 20 Familiar pain 35 bilateral   Cervical Rotation (45) 15 10  Familiar pain 35 bilateral     Shoulder AROM/PROM Right Left Pain/Dysfunction with Movement Goal   Shoulder Flexion (180) WITHIN FUNCTIONAL LIMITS  WITHIN FUNCTIONAL LIMITS      Shoulder Abduction (180) 160 160  170 bilateral     STRENGTH:    U/E MMT Right Left Pain/Dysfunction with Movement Goal   Shoulder Flexion 4-/5 4/5  4+/5 Bilateral   Shoulder Abduction 3+/5 3+/5  4+/5 Bilateral       MUSCLE LENGTH:     Muscle Tested  Right Left  Goal   Upper Trapezius  decreased decreased Normal Bilateral   Levator Scapulae  decreased decreased Normal Bilateral   Sternocleidomastoid decreased decreased Normal Bilateral   Scalenes  decreased decreased Normal Bilateral   Pectoralis Minor  decreased decreased Normal Bilateral   Pectoralis Major decreased decreased Normal Bilateral       JOINT MOBILITY:     Joint Motion Tested Right  (spine) Left  Goal   Cervical rotation Normal, but limited due to muscle guarding  - Normal Bilateral       FUNCTION:     CMS Impairment/Limitation/Restriction for FOTO Neck Survey    Therapist reviewed FOTO scores for Hiwot on 11/27/2024.   FOTO documents entered into [employer] - see Media section.    Intake Score: 51         TREATMENT       Hiwot received the treatments listed below:        CPT  Intervention Performed  11/27/2024  Duration / Intensity     TE  Patient educated on plan of care and current condition as well as HOME EXERCISE PROGRAM established for cervical ACTIVE RANGE OF MOTION as tolerable x Patient verbalized understanding                           NMR                                                    TA                                                               MT        PLAN  UPPER BODY ERGOMETER, pulleys flexion, standing chin tucks with ball, cervical SNAG with heat, open books, supine chin tuck, cervical ACTIVE RANGE OF MOTION, SOFT TISSUE MOBILIZATION to bilateral levator scapulae             CPT Codes available for Billing:   (00) minutes of Manual therapy (MT) to improve pain and ROM.  (10) minutes of Therapeutic Exercise (TE) to develop strength, endurance, range of motion, and flexibility.  (00) minutes of Neuromuscular Re-Education (NMR)  to improve: Balance, Coordination, Kinesthetic, Sense, Proprioception, and Posture.  (00) minutes of Therapeutic Activities (TA) to improve functional performance.  Unattended Electrical Stimulation (ES) for muscle performance or pain modulation.  BFR: Blood flow restriction applied during exercise  NP or (-): Not Performed      PATIENT EDUCATION AND HOME EXERCISES     Education provided: (10) minutes  PURPOSE: Patient educated on the impairments noted above and the effects of physical therapy intervention to improve overall condition and QOL.   EXERCISE: Patient was educated on all the above exercise prior/during/after for proper posture, positioning, and execution for safe performance with home exercise program.   STRENGTH: Patient educated on the importance of improved core and extremity strength in order to improve alignment of the spine and extremities with static positions and dynamic movement.   SLEEPING POSITIONS: Patient educated on the use of pillows to aid in neutral alignment of spine and extremities when sleeping in supine or  side lying.    Written Home Exercises Provided: no.  Exercises were reviewed and Hiwot was able to demonstrate them prior to the end of the session.  Hiwot demonstrated good  understanding of the education provided. See EMR under Patient Instructions for exercises provided during therapy sessions.    ASSESSMENT     Hiwot is a 79 y.o. female referred to outpatient Physical Therapy with a medical diagnosis of DDD (degenerative disc disease), cervical . Patient presents with impairments including: impairments list: ROM, strength, endurance, joint mobility, muscle length, posture, and functional movement patterns.    Patient prognosis is Good.   Patient will benefit from skilled outpatient Physical Therapy to address the deficits stated above and in the chart below, provide patient/family education, and to maximize patient's level of independence.     Plan of care discussed with patient: Yes  Patient's spiritual, cultural and educational needs considered and patient is agreeable to the plan of care and goals as stated below:     Anticipated Barriers for therapy: chronicity of condition and occupation    Medical Necessity is demonstrated by the following   History  Co-morbidities and personal factors that may impact the plan of care [] LOW: no personal factors / co-morbidities  [] MODERATE: 1-2 personal factors / co-morbidities  [x] HIGH: 3+ personal factors / co-morbidities    Moderate / High Support Documentation:   Past Medical History:   Diagnosis Date    Anxiety     Arthritis     hand, neck, back    Behaviorally induced insufficient sleep syndrome     Behaviorally induced insufficient sleep syndrome     Cataract     Depression     GERD (gastroesophageal reflux disease)     Hyperlipidemia     Obesity     Osteopenia     Pollen allergies     Self-care deficit 12/10/2021    Sleep apnea          Examination  Body Structures and Functions, activity limitations and participation restrictions that may impact the plan of  care [] LOW: addressing 1-2 elements  [x] MODERATE: 3+ elements  [] HIGH: 4+ elements (please support below)    Moderate / High Support Documentation: See evaluation / objective measurements above and FOTO.     Clinical Presentation [] LOW: stable  [x] MODERATE: Evolving  [] HIGH: Unstable     Decision Making/ Complexity Score: moderate         Goals:  Reviewed:11/27/2024      Short Term Goals:  4 weeks Status  Date Met   PAIN: Patient will report worst pain of 5/10 in order to progress toward max functional ability and improve quality of life. [x] Progressing  [] Met  [] Not Met    FUNCTION: Patient will demonstrate improved function as indicated by a functional intake score of more than or equal to 59 out of 100 on FOTO. [x] Progressing  [] Met  [] Not Met    MOBILITY: Patient will improve Range of Motion to 50% of stated goals, listed in objective measures above, in order to progress towards independence with functional activities.  [x] Progressing  [] Met  [] Not Met    STRENGTH: Patient will improve strength to 50% of stated goals, listed in objective measures above, in order to progress towards independence with functional activities.  [x] Progressing  [] Met  [] Not Met      Long Term Goals:  8 weeks Status Date Met   PAIN: Patient will report worst pain of 3/10 in order to progress toward max functional ability and improve quality of life [x] Progressing  [] Met  [] Not Met    FUNCTION: Patient will demonstrate improved function as indicated by a functional intake score of more than or equal to 68 out of 100 on FOTO. [x] Progressing  [] Met  [] Not Met    MOBILITY: Patient will improve Range of Motion to stated goals, listed in objective measures above, in order to return to maximal functional potential and improve quality of life. [x] Progressing  [] Met  [] Not Met    STRENGTH: Patient will improve strength to stated goals, listed in objective measures above, in order to improve functional independence and  quality of life. [x] Progressing  [] Met  [] Not Met    Patient will return to normal ADL's, IADL's, community involvement, recreational activities, and work-related activities with less than or equal to 3/10 pain and maximal function.  [x] Progressing  [] Met  [] Not Met      PLAN   Plan of Care Certification: 11/27/2024 to 01/22/2025.    Outpatient Physical Therapy 2 times weekly for 8 weeks to include any combination of the following interventions: virtual visits, dry needling, modalities, electrical stimulation (IFC, Pre-Mod, Attended with Functional Dry Needling), Cervical/Lumbar Traction, Gait Training, Manual Therapy, Moist Heat/ Ice, Neuromuscular Re-ed, Patient Education, Self Care, Therapeutic Exercise, and Therapeutic Activites     Tresa Rick, PT, DPT      Physician's Signature: ___________________________________________________

## 2024-12-03 DIAGNOSIS — M25.562 LEFT KNEE PAIN, UNSPECIFIED CHRONICITY: Primary | ICD-10-CM

## 2024-12-04 ENCOUNTER — PATIENT MESSAGE (OUTPATIENT)
Dept: INTERNAL MEDICINE | Facility: CLINIC | Age: 79
End: 2024-12-04
Payer: MEDICARE

## 2024-12-04 ENCOUNTER — HOSPITAL ENCOUNTER (OUTPATIENT)
Dept: RADIOLOGY | Facility: HOSPITAL | Age: 79
Discharge: HOME | End: 2024-12-04
Attending: STUDENT IN AN ORGANIZED HEALTH CARE EDUCATION/TRAINING PROGRAM
Payer: MEDICARE

## 2024-12-04 ENCOUNTER — OFFICE VISIT (OUTPATIENT)
Dept: SPORTS MEDICINE | Facility: CLINIC | Age: 79
End: 2024-12-04
Payer: MEDICARE

## 2024-12-04 ENCOUNTER — PATIENT MESSAGE (OUTPATIENT)
Dept: SPORTS MEDICINE | Facility: CLINIC | Age: 79
End: 2024-12-04

## 2024-12-04 DIAGNOSIS — M25.562 LEFT KNEE PAIN, UNSPECIFIED CHRONICITY: ICD-10-CM

## 2024-12-04 DIAGNOSIS — M17.0 PRIMARY OSTEOARTHRITIS OF KNEES, BILATERAL: Primary | ICD-10-CM

## 2024-12-04 PROCEDURE — 73564 X-RAY EXAM KNEE 4 OR MORE: CPT | Mod: TC,HCNC,LT

## 2024-12-04 PROCEDURE — 1101F PT FALLS ASSESS-DOCD LE1/YR: CPT | Mod: HCNC,CPTII,S$GLB, | Performed by: STUDENT IN AN ORGANIZED HEALTH CARE EDUCATION/TRAINING PROGRAM

## 2024-12-04 PROCEDURE — 73564 X-RAY EXAM KNEE 4 OR MORE: CPT | Mod: 26,HCNC,LT, | Performed by: RADIOLOGY

## 2024-12-04 PROCEDURE — 99214 OFFICE O/P EST MOD 30 MIN: CPT | Mod: HCNC,S$GLB,, | Performed by: STUDENT IN AN ORGANIZED HEALTH CARE EDUCATION/TRAINING PROGRAM

## 2024-12-04 PROCEDURE — G2211 COMPLEX E/M VISIT ADD ON: HCPCS | Mod: HCNC,S$GLB,, | Performed by: STUDENT IN AN ORGANIZED HEALTH CARE EDUCATION/TRAINING PROGRAM

## 2024-12-04 PROCEDURE — 73562 X-RAY EXAM OF KNEE 3: CPT | Mod: 26,59,HCNC,RT | Performed by: RADIOLOGY

## 2024-12-04 PROCEDURE — 1125F AMNT PAIN NOTED PAIN PRSNT: CPT | Mod: HCNC,CPTII,S$GLB, | Performed by: STUDENT IN AN ORGANIZED HEALTH CARE EDUCATION/TRAINING PROGRAM

## 2024-12-04 PROCEDURE — 99999 PR PBB SHADOW E&M-EST. PATIENT-LVL III: CPT | Mod: PBBFAC,HCNC,, | Performed by: STUDENT IN AN ORGANIZED HEALTH CARE EDUCATION/TRAINING PROGRAM

## 2024-12-04 PROCEDURE — 3288F FALL RISK ASSESSMENT DOCD: CPT | Mod: HCNC,CPTII,S$GLB, | Performed by: STUDENT IN AN ORGANIZED HEALTH CARE EDUCATION/TRAINING PROGRAM

## 2024-12-04 PROCEDURE — 1159F MED LIST DOCD IN RCRD: CPT | Mod: HCNC,CPTII,S$GLB, | Performed by: STUDENT IN AN ORGANIZED HEALTH CARE EDUCATION/TRAINING PROGRAM

## 2024-12-04 NOTE — PATIENT INSTRUCTIONS
Assessment:  Hiwot Coulter is a 79 y.o. female   Chief Complaint   Patient presents with    Left Knee - Pain    Right Knee - Pain       Encounter Diagnosis   Name Primary?    Primary osteoarthritis of knees, bilateral Yes        Plan:  Placed a medication referral for visco-supplementation injections. This is a series of three injections over three weeks. In this procedure, a gel-like fluid called hyaluronic acid is injected into the knee joint. Hyaluronic acid is a naturally occurring substance found in the synovial fluid surrounding joints. People with osteoarthritis have a lower-than-normal concentration of hyaluronic acid in their joints The theory is that adding hyaluronic acid to the arthritic joint will facilitate movement and reduce pain through a cellular level of improvement. The receptors within the knee are then down regulated to reduce the sensitivity and there is a greater stimulation production of hyaluronic acid.       General Arthritis info:    -shiny white stuff at end of a chicken bones is cartilage    -arthritis is wearing away of the cartilage that lines the end of your bones    -osteoarthritis is thought to be a wear and tear phenomenon    -symptoms are due to inflammation of joint causing stiffness, aching, and sometimes swelling    -occasionally sharp pain will occur causing a give way sensation    -Risk factors: genetic, weight, female > male, age    Treatment options:    -maintain healthy weight (every pound is 4 pounds of pressure on the knee)    -daily moderate exercise (walk, bike, swim 30 minutes per day) to keep joints moving    -daily strengthening exercises (through therapy or on own) to keep muscles supporting joint healthy and strong    -glucosamine 1500mg daily (look for USP label on bottle)    -tylenol as needed for pain (follow directions on the bottle)    -anti-inflammatory medication such as alleve may be helpful- take 1-2 tabs twice daily for 7 days. If it helps your pain,  continue. If you do not feel any change, you may stop and then take it as needed.    (you may be given a once daily anti-inflammatory such as MOBIC. If given, avoid other anti-inflammatory medications such as advil, ibuprofen, motrin, naprosyn, alleve, etc)    -if swollen and painful, ice, decrease activity, and take anti-inflammatory daily for 5-7 days and if no relief call your doctor for further options    -consider cortisone injection (every 3-4 months at most)- anti- inflammatory steroid medication that can be injected directly into the joint to reduce inflammation    -consider hyaluronic acid injections (eufflexxa, hyalgan, synvisc, supartz) (every 6 months at most)- protein injection that helps decrease pain and irritability in the joint. It is best used to help prolong intervals between cortisone injections to minimize steroid injections. These are currently approved for knee injections. Discuss with your doctor if other joint involved. Call to seek approval prior to the injections.    -long-term treatment may include a total joint replacement (keep diary of good days and bad days, then evaluate as to when you are ready)   Supplements for pain, inflammation, arthritis.    Supplements for pain, inflammation, arthritis:    Glucosamine sulfate/chondroitin sulfate has been shown to be safe and effective for knee arthritis (and possibly other joints affected with arthritis). This is an over the counter medication/supplement and usually needs to be taken for 1-2 months before results are seen. If you do not see any results after this time, consider stopping it. The dose is usually found on the bottle, and is 1500mg to start (first 1-2 months) then you can back down to 1000 mg (current recommendations are 1500mg per day, all at once).    Some people can have stomach problems with this and if you do, you may stop taking it or divide up the doses. If you are ALLERGIC TO SHELLFISH, please do not take. Follow the  instructions on the bottle.    Other supplements that have been shown to help with joint and muscle pain include:    (Unless otherwise noted, as these supplements are not FDA controlled, please follow the recommendations on the bottle)    Turmeric 500mg PO BID    Flax seed oil    Avocado soybean unsaponifiables    HAROON-e    MSM    Cherry juice extract (tart)    Jayshreehip    Anikinara    Diacerein    If there are any concerns about taking these supplements with other medications you may already be taking, you can speak to your pharmacist, physician, health care provider, or research other medical information available to you. Side effects and interactions are possible with any supplement or medication - be safe!     Follow-up: For injection or sooner if there are any problems between now and then.    Thank you for choosing [facility] and Dr. Antonio Hooper for your orthopedic & sports medicine care. It is our goal to provide you with exceptional care that will help keep you healthy, active, and get you back in the game.    Please do not hesitate to reach out to us via email, phone, or MyChart with any questions, concerns, or feedback.    If you felt that you received exemplary care today, please consider leaving us feedback on [employer] at:  [email or web address]    If you are experiencing pain/discomfort ,or have questions and would like to be connected to the [facility] on-call team, please call this number and specify which Sports Medicine provider is treating you: [phone]

## 2024-12-04 NOTE — PROGRESS NOTES
Patient ID: Hiwot Coulter  YOB: 1945  MRN: [id number]    Referred By: self for BL knee pain L >R    History of Present Illness:     CHIEF COMPLAINT:  - Hiwot presents today for follow-up evaluation of bilateral knee pain, with the left knee being more symptomatic than the right.    HPI:  Hiwot presents with bilateral knee pain, rating the left knee pain as 7/10 and the right knee pain as 4/10 in severity. The left knee pain started approximately two months ago. She previously received gel injections in the right knee, which provided relief for about two years, but has not had any injections in the left knee. The pain is localized to the medial aspect of the knee, with the patient indicating the area. She experiences pain with lateral movement and when bending the leg.    She denies any recent injury, twisting, or wrong step. She also denies any swelling in either knee or feeling like the knees get stuck when trying to straighten or bend them. Cold weather does not bother the knee.    She remains active, engaging in walking, yard work, and jogging with her dog at the park 3-4 times per week for 30 minutes. Hiwot describes moving dirt around in her flower bed.    She is currently taking 800mg of ibuprofen for pain management but states that topical creams have not been effective.    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          Past Medical History:   Past Medical History:   Diagnosis Date    Anxiety     Arthritis     hand, neck, back    Behaviorally induced insufficient sleep syndrome     Behaviorally induced insufficient sleep syndrome     Cataract     Depression     GERD (gastroesophageal  reflux disease)     Hyperlipidemia     Obesity     Osteopenia     Pollen allergies     Self-care deficit 12/10/2021    Sleep apnea      Past Surgical History:   Procedure Laterality Date    CATARACT EXTRACTION W/  INTRAOCULAR LENS IMPLANT Right 07/18/2019    CATARACT EXTRACTION W/  INTRAOCULAR LENS IMPLANT Left 08/22/2019    CHOLECYSTECTOMY  2012    HYSTERECTOMY       Family History   Problem Relation Name Age of Onset    Leukemia Father      Heart disease Father      Cancer Father          leukemia    Cataracts Mother      Melanoma Neg Hx      Psoriasis Neg Hx      Lupus Neg Hx      Eczema Neg Hx       Social History     Socioeconomic History    Marital status:    Occupational History    Occupation:      Employer: [employer]   Tobacco Use    Smoking status: Never    Smokeless tobacco: Never   Substance and Sexual Activity    Alcohol use: No    Drug use: No    Sexual activity: Yes     Partners: Male     Birth control/protection: None   Other Topics Concern    Are you pregnant or think you may be? No    Breast-feeding No   Social History Narrative    1 dog, no smokers in household.     Social Drivers of Health     Financial Resource Strain: Low Risk  (6/25/2024)    Overall Financial Resource Strain (CARDIA)     Difficulty of Paying Living Expenses: Not hard at all   Food Insecurity: No Food Insecurity (6/25/2024)    Hunger Vital Sign     Worried About Running Out of Food in the Last Year: Never true     Ran Out of Food in the Last Year: Never true   Physical Activity: Unknown (6/25/2024)    Exercise Vital Sign     Days of Exercise per Week: Patient declined   Stress: Patient Declined (6/25/2024)    [place] [employer] - Occupational Stress Questionnaire     Feeling of Stress : Patient declined   Housing Stability: Unknown (6/25/2024)    Housing Stability Vital Sign     Unable to Pay for Housing in the Last Year: Patient declined     Medication List with Changes/Refills    Current Medications    ACETAMINOPHEN (TYLENOL) 500 MG CAP    as needed.     EFINACONAZOLE (JUBLIA) 10 % EDITA    Apply to toenail once a day    ERGOCALCIFEROL (ERGOCALCIFEROL) 50,000 UNIT CAP    Take 1 capsule (50,000 Units total) by mouth twice a week.    EUFLEXXA 10 MG/ML(MW 2.4 -3.6 MILLION) IATC INJECTION        FLASH GLUCOSE SENSOR (FREESTYLE AMELIA 2 SENSOR) KIT    .    FLU VAC 2022 [id number],PF, (FLUAD QUAD 2022-23,65Y UP,,PF,) 60 MCG (15 MCG X 4)/0.5 ML SYRG    Inject 0.5 mLs into the muscle.    FLUTICASONE PROPIONATE (FLONASE) 50 MCG/ACTUATION NASAL SPRAY    2 sprays (100 mcg total) by Each Nostril route once daily.    IBANDRONATE (BONIVA) 150 MG TABLET    TAKE 1 TABLET(150 MG) BY MOUTH EVERY 30 DAYS    IBUPROFEN (ADVIL,MOTRIN) 800 MG TABLET    Take 1 tablet (800 mg total) by mouth every 6 (six) hours as needed for Pain.    MELOXICAM (MOBIC) 15 MG TABLET    Take 1 tablet (15 mg total) by mouth once daily.    MONTELUKAST (SINGULAIR) 10 MG TABLET    TAKE 1 TABLET(10 MG) BY MOUTH EVERY EVENING    TIRZEPATIDE, WEIGHT LOSS, (ZEPBOUND) 10 MG/0.5 ML PNIJ    Inject 10 mg into the skin every 7 days. 90 days is ok.     Review of patient's allergies indicates:  No Known Allergies    Physical Exam:   There is no height or weight on file to calculate BMI.    GENERAL: Well appearing, in no acute distress.  HEAD: Normocephalic and atraumatic.  ENT: External ears and nose grossly normal.  EYES: EOMI bilaterally  PULMONARY: Respirations are grossly even and non-labored.  NEURO: Awake, alert, and oriented x 3.  SKIN: No obvious rashes appreciated.  PSYCH: Mood & affect are appropriate.    Detailed MSK exam:     BL knee: No obvious deformities, no ecchymosis, no erythema. No effusion. Full ROM with crepitus. Tender to palpation at medial femoral condyles . Patellar Apprehension negative. Good patellar mobility with crepitus.   Ligamentous exam: Lachman negative. Valgus @ 0 positive +1B. Valgus @ 30 positive +1B. Varus  negative. Anterior drawer negative. Posterior Drawer negative. Mensicus exam: Thessaly negative, Pain with maximal passive knee flexion negative, Pain/click Marlena positive, Pain with forced hyperextension negative, Hx of catching/locking reported negative, Joint line tenderness negative.     Imaging:  X-ray Knee Ortho Left with Flexion  Narrative: EXAMINATION:  XR KNEE ORTHO LEFT WITH FLEXION    CLINICAL HISTORY:  . Pain in left knee    TECHNIQUE:  AP standing view of both knees, PA flexion standing views of both knees, and Merchant views of both knees were performed. A lateral view of the left knee was also performed.    COMPARISON:  April 26, 2023.    FINDINGS:  Moderate medial compartment joint space narrowing on the right.  Mild medial compartment joint space narrowing on the left.  Lateral patellar tilt bilaterally.  No acute fracture or dislocation on either side.  No significant effusion on the left.  Impression: As above.    Electronically signed by: Zaki Norwood  Date:    12/04/2024  Time:    13:35        Relevant imaging results were reviewed and interpreted by me and per my read as above.  This was discussed with the patient and / or family today.     Assessment:  Hiwot Coulter is a 79 y.o. female presents today for recurrent primary osteoarthritis of bilateral knees.  She has had good results with viscosupplementation in the past and is requesting repeat bilateral injections.  We will follow-up for bilateral Visco of the knees no new injuries falls traumas x-ray reviewed today.    Primary osteoarthritis of knees, bilateral  -     Prior authorization Order       MEDICAL NECESSITY FOR VISCOSUPPLEMETNATION: After thorough evaluation of the patient, I have determined that visco-supplementation is medically necessary. The patient has painful degenerative changes of the knee with failure of conservative treatments including lifestyle modifications and rehabilitation exercises.  Oral analgesis/NSAIDs have  not adequately controlled symptoms and there is radiographic evidence of Kellgren Edi grade 2 or greater osteoarthritic changes, or in lack of radiographic evidence, there is arthroscopic or other evidence of chondrosis.     Today's visit is associated with current or anticipated ongoing medical care related to this patient's diagnosis of osteoarthritis.  Currently there is no cure of osteoarthritis and the patient will require regular follow up to manage symptoms and progression.  The patient is to return to the office within a minimum of 3-6 months to review symptoms and function at that time.   CPT code      A copy of today's visit note has been sent to the referring provider.       Antonio Hooper MD    This note was generated with the assistance of ambient listening technology. Verbal consent was obtained by the patient and accompanying visitor(s) for the recording of patient appointment to facilitate this note. I attest to having reviewed and edited the generated note for accuracy, though some syntax or spelling errors may persist. Please contact the author of this note for any clarification.       Disclaimer: This note was prepared using a voice recognition system and is likely to have sound alike errors within the text.

## 2024-12-06 ENCOUNTER — LAB VISIT (OUTPATIENT)
Dept: LAB | Facility: HOSPITAL | Age: 79
End: 2024-12-06
Attending: PEDIATRICS
Payer: MEDICARE

## 2024-12-06 DIAGNOSIS — E78.5 HYPERLIPIDEMIA, UNSPECIFIED HYPERLIPIDEMIA TYPE: ICD-10-CM

## 2024-12-06 DIAGNOSIS — E55.9 VITAMIN D DEFICIENCY: ICD-10-CM

## 2024-12-06 LAB
25(OH)D3+25(OH)D2 SERPL-MCNC: 27 NG/ML (ref 30–96)
ALBUMIN SERPL BCP-MCNC: 4 G/DL (ref 3.5–5.2)
ALP SERPL-CCNC: 58 U/L (ref 40–150)
ALT SERPL W/O P-5'-P-CCNC: 13 U/L (ref 10–44)
ANION GAP SERPL CALC-SCNC: 6 MMOL/L (ref 8–16)
AST SERPL-CCNC: 18 U/L (ref 10–40)
BILIRUB SERPL-MCNC: 0.6 MG/DL (ref 0.1–1)
BUN SERPL-MCNC: 18 MG/DL (ref 8–23)
CALCIUM SERPL-MCNC: 9.6 MG/DL (ref 8.7–10.5)
CHLORIDE SERPL-SCNC: 108 MMOL/L (ref 95–110)
CHOLEST SERPL-MCNC: 232 MG/DL (ref 120–199)
CHOLEST/HDLC SERPL: 3.3 {RATIO} (ref 2–5)
CO2 SERPL-SCNC: 26 MMOL/L (ref 23–29)
CREAT SERPL-MCNC: 0.8 MG/DL (ref 0.5–1.4)
EST. GFR  (NO RACE VARIABLE): >60 ML/MIN/1.73 M^2
GLUCOSE SERPL-MCNC: 90 MG/DL (ref 70–110)
HDLC SERPL-MCNC: 71 MG/DL (ref 40–75)
HDLC SERPL: 30.6 % (ref 20–50)
LDLC SERPL CALC-MCNC: 144.8 MG/DL (ref 63–159)
NONHDLC SERPL-MCNC: 161 MG/DL
POTASSIUM SERPL-SCNC: 4.7 MMOL/L (ref 3.5–5.1)
PROT SERPL-MCNC: 7 G/DL (ref 6–8.4)
SODIUM SERPL-SCNC: 140 MMOL/L (ref 136–145)
TRIGL SERPL-MCNC: 81 MG/DL (ref 30–150)

## 2024-12-06 PROCEDURE — 36415 COLL VENOUS BLD VENIPUNCTURE: CPT | Mod: HCNC | Performed by: PEDIATRICS

## 2024-12-06 PROCEDURE — 80053 COMPREHEN METABOLIC PANEL: CPT | Mod: HCNC | Performed by: PEDIATRICS

## 2024-12-06 PROCEDURE — 80061 LIPID PANEL: CPT | Mod: HCNC | Performed by: PEDIATRICS

## 2024-12-06 PROCEDURE — 82306 VITAMIN D 25 HYDROXY: CPT | Mod: HCNC | Performed by: PEDIATRICS

## 2024-12-13 ENCOUNTER — OFFICE VISIT (OUTPATIENT)
Dept: INTERNAL MEDICINE | Facility: CLINIC | Age: 79
End: 2024-12-13
Payer: MEDICARE

## 2024-12-13 ENCOUNTER — OFFICE VISIT (OUTPATIENT)
Dept: OPHTHALMOLOGY | Facility: CLINIC | Age: 79
End: 2024-12-13
Payer: MEDICARE

## 2024-12-13 VITALS
OXYGEN SATURATION: 99 % | HEIGHT: 61 IN | TEMPERATURE: 97 F | HEART RATE: 69 BPM | WEIGHT: 149.69 LBS | BODY MASS INDEX: 28.26 KG/M2 | RESPIRATION RATE: 17 BRPM | SYSTOLIC BLOOD PRESSURE: 120 MMHG | DIASTOLIC BLOOD PRESSURE: 70 MMHG

## 2024-12-13 DIAGNOSIS — F41.9 ANXIETY: ICD-10-CM

## 2024-12-13 DIAGNOSIS — M85.89 OSTEOPENIA OF MULTIPLE SITES: ICD-10-CM

## 2024-12-13 DIAGNOSIS — K21.9 GASTROESOPHAGEAL REFLUX DISEASE WITHOUT ESOPHAGITIS: Chronic | ICD-10-CM

## 2024-12-13 DIAGNOSIS — M15.0 PRIMARY OSTEOARTHRITIS INVOLVING MULTIPLE JOINTS: ICD-10-CM

## 2024-12-13 DIAGNOSIS — E55.9 VITAMIN D DEFICIENCY: ICD-10-CM

## 2024-12-13 DIAGNOSIS — E78.5 HYPERLIPIDEMIA, UNSPECIFIED HYPERLIPIDEMIA TYPE: ICD-10-CM

## 2024-12-13 DIAGNOSIS — G47.33 OSA ON CPAP: ICD-10-CM

## 2024-12-13 DIAGNOSIS — Z00.00 WELL ADULT EXAM: Primary | ICD-10-CM

## 2024-12-13 DIAGNOSIS — H35.30 AGE-RELATED MACULAR DEGENERATION: ICD-10-CM

## 2024-12-13 DIAGNOSIS — Z96.1 PSEUDOPHAKIA: ICD-10-CM

## 2024-12-13 DIAGNOSIS — H40.023 OAG (OPEN ANGLE GLAUCOMA) SUSPECT, HIGH RISK, BILATERAL: Primary | ICD-10-CM

## 2024-12-13 DIAGNOSIS — Z12.31 BREAST CANCER SCREENING BY MAMMOGRAM: ICD-10-CM

## 2024-12-13 DIAGNOSIS — H26.493 PCO (POSTERIOR CAPSULAR OPACIFICATION), BILATERAL: ICD-10-CM

## 2024-12-13 PROCEDURE — 99999 PR PBB SHADOW E&M-EST. PATIENT-LVL III: CPT | Mod: PBBFAC,HCNC,, | Performed by: OPHTHALMOLOGY

## 2024-12-13 PROCEDURE — 99999 PR PBB SHADOW E&M-EST. PATIENT-LVL IV: CPT | Mod: PBBFAC,HCNC,, | Performed by: PEDIATRICS

## 2024-12-13 RX ORDER — PREDNISOLONE ACETATE 10 MG/ML
1 SUSPENSION/ DROPS OPHTHALMIC 4 TIMES DAILY
Qty: 5 ML | Refills: 0 | Status: SHIPPED | OUTPATIENT
Start: 2024-12-13 | End: 2024-12-20

## 2024-12-13 NOTE — PROGRESS NOTES
Subjective:       Patient ID: Hiwot Coulter is a 79 y.o. female.    Chief Complaint: Annual Exam    Hiwot Coulter is a 79 y.o. female who presents to clinic for her annual.     1) LIPIDS: following D&E (low salt diet).   2) Obesity: After off mounjaro and weight regain, has been trying to restart Mounjaro 7.5 mg and has been successful without side effects.  3) Osteoporosis: on boniva 5/21 dexascan showed interval improvement   4) Quiet anxiety. No buspar/lexapro. [place] website shows no interval xanax/narcotic use.   5) GERD Quiet on treatment, rare pepcid use.  6) Vitamin D: using twice weekly D   7) Osteoarthritis: overall better, is seeing sports med/ortho, [place] website reviewed no hydrocodone use, except for dental use.   8) JAMAL/sleep/RLS: sees pulmonary. Still has occasional issues with falling asleep.  9) Chronic rhinitis: quiet with flonase and claritin D. Does have chronic cough without SOB/CONNOLLY      LABS REVIEWED AND DISCUSSED WITH PATIENT: CMP and lipids         Review of Systems   Constitutional:  Negative for fever and unexpected weight change.   HENT:  Negative for congestion and rhinorrhea.    Eyes:  Negative for discharge and redness.   Respiratory:  Negative for cough and wheezing.    Cardiovascular:  Negative for chest pain, palpitations and leg swelling.   Gastrointestinal:  Negative for abdominal pain, constipation, diarrhea and vomiting.   Endocrine: Negative for cold intolerance, heat intolerance, polydipsia, polyphagia and polyuria.   Genitourinary:  Negative for decreased urine volume, difficulty urinating and menstrual problem.   Musculoskeletal:  Positive for arthralgias, back pain and neck pain. Negative for joint swelling.   Skin:  Negative for rash and wound.   Neurological:  Negative for syncope and headaches.   Psychiatric/Behavioral:  Positive for sleep disturbance (see hpi). Negative for behavioral problems and dysphoric mood. The patient is not nervous/anxious.         Objective:      Physical Exam  Constitutional:       Appearance: Normal appearance. She is well-developed.   HENT:      Head: Normocephalic and atraumatic.   Eyes:      General: Lids are normal.      Conjunctiva/sclera: Conjunctivae normal.      Pupils: Pupils are equal, round, and reactive to light.   Neck:      Thyroid: No thyroid mass or thyromegaly.      Vascular: No carotid bruit.      Trachea: Trachea normal.      Meningeal: Brudzinski's sign and Kernig's sign absent.   Cardiovascular:      Rate and Rhythm: Normal rate and regular rhythm.      Pulses: Normal pulses.      Heart sounds: Normal heart sounds. No murmur heard.  Pulmonary:      Effort: Pulmonary effort is normal.      Breath sounds: Normal breath sounds. No wheezing, rhonchi or rales.   Abdominal:      Palpations: Abdomen is soft.      Tenderness: There is no abdominal tenderness. There is no rebound.   Musculoskeletal:      Cervical back: Normal range of motion and neck supple.   Skin:     General: Skin is warm.      Findings: No abrasion or rash.   Neurological:      Mental Status: She is alert and oriented to person, place, and time.      Cranial Nerves: No cranial nerve deficit.      Sensory: No sensory deficit.      Coordination: Coordination normal.      Gait: Gait normal.      Deep Tendon Reflexes: Reflexes are normal and symmetric.   Psychiatric:         Mood and Affect: Mood normal. Mood is not anxious or depressed.         Speech: Speech normal.         Behavior: Behavior normal. Behavior is cooperative.         Thought Content: Thought content normal.         Judgment: Judgment normal.         Assessment:       1. Well adult exam    2. Vitamin D deficiency    3. Primary osteoarthritis involving multiple joints    4. Osteopenia of multiple sites    5. JAMAL on CPAP    6. Hyperlipidemia, unspecified hyperlipidemia type    7. Gastroesophageal reflux disease without esophagitis    8. Anxiety    9. Breast cancer screening by mammogram         Plan:       Well adult exam    Vitamin D deficiency  -     Vitamin D; Future; Expected date: 12/13/2024    Primary osteoarthritis involving multiple joints    Osteopenia of multiple sites    JAMAL on CPAP    Hyperlipidemia, unspecified hyperlipidemia type  -     Lipid Panel; Future; Expected date: 12/13/2024  -     Comprehensive Metabolic Panel; Future; Expected date: 12/13/2024    Gastroesophageal reflux disease without esophagitis    Anxiety    Breast cancer screening by mammogram  -     Mammo Digital Screening Bilat w/ Dominick; Future; Expected date: 12/13/2024    HMI d/w patient. Slight increase in lipids should improve with weight loss with mounjaro. Shw will see if she can stretch out Mounjaro dosing interval. D&E. F/u yearly with labs. Dexascan due.

## 2024-12-13 NOTE — PROGRESS NOTES
HPI     Glaucoma Suspect            Comments: Pt reports for yearly exam. Pt notices a lot of glares at night   when driving. Pt also reports distance vision becoming weaker. Pt denies   any pain or irritation.           Comments    1. PCIOL OD 7/18/19 +20.0WF/CDE 10.73   PCIOL OS +21.0 SN60WF / CDE: 12.02 / 08/22/19   2. Glaucoma Suspect   3. Negative FH glaucoma   SLT OD 11/5/20   + CPAP     No drops             Last edited by Monik Navarro on 12/13/2024  8:47 AM.            Assessment /Plan     For exam results, see Encounter Report.      ICD-10-CM ICD-9-CM    1. OAG (open angle glaucoma) suspect, high risk, bilateral  H40.023 365.05 Posterior Segment OCT Optic Nerve- Both eyes    Doing well - intraocular pressure is within acceptable range relative to target pressure with no evidence of progression.   Continue current treatment.  Reviewed importance of continued compliance with treatment and follow up.       2. Age-related macular degeneration  H35.30 362.50 Exam consistent with moderate age related macular degeneration with elevated risk findings. Discussed clinical condition and recommend avoidance of tobacco products.  Patient instructed in the use of daily Amsler Grid, AREDS II formula. Patient advised to call immediately if any Amsler Grid / visual changes occur.       3. Pseudophakia  Z96.1 V43.1 Monitor       4. PCO (posterior capsular opacification), bilateral  H26.493 366.50 Yag Operative Procedure Note    79 y.o. year old patient experiencing a symptomatic decrease in vision OU with inability to perform activities of daily living including reading.      SLE: Posterior intraocular lens implant with capsular fibrosis     Risks, benefits and alternatives of Yag Laser Capsulotomy discussed. Including risks of retinal detachment (1-3%), macular edema, dislocated implant, pain, inflammation elevated intraocular pressure and vision loss. Consent signed.  Time out procedure form completed prior to  laser.    Medications given:  Alphagan 0.15%  Tetracaine    Laser energy settings:    Right eye:  2.0 energy per shot  34 bursts  1 to 1 ratio per shot     Left eye:  2.0 energy per shot  67 bursts  1 to 1 ratio per shot     Central capsular opening created without difficulty          Return to clinic 1 month for YAG PO

## 2024-12-16 ENCOUNTER — OFFICE VISIT (OUTPATIENT)
Dept: SPORTS MEDICINE | Facility: CLINIC | Age: 79
End: 2024-12-16
Payer: MEDICARE

## 2024-12-16 ENCOUNTER — CLINICAL SUPPORT (OUTPATIENT)
Dept: REHABILITATION | Facility: HOSPITAL | Age: 79
End: 2024-12-16
Payer: MEDICARE

## 2024-12-16 DIAGNOSIS — M25.619 DECREASED RANGE OF MOTION OF SHOULDER, UNSPECIFIED LATERALITY: ICD-10-CM

## 2024-12-16 DIAGNOSIS — M17.0 PRIMARY OSTEOARTHRITIS OF KNEES, BILATERAL: Primary | ICD-10-CM

## 2024-12-16 DIAGNOSIS — R29.898 DECREASED RANGE OF MOTION OF NECK: Primary | ICD-10-CM

## 2024-12-16 DIAGNOSIS — Z74.09 DECREASED FUNCTIONAL MOBILITY AND ENDURANCE: ICD-10-CM

## 2024-12-16 DIAGNOSIS — R29.898 DECREASED STRENGTH OF UPPER EXTREMITY: ICD-10-CM

## 2024-12-16 PROCEDURE — 20611 DRAIN/INJ JOINT/BURSA W/US: CPT | Mod: 50,HCNC,S$GLB, | Performed by: STUDENT IN AN ORGANIZED HEALTH CARE EDUCATION/TRAINING PROGRAM

## 2024-12-16 PROCEDURE — 97140 MANUAL THERAPY 1/> REGIONS: CPT | Mod: HCNC

## 2024-12-16 PROCEDURE — 97112 NEUROMUSCULAR REEDUCATION: CPT | Mod: HCNC

## 2024-12-16 PROCEDURE — 99499 UNLISTED E&M SERVICE: CPT | Mod: HCNC,S$GLB,, | Performed by: STUDENT IN AN ORGANIZED HEALTH CARE EDUCATION/TRAINING PROGRAM

## 2024-12-16 NOTE — PROCEDURES
Large Joint Aspiration/Injection: bilateral knee    Date/Time: 12/16/2024 4:00 PM    Performed by: Antonio Hooper MD  Authorized by: Antonio Hooper MD    Consent Done?:  Yes (Verbal)  Indications:  Arthritis and pain  Site marked: the procedure site was marked    Timeout: prior to procedure the correct patient, procedure, and site was verified    Prep: patient was prepped and draped in usual sterile fashion      Local anesthesia used?: Yes    Local anesthetic:  Topical anesthetic    Details:  Needle Size:  22 G  Ultrasonic Guidance for needle placement?: Yes    Images are saved and documented.  Approach: Superior lateral.  Location:  Knee  Laterality:  Bilateral  Site:  Bilateral knee  Medications (Right):  30 mg sodium hyaluronate (orthovisc) 30 mg/2 mL  Medications (Left):  30 mg sodium hyaluronate (orthovisc) 30 mg/2 mL  Patient tolerance:  Patient tolerated the procedure well with no immediate complications     Ultrasound guidance was used for needle localization. Images were saved and stored for documentation. The appropriate structures were visualized. Dynamic visualization of the needle was continuous throughout the procedures and maintained good position.     We discussed the proper protocols after the injection such as no submerging pools, baths tubs, or hot tubs for 24 hr.  Showering is okay today.   We discussed red flags such as fevers, chills, red, warm, tender joint at the area of injection to please seek medical care immediately.      MEDICAL NECESSITY FOR VISCOSUPPLEMETNATION: After thorough evaluation of the patient, I have determined that visco-supplementation is medically necessary. The patient has painful degenerative changes of the knee with failure of conservative treatments including lifestyle modifications and rehabilitation exercises.  Oral analgesis/NSAIDs have not adequately controlled symptoms and there is radiographic evidence of Kellgren Edi grade 2 or greater osteoarthritic  changes, or in lack of radiographic evidence, there is arthroscopic or other evidence of chondrosis.     Orthovisc:  Bilateral knee 1/3

## 2024-12-16 NOTE — PATIENT INSTRUCTIONS
Assessment:  Hiwot Coulter is a 79 y.o. female No chief complaint on file.      Encounter Diagnosis   Name Primary?    Primary osteoarthritis of knees, bilateral Yes        Plan:  Provided viscosupplementation injection today. We discussed the proper protocols after the injection such as no submerging pools, baths tubs, or hot tubs for 24 hr.  Showering is okay today.   We discussed red flags such as fevers, chills, red, warm, tender joint at the area of injection to please seek medical care immediately.      This is a series of  three injections over three weeks. In this procedure, a gel-like fluid called hyaluronic acid is injected into the knee joint. Hyaluronic acid is a naturally occurring substance found in the synovial fluid surrounding joints. People with osteoarthritis have a lower-than-normal concentration of hyaluronic acid in their joints The theory is that adding hyaluronic acid to the arthritic joint will facilitate movement and reduce pain through a cellular level of improvement. The receptors within the knee are then down regulated to reduce the sensitivity and there is a greater stimulation production of hyaluronic acid.        Follow-up: As needed or sooner if there are any problems between now and then.    Thank you for choosing [facility] and Dr. Antonio Hooper for your orthopedic & sports medicine care. It is our goal to provide you with exceptional care that will help keep you healthy, active, and get you back in the game.    Please do not hesitate to reach out to us via email, phone, or MyChart with any questions, concerns, or feedback.    If you felt that you received exemplary care today, please consider leaving us feedback on Healthgrades at:  [email or web address]    If you are experiencing pain/discomfort, or have questions and would like to be connected to the [facility] on-call team,  please call this number and specify which Sports Medicine provider is treating you: [phone]

## 2024-12-16 NOTE — PROGRESS NOTES
"  [facility]   Physical Therapy Treatment Note      Name: Hiwot Coulter  [facility] Number: [id number]    Therapy Diagnosis:   Encounter Diagnoses   Name Primary?    Decreased range of motion of neck Yes    Decreased range of motion of shoulder, unspecified laterality     Decreased strength of upper extremity     Decreased functional mobility and endurance      Physician: Kumar Ramirez MD    Visit Date: 12/16/2024    Physician Orders: Physical Therapy Evaluation and Treat  Medical Diagnosis from Referral: M50.30 (ICD-10-CM) - DDD (degenerative disc disease), cervical   Evaluation Date: 11/27/2024  Authorization Period Expiration: 12/31/2024  Plan of Care Expiration: 01/22/2025  Progress Note Due: 12/27/2024  Visit # / Visits authorized: 1/8 (+1 Evaluation)    FOTO: 1/3      Precautions: Standard    Time In: 5:00 PM  Time Out: 5:55 PM  Total Billable Time: 38 minutes  (Billing reflects 1 on 1 treatment time spent with patient)    SUBJECTIVE     Patient reports: she felt a little bit better after the initial evaluation but still feels like she is having difficulty rotating her neck. Patient reported at end of today's session she was able to move her neck better and "felt great."  She was compliant with home exercise program.  Response to previous treatment: no adverse effects  Functional change: none noted yet    Pain: NT/10  Location: Bilateral posterior head near occiput, bilateral upper trapezial, levator scapulae, sternocleidomastoid     OBJECTIVE     Objective Measures updated at progress report unless specified.     TREATMENT       CPT Intervention Performed  12/16/2024  Duration / Intensity     TE  UPPER BODY ERGOMETER  x 3 minutes forward, 3 minutes backwards    Pulleys x 3 minutes flexion bilateral with cues to perform slowly                     NMR  Standing Chin Tucks x 3 x 10 small ball      Standing Scapular Retractions x 3 x 10 blue band      Standing Shoulder Extensions x " 3 x 10 red band     Supine Cervical SNAG with moist heat pack x  15 x 15 second holds      Supine Cervical ACTIVE RANGE OF MOTION AT END x 2 minutes each direction            TA                                                               MT  Seated SOFT TISSUE MOBILIZATION to levator scapulae bilaterally and middle/upper trapezius x  10 minutes   PLAN      open books, supine chin tuck, continued scapular strengthening              CPT Codes available for Billing:   (10) minutes of Manual therapy (MT) to improve pain and ROM.  (09) minutes of Therapeutic Exercise (TE) to develop strength, endurance, range of motion, and flexibility.  (36) minutes of Neuromuscular Re-Education (NMR)  to improve: Balance, Coordination, Kinesthetic, Sense, Proprioception, and Posture.  (00) minutes of Therapeutic Activities (TA) to improve functional performance.  Unattended Electrical Stimulation (ES) for muscle performance or pain modulation.  BFR: Blood flow restriction applied during exercise  NP or (-): Not Performed    PATIENT EDUCATION AND HOME EXERCISES     Home Exercises Provided and Patient Education Provided     Education provided:   PURPOSE: Patient educated on the impairments noted above and the effects of physical therapy intervention to improve overall condition and QOL.   EXERCISE: Patient was educated on all the above exercise prior/during/after for proper posture, positioning, and execution for safe performance with home exercise program.   STRENGTH: Patient educated on the importance of improved core and extremity strength in order to improve alignment of the spine and extremities with static positions and dynamic movement.   SLEEPING POSITIONS: Patient educated on the use of pillows to aid in neutral alignment of spine and extremities when sleeping in supine or side lying.    Written Home Exercises Provided: Patient instructed to cont prior HEP. Exercises were reviewed and Hiwot was able to demonstrate them prior  to the end of the session.  Hiwot demonstrated good  understanding of the education provided. See EMR under Patient Instructions for exercises provided during therapy sessions      ASSESSMENT   Patient tolerated first treatment session well. Patient did require extra cueing throughout session to decrease upper trapezial/levator scapulae compensations. Even with pulleys, patient required verbal cueing to slow down and allow time with stretch. After given cues however, patient was able to complete all strengthening and mobility exercises with good tolerance and left session demonstrating better bilateral cervical rotation RANGE OF MOTION.    Hiwot Is progressing well towards her goals.   Patient prognosis is Good.     Patient will continue to benefit from skilled outpatient physical therapy to address the deficits listed in the problem list box on initial evaluation, provide patient/family education and to maximize patient's level of independence in the home and community environment.     Patient's spiritual, cultural and educational needs considered and patient agreeable to plan of care and goals.     Anticipated barriers to physical therapy: chronicity of condition and occupation     Goals:   Reviewed: 12/16/2024      Short Term Goals:  4 weeks Status  Date Met   PAIN: Patient will report worst pain of 5/10 in order to progress toward max functional ability and improve quality of life. [x] Progressing  [] Met  [] Not Met     FUNCTION: Patient will demonstrate improved function as indicated by a functional intake score of more than or equal to 59 out of 100 on FOTO. [x] Progressing  [] Met  [] Not Met     MOBILITY: Patient will improve Range of Motion to 50% of stated goals, listed in objective measures above, in order to progress towards independence with functional activities.  [x] Progressing  [] Met  [] Not Met     STRENGTH: Patient will improve strength to 50% of stated goals, listed in objective measures  above, in order to progress towards independence with functional activities.  [x] Progressing  [] Met  [] Not Met        Long Term Goals:  8 weeks Status Date Met   PAIN: Patient will report worst pain of 3/10 in order to progress toward max functional ability and improve quality of life [x] Progressing  [] Met  [] Not Met     FUNCTION: Patient will demonstrate improved function as indicated by a functional intake score of more than or equal to 68 out of 100 on FOTO. [x] Progressing  [] Met  [] Not Met     MOBILITY: Patient will improve Range of Motion to stated goals, listed in objective measures above, in order to return to maximal functional potential and improve quality of life. [x] Progressing  [] Met  [] Not Met     STRENGTH: Patient will improve strength to stated goals, listed in objective measures above, in order to improve functional independence and quality of life. [x] Progressing  [] Met  [] Not Met     Patient will return to normal ADL's, IADL's, community involvement, recreational activities, and work-related activities with less than or equal to 3/10 pain and maximal function.  [x] Progressing  [] Met  [] Not Met          PLAN     Monitor response to today's treatment session and progress with Physical Therapy plan of care as indicated.    Tresa Rick, PT

## 2024-12-18 ENCOUNTER — CLINICAL SUPPORT (OUTPATIENT)
Dept: REHABILITATION | Facility: HOSPITAL | Age: 79
End: 2024-12-18
Payer: MEDICARE

## 2024-12-18 DIAGNOSIS — R29.898 DECREASED RANGE OF MOTION OF NECK: Primary | ICD-10-CM

## 2024-12-18 DIAGNOSIS — R29.898 DECREASED STRENGTH OF UPPER EXTREMITY: ICD-10-CM

## 2024-12-18 DIAGNOSIS — Z74.09 DECREASED FUNCTIONAL MOBILITY AND ENDURANCE: ICD-10-CM

## 2024-12-18 DIAGNOSIS — M25.561 RIGHT KNEE PAIN, UNSPECIFIED CHRONICITY: ICD-10-CM

## 2024-12-18 DIAGNOSIS — M25.619 DECREASED RANGE OF MOTION OF SHOULDER, UNSPECIFIED LATERALITY: ICD-10-CM

## 2024-12-18 PROCEDURE — 97112 NEUROMUSCULAR REEDUCATION: CPT | Mod: HCNC | Performed by: PHYSICAL THERAPIST

## 2024-12-18 PROCEDURE — 97110 THERAPEUTIC EXERCISES: CPT | Mod: HCNC | Performed by: PHYSICAL THERAPIST

## 2024-12-18 PROCEDURE — 97140 MANUAL THERAPY 1/> REGIONS: CPT | Mod: HCNC | Performed by: PHYSICAL THERAPIST

## 2024-12-18 NOTE — PROGRESS NOTES
[facility]   Physical Therapy Treatment Note      Name: Hiwot Coulter  [facility] Number: [id number]    Therapy Diagnosis:   Encounter Diagnoses   Name Primary?    Decreased range of motion of neck Yes    Decreased range of motion of shoulder, unspecified laterality     Decreased strength of upper extremity     Decreased functional mobility and endurance      Physician: Kumar Ramirez MD    Visit Date: 12/18/2024    Physician Orders: Physical Therapy Evaluation and Treat  Medical Diagnosis from Referral: M50.30 (ICD-10-CM) - DDD (degenerative disc disease), cervical   Evaluation Date: 11/27/2024  Authorization Period Expiration: 12/31/2024  Plan of Care Expiration: 01/22/2025  Progress Note Due: 12/27/2024  Visit # / Visits authorized: 2/8 (+1 Evaluation)    FOTO: 1/3      Precautions: Standard    Time In: 3:30 PM  Time Out: 4:30 PM  Total Billable Time: 53 minutes  (Billing reflects 1 on 1 treatment time spent with patient)    SUBJECTIVE     Patient reports: she felt much better after last treatment session she reports that she has been continuing to jog with her dog. She reports that she has been putting heat on her neck and moving her head at home.     She was compliant with home exercise program.  Response to previous treatment: no adverse effects  Functional change: none noted yet    Pain: 4/10  Location: Bilateral posterior head near occiput, bilateral upper trapezial, levator scapulae, sternocleidomastoid     OBJECTIVE     Objective Measures updated at progress report unless specified.     TREATMENT       CPT Intervention Performed  12/18/2024  Duration / Intensity     TE  UPPER BODY ERGOMETER  x 3 minutes forward, 3 minutes backwards    Pulleys x 3 minutes flexion bilateral with cues to perform slowly                     NMR  Standing Chin Tucks x 3 x 10 small ball      Standing Scapular Retractions x 3 x 10 blue band      Standing Shoulder Extensions x 3 x 10 red band      Supine Cervical SNAG with moist heat pack x  15 x 15 second holds      Supine Cervical ACTIVE RANGE OF MOTION AT END x 2 minutes each direction            TA                                                               MT Supine  SOFT TISSUE MOBILIZATION to levator scapulae bilaterally and middle/upper trapezius, subscapularis x  10 minutes   PLAN  open books, supine chin tuck, continued scapular strengthening           CPT Codes available for Billing:   (10) minutes of Manual therapy (MT) to improve pain and ROM.  (09) minutes of Therapeutic Exercise (TE) to develop strength, endurance, range of motion, and flexibility.  (34) minutes of Neuromuscular Re-Education (NMR)  to improve: Balance, Coordination, Kinesthetic, Sense, Proprioception, and Posture.  (00) minutes of Therapeutic Activities (TA) to improve functional performance.  Unattended Electrical Stimulation (ES) for muscle performance or pain modulation.  BFR: Blood flow restriction applied during exercise  NP or (-): Not Performed    PATIENT EDUCATION AND HOME EXERCISES     Home Exercises Provided and Patient Education Provided     Education provided:   PURPOSE: Patient educated on the impairments noted above and the effects of physical therapy intervention to improve overall condition and QOL.   EXERCISE: Patient was educated on all the above exercise prior/during/after for proper posture, positioning, and execution for safe performance with home exercise program.   STRENGTH: Patient educated on the importance of improved core and extremity strength in order to improve alignment of the spine and extremities with static positions and dynamic movement.   SLEEPING POSITIONS: Patient educated on the use of pillows to aid in neutral alignment of spine and extremities when sleeping in supine or side lying.    Written Home Exercises Provided: Patient instructed to cont prior HEP. Exercises were reviewed and Hiwot was able to demonstrate them prior to the end  of the session.  Hiwot demonstrated good  understanding of the education provided. See EMR under Patient Instructions for exercises provided during therapy sessions      ASSESSMENT   Patient requires cues for technique and to avoid substitution at neck and upper trapezial with interventions. Increased time for all activities today. Encouraged home exercise program and continued ROM activities.    Hiwot Is progressing well towards her goals.   Patient prognosis is Good.     Patient will continue to benefit from skilled outpatient physical therapy to address the deficits listed in the problem list box on initial evaluation, provide patient/family education and to maximize patient's level of independence in the home and community environment.     Patient's spiritual, cultural and educational needs considered and patient agreeable to plan of care and goals.     Anticipated barriers to physical therapy: chronicity of condition and occupation     Goals:   Reviewed: 12/18/2024      Short Term Goals:  4 weeks Status  Date Met   PAIN: Patient will report worst pain of 5/10 in order to progress toward max functional ability and improve quality of life. [x] Progressing  [] Met  [] Not Met     FUNCTION: Patient will demonstrate improved function as indicated by a functional intake score of more than or equal to 59 out of 100 on FOTO. [x] Progressing  [] Met  [] Not Met     MOBILITY: Patient will improve Range of Motion to 50% of stated goals, listed in objective measures above, in order to progress towards independence with functional activities.  [x] Progressing  [] Met  [] Not Met     STRENGTH: Patient will improve strength to 50% of stated goals, listed in objective measures above, in order to progress towards independence with functional activities.  [x] Progressing  [] Met  [] Not Met        Long Term Goals:  8 weeks Status Date Met   PAIN: Patient will report worst pain of 3/10 in order to progress toward max functional  ability and improve quality of life [x] Progressing  [] Met  [] Not Met     FUNCTION: Patient will demonstrate improved function as indicated by a functional intake score of more than or equal to 68 out of 100 on FOTO. [x] Progressing  [] Met  [] Not Met     MOBILITY: Patient will improve Range of Motion to stated goals, listed in objective measures above, in order to return to maximal functional potential and improve quality of life. [x] Progressing  [] Met  [] Not Met     STRENGTH: Patient will improve strength to stated goals, listed in objective measures above, in order to improve functional independence and quality of life. [x] Progressing  [] Met  [] Not Met     Patient will return to normal ADL's, IADL's, community involvement, recreational activities, and work-related activities with less than or equal to 3/10 pain and maximal function.  [x] Progressing  [] Met  [] Not Met          PLAN     Monitor response to today's treatment session and progress with Physical Therapy plan of care as indicated.    Adrienne Chung, PT

## 2024-12-21 PROBLEM — R29.898 DECREASED STRENGTH OF UPPER EXTREMITY: Status: RESOLVED | Noted: 2024-11-27 | Resolved: 2024-12-21

## 2024-12-21 PROBLEM — Z74.09 DECREASED FUNCTIONAL MOBILITY AND ENDURANCE: Status: RESOLVED | Noted: 2024-11-27 | Resolved: 2024-12-21

## 2024-12-21 PROBLEM — R29.898 DECREASED RANGE OF MOTION OF NECK: Status: RESOLVED | Noted: 2024-11-27 | Resolved: 2024-12-21

## 2024-12-21 PROBLEM — R52 PAIN: Status: RESOLVED | Noted: 2021-12-10 | Resolved: 2024-12-21

## 2024-12-21 PROBLEM — M25.641 STIFFNESS OF FINGER JOINT, RIGHT: Status: RESOLVED | Noted: 2021-12-10 | Resolved: 2024-12-21

## 2024-12-21 PROBLEM — M25.619 DECREASED RANGE OF MOTION (ROM) OF SHOULDER: Status: RESOLVED | Noted: 2024-11-27 | Resolved: 2024-12-21

## 2024-12-23 ENCOUNTER — CLINICAL SUPPORT (OUTPATIENT)
Dept: REHABILITATION | Facility: HOSPITAL | Age: 79
End: 2024-12-23
Payer: MEDICARE

## 2024-12-23 DIAGNOSIS — Z74.09 DECREASED FUNCTIONAL MOBILITY AND ENDURANCE: ICD-10-CM

## 2024-12-23 DIAGNOSIS — R29.898 DECREASED RANGE OF MOTION OF NECK: ICD-10-CM

## 2024-12-23 DIAGNOSIS — M25.619 DECREASED RANGE OF MOTION OF SHOULDER, UNSPECIFIED LATERALITY: ICD-10-CM

## 2024-12-23 DIAGNOSIS — R29.898 DECREASED STRENGTH OF UPPER EXTREMITY: Primary | ICD-10-CM

## 2024-12-23 PROCEDURE — 97140 MANUAL THERAPY 1/> REGIONS: CPT | Mod: HCNC | Performed by: PHYSICAL THERAPIST

## 2024-12-23 PROCEDURE — 97110 THERAPEUTIC EXERCISES: CPT | Mod: HCNC | Performed by: PHYSICAL THERAPIST

## 2024-12-23 PROCEDURE — 97112 NEUROMUSCULAR REEDUCATION: CPT | Mod: HCNC | Performed by: PHYSICAL THERAPIST

## 2024-12-23 NOTE — PROGRESS NOTES
[facility]   Physical Therapy Treatment Note      Name: Hiwot Coulter  [facility] Number: [id number]    Therapy Diagnosis:   Encounter Diagnoses   Name Primary?    Decreased strength of upper extremity Yes    Decreased functional mobility and endurance     Decreased range of motion of neck     Decreased range of motion of shoulder, unspecified laterality        Physician: Kumar Ramirez MD    Visit Date: 12/23/2024    Physician Orders: Physical Therapy Evaluation and Treat  Medical Diagnosis from Referral: M50.30 (ICD-10-CM) - DDD (degenerative disc disease), cervical   Evaluation Date: 11/27/2024  Authorization Period Expiration: 12/31/2024  Plan of Care Expiration: 01/22/2025  Progress Note Due: 12/27/2024  Visit # / Visits authorized: 2/8 (+1 Evaluation)    FOTO: 1/3      Precautions: Standard    Time In: 4:20 PM  Time Out: 5:20 PM  Total Billable Time: 53 minutes  (Billing reflects 1 on 1 treatment time spent with patient)    SUBJECTIVE     Patient reports: she is doing better with ROM, pain is with end range motion flexion and extension. Sore after therapy today - soreness in muscle's.    She was compliant with home exercise program.  Response to previous treatment: no adverse effects  Functional change: none noted yet    Pain: 4/10  Location: Bilateral posterior head near occiput, bilateral upper trapezial, levator scapulae, sternocleidomastoid     OBJECTIVE     Objective Measures updated at progress report unless specified.     TREATMENT       CPT Intervention Performed  12/23/2024  Duration / Intensity     TE  UPPER BODY ERGOMETER  x 3 minutes forward, 3 minutes backwards    Pulleys x 3 minutes flexion & abduction bilateral with cues to perform slowly    Supine cervical Rotation x 10x each                NMR  Standing Chin Tucks x 3 x 10 small ball      Standing Scapular Retractions x 3 x 10 blue band      Standing Shoulder Extensions x 3 x 10 red band     Supine Cervical  SNAG with moist heat pack x  2 min each side x 15 second holds      Supine Cervical ACTIVE RANGE OF MOTION AT END x 2 minutes each direction            TA                                                               MT Supine  SOFT TISSUE MOBILIZATION to levator scapulae bilaterally and middle/upper trapezius, subscapularis x  10 minutes   PLAN  open books, supine chin tuck, continued scapular strengthening           CPT Codes available for Billing:   (10) minutes of Manual therapy (MT) to improve pain and ROM.  (09) minutes of Therapeutic Exercise (TE) to develop strength, endurance, range of motion, and flexibility.  (34) minutes of Neuromuscular Re-Education (NMR)  to improve: Balance, Coordination, Kinesthetic, Sense, Proprioception, and Posture.  (00) minutes of Therapeutic Activities (TA) to improve functional performance.  Unattended Electrical Stimulation (ES) for muscle performance or pain modulation.  BFR: Blood flow restriction applied during exercise  NP or (-): Not Performed    PATIENT EDUCATION AND HOME EXERCISES     Home Exercises Provided and Patient Education Provided     Education provided:   PURPOSE: Patient educated on the impairments noted above and the effects of physical therapy intervention to improve overall condition and QOL.   EXERCISE: Patient was educated on all the above exercise prior/during/after for proper posture, positioning, and execution for safe performance with home exercise program.   STRENGTH: Patient educated on the importance of improved core and extremity strength in order to improve alignment of the spine and extremities with static positions and dynamic movement.   SLEEPING POSITIONS: Patient educated on the use of pillows to aid in neutral alignment of spine and extremities when sleeping in supine or side lying.    Written Home Exercises Provided: Patient instructed to cont prior HEP. Exercises were reviewed and Hiwot was able to demonstrate them prior to the end of  the session.  Hiwot demonstrated good  understanding of the education provided. See EMR under Patient Instructions for exercises provided during therapy sessions      ASSESSMENT   Patient reporting continued pain but states that ROM of neck is improving overall. Encouraged home exercise program and heat to neck as needed to assist with stiffness.    Hiwot Is progressing well towards her goals.   Patient prognosis is Good.     Patient will continue to benefit from skilled outpatient physical therapy to address the deficits listed in the problem list box on initial evaluation, provide patient/family education and to maximize patient's level of independence in the home and community environment.     Patient's spiritual, cultural and educational needs considered and patient agreeable to plan of care and goals.     Anticipated barriers to physical therapy: chronicity of condition and occupation     Goals:   Reviewed: 12/23/2024      Short Term Goals:  4 weeks Status  Date Met   PAIN: Patient will report worst pain of 5/10 in order to progress toward max functional ability and improve quality of life. [x] Progressing  [] Met  [] Not Met     FUNCTION: Patient will demonstrate improved function as indicated by a functional intake score of more than or equal to 59 out of 100 on FOTO. [x] Progressing  [] Met  [] Not Met     MOBILITY: Patient will improve Range of Motion to 50% of stated goals, listed in objective measures above, in order to progress towards independence with functional activities.  [x] Progressing  [] Met  [] Not Met     STRENGTH: Patient will improve strength to 50% of stated goals, listed in objective measures above, in order to progress towards independence with functional activities.  [x] Progressing  [] Met  [] Not Met        Long Term Goals:  8 weeks Status Date Met   PAIN: Patient will report worst pain of 3/10 in order to progress toward max functional ability and improve quality of life [x]  Progressing  [] Met  [] Not Met     FUNCTION: Patient will demonstrate improved function as indicated by a functional intake score of more than or equal to 68 out of 100 on FOTO. [x] Progressing  [] Met  [] Not Met     MOBILITY: Patient will improve Range of Motion to stated goals, listed in objective measures above, in order to return to maximal functional potential and improve quality of life. [x] Progressing  [] Met  [] Not Met     STRENGTH: Patient will improve strength to stated goals, listed in objective measures above, in order to improve functional independence and quality of life. [x] Progressing  [] Met  [] Not Met     Patient will return to normal ADL's, IADL's, community involvement, recreational activities, and work-related activities with less than or equal to 3/10 pain and maximal function.  [x] Progressing  [] Met  [] Not Met          PLAN     Monitor response to today's treatment session and progress with Physical Therapy plan of care as indicated.    Adrienne Chung, PT

## 2024-12-24 ENCOUNTER — TELEPHONE (OUTPATIENT)
Dept: INTERNAL MEDICINE | Facility: CLINIC | Age: 79
End: 2024-12-24
Payer: MEDICARE

## 2024-12-24 DIAGNOSIS — E66.3 OVERWEIGHT (BMI 25.0-29.9): ICD-10-CM

## 2024-12-24 RX ORDER — TIRZEPATIDE 10 MG/.5ML
10 INJECTION, SOLUTION SUBCUTANEOUS
Qty: 4 PEN | Refills: 11 | Status: SHIPPED | OUTPATIENT
Start: 2024-12-24

## 2024-12-24 NOTE — TELEPHONE ENCOUNTER
----- Message from Krysten sent at 12/23/2024  3:52 PM CST -----  Contact: Hiwot  Type:  Patient Requesting a call back     Who Called:Hiwot  What is the call back request regarding?:pt medication was sent to the wrong pharmacy   tirzepatide, weight loss, (ZEPBOUND) 10 mg/0.5 mL PnIj   Would the patient rather a call back or a response via [facility]?call  Best Call Back Number:[phone]    Additional Information:     Mail Order   [facility] - [address], [address]  [address] [place] [address], [place] [address]  Phone: [phone] Fax: [phone]

## 2024-12-30 ENCOUNTER — OFFICE VISIT (OUTPATIENT)
Dept: SPORTS MEDICINE | Facility: CLINIC | Age: 79
End: 2024-12-30
Payer: MEDICARE

## 2024-12-30 ENCOUNTER — CLINICAL SUPPORT (OUTPATIENT)
Dept: REHABILITATION | Facility: HOSPITAL | Age: 79
End: 2024-12-30
Payer: MEDICARE

## 2024-12-30 ENCOUNTER — PATIENT MESSAGE (OUTPATIENT)
Dept: INTERNAL MEDICINE | Facility: CLINIC | Age: 79
End: 2024-12-30
Payer: MEDICARE

## 2024-12-30 VITALS — WEIGHT: 149.69 LBS | BODY MASS INDEX: 28.26 KG/M2 | HEIGHT: 61 IN

## 2024-12-30 DIAGNOSIS — M17.0 PRIMARY OSTEOARTHRITIS OF KNEES, BILATERAL: Primary | ICD-10-CM

## 2024-12-30 DIAGNOSIS — M25.619 DECREASED RANGE OF MOTION OF SHOULDER, UNSPECIFIED LATERALITY: ICD-10-CM

## 2024-12-30 DIAGNOSIS — Z74.09 DECREASED FUNCTIONAL MOBILITY AND ENDURANCE: ICD-10-CM

## 2024-12-30 DIAGNOSIS — R29.898 DECREASED STRENGTH OF UPPER EXTREMITY: ICD-10-CM

## 2024-12-30 DIAGNOSIS — E66.3 OVERWEIGHT (BMI 25.0-29.9): ICD-10-CM

## 2024-12-30 DIAGNOSIS — R29.898 DECREASED RANGE OF MOTION OF NECK: Primary | ICD-10-CM

## 2024-12-30 PROCEDURE — 97110 THERAPEUTIC EXERCISES: CPT | Mod: HCNC

## 2024-12-30 PROCEDURE — 99499 UNLISTED E&M SERVICE: CPT | Mod: HCNC,S$GLB,, | Performed by: STUDENT IN AN ORGANIZED HEALTH CARE EDUCATION/TRAINING PROGRAM

## 2024-12-30 PROCEDURE — 99999 PR PBB SHADOW E&M-EST. PATIENT-LVL III: CPT | Mod: PBBFAC,HCNC,, | Performed by: STUDENT IN AN ORGANIZED HEALTH CARE EDUCATION/TRAINING PROGRAM

## 2024-12-30 PROCEDURE — 97140 MANUAL THERAPY 1/> REGIONS: CPT | Mod: HCNC

## 2024-12-30 PROCEDURE — 97112 NEUROMUSCULAR REEDUCATION: CPT | Mod: HCNC

## 2024-12-30 PROCEDURE — 20611 DRAIN/INJ JOINT/BURSA W/US: CPT | Mod: 50,HCNC,S$GLB, | Performed by: STUDENT IN AN ORGANIZED HEALTH CARE EDUCATION/TRAINING PROGRAM

## 2024-12-30 NOTE — PROCEDURES
Infusion Nursing Note:  Holli Thacker presents today for Xolair.    Patient seen by provider today: No   present during visit today: Not Applicable.    Note: Hives on feet, thighs, and hands.  One injection into each arm.    Intravenous Access:  No Intravenous access/labs at this visit.    Treatment Conditions:  Not Applicable.    Post Infusion Assessment:  Patient tolerated injection without incident.  Patient observed for 30 minutes post Xolair per protocol.  Site patent and intact, free from redness, edema or discomfort.    Discharge Plan:   Patient declined prescription refills.  Discharge instructions reviewed with: Patient.  Patient and/or family verbalized understanding of discharge instructions and all questions answered.  AVS to patient via [employer].  Patient will return 2/21/18 for Xolair for next appointment.   Patient discharged in stable condition accompanied by: daughter.  Departure Mode: Ambulatory.    Mercedez Gallagher RN   Large Joint Aspiration/Injection: bilateral knee    Date/Time: 12/30/2024 4:00 PM    Performed by: Antonio Hooper MD  Authorized by: Antonio Hooper MD    Consent Done?:  Yes (Verbal)  Indications:  Arthritis and pain  Site marked: the procedure site was marked    Timeout: prior to procedure the correct patient, procedure, and site was verified    Prep: patient was prepped and draped in usual sterile fashion      Local anesthesia used?: Yes    Local anesthetic:  Topical anesthetic    Details:  Needle Size:  22 G  Ultrasonic Guidance for needle placement?: Yes    Images are saved and documented.  Approach: Superior lateral.  Location:  Knee  Laterality:  Bilateral  Site:  Bilateral knee  Medications (Right):  30 mg sodium hyaluronate (orthovisc) 30 mg/2 mL  Medications (Left):  30 mg sodium hyaluronate (orthovisc) 30 mg/2 mL  Patient tolerance:  Patient tolerated the procedure well with no immediate complications     Ultrasound guidance was used for needle localization. Images were saved and stored for documentation. The appropriate structures were visualized. Dynamic visualization of the needle was continuous throughout the procedures and maintained good position.     We discussed the proper protocols after the injection such as no submerging pools, baths tubs, or hot tubs for 24 hr.  Showering is okay today.   We discussed red flags such as fevers, chills, red, warm, tender joint at the area of injection to please seek medical care immediately.      MEDICAL NECESSITY FOR VISCOSUPPLEMETNATION: After thorough evaluation of the patient, I have determined that visco-supplementation is medically necessary. The patient has painful degenerative changes of the knee with failure of conservative treatments including lifestyle modifications and rehabilitation exercises.  Oral analgesis/NSAIDs have not adequately controlled symptoms and there is radiographic evidence of Kellgren Edi grade 2 or greater osteoarthritic  changes, or in lack of radiographic evidence, there is arthroscopic or other evidence of chondrosis.     Orthovisc:  Bilateral knee 2/3

## 2024-12-31 NOTE — PROGRESS NOTES
[facility]   Physical Therapy Treatment Note      Name: Hiwot Coulter  [facility] Number: [id number]    Therapy Diagnosis:   Encounter Diagnoses   Name Primary?    Decreased range of motion of neck Yes    Decreased range of motion of shoulder, unspecified laterality     Decreased strength of upper extremity     Decreased functional mobility and endurance        Physician: Kumar Ramirez MD    Visit Date: 12/30/2024    Physician Orders: Physical Therapy Evaluation and Treat  Medical Diagnosis from Referral: M50.30 (ICD-10-CM) - DDD (degenerative disc disease), cervical   Evaluation Date: 11/27/2024  Authorization Period Expiration: 12/31/2024  Plan of Care Expiration: 01/22/2025  Progress Note Due: 12/27/2024  Visit # / Visits authorized: 4/8 (+1 Evaluation)    FOTO: 1/3      Precautions: Standard    Time In: 4:30 PM  Time Out: 5:29 PM  Total Billable Time: 55 minutes  (Billing reflects 1 on 1 treatment time spent with patient)    SUBJECTIVE     Patient reports: She is feeling much better overall and notes that her cervical ROM is improving     She was compliant with home exercise program.  Response to previous treatment: no adverse effects  Functional change: none noted yet    Pain: 4/10  Location: Bilateral posterior head near occiput, bilateral upper trapezial, levator scapulae, sternocleidomastoid     OBJECTIVE     Objective Measures updated at progress report unless specified.     TREATMENT        CPT Intervention Performed  Today Duration / Intensity      TE  UPPER BODY ERGOMETER  x 3 minutes forward, 3 minutes backwards     Pulleys x 3 minutes flexion & abduction bilateral with cues to perform slowly     Supine cervical Rotation x 10x each      Open books  X 12x b    NMR  Standing Chin Tucks x 3 x 10 small ball      Standing Scapular Retractions x 3 x 10 blue band      Standing Shoulder Extensions x 3 x 10 red band      Supine Cervical SNAG with moist heat pack x  2 min each  side x 15 second holds      Seated Cervical ACTIVE RANGE OF MOTION  x 2 minutes each direction   TA          MT Supine SOFT TISSUE MOBILIZATION to levator scapulae bilaterally and middle/upper trapezius, subscapularis x  10 minutes   PLAN  open books, supine chin tuck, continued scapular strengthening           CPT Codes available for Billing:   (10) minutes of Manual therapy (MT) to improve pain and ROM.  (11) minutes of Therapeutic Exercise (TE) to develop strength, endurance, range of motion, and flexibility.  (34) minutes of Neuromuscular Re-Education (NMR)  to improve: Balance, Coordination, Kinesthetic, Sense, Proprioception, and Posture.  (00) minutes of Therapeutic Activities (TA) to improve functional performance.  Unattended Electrical Stimulation (ES) for muscle performance or pain modulation.  BFR: Blood flow restriction applied during exercise  NP or (-): Not Performed      PATIENT EDUCATION AND HOME EXERCISES     Home Exercises Provided and Patient Education Provided     Education provided:   PURPOSE: Patient educated on the impairments noted above and the effects of physical therapy intervention to improve overall condition and QOL.   EXERCISE: Patient was educated on all the above exercise prior/during/after for proper posture, positioning, and execution for safe performance with home exercise program.   STRENGTH: Patient educated on the importance of improved core and extremity strength in order to improve alignment of the spine and extremities with static positions and dynamic movement.   SLEEPING POSITIONS: Patient educated on the use of pillows to aid in neutral alignment of spine and extremities when sleeping in supine or side lying.    Written Home Exercises Provided: Patient instructed to cont prior HEP. Exercises were reviewed and Hiwot was able to demonstrate them prior to the end of the session.  Hiwot demonstrated good  understanding of the education provided. See EMR under Patient  Instructions for exercises provided during therapy sessions      ASSESSMENT   Patient tolerated session well today.  Pt demonstrates significant improvement in cervical AROM compared to initial evaluation. Added open books to promote postural mobility     Hiwot Is progressing well towards her goals.   Patient prognosis is Good.     Patient will continue to benefit from skilled outpatient physical therapy to address the deficits listed in the problem list box on initial evaluation, provide patient/family education and to maximize patient's level of independence in the home and community environment.     Patient's spiritual, cultural and educational needs considered and patient agreeable to plan of care and goals.     Anticipated barriers to physical therapy: chronicity of condition and occupation     Goals:   Reviewed: 12/30/2024      Short Term Goals:  4 weeks Status  Date Met   PAIN: Patient will report worst pain of 5/10 in order to progress toward max functional ability and improve quality of life. [x] Progressing  [] Met  [] Not Met     FUNCTION: Patient will demonstrate improved function as indicated by a functional intake score of more than or equal to 59 out of 100 on FOTO. [x] Progressing  [] Met  [] Not Met     MOBILITY: Patient will improve Range of Motion to 50% of stated goals, listed in objective measures above, in order to progress towards independence with functional activities.  [x] Progressing  [] Met  [] Not Met     STRENGTH: Patient will improve strength to 50% of stated goals, listed in objective measures above, in order to progress towards independence with functional activities.  [x] Progressing  [] Met  [] Not Met        Long Term Goals:  8 weeks Status Date Met   PAIN: Patient will report worst pain of 3/10 in order to progress toward max functional ability and improve quality of life [x] Progressing  [] Met  [] Not Met     FUNCTION: Patient will demonstrate improved function as indicated by  a functional intake score of more than or equal to 68 out of 100 on FOTO. [x] Progressing  [] Met  [] Not Met     MOBILITY: Patient will improve Range of Motion to stated goals, listed in objective measures above, in order to return to maximal functional potential and improve quality of life. [x] Progressing  [] Met  [] Not Met     STRENGTH: Patient will improve strength to stated goals, listed in objective measures above, in order to improve functional independence and quality of life. [x] Progressing  [] Met  [] Not Met     Patient will return to normal ADL's, IADL's, community involvement, recreational activities, and work-related activities with less than or equal to 3/10 pain and maximal function.  [x] Progressing  [] Met  [] Not Met          PLAN     Monitor response to today's treatment session and progress with Physical Therapy plan of care as indicated.    Orly Edmonds, PT

## 2025-01-02 RX ORDER — TIRZEPATIDE 10 MG/.5ML
10 INJECTION, SOLUTION SUBCUTANEOUS
Qty: 4 PEN | Refills: 11 | Status: SHIPPED | OUTPATIENT
Start: 2025-01-02

## 2025-01-06 ENCOUNTER — OFFICE VISIT (OUTPATIENT)
Dept: ORTHOPEDICS | Facility: CLINIC | Age: 80
End: 2025-01-06
Payer: MEDICARE

## 2025-01-06 ENCOUNTER — CLINICAL SUPPORT (OUTPATIENT)
Dept: REHABILITATION | Facility: HOSPITAL | Age: 80
End: 2025-01-06
Attending: PHYSICAL THERAPIST
Payer: MEDICARE

## 2025-01-06 VITALS — BODY MASS INDEX: 28.26 KG/M2 | WEIGHT: 149.69 LBS | HEIGHT: 61 IN

## 2025-01-06 DIAGNOSIS — Z74.09 DECREASED FUNCTIONAL MOBILITY AND ENDURANCE: ICD-10-CM

## 2025-01-06 DIAGNOSIS — M25.561 RIGHT KNEE PAIN, UNSPECIFIED CHRONICITY: Primary | ICD-10-CM

## 2025-01-06 DIAGNOSIS — R29.898 DECREASED RANGE OF MOTION OF NECK: Primary | ICD-10-CM

## 2025-01-06 DIAGNOSIS — M25.619 DECREASED RANGE OF MOTION OF SHOULDER, UNSPECIFIED LATERALITY: ICD-10-CM

## 2025-01-06 DIAGNOSIS — R29.898 DECREASED STRENGTH OF UPPER EXTREMITY: ICD-10-CM

## 2025-01-06 PROCEDURE — 97112 NEUROMUSCULAR REEDUCATION: CPT | Mod: HCNC | Performed by: PHYSICAL THERAPIST

## 2025-01-06 PROCEDURE — 97110 THERAPEUTIC EXERCISES: CPT | Mod: HCNC | Performed by: PHYSICAL THERAPIST

## 2025-01-06 PROCEDURE — 97140 MANUAL THERAPY 1/> REGIONS: CPT | Mod: HCNC | Performed by: PHYSICAL THERAPIST

## 2025-01-06 PROCEDURE — 99499 UNLISTED E&M SERVICE: CPT | Mod: HCNC,S$GLB,,

## 2025-01-06 NOTE — PROGRESS NOTES
[facility]   Physical Therapy Treatment Note & PROGRESS NOTE     Name: Hiwot Coulter  Clinic Number: [id number]    Therapy Diagnosis:   Encounter Diagnoses   Name Primary?    Decreased range of motion of neck Yes    Decreased range of motion of shoulder, unspecified laterality     Decreased strength of upper extremity     Decreased functional mobility and endurance        Physician: Kumar Ramirez MD    Visit Date: 1/6/2025    Physician Orders: Physical Therapy Evaluation and Treat  Medical Diagnosis from Referral: M50.30 (ICD-10-CM) - DDD (degenerative disc disease), cervical   Evaluation Date: 11/27/2024  Authorization Period Expiration: 12/31/2024  Plan of Care Expiration: 01/22/2025  Progress Note Due: 1/22/2025  Visit # / Visits authorized: 5/8 (+1 Evaluation)    FOTO: 1/3      Precautions: Standard    Time In: 4:30 PM  Time Out: 5:30 PM  Total Billable Time: 58 minutes  (Billing reflects 1 on 1 treatment time spent with patient)    SUBJECTIVE     Patient reports: She is feeling much better overall and notes that her cervical ROM is improving. She does report less pain just feeling stiffness.    She was compliant with home exercise program.  Response to previous treatment: no adverse effects  Functional change: none noted yet    Pain: 4/10  Location: Bilateral posterior head near occiput, bilateral upper trapezial, levator scapulae, sternocleidomastoid     OBJECTIVE     Objective Measures updated at progress report unless specified.      Functional Movement  Analysis 1/6/25   Overhead Reach Non-painful and Dysfunctional Nonpainful and some upper trapezial  substitution         RANGE OF MOTION:     Cervical Right   (spine) Left     Pain/Dysfunction with Movement Goal 1/6/25   Cervical Flexion (80) WITHIN FUNCTIONAL LIMITS  ---     40   Cervical Extension (70) 40 --- Familiar pain 60 40   Cervical Side Bending (45) 25 20 Familiar pain 35 bilateral 20 B   Cervical Rotation  (45) 15 10  Familiar pain 35 bilateral 50% B      Shoulder AROM/PROM Right Left Pain/Dysfunction with Movement Goal 1/6/25   Shoulder Flexion (180) WITHIN FUNCTIONAL LIMITS  WITHIN FUNCTIONAL LIMITS      140   Shoulder Abduction (180) 160 160   170 bilateral 160 B      STRENGTH:     U/E MMT Right Left Pain/Dysfunction with Movement Goal 1/6/25   Shoulder Flexion 4-/5 4/5   4+/5 Bilateral 4+   Shoulder Abduction 3+/5 3+/5   4+/5 Bilateral 4+         MUSCLE LENGTH:      Muscle Tested  Right Left  Goal 1/6/25   Upper Trapezius  decreased decreased Normal Bilateral limited   Levator Scapulae  decreased decreased Normal Bilateral limited   Sternocleidomastoid decreased decreased Normal Bilateral limited   Scalenes  decreased decreased Normal Bilateral limited   Pectoralis Minor  decreased decreased Normal Bilateral limited   Pectoralis Major decreased decreased Normal Bilateral limited         FOTO:          TREATMENT        CPT Intervention Performed  Today Duration / Intensity      TE  UPPER BODY ERGOMETER  x 3 minutes forward, 3 minutes backwards     Pulleys x 2 minutes flexion & abduction bilateral with cues to perform slowly     Supine cervical Rotation - 10x each      Open books  - 12x b     FOTO/Re-assess x 10 min         NMR  Standing Chin Tucks x 3 x 10 towel roll today      Standing Scapular Retractions x 3 x 10 blue band      Standing Shoulder Extensions x 3 x 10 green band      Supine Cervical SNAG with moist heat pack x 20x each side x 15 second holds      Seated Cervical ACTIVE RANGE OF MOTION  - 2 minutes each direction    Series 6 on [employer]- Pectoral stretch   Sw[employer]ers   [employer]  [place]  Shoulder rolls BW x  x  x  x  x 3 min  1 min   1 min  1 min  1 min    Prone latissimus dorsi  x 10x/ 2 sets    Prone T's  x 10x/ 2 sets         TA          MT    Supine SOFT TISSUE MOBILIZATION to levator scapulae bilaterally and middle/upper trapezius, subscapularis x  8 minutes (pre and post treatment session)   PLAN  open  books, supine chin tuck, continued scapular strengthening           CPT Codes available for Billing:   (08) minutes of Manual therapy (MT) to improve pain and ROM.  (20) minutes of Therapeutic Exercise (TE) to develop strength, endurance, range of motion, and flexibility.  (30) minutes of Neuromuscular Re-Education (NMR)  to improve: Balance, Coordination, Kinesthetic, Sense, Proprioception, and Posture.  (00) minutes of Therapeutic Activities (TA) to improve functional performance.  Unattended Electrical Stimulation (ES) for muscle performance or pain modulation.  BFR: Blood flow restriction applied during exercise  NP or (-): Not Performed      PATIENT EDUCATION AND HOME EXERCISES     Home Exercises Provided and Patient Education Provided     Education provided:   PURPOSE: Patient educated on the impairments noted above and the effects of physical therapy intervention to improve overall condition and QOL.   EXERCISE: Patient was educated on all the above exercise prior/during/after for proper posture, positioning, and execution for safe performance with home exercise program.   STRENGTH: Patient educated on the importance of improved core and extremity strength in order to improve alignment of the spine and extremities with static positions and dynamic movement.   SLEEPING POSITIONS: Patient educated on the use of pillows to aid in neutral alignment of spine and extremities when sleeping in supine or side lying.    Written Home Exercises Provided: Patient instructed to cont prior HEP. Exercises were reviewed and Hiwot was able to demonstrate them prior to the end of the session.  Hiwot demonstrated good  understanding of the education provided. See EMR under Patient Instructions for exercises provided during therapy sessions      ASSESSMENT   Patient has made improvements in objective strength measurements, ROM and MLT remain limited. Patient is reporting decreased pain with ROM and improved symptoms, but not  reflected in FOTO or ROM testing. Encouraged home exercise program. Extensive cues for all progressions today.     Hiwot Is progressing well towards her goals.   Patient prognosis is Good.     Patient will continue to benefit from skilled outpatient physical therapy to address the deficits listed in the problem list box on initial evaluation, provide patient/family education and to maximize patient's level of independence in the home and community environment.     Patient's spiritual, cultural and educational needs considered and patient agreeable to plan of care and goals.     Anticipated barriers to physical therapy: chronicity of condition and occupation     Goals:   Reviewed: 1/6/2025      Short Term Goals:  4 weeks Status  Date    PAIN: Patient will report worst pain of 5/10 in order to progress toward max functional ability and improve quality of life. [x] Progressing  [] Met  [] Not Met 1/6/2025    FUNCTION: Patient will demonstrate improved function as indicated by a functional intake score of more than or equal to 59 out of 100 on FOTO. [x] Progressing  [] Met  [] Not Met 1/6/2025    MOBILITY: Patient will improve Range of Motion to 50% of stated goals, listed in objective measures above, in order to progress towards independence with functional activities.  [x] Progressing  [] Met  [] Not Met 1/6/2025    STRENGTH: Patient will improve strength to 50% of stated goals, listed in objective measures above, in order to progress towards independence with functional activities.  [] Progressing  [x] Met  [] Not Met  1/6/2025      Long Term Goals:  8 weeks Status Date    PAIN: Patient will report worst pain of 3/10 in order to progress toward max functional ability and improve quality of life [x] Progressing  [] Met  [] Not Met  1/6/2025   FUNCTION: Patient will demonstrate improved function as indicated by a functional intake score of more than or equal to 68 out of 100 on FOTO. [x] Progressing  [] Met  [] Not  Met 1/6/2025    MOBILITY: Patient will improve Range of Motion to stated goals, listed in objective measures above, in order to return to maximal functional potential and improve quality of life. [x] Progressing  [] Met  [] Not Met  1/6/2025   STRENGTH: Patient will improve strength to stated goals, listed in objective measures above, in order to improve functional independence and quality of life. [x] Progressing  [] Met  [] Not Met 1/6/2025    Patient will return to normal ADL's, IADL's, community involvement, recreational activities, and work-related activities with less than or equal to 3/10 pain and maximal function.  [x] Progressing  [] Met  [] Not Met 1/6/2025         PLAN     Monitor response to today's treatment session and progress with Physical Therapy plan of care as indicated.    Adrienne Chung, PT

## 2025-01-07 ENCOUNTER — PROCEDURE VISIT (OUTPATIENT)
Dept: SPORTS MEDICINE | Facility: CLINIC | Age: 80
End: 2025-01-07
Payer: MEDICARE

## 2025-01-07 ENCOUNTER — PATIENT MESSAGE (OUTPATIENT)
Dept: INTERNAL MEDICINE | Facility: CLINIC | Age: 80
End: 2025-01-07
Payer: MEDICARE

## 2025-01-07 DIAGNOSIS — M17.0 PRIMARY OSTEOARTHRITIS OF KNEES, BILATERAL: Primary | ICD-10-CM

## 2025-01-07 DIAGNOSIS — E66.3 OVERWEIGHT (BMI 25.0-29.9): ICD-10-CM

## 2025-01-07 DIAGNOSIS — G89.29 CHRONIC PAIN OF LEFT KNEE: ICD-10-CM

## 2025-01-07 DIAGNOSIS — M25.562 CHRONIC PAIN OF LEFT KNEE: ICD-10-CM

## 2025-01-07 PROCEDURE — 20611 DRAIN/INJ JOINT/BURSA W/US: CPT | Mod: 50,HCNC,S$GLB, | Performed by: STUDENT IN AN ORGANIZED HEALTH CARE EDUCATION/TRAINING PROGRAM

## 2025-01-07 PROCEDURE — 99499 UNLISTED E&M SERVICE: CPT | Mod: HCNC,S$GLB,, | Performed by: STUDENT IN AN ORGANIZED HEALTH CARE EDUCATION/TRAINING PROGRAM

## 2025-01-07 NOTE — PROCEDURES
Large Joint Aspiration/Injection: bilateral knee Orthovisc #3/3    Date/Time: 1/7/2025 3:00 PM    Performed by: Néstor Mullen MD  Authorized by: Néstor Mullen MD    Consent Done?:  Yes (Verbal)  Indications:  Arthritis and pain  Site marked: the procedure site was marked    Timeout: prior to procedure the correct patient, procedure, and site was verified      Local anesthesia used?: Yes    Local anesthetic:  Topical anesthetic    Details:  Needle Size:  22 G  Ultrasonic Guidance for needle placement?: Yes    Images are saved and documented.  Location:  Knee  Laterality:  Bilateral  Site:  Bilateral knee  Medications (Right):  30 mg sodium hyaluronate (orthovisc) 30 mg/2 mL  Medications (Left):  30 mg sodium hyaluronate (orthovisc) 30 mg/2 mL  Patient tolerance:  Patient tolerated the procedure well with no immediate complications     Ultrasound guidance was used for needle localization. Images were saved and stored for documentation. The appropriate structures were visualized. Dynamic visualization of the needle was continuous throughout the procedures and maintained good position.     Proper protocols after the aspiration included: no submerging pools, baths tubs, or hot tubs for 24 hr.  Showering is okay today.  Red flag symptoms include fever, chills, nausea, vomiting, red, warm, tender joint at the area of injection.  If you are noticing these symptoms, they may be indicative of an infection, and please seek medical care immediately, either by calling our clinic or going to the emergency room.

## 2025-01-07 NOTE — PATIENT INSTRUCTIONS
Assessment:  Hiwot Coulter is a 79 y.o. female with a chief complaint of Pain and Injections of the Left Knee and Pain and Injections of the Right Knee    Encounter Diagnoses   Name Primary?    Primary osteoarthritis of knees, bilateral Yes    Chronic pain of left knee       Plan:  Bilateral knee Orthovisc injections, #3/3.    Proper protocols after the aspiration included: no submerging pools, baths tubs, or hot tubs for 24 hr.  Showering is okay today.  Red flag symptoms include fever, chills, nausea, vomiting, red, warm, tender joint at the area of injection.  If you are noticing these symptoms, they may be indicative of an infection, and please seek medical care immediately, either by calling our clinic or going to the emergency room.    Follow-up:  As needed or sooner if there are any problems between now and then.    Thank you for choosing [facility] and Dr. Néstor Mullen for your orthopedic & sports medicine care. It is our goal to provide you with exceptional care that will help keep you healthy, active, and get you back in the game.    Please do not hesitate to reach out to us via email, phone, or MyChart with any questions, concerns, or feedback.    If you are experiencing pain/discomfort ,or have questions after 5pm and would like to be connected to the [facility] on-call team, please call this number and specify which Sports Medicine provider is treating you: [phone]

## 2025-01-08 ENCOUNTER — CLINICAL SUPPORT (OUTPATIENT)
Dept: REHABILITATION | Facility: HOSPITAL | Age: 80
End: 2025-01-08
Payer: MEDICARE

## 2025-01-08 DIAGNOSIS — R29.898 DECREASED STRENGTH OF UPPER EXTREMITY: ICD-10-CM

## 2025-01-08 DIAGNOSIS — R29.898 DECREASED RANGE OF MOTION OF NECK: Primary | ICD-10-CM

## 2025-01-08 DIAGNOSIS — M25.619 DECREASED RANGE OF MOTION OF SHOULDER, UNSPECIFIED LATERALITY: ICD-10-CM

## 2025-01-08 DIAGNOSIS — Z74.09 DECREASED FUNCTIONAL MOBILITY AND ENDURANCE: ICD-10-CM

## 2025-01-08 PROCEDURE — 97110 THERAPEUTIC EXERCISES: CPT | Mod: HCNC

## 2025-01-08 PROCEDURE — 97112 NEUROMUSCULAR REEDUCATION: CPT | Mod: HCNC

## 2025-01-08 NOTE — PROGRESS NOTES
[facility]   Physical Therapy Treatment Note      Name: Hiwot Coulter  [facility] Number: [id number]    Therapy Diagnosis:   Encounter Diagnoses   Name Primary?    Decreased range of motion of neck Yes    Decreased range of motion of shoulder, unspecified laterality     Decreased strength of upper extremity     Decreased functional mobility and endurance        Physician: Kumar Ramirez MD    Visit Date: 1/8/2025    Physician Orders: Physical Therapy Evaluation and Treat  Medical Diagnosis from Referral: M50.30 (ICD-10-CM) - DDD (degenerative disc disease), cervical   Evaluation Date: 11/27/2024  Authorization Period Expiration: 12/31/2024  Plan of Care Expiration: 01/22/2025  Progress Note Due: 1/22/2025  Visit # / Visits authorized: 2/24 (+1 Evaluation + 4 treatment)    FOTO: 2/3      Precautions: Standard    Time In: 4:00 PM  Time Out: 5:00 PM  Total Billable Time: 60 minutes  (Billing reflects 1 on 1 treatment time spent with patient)    SUBJECTIVE     Patient reports: She feels like her goals are being met and feels like the exercises she is doing here have been beneficial.    She was compliant with home exercise program.  Response to previous treatment: no adverse effects  Functional change: none noted yet    Pain: NT/10  Location: Bilateral posterior head near occiput, bilateral upper trapezial, levator scapulae, sternocleidomastoid     OBJECTIVE     Objective Measures updated at progress report unless specified.      TREATMENT        CPT Intervention Performed  Today Duration / Intensity      TE  UPPER BODY ERGOMETER  x 3 minutes forward, 3 minutes backwards     Pulleys x 2 minutes flexion & abduction bilateral with cues to perform slowly     Supine cervical Rotation - 10x each      Open books  - 12x b     FOTO/Re-assess  10 min         NMR  Standing Chin Tucks x 3 x 10 towel roll today      Standing Scapular Retractions x 3 x 10 blue band      Standing Shoulder Extensions  x 3 x 10 green band      Supine Cervical SNAG with moist heat pack x 20x each side x 15 second holds      Seated Cervical ACTIVE RANGE OF MOTION  - 2 minutes each direction    Series 6 on [employer]- Pectoral stretch   Swimmers   Rafi Reynaga  Shoulder rolls BW x  x  x  x  x 3 min  1 min   1 min  1 min  1 min    Prone latissimus dorsi  x 10x/ 2 sets    Prone T's  x 10x/ 2 sets         TA          MT    Supine SOFT TISSUE MOBILIZATION to levator scapulae bilaterally and middle/upper trapezius, subscapularis x  8 minutes (pre treatment)   PLAN  open books, supine chin tuck, continued scapular strengthening           CPT Codes available for Billing:   (08) minutes of Manual therapy (MT) to improve pain and ROM.  (10) minutes of Therapeutic Exercise (TE) to develop strength, endurance, range of motion, and flexibility.  (42) minutes of Neuromuscular Re-Education (NMR)  to improve: Balance, Coordination, Kinesthetic, Sense, Proprioception, and Posture.  (00) minutes of Therapeutic Activities (TA) to improve functional performance.  Unattended Electrical Stimulation (ES) for muscle performance or pain modulation.  BFR: Blood flow restriction applied during exercise  NP or (-): Not Performed      PATIENT EDUCATION AND HOME EXERCISES     Home Exercises Provided and Patient Education Provided     Education provided:   PURPOSE: Patient educated on the impairments noted above and the effects of physical therapy intervention to improve overall condition and QOL.   EXERCISE: Patient was educated on all the above exercise prior/during/after for proper posture, positioning, and execution for safe performance with home exercise program.   STRENGTH: Patient educated on the importance of improved core and extremity strength in order to improve alignment of the spine and extremities with static positions and dynamic movement.   SLEEPING POSITIONS: Patient educated on the use of pillows to aid in neutral alignment of spine and extremities  when sleeping in supine or side lying.    Written Home Exercises Provided: Patient instructed to cont prior HEP. Exercises were reviewed and Hiwot was able to demonstrate them prior to the end of the session.  Hiwot demonstrated good  understanding of the education provided. See EMR under Patient Instructions for exercises provided during therapy sessions      ASSESSMENT   Patient tolerated today's session very well. More muscular tension noted in right upper trapezial area with manual therapy as compared to left. Continued encouragement of HOME EXERCISE PROGRAM.    Hiwot Is progressing well towards her goals.   Patient prognosis is Good.     Patient will continue to benefit from skilled outpatient physical therapy to address the deficits listed in the problem list box on initial evaluation, provide patient/family education and to maximize patient's level of independence in the home and community environment.     Patient's spiritual, cultural and educational needs considered and patient agreeable to plan of care and goals.     Anticipated barriers to physical therapy: chronicity of condition and occupation     Goals:   Reviewed: 1/8/2025      Short Term Goals:  4 weeks Status  Date    PAIN: Patient will report worst pain of 5/10 in order to progress toward max functional ability and improve quality of life. [x] Progressing  [] Met  [] Not Met 1/6/2025    FUNCTION: Patient will demonstrate improved function as indicated by a functional intake score of more than or equal to 59 out of 100 on FOTO. [x] Progressing  [] Met  [] Not Met 1/6/2025    MOBILITY: Patient will improve Range of Motion to 50% of stated goals, listed in objective measures above, in order to progress towards independence with functional activities.  [x] Progressing  [] Met  [] Not Met 1/6/2025    STRENGTH: Patient will improve strength to 50% of stated goals, listed in objective measures above, in order to progress towards independence with  functional activities.  [] Progressing  [x] Met  [] Not Met  1/6/2025      Long Term Goals:  8 weeks Status Date    PAIN: Patient will report worst pain of 3/10 in order to progress toward max functional ability and improve quality of life [x] Progressing  [] Met  [] Not Met  1/6/2025   FUNCTION: Patient will demonstrate improved function as indicated by a functional intake score of more than or equal to 68 out of 100 on FOTO. [x] Progressing  [] Met  [] Not Met 1/6/2025    MOBILITY: Patient will improve Range of Motion to stated goals, listed in objective measures above, in order to return to maximal functional potential and improve quality of life. [x] Progressing  [] Met  [] Not Met  1/6/2025   STRENGTH: Patient will improve strength to stated goals, listed in objective measures above, in order to improve functional independence and quality of life. [x] Progressing  [] Met  [] Not Met 1/6/2025    Patient will return to normal ADL's, IADL's, community involvement, recreational activities, and work-related activities with less than or equal to 3/10 pain and maximal function.  [x] Progressing  [] Met  [] Not Met 1/6/2025         PLAN     Monitor response to today's treatment session and progress with Physical Therapy plan of care as indicated.    Tresa Rick, PT

## 2025-01-08 NOTE — PLAN OF CARE
[facility]   Physical Therapy Treatment Note & PROGRESS NOTE     Name: Hiwot Coulter  Clinic Number: [id number]    Therapy Diagnosis:   Encounter Diagnoses   Name Primary?    Decreased range of motion of neck Yes    Decreased range of motion of shoulder, unspecified laterality     Decreased strength of upper extremity     Decreased functional mobility and endurance        Physician: Kumar Ramirez MD    Visit Date: 1/6/2025    Physician Orders: Physical Therapy Evaluation and Treat  Medical Diagnosis from Referral: M50.30 (ICD-10-CM) - DDD (degenerative disc disease), cervical   Evaluation Date: 11/27/2024  Authorization Period Expiration: 12/31/2024  Plan of Care Expiration: 01/22/2025  Progress Note Due: 1/22/2025  Visit # / Visits authorized: 5/8 (+1 Evaluation)    FOTO: 1/3      Precautions: Standard    Time In: 4:30 PM  Time Out: 5:30 PM  Total Billable Time: 58 minutes  (Billing reflects 1 on 1 treatment time spent with patient)    SUBJECTIVE     Patient reports: She is feeling much better overall and notes that her cervical ROM is improving. She does report less pain just feeling stiffness.    She was compliant with home exercise program.  Response to previous treatment: no adverse effects  Functional change: none noted yet    Pain: 4/10  Location: Bilateral posterior head near occiput, bilateral upper trapezial, levator scapulae, sternocleidomastoid     OBJECTIVE     Objective Measures updated at progress report unless specified.      Functional Movement  Analysis 1/6/25   Overhead Reach Non-painful and Dysfunctional Nonpainful and some upper trapezial  substitution         RANGE OF MOTION:     Cervical Right   (spine) Left     Pain/Dysfunction with Movement Goal 1/6/25   Cervical Flexion (80) WITHIN FUNCTIONAL LIMITS  ---     40   Cervical Extension (70) 40 --- Familiar pain 60 40   Cervical Side Bending (45) 25 20 Familiar pain 35 bilateral 20 B   Cervical Rotation  (45) 15 10  Familiar pain 35 bilateral 50% B      Shoulder AROM/PROM Right Left Pain/Dysfunction with Movement Goal 1/6/25   Shoulder Flexion (180) WITHIN FUNCTIONAL LIMITS  WITHIN FUNCTIONAL LIMITS      140   Shoulder Abduction (180) 160 160   170 bilateral 160 B      STRENGTH:     U/E MMT Right Left Pain/Dysfunction with Movement Goal 1/6/25   Shoulder Flexion 4-/5 4/5   4+/5 Bilateral 4+   Shoulder Abduction 3+/5 3+/5   4+/5 Bilateral 4+         MUSCLE LENGTH:      Muscle Tested  Right Left  Goal 1/6/25   Upper Trapezius  decreased decreased Normal Bilateral limited   Levator Scapulae  decreased decreased Normal Bilateral limited   Sternocleidomastoid decreased decreased Normal Bilateral limited   Scalenes  decreased decreased Normal Bilateral limited   Pectoralis Minor  decreased decreased Normal Bilateral limited   Pectoralis Major decreased decreased Normal Bilateral limited         FOTO:          TREATMENT        CPT Intervention Performed  Today Duration / Intensity      TE  UPPER BODY ERGOMETER  x 3 minutes forward, 3 minutes backwards     Pulleys x 2 minutes flexion & abduction bilateral with cues to perform slowly     Supine cervical Rotation - 10x each      Open books  - 12x b     FOTO/Re-assess x 10 min         NMR  Standing Chin Tucks x 3 x 10 towel roll today      Standing Scapular Retractions x 3 x 10 blue band      Standing Shoulder Extensions x 3 x 10 green band      Supine Cervical SNAG with moist heat pack x 20x each side x 15 second holds      Seated Cervical ACTIVE RANGE OF MOTION  - 2 minutes each direction    Series 6 on [employer]- Pectoral stretch   Swimmers   [employer]  [place]  Shoulder rolls BW x  x  x  x  x 3 min  1 min   1 min  1 min  1 min    Prone latissimus dorsi  x 10x/ 2 sets    Prone T's  x 10x/ 2 sets         TA          MT    Supine SOFT TISSUE MOBILIZATION to levator scapulae bilaterally and middle/upper trapezius, subscapularis x  8 minutes (pre and post treatment session)   PLAN  open  books, supine chin tuck, continued scapular strengthening           CPT Codes available for Billing:   (08) minutes of Manual therapy (MT) to improve pain and ROM.  (20) minutes of Therapeutic Exercise (TE) to develop strength, endurance, range of motion, and flexibility.  (30) minutes of Neuromuscular Re-Education (NMR)  to improve: Balance, Coordination, Kinesthetic, Sense, Proprioception, and Posture.  (00) minutes of Therapeutic Activities (TA) to improve functional performance.  Unattended Electrical Stimulation (ES) for muscle performance or pain modulation.  BFR: Blood flow restriction applied during exercise  NP or (-): Not Performed      PATIENT EDUCATION AND HOME EXERCISES     Home Exercises Provided and Patient Education Provided     Education provided:   PURPOSE: Patient educated on the impairments noted above and the effects of physical therapy intervention to improve overall condition and QOL.   EXERCISE: Patient was educated on all the above exercise prior/during/after for proper posture, positioning, and execution for safe performance with home exercise program.   STRENGTH: Patient educated on the importance of improved core and extremity strength in order to improve alignment of the spine and extremities with static positions and dynamic movement.   SLEEPING POSITIONS: Patient educated on the use of pillows to aid in neutral alignment of spine and extremities when sleeping in supine or side lying.    Written Home Exercises Provided: Patient instructed to cont prior HEP. Exercises were reviewed and Hiwot was able to demonstrate them prior to the end of the session.  Hiwot demonstrated good  understanding of the education provided. See EMR under Patient Instructions for exercises provided during therapy sessions      ASSESSMENT   Patient has made improvements in objective strength measurements, ROM and MLT remain limited. Patient is reporting decreased pain with ROM and improved symptoms, but not  reflected in FOTO or ROM testing. Encouraged home exercise program. Extensive cues for all progressions today.     Hiwot Is progressing well towards her goals.   Patient prognosis is Good.     Patient will continue to benefit from skilled outpatient physical therapy to address the deficits listed in the problem list box on initial evaluation, provide patient/family education and to maximize patient's level of independence in the home and community environment.     Patient's spiritual, cultural and educational needs considered and patient agreeable to plan of care and goals.     Anticipated barriers to physical therapy: chronicity of condition and occupation     Goals:   Reviewed: 1/6/2025      Short Term Goals:  4 weeks Status  Date    PAIN: Patient will report worst pain of 5/10 in order to progress toward max functional ability and improve quality of life. [x] Progressing  [] Met  [] Not Met 1/6/2025    FUNCTION: Patient will demonstrate improved function as indicated by a functional intake score of more than or equal to 59 out of 100 on FOTO. [x] Progressing  [] Met  [] Not Met 1/6/2025    MOBILITY: Patient will improve Range of Motion to 50% of stated goals, listed in objective measures above, in order to progress towards independence with functional activities.  [x] Progressing  [] Met  [] Not Met 1/6/2025    STRENGTH: Patient will improve strength to 50% of stated goals, listed in objective measures above, in order to progress towards independence with functional activities.  [] Progressing  [x] Met  [] Not Met  1/6/2025      Long Term Goals:  8 weeks Status Date    PAIN: Patient will report worst pain of 3/10 in order to progress toward max functional ability and improve quality of life [x] Progressing  [] Met  [] Not Met  1/6/2025   FUNCTION: Patient will demonstrate improved function as indicated by a functional intake score of more than or equal to 68 out of 100 on FOTO. [x] Progressing  [] Met  [] Not  Met 1/6/2025    MOBILITY: Patient will improve Range of Motion to stated goals, listed in objective measures above, in order to return to maximal functional potential and improve quality of life. [x] Progressing  [] Met  [] Not Met  1/6/2025   STRENGTH: Patient will improve strength to stated goals, listed in objective measures above, in order to improve functional independence and quality of life. [x] Progressing  [] Met  [] Not Met 1/6/2025    Patient will return to normal ADL's, IADL's, community involvement, recreational activities, and work-related activities with less than or equal to 3/10 pain and maximal function.  [x] Progressing  [] Met  [] Not Met 1/6/2025         PLAN     Monitor response to today's treatment session and progress with Physical Therapy plan of care as indicated.    Adrienne Chung, PT

## 2025-01-10 ENCOUNTER — HOSPITAL ENCOUNTER (OUTPATIENT)
Dept: RADIOLOGY | Facility: HOSPITAL | Age: 80
Discharge: HOME | End: 2025-01-10
Attending: PEDIATRICS
Payer: MEDICARE

## 2025-01-10 VITALS — WEIGHT: 149.69 LBS | HEIGHT: 61 IN | BODY MASS INDEX: 28.26 KG/M2

## 2025-01-10 DIAGNOSIS — Z12.31 BREAST CANCER SCREENING BY MAMMOGRAM: ICD-10-CM

## 2025-01-10 PROCEDURE — 77067 SCR MAMMO BI INCL CAD: CPT | Mod: 26,HCNC,, | Performed by: RADIOLOGY

## 2025-01-10 PROCEDURE — 77063 BREAST TOMOSYNTHESIS BI: CPT | Mod: 26,HCNC,, | Performed by: RADIOLOGY

## 2025-01-10 PROCEDURE — 77063 BREAST TOMOSYNTHESIS BI: CPT | Mod: TC,HCNC

## 2025-01-13 ENCOUNTER — CLINICAL SUPPORT (OUTPATIENT)
Dept: REHABILITATION | Facility: HOSPITAL | Age: 80
End: 2025-01-13
Payer: MEDICARE

## 2025-01-13 ENCOUNTER — TELEPHONE (OUTPATIENT)
Dept: INTERNAL MEDICINE | Facility: CLINIC | Age: 80
End: 2025-01-13
Payer: MEDICARE

## 2025-01-13 DIAGNOSIS — R29.898 DECREASED RANGE OF MOTION OF NECK: Primary | ICD-10-CM

## 2025-01-13 DIAGNOSIS — M25.619 DECREASED RANGE OF MOTION OF SHOULDER, UNSPECIFIED LATERALITY: ICD-10-CM

## 2025-01-13 DIAGNOSIS — Z74.09 DECREASED FUNCTIONAL MOBILITY AND ENDURANCE: ICD-10-CM

## 2025-01-13 DIAGNOSIS — R29.898 DECREASED STRENGTH OF UPPER EXTREMITY: ICD-10-CM

## 2025-01-13 DIAGNOSIS — M85.89 OSTEOPENIA OF MULTIPLE SITES: Primary | ICD-10-CM

## 2025-01-13 PROCEDURE — 97140 MANUAL THERAPY 1/> REGIONS: CPT | Mod: HCNC

## 2025-01-13 PROCEDURE — 97110 THERAPEUTIC EXERCISES: CPT | Mod: HCNC

## 2025-01-13 PROCEDURE — 97112 NEUROMUSCULAR REEDUCATION: CPT | Mod: HCNC

## 2025-01-13 RX ORDER — TIRZEPATIDE 10 MG/.5ML
10 INJECTION, SOLUTION SUBCUTANEOUS
Qty: 4 PEN | Refills: 11 | Status: SHIPPED | OUTPATIENT
Start: 2025-01-13

## 2025-01-13 NOTE — PROGRESS NOTES
[facility]   Physical Therapy Treatment Note      Name: Hiwot Coulter  [facility] Number: [id number]    Therapy Diagnosis:   Encounter Diagnoses   Name Primary?    Decreased range of motion of neck Yes    Decreased range of motion of shoulder, unspecified laterality     Decreased strength of upper extremity     Decreased functional mobility and endurance        Physician: Kumar Ramirez MD    Visit Date: 1/13/2025    Physician Orders: Physical Therapy Evaluation and Treat  Medical Diagnosis from Referral: M50.30 (ICD-10-CM) - DDD (degenerative disc disease), cervical   Evaluation Date: 11/27/2024  Authorization Period Expiration: 12/31/2024  Plan of Care Expiration: 01/22/2025  Progress Note Due: 1/22/2025  Visit # / Visits authorized: 3/24 (+1 Evaluation + 4 treatment)    FOTO: 2/3      Precautions: Standard    Time In: 4:00 PM  Time Out: 5:00 PM  Total Billable Time: 60 minutes  (Billing reflects 1 on 1 treatment time spent with patient)    SUBJECTIVE     Patient reports: She is a little more stiff today than normal.    She was compliant with home exercise program.  Response to previous treatment: no adverse effects  Functional change: none noted yet    Pain: NT/10  Location: Bilateral posterior head near occiput, bilateral upper trapezial, levator scapulae, sternocleidomastoid     OBJECTIVE     Objective Measures updated at progress report unless specified.      TREATMENT        CPT Intervention Performed  Today Duration / Intensity      TE  UPPER BODY ERGOMETER  x 3 minutes forward, 3 minutes backwards     Pulleys x 3 minutes flexion & abduction bilateral with cues to perform slowly     Supine cervical Rotation - 10x each      Open books  - 12x b     FOTO/Re-assess  10 min         NMR  Standing Chin Tucks x 3 x 10 towel roll today      Standing Scapular Retractions x 3 x 10 blue band      Standing Shoulder Extensions x 3 x 10 blue band      Supine Cervical SNAG with moist heat  pack x 20x each side x 15 second holds      Seated Cervical ACTIVE RANGE OF MOTION  - 2 minutes each direction    Series 6 on [employer]- Pectoral stretch   Swimmers   Rafi Reynaga  Shoulder rolls BW x  x  x  x  x 3 min  1 min   1 min  1 min  1 min    Prone latissimus dorsi  x 10x/ 3 sets    Prone T's  x 10x/ 3 sets         TA          MT    Supine SOFT TISSUE MOBILIZATION to levator scapulae bilaterally and middle/upper trapezius, subscapularis    Supine cervical side glides X          x  8 minutes (pre treatment)   PLAN  open books, supine chin tuck, continued scapular strengthening           CPT Codes available for Billing:   (08) minutes of Manual therapy (MT) to improve pain and ROM.  (12) minutes of Therapeutic Exercise (TE) to develop strength, endurance, range of motion, and flexibility.  (40) minutes of Neuromuscular Re-Education (NMR)  to improve: Balance, Coordination, Kinesthetic, Sense, Proprioception, and Posture.  (00) minutes of Therapeutic Activities (TA) to improve functional performance.  Unattended Electrical Stimulation (ES) for muscle performance or pain modulation.  BFR: Blood flow restriction applied during exercise  NP or (-): Not Performed    PATIENT EDUCATION AND HOME EXERCISES     Home Exercises Provided and Patient Education Provided     Education provided:   PURPOSE: Patient educated on the impairments noted above and the effects of physical therapy intervention to improve overall condition and QOL.   EXERCISE: Patient was educated on all the above exercise prior/during/after for proper posture, positioning, and execution for safe performance with home exercise program.   STRENGTH: Patient educated on the importance of improved core and extremity strength in order to improve alignment of the spine and extremities with static positions and dynamic movement.   SLEEPING POSITIONS: Patient educated on the use of pillows to aid in neutral alignment of spine and extremities when sleeping in supine  or side lying.    Written Home Exercises Provided: Patient instructed to cont prior HEP. Exercises were reviewed and Hiwot was able to demonstrate them prior to the end of the session.  Hiwot demonstrated good  understanding of the education provided. See EMR under Patient Instructions for exercises provided during therapy sessions      ASSESSMENT   Cervical side glides performed today as opposed to scapular mobility to which patient tolerated well and reported subjective decrease in stiffness by end of visit. Continued encouragement of HOME EXERCISE PROGRAM.    Hiwot Is progressing well towards her goals.   Patient prognosis is Good.     Patient will continue to benefit from skilled outpatient physical therapy to address the deficits listed in the problem list box on initial evaluation, provide patient/family education and to maximize patient's level of independence in the home and community environment.     Patient's spiritual, cultural and educational needs considered and patient agreeable to plan of care and goals.     Anticipated barriers to physical therapy: chronicity of condition and occupation     Goals:   Reviewed: 1/13/2025      Short Term Goals:  4 weeks Status  Date    PAIN: Patient will report worst pain of 5/10 in order to progress toward max functional ability and improve quality of life. [x] Progressing  [] Met  [] Not Met 1/6/2025    FUNCTION: Patient will demonstrate improved function as indicated by a functional intake score of more than or equal to 59 out of 100 on FOTO. [x] Progressing  [] Met  [] Not Met 1/6/2025    MOBILITY: Patient will improve Range of Motion to 50% of stated goals, listed in objective measures above, in order to progress towards independence with functional activities.  [x] Progressing  [] Met  [] Not Met 1/6/2025    STRENGTH: Patient will improve strength to 50% of stated goals, listed in objective measures above, in order to progress towards independence with  functional activities.  [] Progressing  [x] Met  [] Not Met  1/6/2025      Long Term Goals:  8 weeks Status Date    PAIN: Patient will report worst pain of 3/10 in order to progress toward max functional ability and improve quality of life [x] Progressing  [] Met  [] Not Met  1/6/2025   FUNCTION: Patient will demonstrate improved function as indicated by a functional intake score of more than or equal to 68 out of 100 on FOTO. [x] Progressing  [] Met  [] Not Met 1/6/2025    MOBILITY: Patient will improve Range of Motion to stated goals, listed in objective measures above, in order to return to maximal functional potential and improve quality of life. [x] Progressing  [] Met  [] Not Met  1/6/2025   STRENGTH: Patient will improve strength to stated goals, listed in objective measures above, in order to improve functional independence and quality of life. [x] Progressing  [] Met  [] Not Met 1/6/2025    Patient will return to normal ADL's, IADL's, community involvement, recreational activities, and work-related activities with less than or equal to 3/10 pain and maximal function.  [x] Progressing  [] Met  [] Not Met 1/6/2025         PLAN     Monitor response to today's treatment session and progress with Physical Therapy plan of care as indicated.    Tresa Rick, PT

## 2025-01-13 NOTE — TELEPHONE ENCOUNTER
Care Due:                  Date            Visit Type   Department     Provider  --------------------------------------------------------------------------------                                EP -                              PRIMARY      [facility] INTERNAL  Last Visit: 12-      CARE ([facility])   [facility]       Kumar Ramirez                              EP -                              PRIMARY      [facility] INTERNAL  Next Visit: 12-      CARE ([facility])   [facility]                                                            Last  Test          Frequency    Reason                     Performed    Due Date  --------------------------------------------------------------------------------    HBA1C.......  6 months...  tirzepatide,.............  Not Found    Overdue    [employer] Embedded Care Due Messages. Reference number: [id number].   1/13/2025 2:43:19 PM CST

## 2025-01-13 NOTE — TELEPHONE ENCOUNTER
----- Message from Kumar Ramirez MD sent at 1/13/2025  6:52 AM CST -----  Your bone density is dropping again. I believe you were on Boniva in the past. I want you to see rheumatology to see if you need to restart.

## 2025-01-13 NOTE — TELEPHONE ENCOUNTER
The medication was sent to [facility] pharmacy. I called them and they said they did not receive it. They said they ask doctor's office to send prescriptions to [email or web address] self [facility] and not to [facility]. They also asked to make sure there is a diagnosis code attached to the prescription. Thank You

## 2025-01-16 ENCOUNTER — OFFICE VISIT (OUTPATIENT)
Dept: OPHTHALMOLOGY | Facility: CLINIC | Age: 80
End: 2025-01-16
Payer: MEDICARE

## 2025-01-16 DIAGNOSIS — Z98.890 POST-OPERATIVE STATE: Primary | ICD-10-CM

## 2025-01-16 DIAGNOSIS — Z98.890 S/P YAG CAPSULOTOMY, BILATERAL: ICD-10-CM

## 2025-01-16 PROCEDURE — 99024 POSTOP FOLLOW-UP VISIT: CPT | Mod: HCNC,S$GLB,, | Performed by: OPHTHALMOLOGY

## 2025-01-16 PROCEDURE — 1159F MED LIST DOCD IN RCRD: CPT | Mod: HCNC,CPTII,S$GLB, | Performed by: OPHTHALMOLOGY

## 2025-01-16 PROCEDURE — 99999 PR PBB SHADOW E&M-EST. PATIENT-LVL III: CPT | Mod: PBBFAC,HCNC,, | Performed by: OPHTHALMOLOGY

## 2025-01-16 PROCEDURE — 1160F RVW MEDS BY RX/DR IN RCRD: CPT | Mod: HCNC,CPTII,S$GLB, | Performed by: OPHTHALMOLOGY

## 2025-01-16 NOTE — PROGRESS NOTES
HPI     Post-op Evaluation            Comments: YAG OU 12/13/24    Patient states she still has not really seen a difference visually since   the laser surgery, saw some floaters but they have resolved.          Comments    1. PCIOL OD 7/18/19 +20.0WF/CDE 10.73   PCIOL OS +21.0 SN60WF / CDE: 12.02 / 08/22/19   YAG OU 12/13/24  2. Glaucoma Suspect   3. Negative FH glaucoma   SLT OD 11/5/20   + CPAP     No drops             Last edited by Marjorie Zuniga, Patient Care Assistant on 1/16/2025  3:08   PM.            Assessment /Plan     For exam results, see Encounter Report.      ICD-10-CM ICD-9-CM    1. Post-operative state  Z98.890 V45.89 S/p YAG OU, doing well  Disp MR      2. S/P YAG capsulotomy, bilateral  Z98.890 V45.89           Return to clinic 1 year or PRN

## 2025-01-17 ENCOUNTER — TELEPHONE (OUTPATIENT)
Dept: INTERNAL MEDICINE | Facility: CLINIC | Age: 80
End: 2025-01-17
Payer: MEDICARE

## 2025-01-27 ENCOUNTER — CLINICAL SUPPORT (OUTPATIENT)
Dept: REHABILITATION | Facility: HOSPITAL | Age: 80
End: 2025-01-27
Payer: MEDICARE

## 2025-01-27 DIAGNOSIS — Z74.09 DECREASED FUNCTIONAL MOBILITY AND ENDURANCE: ICD-10-CM

## 2025-01-27 DIAGNOSIS — R29.898 DECREASED RANGE OF MOTION OF NECK: Primary | ICD-10-CM

## 2025-01-27 DIAGNOSIS — M25.619 DECREASED RANGE OF MOTION OF SHOULDER, UNSPECIFIED LATERALITY: ICD-10-CM

## 2025-01-27 DIAGNOSIS — R29.898 DECREASED STRENGTH OF UPPER EXTREMITY: ICD-10-CM

## 2025-01-27 PROCEDURE — 97112 NEUROMUSCULAR REEDUCATION: CPT | Mod: HCNC

## 2025-01-27 PROCEDURE — 97110 THERAPEUTIC EXERCISES: CPT | Mod: HCNC

## 2025-01-27 NOTE — PROGRESS NOTES
[facility]   Physical Therapy Treatment Note + Updated Plan of Care     Name: Hiwot Coulter  Clinic Number: [id number]    Therapy Diagnosis:   Encounter Diagnoses   Name Primary?    Decreased range of motion of neck Yes    Decreased range of motion of shoulder, unspecified laterality     Decreased strength of upper extremity     Decreased functional mobility and endurance        Physician: Kumar Ramirez MD    Visit Date: 1/27/2025    Physician Orders: Physical Therapy Evaluation and Treat  Medical Diagnosis from Referral: M50.30 (ICD-10-CM) - DDD (degenerative disc disease), cervical   Evaluation Date: 11/27/2024  Authorization Period Expiration: 12/31/2024  Plan of Care Expiration: 03/24/2025 (updated)  Progress Note Due: 02/26/2025  Visit # / Visits authorized: 4/24 (+1 Evaluation + 4 treatment)    FOTO: 3/3      Precautions: Standard    Time In: 4:10 PM  Time Out: 5:00 PM  Total Billable Time: 50 minutes  (Billing reflects 1 on 1 treatment time spent with patient)    SUBJECTIVE     Patient reports: she feels like she has made progress with PHYSICAL THERAPY. Patient states her neck is still bothering her today and would like to continue with therapy to continue making functional progress as she feels like she can drive safer now.    She was compliant with home exercise program.  Response to previous treatment: no adverse effects  Functional change: able to check blind spot, still having pain with lying supine    Pain: 4/10  Location: Bilateral posterior head near occiput, bilateral upper trapezial, levator scapulae, sternocleidomastoid     OBJECTIVE     Objective Measures updated at progress report unless specified.     *Denotes change since initial evaluation, re-tested on 01/27/2025    RANGE OF MOTION:     Cervical Right   (spine) Left     Pain/Dysfunction with Movement Goal   Cervical Flexion (80) WITHIN FUNCTIONAL LIMITS  ---       Cervical Extension (70) 40 --- Familiar  pain 60   Cervical Side Bending (45) 25 25* Familiar pain 35 bilateral   Cervical Rotation (45) 30 30* Familiar pain 35 bilateral      STRENGTH:     U/E MMT Right Left Pain/Dysfunction with Movement Goal   Shoulder Flexion 5/5* 5/5*   4+/5 Bilateral   Shoulder Abduction 5/5* 4+/5*   4+/5 Bilateral         FUNCTION:      CMS Impairment/Limitation/Restriction for FOTO Neck Survey     Therapist reviewed FOTO scores for Hiwot on 01/27/20245.   FOTO documents entered into [employer] - see Media section.     Intake Score: 55*            TREATMENT        CPT Intervention Performed  Today Duration / Intensity      TE  UPPER BODY ERGOMETER  x 3 minutes forward, 3 minutes backwards     Pulleys  3 minutes flexion & abduction bilateral with cues to perform slowly     Supine cervical Rotation - 10x each      Open books  - 12x b     FOTO/Re-assess x 10 min         NMR  Standing Chin Tucks - 3 x 10 towel roll today      Standing Scapular Retractions x 3 x 10 blue band      Standing Shoulder Extensions x 3 x 10 blue band      Supine Cervical SNAG with moist heat pack x 20x each side x 15 second holds      Seated Cervical ACTIVE RANGE OF MOTION  - 2 minutes each direction    Series 6 on [employer]- Pectoral stretch   Swimmers   [employer]  [place]  Shoulder rolls BW  3 min  1 min   1 min  1 min  1 min    Prone latissimus dorsi  - 10x/ 3 sets    Prone T's  - 10x/ 3 sets         TA          MT    Supine SOFT TISSUE MOBILIZATION to levator scapulae bilaterally and middle/upper trapezius, subscapularis    Supine cervical side glides    Seated SOFT TISSUE MOBILIZATION to bilateral upper trapezial                 x      8 minutes (pre treatment)   PLAN  open books, supine chin tuck, continued scapular strengthening           CPT Codes available for Billing:   (08) minutes of Manual therapy (MT) to improve pain and ROM.  (16) minutes of Therapeutic Exercise (TE) to develop strength, endurance, range of motion, and flexibility.  (26) minutes of  Neuromuscular Re-Education (NMR)  to improve: Balance, Coordination, Kinesthetic, Sense, Proprioception, and Posture.  (00) minutes of Therapeutic Activities (TA) to improve functional performance.  Unattended Electrical Stimulation (ES) for muscle performance or pain modulation.  BFR: Blood flow restriction applied during exercise  NP or (-): Not Performed    PATIENT EDUCATION AND HOME EXERCISES     Home Exercises Provided and Patient Education Provided     Education provided:   PURPOSE: Patient educated on the impairments noted above and the effects of physical therapy intervention to improve overall condition and QOL.   EXERCISE: Patient was educated on all the above exercise prior/during/after for proper posture, positioning, and execution for safe performance with home exercise program.   STRENGTH: Patient educated on the importance of improved core and extremity strength in order to improve alignment of the spine and extremities with static positions and dynamic movement.   SLEEPING POSITIONS: Patient educated on the use of pillows to aid in neutral alignment of spine and extremities when sleeping in supine or side lying.    Written Home Exercises Provided: Patient instructed to cont prior HEP. Exercises were reviewed and Hiwot was able to demonstrate them prior to the end of the session.  Hiwot demonstrated good  understanding of the education provided. See EMR under Patient Instructions for exercises provided during therapy sessions      ASSESSMENT   Since initial evaluation patient demonstrates moderate to significant improvements in left cervical side-bending and left cervical rotation ACTIVE RANGE OF MOTION as well as moderate to significant improvements in bilateral shoulder flexion and abduction strength. Due to these continued improvements, patient would continue to benefit from skilled therapy to continue addressing her remaining deficits and allow patient to work and rest with less overall pain and  dysfunction.    Hiwot Is progressing well towards her goals.   Patient prognosis is Good.     Patient will continue to benefit from skilled outpatient physical therapy to address the deficits listed in the problem list box on initial evaluation, provide patient/family education and to maximize patient's level of independence in the home and community environment.     Patient's spiritual, cultural and educational needs considered and patient agreeable to plan of care and goals.     Anticipated barriers to physical therapy: chronicity of condition and occupation     Goals:   Reviewed: 1/27/2025      Short Term Goals:  4 weeks Status  Date    PAIN: Patient will report worst pain of 5/10 in order to progress toward max functional ability and improve quality of life. [] Progressing  [x] Met  [] Not Met 01/27/2025   FUNCTION: Patient will demonstrate improved function as indicated by a functional intake score of more than or equal to 59 out of 100 on FOTO. [x] Progressing  [] Met  [] Not Met 01/27/2025   MOBILITY: Patient will improve Range of Motion to 50% of stated goals, listed in objective measures above, in order to progress towards independence with functional activities.  [x] Progressing  [] Met  [] Not Met 01/27/2025   STRENGTH: Patient will improve strength to 50% of stated goals, listed in objective measures above, in order to progress towards independence with functional activities.  [] Progressing  [x] Met  [] Not Met  1/6/2025      Long Term Goals:  8 weeks Status Date    PAIN: Patient will report worst pain of 3/10 in order to progress toward max functional ability and improve quality of life [x] Progressing  [] Met  [] Not Met 01/27/2025    FUNCTION: Patient will demonstrate improved function as indicated by a functional intake score of more than or equal to 68 out of 100 on FOTO. [x] Progressing  [] Met  [] Not Met 01/27/2025   MOBILITY: Patient will improve Range of Motion to stated goals, listed in  objective measures above, in order to return to maximal functional potential and improve quality of life. [x] Progressing  [] Met  [] Not Met 01/27/2025   STRENGTH: Patient will improve strength to stated goals, listed in objective measures above, in order to improve functional independence and quality of life. [x] Progressing  [] Met  [] Not Met 01/27/2025   Patient will return to normal ADL's, IADL's, community involvement, recreational activities, and work-related activities with less than or equal to 3/10 pain and maximal function.  [x] Progressing  [] Met  [] Not Met 01/27/2025        PLAN     Plan of Care Certification: 01/27/2025 to 03/24/2025.     Outpatient Physical Therapy 2 times weekly for 8 weeks to include any combination of the following interventions: virtual visits, dry needling, modalities, electrical stimulation (IFC, Pre-Mod, Attended with Functional Dry Needling), Cervical/Lumbar Traction, Gait Training, Manual Therapy, Moist Heat/ Ice, Neuromuscular Re-ed, Patient Education, Self Care, Therapeutic Exercise, and Therapeutic Activites     Tresa Rick, PT

## 2025-01-29 ENCOUNTER — CLINICAL SUPPORT (OUTPATIENT)
Dept: REHABILITATION | Facility: HOSPITAL | Age: 80
End: 2025-01-29
Payer: MEDICARE

## 2025-01-29 DIAGNOSIS — M25.619 DECREASED RANGE OF MOTION OF SHOULDER, UNSPECIFIED LATERALITY: ICD-10-CM

## 2025-01-29 DIAGNOSIS — R29.898 DECREASED STRENGTH OF UPPER EXTREMITY: ICD-10-CM

## 2025-01-29 DIAGNOSIS — R29.898 DECREASED RANGE OF MOTION OF NECK: Primary | ICD-10-CM

## 2025-01-29 DIAGNOSIS — Z74.09 DECREASED FUNCTIONAL MOBILITY AND ENDURANCE: ICD-10-CM

## 2025-01-29 PROCEDURE — 97140 MANUAL THERAPY 1/> REGIONS: CPT | Mod: HCNC

## 2025-01-29 RX ORDER — IBUPROFEN 800 MG/1
800 TABLET ORAL EVERY 6 HOURS PRN
Qty: 270 TABLET | Refills: 3 | Status: SHIPPED | OUTPATIENT
Start: 2025-01-29

## 2025-01-29 NOTE — TELEPHONE ENCOUNTER
No care due was identified.  [facility] Embedded Care Due Messages. Reference number: [id number].   1/29/2025 7:45:37 AM CST

## 2025-01-29 NOTE — PROGRESS NOTES
[facility]   Physical Therapy Treatment Note      Name: Hiwot Coulter  [facility] Number: [id number]    Therapy Diagnosis:   Encounter Diagnoses   Name Primary?    Decreased range of motion of neck Yes    Decreased range of motion of shoulder, unspecified laterality     Decreased strength of upper extremity     Decreased functional mobility and endurance          Physician: Kumar Ramirez MD    Visit Date: 1/29/2025    Physician Orders: Physical Therapy Evaluation and Treat  Medical Diagnosis from Referral: M50.30 (ICD-10-CM) - DDD (degenerative disc disease), cervical   Evaluation Date: 11/27/2024  Authorization Period Expiration: 12/31/2024  Plan of Care Expiration: 03/24/2025 (updated)  Progress Note Due: 02/26/2025  Visit # / Visits authorized: 5/24 (+1 Evaluation + 4 treatment)    FOTO: 3/3      Precautions: Standard    Time In: 4:05 PM  Time Out: 4:50 PM  Total Billable Time: 10 minutes  (Billing reflects 1 on 1 treatment time spent with patient)    SUBJECTIVE     Patient reports: she feels a little less stiff than last visit but reports she still feels like her neck is tight today.    She was compliant with home exercise program.  Response to previous treatment: no adverse effects  Functional change: able to check blind spot, still having pain with lying supine    Pain: 4/10  Location: Bilateral posterior head near occiput, bilateral upper trapezial, levator scapulae, sternocleidomastoid     OBJECTIVE     Objective Measures updated at progress report unless specified.        TREATMENT        CPT Intervention Performed  Today Duration / Intensity      TE  UPPER BODY ERGOMETER  x 3 minutes forward, 3 minutes backwards     Pulleys x 2 minutes flexion & abduction bilateral with cues to perform slowly     Supine cervical Rotation - 10x each      Open books  - 12x b     FOTO/Re-assess  10 min         NMR  Standing Chin Tucks - 3 x 10 towel roll today      Standing Scapular  Retractions x 3 x 10 blue band      Standing Shoulder Extensions x 3 x 10 blue band      Supine Cervical SNAG with moist heat pack x 20x each side x 15 second holds      Seated Cervical ACTIVE RANGE OF MOTION  - 2 minutes each direction    Series 6 on [employer]- Pectoral stretch   Swimmers   [place]  Shoulder rolls BW X  X  X  X  x 3 min  1 min   1 min  1 min  1 min    Prone latissimus dorsi  x 10x/ 3 sets    Prone T's  x 10x/ 3 sets         TA          MT    Supine SOFT TISSUE MOBILIZATION to levator scapulae bilaterally and middle/upper trapezius, subscapularis    Supine cervical side glides    Seated SOFT TISSUE MOBILIZATION to bilateral upper trapezial                 x      8 minutes (pre treatment)   PLAN  open books, supine chin tuck, continued scapular strengthening           CPT Codes available for Billing:   (08) minutes of Manual therapy (MT) to improve pain and ROM.  (10) minutes of Therapeutic Exercise (TE) to develop strength, endurance, range of motion, and flexibility.  (27) minutes of Neuromuscular Re-Education (NMR)  to improve: Balance, Coordination, Kinesthetic, Sense, Proprioception, and Posture.  (00) minutes of Therapeutic Activities (TA) to improve functional performance.  Unattended Electrical Stimulation (ES) for muscle performance or pain modulation.  BFR: Blood flow restriction applied during exercise  NP or (-): Not Performed    PATIENT EDUCATION AND HOME EXERCISES     Home Exercises Provided and Patient Education Provided     Education provided:   PURPOSE: Patient educated on the impairments noted above and the effects of physical therapy intervention to improve overall condition and QOL.   EXERCISE: Patient was educated on all the above exercise prior/during/after for proper posture, positioning, and execution for safe performance with home exercise program.   STRENGTH: Patient educated on the importance of improved core and extremity strength in order to improve alignment of the  spine and extremities with static positions and dynamic movement.   SLEEPING POSITIONS: Patient educated on the use of pillows to aid in neutral alignment of spine and extremities when sleeping in supine or side lying.    Written Home Exercises Provided: Patient instructed to cont prior HEP. Exercises were reviewed and Hiwot was able to demonstrate them prior to the end of the session.  Hiwot demonstrated good  understanding of the education provided. See EMR under Patient Instructions for exercises provided during therapy sessions      ASSESSMENT   Patient tolerated today's session well and continues to exhibit good effort to improving condition. Plan to continue monitoring and progressing patient as tolerable.    Hiwot Is progressing well towards her goals.   Patient prognosis is Good.     Patient will continue to benefit from skilled outpatient physical therapy to address the deficits listed in the problem list box on initial evaluation, provide patient/family education and to maximize patient's level of independence in the home and community environment.     Patient's spiritual, cultural and educational needs considered and patient agreeable to plan of care and goals.     Anticipated barriers to physical therapy: chronicity of condition and occupation     Goals:   Reviewed: 1/29/2025      Short Term Goals:  4 weeks Status  Date    PAIN: Patient will report worst pain of 5/10 in order to progress toward max functional ability and improve quality of life. [] Progressing  [x] Met  [] Not Met 01/27/2025   FUNCTION: Patient will demonstrate improved function as indicated by a functional intake score of more than or equal to 59 out of 100 on FOTO. [x] Progressing  [] Met  [] Not Met 01/27/2025   MOBILITY: Patient will improve Range of Motion to 50% of stated goals, listed in objective measures above, in order to progress towards independence with functional activities.  [x] Progressing  [] Met  [] Not Met  01/27/2025   STRENGTH: Patient will improve strength to 50% of stated goals, listed in objective measures above, in order to progress towards independence with functional activities.  [] Progressing  [x] Met  [] Not Met  1/6/2025      Long Term Goals:  8 weeks Status Date    PAIN: Patient will report worst pain of 3/10 in order to progress toward max functional ability and improve quality of life [x] Progressing  [] Met  [] Not Met 01/27/2025    FUNCTION: Patient will demonstrate improved function as indicated by a functional intake score of more than or equal to 68 out of 100 on FOTO. [x] Progressing  [] Met  [] Not Met 01/27/2025   MOBILITY: Patient will improve Range of Motion to stated goals, listed in objective measures above, in order to return to maximal functional potential and improve quality of life. [x] Progressing  [] Met  [] Not Met 01/27/2025   STRENGTH: Patient will improve strength to stated goals, listed in objective measures above, in order to improve functional independence and quality of life. [x] Progressing  [] Met  [] Not Met 01/27/2025   Patient will return to normal ADL's, IADL's, community involvement, recreational activities, and work-related activities with less than or equal to 3/10 pain and maximal function.  [x] Progressing  [] Met  [] Not Met 01/27/2025        PLAN     Plan of Care Certification: 01/27/2025 to 03/24/2025.     Outpatient Physical Therapy 2 times weekly for 8 weeks to include any combination of the following interventions: virtual visits, dry needling, modalities, electrical stimulation (IFC, Pre-Mod, Attended with Functional Dry Needling), Cervical/Lumbar Traction, Gait Training, Manual Therapy, Moist Heat/ Ice, Neuromuscular Re-ed, Patient Education, Self Care, Therapeutic Exercise, and Therapeutic Activites     Tresa Rick, PT

## 2025-01-29 NOTE — TELEPHONE ENCOUNTER
Refill Routing Note   Medication(s) are not appropriate for processing by [facility] for the following reason(s):        Outside of protocol    [facility] action(s):  Route               Appointments  past 12m or future 3m with PCP    Date Provider   Last Visit   12/13/2024 Kumar Ramirez MD   Next Visit   Visit date not found Kumar Ramirez MD   ED visits in past 90 days: 0        Note composed:9:55 AM 01/29/2025

## 2025-02-04 ENCOUNTER — CLINICAL SUPPORT (OUTPATIENT)
Dept: REHABILITATION | Facility: HOSPITAL | Age: 80
End: 2025-02-04
Payer: MEDICARE

## 2025-02-04 DIAGNOSIS — Z74.09 DECREASED FUNCTIONAL MOBILITY AND ENDURANCE: ICD-10-CM

## 2025-02-04 DIAGNOSIS — M25.619 DECREASED RANGE OF MOTION OF SHOULDER, UNSPECIFIED LATERALITY: ICD-10-CM

## 2025-02-04 DIAGNOSIS — R29.898 DECREASED RANGE OF MOTION OF NECK: Primary | ICD-10-CM

## 2025-02-04 DIAGNOSIS — R29.898 DECREASED STRENGTH OF UPPER EXTREMITY: ICD-10-CM

## 2025-02-04 PROCEDURE — 97140 MANUAL THERAPY 1/> REGIONS: CPT | Mod: HCNC

## 2025-02-04 PROCEDURE — 97112 NEUROMUSCULAR REEDUCATION: CPT | Mod: HCNC

## 2025-02-04 PROCEDURE — 97110 THERAPEUTIC EXERCISES: CPT | Mod: HCNC

## 2025-02-04 NOTE — PROGRESS NOTES
[facility]   Physical Therapy Treatment Note      Name: Hiwot Coulter  [facility] Number: [id number]    Therapy Diagnosis:   Encounter Diagnoses   Name Primary?    Decreased range of motion of neck Yes    Decreased range of motion of shoulder, unspecified laterality     Decreased strength of upper extremity     Decreased functional mobility and endurance        Physician: Kumar Ramirez MD    Visit Date: 2/4/2025    Physician Orders: Physical Therapy Evaluation and Treat  Medical Diagnosis from Referral: M50.30 (ICD-10-CM) - DDD (degenerative disc disease), cervical   Evaluation Date: 11/27/2024  Authorization Period Expiration: 12/31/2024  Plan of Care Expiration: 03/24/2025 (updated)  Progress Note Due: 02/26/2025  Visit # / Visits authorized: 5/24 (+1 Evaluation + 4 treatment)    FOTO: 3/3      Precautions: Standard    Time In: 4:00 PM  Time Out: 5:55 PM  Total Billable Time: 55 minutes  (Billing reflects 1 on 1 treatment time spent with patient)    SUBJECTIVE     Patient reports: she feels pretty good throughout the day but still reports upper cervical pain at night when lying down. Patient states she is still trying to change her pillow and support to see if anything can help decrease this pain.    She was compliant with home exercise program.  Response to previous treatment: no adverse effects  Functional change: able to check blind spot, still having pain with lying supine    Pain: 4/10  Location: Bilateral posterior head near occiput, bilateral upper trapezial, levator scapulae, sternocleidomastoid     OBJECTIVE     Objective Measures updated at progress report unless specified.        TREATMENT        CPT Intervention Performed  Today Duration / Intensity      TE  UPPER BODY ERGOMETER  x 3 minutes forward, 3 minutes backwards     Pulleys x 2 minutes flexion & abduction bilateral with cues to perform slowly     Supine cervical Rotation - 10x each      Open books  - 12x b      FOTO/Re-assess  10 min         NMR  Standing Chin Tucks - 3 x 10 towel roll today      Standing Scapular Retractions x 3 x 10 blue band      Standing Shoulder Extensions x 3 x 10 blue band      Supine Cervical SNAG with moist heat pack x 20x each side x 15 second holds      Seated Cervical ACTIVE RANGE OF MOTION  - 2 minutes each direction    Series 6 on [employer]- Pectoral stretch   Swimmers   HUGs  Lester  Shoulder rolls BW X  X  X  X  x 3 min  1 min   1 min  1 min  1 min    Prone latissimus dorsi  x 10x/ 3 sets 1lbs    Prone T's  x 10x/ 3 sets 1lbs         TA          MT    Supine SOFT TISSUE MOBILIZATION to levator scapulae bilaterally and middle/upper trapezius, subscapularis    Supine cervical side glides    Seated SOFT TISSUE MOBILIZATION to bilateral upper trapezial                 x      8 minutes (pre treatment)   PLAN  open books, supine chin tuck, continued scapular strengthening           CPT Codes available for Billing:   (08) minutes of Manual therapy (MT) to improve pain and ROM.  (10) minutes of Therapeutic Exercise (TE) to develop strength, endurance, range of motion, and flexibility.  (37) minutes of Neuromuscular Re-Education (NMR)  to improve: Balance, Coordination, Kinesthetic, Sense, Proprioception, and Posture.  (00) minutes of Therapeutic Activities (TA) to improve functional performance.  Unattended Electrical Stimulation (ES) for muscle performance or pain modulation.  BFR: Blood flow restriction applied during exercise  NP or (-): Not Performed    PATIENT EDUCATION AND HOME EXERCISES     Home Exercises Provided and Patient Education Provided     Education provided:   PURPOSE: Patient educated on the impairments noted above and the effects of physical therapy intervention to improve overall condition and QOL.   EXERCISE: Patient was educated on all the above exercise prior/during/after for proper posture, positioning, and execution for safe performance with home exercise program.   STRENGTH:  Patient educated on the importance of improved core and extremity strength in order to improve alignment of the spine and extremities with static positions and dynamic movement.   SLEEPING POSITIONS: Patient educated on the use of pillows to aid in neutral alignment of spine and extremities when sleeping in supine or side lying.    Written Home Exercises Provided: Patient instructed to cont prior HEP. Exercises were reviewed and Hiwot was able to demonstrate them prior to the end of the session.  Hiwot demonstrated good  understanding of the education provided. See EMR under Patient Instructions for exercises provided during therapy sessions      ASSESSMENT   Progressed patient with increased resistance on prone scapular strengthening exercises to which patient tolerated well but did require verbal cueing for control of eccentric portion. Educated to continue attempting different sleeping positions to determine if anything can help ameliorate this pain at night; patient verbalized understanding.    Hiwot Is progressing well towards her goals.   Patient prognosis is Good.     Patient will continue to benefit from skilled outpatient physical therapy to address the deficits listed in the problem list box on initial evaluation, provide patient/family education and to maximize patient's level of independence in the home and community environment.     Patient's spiritual, cultural and educational needs considered and patient agreeable to plan of care and goals.     Anticipated barriers to physical therapy: chronicity of condition and occupation     Goals:   Reviewed: 2/4/2025      Short Term Goals:  4 weeks Status  Date    PAIN: Patient will report worst pain of 5/10 in order to progress toward max functional ability and improve quality of life. [] Progressing  [x] Met  [] Not Met 01/27/2025   FUNCTION: Patient will demonstrate improved function as indicated by a functional intake score of more than or equal to 59 out  of 100 on FOTO. [x] Progressing  [] Met  [] Not Met 01/27/2025   MOBILITY: Patient will improve Range of Motion to 50% of stated goals, listed in objective measures above, in order to progress towards independence with functional activities.  [x] Progressing  [] Met  [] Not Met 01/27/2025   STRENGTH: Patient will improve strength to 50% of stated goals, listed in objective measures above, in order to progress towards independence with functional activities.  [] Progressing  [x] Met  [] Not Met  1/6/2025      Long Term Goals:  8 weeks Status Date    PAIN: Patient will report worst pain of 3/10 in order to progress toward max functional ability and improve quality of life [x] Progressing  [] Met  [] Not Met 01/27/2025    FUNCTION: Patient will demonstrate improved function as indicated by a functional intake score of more than or equal to 68 out of 100 on FOTO. [x] Progressing  [] Met  [] Not Met 01/27/2025   MOBILITY: Patient will improve Range of Motion to stated goals, listed in objective measures above, in order to return to maximal functional potential and improve quality of life. [x] Progressing  [] Met  [] Not Met 01/27/2025   STRENGTH: Patient will improve strength to stated goals, listed in objective measures above, in order to improve functional independence and quality of life. [x] Progressing  [] Met  [] Not Met 01/27/2025   Patient will return to normal ADL's, IADL's, community involvement, recreational activities, and work-related activities with less than or equal to 3/10 pain and maximal function.  [x] Progressing  [] Met  [] Not Met 01/27/2025        PLAN     Plan of Care Certification: 01/27/2025 to 03/24/2025.     Outpatient Physical Therapy 2 times weekly for 8 weeks to include any combination of the following interventions: virtual visits, dry needling, modalities, electrical stimulation (IFC, Pre-Mod, Attended with Functional Dry Needling), Cervical/Lumbar Traction, Gait Training, Manual Therapy,  Moist Heat/ Ice, Neuromuscular Re-ed, Patient Education, Self Care, Therapeutic Exercise, and Therapeutic Activites     Tresa Rick, PT

## 2025-02-05 ENCOUNTER — TELEPHONE (OUTPATIENT)
Dept: INTERNAL MEDICINE | Facility: CLINIC | Age: 80
End: 2025-02-05
Payer: MEDICARE

## 2025-02-05 DIAGNOSIS — E66.3 OVERWEIGHT (BMI 25.0-29.9): ICD-10-CM

## 2025-02-05 NOTE — TELEPHONE ENCOUNTER
The pharmacy does not have 10mg of Zepbound and they also do not have then pens. They have 2.5mg and 5mg. Patient would rather the 5mg d/t price. Also she is okay with the medication being in vials.

## 2025-02-05 NOTE — TELEPHONE ENCOUNTER
----- Message from Elizabeth sent at 2/5/2025  4:25 PM CST -----  Contact: JIE SCHUSTER [[id number]]  ..Type:  Patient Requesting Call    Who Called:JIE SCHUSTER [[id number]]  Would the patient rather a call back or a response via [employer]? Call  Best Call Back Number:.[phone] (home)   Additional Information: Patient is returning call to providers office.

## 2025-02-05 NOTE — TELEPHONE ENCOUNTER
----- Message from Beverly sent at 2/5/2025  2:33 PM CST -----  .Type:  Pharmacy Calling to Clarify an RX    Name of Caller:Milan  Pharmacy Name:[facility]  Prescription Name:Zepbound  What do they need to clarify?:refill  Best Call Back Number:[phone] and fax is [phone]  Additional Information: The prescription isn't going through successfully. Please call the pharmacy back and anyone can help. Thx.EL

## 2025-02-05 NOTE — TELEPHONE ENCOUNTER
----- Message from Irene sent at 2/5/2025  3:42 PM CST -----  Type:  Patient Returning Call    Who Called:Hiwot  Who Left Message for Patient:Olivia  Does the patient know what this is regarding?:  Would the patient rather a call back or a response via [employer]? Callback  Best Call Back Number:[phone]  Additional Information: Missed call

## 2025-02-05 NOTE — TELEPHONE ENCOUNTER
Spoke with Lea about RX not going through. [facility] does not have 10mg of zepbound and they do not have the pens. They only have 2.5mg and 5mg and vials. Will contact pt to ask if she would want to vials or pens. An alternative pharmacy was given if she wanted to use pens.

## 2025-02-06 RX ORDER — TIRZEPATIDE 5 MG/.5ML
5 INJECTION, SOLUTION SUBCUTANEOUS
Qty: 4 PEN | Refills: 11 | Status: SHIPPED | OUTPATIENT
Start: 2025-02-06

## 2025-02-07 ENCOUNTER — TELEPHONE (OUTPATIENT)
Dept: INTERNAL MEDICINE | Facility: CLINIC | Age: 80
End: 2025-02-07
Payer: MEDICARE

## 2025-02-07 NOTE — TELEPHONE ENCOUNTER
----- Message from Marjorie sent at 2/7/2025 12:39 PM CST -----  Regarding: pharmacy authorization  Contact: Milan  Type:  Pharmacy Calling to Clarify an RX    Name of Caller: Milan  Pharmacy Name: [facility]   Prescription Name: tirzepatide, weight loss, (ZEPBOUND) 5 mg/0.5 mL PnIj  What do they need to clarify?: provider information   Best Call Back Number: [phone]   Additional Information: The provider called and requested a prescriptin and it was received but the provider NPI# and JUANI macdonald is missing. Please resend to fax [phone].

## 2025-02-10 ENCOUNTER — CLINICAL SUPPORT (OUTPATIENT)
Dept: REHABILITATION | Facility: HOSPITAL | Age: 80
End: 2025-02-10
Payer: MEDICARE

## 2025-02-10 DIAGNOSIS — Z74.09 DECREASED FUNCTIONAL MOBILITY AND ENDURANCE: ICD-10-CM

## 2025-02-10 DIAGNOSIS — M25.619 DECREASED RANGE OF MOTION OF SHOULDER, UNSPECIFIED LATERALITY: ICD-10-CM

## 2025-02-10 DIAGNOSIS — R29.898 DECREASED STRENGTH OF UPPER EXTREMITY: ICD-10-CM

## 2025-02-10 DIAGNOSIS — R29.898 DECREASED RANGE OF MOTION OF NECK: Primary | ICD-10-CM

## 2025-02-10 PROCEDURE — 97112 NEUROMUSCULAR REEDUCATION: CPT | Mod: HCNC

## 2025-02-10 NOTE — PROGRESS NOTES
[facility]   Physical Therapy Treatment Note      Name: Hiwot Coulter  [facility] Number: [id number]    Therapy Diagnosis:   Encounter Diagnoses   Name Primary?    Decreased range of motion of neck Yes    Decreased range of motion of shoulder, unspecified laterality     Decreased strength of upper extremity     Decreased functional mobility and endurance      Physician: Kumar Ramirez MD    Visit Date: 2/10/2025    Physician Orders: Physical Therapy Evaluation and Treat  Medical Diagnosis from Referral: M50.30 (ICD-10-CM) - DDD (degenerative disc disease), cervical   Evaluation Date: 11/27/2024  Authorization Period Expiration: 12/31/2024  Plan of Care Expiration: 03/24/2025 (updated)  Progress Note Due: 02/26/2025  Visit # / Visits authorized: 6/24 (+1 Evaluation + 4 treatment)    FOTO: 3/3      Precautions: Standard    Time In: 4:00 PM  Time Out: 5:00 PM  Total Billable Time: 40 minutes  (Billing reflects 1 on 1 treatment time spent with patient)    SUBJECTIVE     Patient reports: she did not get a chance to sleep with her cervical pillow and still reports having neck pain at night.    She was compliant with home exercise program.  Response to previous treatment: no adverse effects  Functional change: able to check blind spot, still having pain with lying supine    Pain: 4/10  Location: Bilateral posterior head near occiput, bilateral upper trapezial, levator scapulae, sternocleidomastoid     OBJECTIVE     Objective Measures updated at progress report unless specified.        TREATMENT        CPT Intervention Performed  Today Duration / Intensity      TE  UPPER BODY ERGOMETER  x 3 minutes forward, 3 minutes backwards     Pulleys - 2 minutes flexion & abduction bilateral with cues to perform slowly     Supine cervical Rotation - 10x each      Open books  - 12x b     FOTO/Re-assess  10 min         NMR  Standing Chin Tucks - 3 x 10 towel roll today    Quadruped Chin Tucks x 3 x 10       Standing Scapular Retractions x 3 x 10 blue band      Standing Shoulder Extensions x 3 x 10 blue band      Supine Cervical SNAG with moist heat pack x 20x each side x 15 second holds      Seated Cervical ACTIVE RANGE OF MOTION  - 2 minutes each direction    Series 6 on [employer]- Pectoral stretch   Swimmers   Rafi Rodríguezs  Shoulder rolls BW X  X  X  X  x 3 min  1 min   1 min  1 min  1 min    Prone latissimus dorsi  x 10x/ 3 sets 1lbs    Prone T's  x 10x/ 3 sets 1lbs         TA          MT    Supine SOFT TISSUE MOBILIZATION to levator scapulae bilaterally and middle/upper trapezius, subscapularis    Supine cervical side glides    Seated SOFT TISSUE MOBILIZATION to bilateral upper trapezial                 x      8 minutes (pre treatment)   PLAN  open books, supine chin tuck, continued scapular strengthening           CPT Codes available for Billing:   (08) minutes of Manual therapy (MT) to improve pain and ROM.  (06) minutes of Therapeutic Exercise (TE) to develop strength, endurance, range of motion, and flexibility.  (41) minutes of Neuromuscular Re-Education (NMR)  to improve: Balance, Coordination, Kinesthetic, Sense, Proprioception, and Posture.  (00) minutes of Therapeutic Activities (TA) to improve functional performance.  Unattended Electrical Stimulation (ES) for muscle performance or pain modulation.  BFR: Blood flow restriction applied during exercise  NP or (-): Not Performed    PATIENT EDUCATION AND HOME EXERCISES     Home Exercises Provided and Patient Education Provided     Education provided:   PURPOSE: Patient educated on the impairments noted above and the effects of physical therapy intervention to improve overall condition and QOL.   EXERCISE: Patient was educated on all the above exercise prior/during/after for proper posture, positioning, and execution for safe performance with home exercise program.   STRENGTH: Patient educated on the importance of improved core and extremity strength in order to  improve alignment of the spine and extremities with static positions and dynamic movement.   SLEEPING POSITIONS: Patient educated on the use of pillows to aid in neutral alignment of spine and extremities when sleeping in supine or side lying.    Written Home Exercises Provided: Patient instructed to cont prior HEP. Exercises were reviewed and Hiwot was able to demonstrate them prior to the end of the session.  Hiwot demonstrated good  understanding of the education provided. See EMR under Patient Instructions for exercises provided during therapy sessions      ASSESSMENT   Added quadruped chin tucks today but patient still struggles with this specific motion and requires extra verbal and visual cueing to complete. Discussed importance of strengthening deep neck flexor muscles to which patient verbalized understanding. Encouraged patient to continue trying different pillows to see if this can help decrease familiar pain at night.    Hiwot Is progressing well towards her goals.   Patient prognosis is Good.     Patient will continue to benefit from skilled outpatient physical therapy to address the deficits listed in the problem list box on initial evaluation, provide patient/family education and to maximize patient's level of independence in the home and community environment.     Patient's spiritual, cultural and educational needs considered and patient agreeable to plan of care and goals.     Anticipated barriers to physical therapy: chronicity of condition and occupation     Goals:   Reviewed: 2/10/2025      Short Term Goals:  4 weeks Status  Date    PAIN: Patient will report worst pain of 5/10 in order to progress toward max functional ability and improve quality of life. [] Progressing  [x] Met  [] Not Met 01/27/2025   FUNCTION: Patient will demonstrate improved function as indicated by a functional intake score of more than or equal to 59 out of 100 on FOTO. [x] Progressing  [] Met  [] Not Met 01/27/2025    MOBILITY: Patient will improve Range of Motion to 50% of stated goals, listed in objective measures above, in order to progress towards independence with functional activities.  [x] Progressing  [] Met  [] Not Met 01/27/2025   STRENGTH: Patient will improve strength to 50% of stated goals, listed in objective measures above, in order to progress towards independence with functional activities.  [] Progressing  [x] Met  [] Not Met  1/6/2025      Long Term Goals:  8 weeks Status Date    PAIN: Patient will report worst pain of 3/10 in order to progress toward max functional ability and improve quality of life [x] Progressing  [] Met  [] Not Met 01/27/2025    FUNCTION: Patient will demonstrate improved function as indicated by a functional intake score of more than or equal to 68 out of 100 on FOTO. [x] Progressing  [] Met  [] Not Met 01/27/2025   MOBILITY: Patient will improve Range of Motion to stated goals, listed in objective measures above, in order to return to maximal functional potential and improve quality of life. [x] Progressing  [] Met  [] Not Met 01/27/2025   STRENGTH: Patient will improve strength to stated goals, listed in objective measures above, in order to improve functional independence and quality of life. [x] Progressing  [] Met  [] Not Met 01/27/2025   Patient will return to normal ADL's, IADL's, community involvement, recreational activities, and work-related activities with less than or equal to 3/10 pain and maximal function.  [x] Progressing  [] Met  [] Not Met 01/27/2025        PLAN     Plan of Care Certification: 01/27/2025 to 03/24/2025.     Outpatient Physical Therapy 2 times weekly for 8 weeks to include any combination of the following interventions: virtual visits, dry needling, modalities, electrical stimulation (IFC, Pre-Mod, Attended with Functional Dry Needling), Cervical/Lumbar Traction, Gait Training, Manual Therapy, Moist Heat/ Ice, Neuromuscular Re-ed, Patient Education, Self  Care, Therapeutic Exercise, and Therapeutic Activites     Tresa Rick, PT

## 2025-02-12 ENCOUNTER — PATIENT MESSAGE (OUTPATIENT)
Dept: INTERNAL MEDICINE | Facility: CLINIC | Age: 80
End: 2025-02-12
Payer: MEDICARE

## 2025-02-12 ENCOUNTER — CLINICAL SUPPORT (OUTPATIENT)
Dept: REHABILITATION | Facility: HOSPITAL | Age: 80
End: 2025-02-12
Payer: MEDICARE

## 2025-02-12 DIAGNOSIS — R29.898 DECREASED RANGE OF MOTION OF NECK: Primary | ICD-10-CM

## 2025-02-12 DIAGNOSIS — R29.898 DECREASED STRENGTH OF UPPER EXTREMITY: ICD-10-CM

## 2025-02-12 DIAGNOSIS — Z74.09 DECREASED FUNCTIONAL MOBILITY AND ENDURANCE: ICD-10-CM

## 2025-02-12 DIAGNOSIS — M25.619 DECREASED RANGE OF MOTION OF SHOULDER, UNSPECIFIED LATERALITY: ICD-10-CM

## 2025-02-12 PROCEDURE — 97112 NEUROMUSCULAR REEDUCATION: CPT | Mod: HCNC | Performed by: PHYSICAL THERAPIST

## 2025-02-12 NOTE — PROGRESS NOTES
[facility]   Physical Therapy Treatment Note      Name: Hiwot Coulter  [facility] Number: [id number]    Therapy Diagnosis:   Encounter Diagnoses   Name Primary?    Decreased range of motion of neck Yes    Decreased range of motion of shoulder, unspecified laterality     Decreased strength of upper extremity     Decreased functional mobility and endurance        Physician: Kumar Ramirez MD    Visit Date: 2/12/2025    Physician Orders: Physical Therapy Evaluation and Treat  Medical Diagnosis from Referral: M50.30 (ICD-10-CM) - DDD (degenerative disc disease), cervical   Evaluation Date: 11/27/2024  Authorization Period Expiration: 12/31/2024  Plan of Care Expiration: 03/24/2025 (updated)  Progress Note Due: 02/26/2025  Visit # / Visits authorized: 8/24 (+1 Evaluation + 4 treatment)    FOTO: 3/3      Precautions: Standard    Time In: 16:35  Time Out: 17:30  Total Billable Time: 30 minutes  (Billing reflects 1 on 1 treatment time spent with patient)    SUBJECTIVE     Patient reports: she is reporting that she got a microwavable cervical heating pad ad has been going to sleep with it - which is helping with pain. She reports pain on either side of her neck today.     She was compliant with home exercise program.  Response to previous treatment: no adverse effects  Functional change: able to check blind spot, still having pain with lying supine    Pain: 3-4/10 - at worst  Location: Bilateral posterior head near occiput, bilateral upper trapezial, levator scapulae, sternocleidomastoid     OBJECTIVE     Objective Measures updated at progress report unless specified.        TREATMENT        CPT Intervention Performed  Today Duration / Intensity      TE  UPPER BODY ERGOMETER  x 3 minutes forward, 3 minutes backwards     Pulleys - 2 minutes flexion & abduction bilateral with cues to perform slowly     Supine cervical Rotation - 10x each      Open books  - 12x b     FOTO/Re-assess  10 min          NMR  Standing Chin Tucks - 3 x 10 towel roll today    Quadruped Chin Tucks x 3 x 10    Quadruped cervical rotation x 10x each side       Standing Scapular Retractions x 3 x 10 blue band      Standing Shoulder Extensions x 3 x 10 blue band      Supine Cervical SNAG with moist heat pack x 20x each side x 15 second holds      Seated Cervical ACTIVE RANGE OF MOTION  - 2 minutes each direction    Series 6 on [employer]- Pectoral stretch   Swimmers   [employer]  [employer]  Shoulder rolls BW x  x  x  x  x 3 min  1 min   1 min  1 min  1 min    Prone latissimus dorsi  x 10x/ 3 sets 1lbs    Prone T's  x 10x/ 3 sets 1lbs         TA          MT    Supine SOFT TISSUE MOBILIZATION to levator scapulae bilaterally and middle/upper trapezius, subscapularis    Supine cervical side glides    Seated SOFT TISSUE MOBILIZATION to bilateral upper trapezial x              x      5 min   PLAN  open books, supine chin tuck, continued scapular strengthening           CPT Codes available for Billing:   (05) minutes of Manual therapy (MT) to improve pain and ROM.  (06) minutes of Therapeutic Exercise (TE) to develop strength, endurance, range of motion, and flexibility.  (38) minutes of Neuromuscular Re-Education (NMR)  to improve: Balance, Coordination, Kinesthetic, Sense, Proprioception, and Posture.  (00) minutes of Therapeutic Activities (TA) to improve functional performance.  Unattended Electrical Stimulation (ES) for muscle performance or pain modulation.  BFR: Blood flow restriction applied during exercise  NP or (-): Not Performed    PATIENT EDUCATION AND HOME EXERCISES     Home Exercises Provided and Patient Education Provided     Education provided:   PURPOSE: Patient educated on the impairments noted above and the effects of physical therapy intervention to improve overall condition and QOL.   EXERCISE: Patient was educated on all the above exercise prior/during/after for proper posture, positioning, and execution for safe performance with home  exercise program.   STRENGTH: Patient educated on the importance of improved core and extremity strength in order to improve alignment of the spine and extremities with static positions and dynamic movement.   SLEEPING POSITIONS: Patient educated on the use of pillows to aid in neutral alignment of spine and extremities when sleeping in supine or side lying.    Written Home Exercises Provided: Patient instructed to cont prior HEP. Exercises were reviewed and Hiwot was able to demonstrate them prior to the end of the session.  Hiwot demonstrated good  understanding of the education provided. See EMR under Patient Instructions for exercises provided during therapy sessions      ASSESSMENT     Patient tolerated all interventions well but does require cues for activities for correction of technique. She will be traveling and was encouraged to continue with home exercise program and heat as needed.     Hiwot Is progressing well towards her goals.   Patient prognosis is Good.     Patient will continue to benefit from skilled outpatient physical therapy to address the deficits listed in the problem list box on initial evaluation, provide patient/family education and to maximize patient's level of independence in the home and community environment.     Patient's spiritual, cultural and educational needs considered and patient agreeable to plan of care and goals.     Anticipated barriers to physical therapy: chronicity of condition and occupation     Goals:   Reviewed: 2/12/2025      Short Term Goals:  4 weeks Status  Date    PAIN: Patient will report worst pain of 5/10 in order to progress toward max functional ability and improve quality of life. [] Progressing  [x] Met  [] Not Met 01/27/2025   FUNCTION: Patient will demonstrate improved function as indicated by a functional intake score of more than or equal to 59 out of 100 on FOTO. [x] Progressing  [] Met  [] Not Met 01/27/2025   MOBILITY: Patient will improve Range  of Motion to 50% of stated goals, listed in objective measures above, in order to progress towards independence with functional activities.  [x] Progressing  [] Met  [] Not Met 01/27/2025   STRENGTH: Patient will improve strength to 50% of stated goals, listed in objective measures above, in order to progress towards independence with functional activities.  [] Progressing  [x] Met  [] Not Met  1/6/2025      Long Term Goals:  8 weeks Status Date    PAIN: Patient will report worst pain of 3/10 in order to progress toward max functional ability and improve quality of life [x] Progressing  [] Met  [] Not Met 01/27/2025    FUNCTION: Patient will demonstrate improved function as indicated by a functional intake score of more than or equal to 68 out of 100 on FOTO. [x] Progressing  [] Met  [] Not Met 01/27/2025   MOBILITY: Patient will improve Range of Motion to stated goals, listed in objective measures above, in order to return to maximal functional potential and improve quality of life. [x] Progressing  [] Met  [] Not Met 01/27/2025   STRENGTH: Patient will improve strength to stated goals, listed in objective measures above, in order to improve functional independence and quality of life. [x] Progressing  [] Met  [] Not Met 01/27/2025   Patient will return to normal ADL's, IADL's, community involvement, recreational activities, and work-related activities with less than or equal to 3/10 pain and maximal function.  [x] Progressing  [] Met  [] Not Met 01/27/2025        PLAN     Plan of Care Certification: 01/27/2025 to 03/24/2025.     Outpatient Physical Therapy 2 times weekly for 8 weeks to include any combination of the following interventions: virtual visits, dry needling, modalities, electrical stimulation (IFC, Pre-Mod, Attended with Functional Dry Needling), Cervical/Lumbar Traction, Gait Training, Manual Therapy, Moist Heat/ Ice, Neuromuscular Re-ed, Patient Education, Self Care, Therapeutic Exercise, and  Therapeutic Activites     Adrienne Chung, PT

## 2025-02-20 ENCOUNTER — TELEPHONE (OUTPATIENT)
Dept: INTERNAL MEDICINE | Facility: CLINIC | Age: 80
End: 2025-02-20
Payer: MEDICARE

## 2025-02-20 NOTE — TELEPHONE ENCOUNTER
----- Message from Julian sent at 2/20/2025  3:49 PM CST -----  Contact: JIE SCHUSTER [[id number]]  .Type:  Patient Returning CallWho Called:JIE SCHUSTER [[id number]]Who Left Message for Patient:Olivia Martinez LPNDoes the patient know what this is regarding?:pt was returning missed call Would the patient rather a call back or a response via [facility]? [facility] Call Back Number:[phone] Additional Information: pt asked for a prescription number that was faxed over to the pharmacy to be able to track pt states its supposed to be 5mg

## 2025-02-20 NOTE — TELEPHONE ENCOUNTER
----- Message from Elizabeth sent at 2/19/2025 12:40 PM CST -----  Contact: [phone] .[email or web address] Patient is calling for Medical Advice regarding:[facility] pharmacy How long has patient had these symptoms:Pharmacy name and phone#:Patient wants a call back or thru [facility]:call back Comments:Speak to the office she states the prescription that was written for the pt was written wrong Zepbound she states it needs to be the vials Please advise patient replies from provider may take up to 48 hours.

## 2025-02-20 NOTE — TELEPHONE ENCOUNTER
Spoke with patient. She said the pharmacy said it was the wrong RX it is suppose to be vials not auto injectors. The RX that was faxed said vials. I refaxed it again today. Pt stated she will contact them tomorrow.

## 2025-02-21 ENCOUNTER — PATIENT MESSAGE (OUTPATIENT)
Dept: INTERNAL MEDICINE | Facility: CLINIC | Age: 80
End: 2025-02-21
Payer: MEDICARE

## 2025-02-21 DIAGNOSIS — Z00.00 ENCOUNTER FOR MEDICARE ANNUAL WELLNESS EXAM: ICD-10-CM

## 2025-02-24 ENCOUNTER — OFFICE VISIT (OUTPATIENT)
Dept: INTERNAL MEDICINE | Facility: CLINIC | Age: 80
End: 2025-02-24
Payer: MEDICARE

## 2025-02-24 VITALS
SYSTOLIC BLOOD PRESSURE: 122 MMHG | WEIGHT: 157.44 LBS | HEART RATE: 86 BPM | TEMPERATURE: 96 F | OXYGEN SATURATION: 97 % | BODY MASS INDEX: 29.72 KG/M2 | DIASTOLIC BLOOD PRESSURE: 80 MMHG | RESPIRATION RATE: 16 BRPM | HEIGHT: 61 IN

## 2025-02-24 DIAGNOSIS — M54.6 ACUTE MIDLINE THORACIC BACK PAIN: Primary | ICD-10-CM

## 2025-02-24 DIAGNOSIS — J30.89 NON-SEASONAL ALLERGIC RHINITIS DUE TO OTHER ALLERGIC TRIGGER: ICD-10-CM

## 2025-02-24 PROCEDURE — 99999 PR PBB SHADOW E&M-EST. PATIENT-LVL IV: CPT | Mod: PBBFAC,HCNC,, | Performed by: PEDIATRICS

## 2025-02-24 PROCEDURE — 3074F SYST BP LT 130 MM HG: CPT | Mod: HCNC,CPTII,S$GLB, | Performed by: PEDIATRICS

## 2025-02-24 PROCEDURE — 1159F MED LIST DOCD IN RCRD: CPT | Mod: HCNC,CPTII,S$GLB, | Performed by: PEDIATRICS

## 2025-02-24 PROCEDURE — 1101F PT FALLS ASSESS-DOCD LE1/YR: CPT | Mod: HCNC,CPTII,S$GLB, | Performed by: PEDIATRICS

## 2025-02-24 PROCEDURE — 3079F DIAST BP 80-89 MM HG: CPT | Mod: HCNC,CPTII,S$GLB, | Performed by: PEDIATRICS

## 2025-02-24 PROCEDURE — 99213 OFFICE O/P EST LOW 20 MIN: CPT | Mod: HCNC,S$GLB,, | Performed by: PEDIATRICS

## 2025-02-24 PROCEDURE — 3288F FALL RISK ASSESSMENT DOCD: CPT | Mod: HCNC,CPTII,S$GLB, | Performed by: PEDIATRICS

## 2025-02-24 PROCEDURE — 1160F RVW MEDS BY RX/DR IN RCRD: CPT | Mod: HCNC,CPTII,S$GLB, | Performed by: PEDIATRICS

## 2025-02-24 PROCEDURE — 1125F AMNT PAIN NOTED PAIN PRSNT: CPT | Mod: HCNC,CPTII,S$GLB, | Performed by: PEDIATRICS

## 2025-02-24 RX ORDER — TRAMADOL HYDROCHLORIDE 50 MG/1
50 TABLET ORAL EVERY 6 HOURS PRN
Qty: 12 TABLET | Refills: 0 | Status: SHIPPED | OUTPATIENT
Start: 2025-02-24

## 2025-02-24 RX ORDER — FLUTICASONE PROPIONATE 50 MCG
2 SPRAY, SUSPENSION (ML) NASAL DAILY
Qty: 54 ML | Refills: 3 | Status: SHIPPED | OUTPATIENT
Start: 2025-02-24

## 2025-02-24 RX ORDER — METHYLPREDNISOLONE 4 MG/1
TABLET ORAL
Qty: 1 EACH | Refills: 0 | Status: SHIPPED | OUTPATIENT
Start: 2025-02-24

## 2025-02-24 RX ORDER — TIZANIDINE 4 MG/1
4 TABLET ORAL EVERY 8 HOURS PRN
Qty: 30 TABLET | Refills: 0 | Status: SHIPPED | OUTPATIENT
Start: 2025-02-24 | End: 2025-03-06

## 2025-02-24 NOTE — PROGRESS NOTES
Patient ID: Hiwot Coulter is a 79 y.o. female.    Chief Complaint: Back Pain    History of Present Illness    CHIEF COMPLAINT:  Patient presents today for back pain that flared up recently.    BACK PAIN AND ASSOCIATED SYMPTOMS:  She reports acute pain between her shoulder blades with radiation to her arms and associated finger numbness. She has not slept since Friday due to pain. She denies chest pain. She is currently taking ibuprofen 3 times daily and Tylenol for pain management. She has three remaining physical therapy sessions scheduled for neck treatment.    PAST MEDICAL HISTORY:  She has a history of compression fracture in her back 20 years ago after falling while walking on her hands. Her last flare-up was 20 years ago, which lasted 3 months with severe pain and resolved with therapy.    PMH, PSH, SH, FH reviewed with patient.      ROS:  General: -fever, -chills, -fatigue, -weight gain, -weight loss  Eyes: -vision changes, -redness, -discharge  ENT: -ear pain, -nasal congestion, -sore throat  Cardiovascular: -chest pain, -palpitations, -lower extremity edema  Respiratory: -cough, -shortness of breath  Gastrointestinal: -abdominal pain, -nausea, -vomiting, -diarrhea, -constipation, -blood in stool  Genitourinary: -dysuria, -hematuria, -frequency  Musculoskeletal: -joint pain, +muscle pain, +back pain  Skin: -rash, -lesion  Neurological: -headache, -dizziness, +numbness, -tingling  Psychiatric: -anxiety, -depression, -sleep difficulty         Exam:  Physical Exam    General: No acute distress. Well-developed. Well-nourished.  Eyes: EOMI. Sclerae anicteric.  HENT: Normocephalic. Atraumatic. Nares patent. Moist oral mucosa.  Cardiovascular: Regular rate. Regular rhythm. No murmurs. No rubs. No gallops. Normal S1, S2.  Respiratory: Normal respiratory effort. Clear to auscultation bilaterally. No rales. No rhonchi. No wheezing.  Abdomen: Soft. Non-tender. Non-distended. Normoactive bowel  sounds.  Musculoskeletal: No  obvious deformity.  Extremities: No lower extremity edema.  Neurological: Alert & oriented x3. No slurred speech. Normal gait.  Psychiatric: Normal mood. Normal affect. Good insight. Good judgment.  Skin: Warm. Dry. No rash.  Back: Tenderness on the left and right side of T6 region. Paravertebral muscle spasms at T6 region. No tenderness over the spinous processes.         Assessment/Plan:  Acute midline thoracic back pain  -     tiZANidine (ZANAFLEX) 4 MG tablet; Take 1 tablet (4 mg total) by mouth every 8 (eight) hours as needed.  Dispense: 30 tablet; Refill: 0  -     traMADoL (ULTRAM) 50 mg tablet; Take 1 tablet (50 mg total) by mouth every 6 (six) hours as needed for Pain.  Dispense: 12 tablet; Refill: 0    Non-seasonal allergic rhinitis due to other allergic trigger  -     fluticasone propionate (FLONASE) 50 mcg/actuation nasal spray; 2 sprays (100 mcg total) by Each Nostril route once daily.  Dispense: 54 mL; Refill: 3    Other orders  -     methylPREDNISolone (MEDROL DOSEPACK) 4 mg tablet; use as directed  Dispense: 1 each; Refill: 0         Assessment & Plan    IMPRESSION:  - Assessed back pain, likely related to previous thoracic compression fracture from 20 years ago  - Noted pain localized to thoracic region between shoulder blades, with associated arm pain and finger numbness  - Will initiate muscle relaxant (tizanidine) and prescribe Tramadol for breakthrough pain  - Opted against immediate x-rays due to recent imaging (1 year ago)  - Prepared to refer to spine specialists (Dr. Ignacio's group) if symptoms persist    THORACIC BACK PAIN:  - Evaluated the patient's condition, noting paravertebral muscle spasms in the T6 region without tenderness over spinous processes.  - Decided not to order x-rays at this time, referencing previous x-rays from about 1 year ago.  - Prescribed tizanidine as a muscle relaxant, to be taken up to 3 times daily.  - Prescribed Tramadol (12 tablets)  for breakthrough pain management.  - Continued ibuprofen 3 times daily as previously prescribed.  - Continued Tylenol as needed, not exceeding package recommendations.  - Considered prescribing steroids for inflammation.  - Discussed extending physical therapy for the back with the patient.  - Patient reports a pain flare-up in the thoracic region between shoulder blades since Friday, with severe pain (rated 9/10) and inability to sleep.  - Noted paravertebral muscle spasms in the T6 region during physical exam.  - Patient reports a history of back fracture from 20 years ago.    ARM PAIN AND FINGER NUMBNESS:  - Patient reports arm pain and finger numbness.  - Scheduled the patient for physical therapy for the neck.  - Patient reports finger numbness.    MEDICATIONS/SUPPLEMENTS:  - Educated the patient on potential sedative effects of tizanidine, particularly with the first dose.  - Patient to avoid driving for 2-3 hours after taking first dose of tizanidine.  - Explained rationale for avoiding simultaneous use of ibuprofen and meloxicam.  - Refilled medication with 3-month supply.    FOLLOW UP:  - Instructed the patient to contact the office if symptoms do not improve by Friday.  - Advised the patient to message if still experiencing significant pain in 2 weeks.  - Scheduled a follow-up visit in 2 weeks for symptom progression and potential referral to spine specialists if needed.          Visit today included increased complexity associated with the care of the episodic problem  addressed and managing the longitudinal care of the patient due to the serious and/or complex managed problem(s) .      No follow-ups on file.    This note was generated with the assistance of ambient listening technology. Verbal consent was obtained by the patient and accompanying visitor(s) for the recording of patient appointment to facilitate this note. I attest to having reviewed and edited the generated note for accuracy, though some  syntax or spelling errors may persist. Please contact the author of this note for any clarification.

## 2025-02-26 ENCOUNTER — CLINICAL SUPPORT (OUTPATIENT)
Dept: REHABILITATION | Facility: HOSPITAL | Age: 80
End: 2025-02-26
Payer: MEDICARE

## 2025-02-26 DIAGNOSIS — R29.898 DECREASED STRENGTH OF UPPER EXTREMITY: ICD-10-CM

## 2025-02-26 DIAGNOSIS — Z74.09 DECREASED FUNCTIONAL MOBILITY AND ENDURANCE: ICD-10-CM

## 2025-02-26 DIAGNOSIS — M25.619 DECREASED RANGE OF MOTION OF SHOULDER, UNSPECIFIED LATERALITY: ICD-10-CM

## 2025-02-26 DIAGNOSIS — R29.898 DECREASED RANGE OF MOTION OF NECK: Primary | ICD-10-CM

## 2025-02-26 PROCEDURE — 97140 MANUAL THERAPY 1/> REGIONS: CPT | Mod: HCNC

## 2025-02-26 PROCEDURE — 97110 THERAPEUTIC EXERCISES: CPT | Mod: HCNC

## 2025-02-27 NOTE — PROGRESS NOTES
[facility]   Physical Therapy Treatment Note + Progress Report     Name: Hiwot Coulter  Clinic Number: [id number]    Therapy Diagnosis:   Encounter Diagnoses   Name Primary?    Decreased range of motion of neck Yes    Decreased range of motion of shoulder, unspecified laterality     Decreased strength of upper extremity     Decreased functional mobility and endurance          Physician: Kumar Ramirez MD    Visit Date: 2/26/2025    Physician Orders: Physical Therapy Evaluation and Treat  Medical Diagnosis from Referral: M50.30 (ICD-10-CM) - DDD (degenerative disc disease), cervical   Evaluation Date: 11/27/2024  Authorization Period Expiration: 12/31/2024  Plan of Care Expiration: 03/24/2025 (updated)  Progress Note Due: 03/24/2025  Visit # / Visits authorized: 9/24 (+1 Evaluation + 4 treatment)    FOTO: 3/3      Precautions: Standard    Time In: 4:10 PM  Time Out: 4:50 PM  Total Billable Time: 30 minutes  (Billing reflects 1 on 1 treatment time spent with patient)    SUBJECTIVE     Patient reports: while she was out of town she began to experience severe bilateral upper back pain that kept her from sleeping for 4 nights. Patient states she eventually went to her doctor who was able to prescribed her ,muscle relaxer's and steroids. Patient states she was finally able to get some rest last night but is still coming into today with pain.     She was compliant with home exercise program.  Response to previous treatment: no adverse effects  Functional change: able to check blind spot, still having pain with lying supine    Pain: 9/10 - at worst  Location: Bilateral posterior head near occiput, bilateral upper trapezial, levator scapulae, sternocleidomastoid     OBJECTIVE     Objective Measures updated at progress report unless specified.     *Denotes change since initial evaluation, re-tested on 02/26/2025     RANGE OF MOTION:       Shoulder AROM/PROM Right Left Pain/Dysfunction with  Movement Goal   Shoulder Flexion (180) WITHIN FUNCTIONAL LIMITS  WITHIN FUNCTIONAL LIMITS        Shoulder Abduction (180) 170* 170*   170 bilateral      STRENGTH:     U/E MMT Right Left Pain/Dysfunction with Movement Goal   Shoulder Flexion 5/5* 5/5*   4+/5 Bilateral   Shoulder Abduction 5/5* 4+/5*   4+/5 Bilateral           TREATMENT        CPT Intervention Performed  Today Duration / Intensity      TE  UPPER BODY ERGOMETER  x 3 minutes forward, 3 minutes backwards     Pulleys - 2 minutes flexion & abduction bilateral with cues to perform slowly     Supine cervical Rotation - 10x each      Open books  - 12x b     Re-assess x 10 min         NMR  Standing Chin Tucks - 3 x 10 towel roll today    Quadruped Chin Tucks  3 x 10    Quadruped cervical rotation  10x each side       Standing Scapular Retractions  3 x 10 blue band      Standing Shoulder Extensions  3 x 10 blue band      Supine Cervical SNAG with moist heat pack  20x each side x 15 second holds      Seated Cervical ACTIVE RANGE OF MOTION  - 2 minutes each direction    Series 6 on AIRE[employer]- Pectoral stretch   Swimmers   HUGs  [place]  Shoulder rolls BW x  x  x  x  x 3 min  2 min   2 min  2 min  2 min    Prone latissimus dorsi   10x/ 3 sets 1lbs    Prone T's   10x/ 3 sets 1lbs         TA          MT    Supine scapular releases with 1st rib grade III-IV bilateral mobilizations with breathing    Prone scapular mobilizations and grade I-II thoracic PA's for pain control x        x      13 min   PLAN  open books, supine chin tuck, continued scapular strengthening           CPT Codes available for Billing:   (13) minutes of Manual therapy (MT) to improve pain and ROM.  (16) minutes of Therapeutic Exercise (TE) to develop strength, endurance, range of motion, and flexibility.  (11) minutes of Neuromuscular Re-Education (NMR)  to improve: Balance, Coordination, Kinesthetic, Sense, Proprioception, and Posture.  (00) minutes of Therapeutic Activities (TA) to improve  functional performance.  Unattended Electrical Stimulation (ES) for muscle performance or pain modulation.  BFR: Blood flow restriction applied during exercise  NP or (-): Not Performed    PATIENT EDUCATION AND HOME EXERCISES     Home Exercises Provided and Patient Education Provided     Education provided:   PURPOSE: Patient educated on the impairments noted above and the effects of physical therapy intervention to improve overall condition and QOL.   EXERCISE: Patient was educated on all the above exercise prior/during/after for proper posture, positioning, and execution for safe performance with home exercise program.   STRENGTH: Patient educated on the importance of improved core and extremity strength in order to improve alignment of the spine and extremities with static positions and dynamic movement.   SLEEPING POSITIONS: Patient educated on the use of pillows to aid in neutral alignment of spine and extremities when sleeping in supine or side lying.    Written Home Exercises Provided: Patient instructed to cont prior HEP. Exercises were reviewed and Hiwot was able to demonstrate them prior to the end of the session.  Hiwot demonstrated good  understanding of the education provided. See EMR under Patient Instructions for exercises provided during therapy sessions      ASSESSMENT   Despite patient's new onset pain, patient demonstrate moderate improvements in bilateral shoulder abduction RANGE OF MOTION, but no changes in strength noted since last progress report most probably due to new recent pain. Most of session today was spent performing manual therapy to decrease pain as patient noted to have decreased bilateral scapular mobility. Ended session with series 6 which patient tolerated well. Because of continued objective improvements, patient would continue to benefit from skilled therapy to continue addressing her remaining deficits and also address new symptoms.    Hiwot Is progressing well towards her  goals.   Patient prognosis is Good.     Patient will continue to benefit from skilled outpatient physical therapy to address the deficits listed in the problem list box on initial evaluation, provide patient/family education and to maximize patient's level of independence in the home and community environment.     Patient's spiritual, cultural and educational needs considered and patient agreeable to plan of care and goals.     Anticipated barriers to physical therapy: chronicity of condition and occupation     Goals:   Reviewed: 2/26/2025      Short Term Goals:  4 weeks Status  Date    PAIN: Patient will report worst pain of 5/10 in order to progress toward max functional ability and improve quality of life. [] Progressing  [x] Met  [] Not Met 01/27/2025   FUNCTION: Patient will demonstrate improved function as indicated by a functional intake score of more than or equal to 59 out of 100 on FOTO. [x] Progressing  [] Met  [] Not Met 02/26/2025   MOBILITY: Patient will improve Range of Motion to 50% of stated goals, listed in objective measures above, in order to progress towards independence with functional activities.  [x] Progressing  [] Met  [] Not Met 02/26/2025   STRENGTH: Patient will improve strength to 50% of stated goals, listed in objective measures above, in order to progress towards independence with functional activities.  [] Progressing  [x] Met  [] Not Met  1/6/2025      Long Term Goals:  8 weeks Status Date    PAIN: Patient will report worst pain of 3/10 in order to progress toward max functional ability and improve quality of life [x] Progressing  [] Met  [] Not Met 02/26/2025    FUNCTION: Patient will demonstrate improved function as indicated by a functional intake score of more than or equal to 68 out of 100 on FOTO. [x] Progressing  [] Met  [] Not Met 02/26/2025   MOBILITY: Patient will improve Range of Motion to stated goals, listed in objective measures above, in order to return to maximal  functional potential and improve quality of life. [x] Progressing  [] Met  [] Not Met 02/26/2025   STRENGTH: Patient will improve strength to stated goals, listed in objective measures above, in order to improve functional independence and quality of life. [x] Progressing  [] Met  [] Not Met 02/26/2025   Patient will return to normal ADL's, IADL's, community involvement, recreational activities, and work-related activities with less than or equal to 3/10 pain and maximal function.  [x] Progressing  [] Met  [] Not Met 02/26/2025        PLAN     Plan of Care Certification: 01/27/2025 to 03/24/2025.     Outpatient Physical Therapy 2 times weekly for 8 weeks to include any combination of the following interventions: virtual visits, dry needling, modalities, electrical stimulation (IFC, Pre-Mod, Attended with Functional Dry Needling), Cervical/Lumbar Traction, Gait Training, Manual Therapy, Moist Heat/ Ice, Neuromuscular Re-ed, Patient Education, Self Care, Therapeutic Exercise, and Therapeutic Activites     Tresa Rick, PT

## 2025-02-28 ENCOUNTER — PATIENT MESSAGE (OUTPATIENT)
Dept: INTERNAL MEDICINE | Facility: CLINIC | Age: 80
End: 2025-02-28
Payer: MEDICARE

## 2025-02-28 ENCOUNTER — TELEPHONE (OUTPATIENT)
Dept: PAIN MEDICINE | Facility: CLINIC | Age: 80
End: 2025-02-28
Payer: MEDICARE

## 2025-02-28 DIAGNOSIS — M47.9 OSTEOARTHRITIS OF LOWER BACK: ICD-10-CM

## 2025-02-28 DIAGNOSIS — M54.50 LUMBAR BACK PAIN: Primary | ICD-10-CM

## 2025-02-28 DIAGNOSIS — M54.6 ACUTE MIDLINE THORACIC BACK PAIN: ICD-10-CM

## 2025-02-28 NOTE — TELEPHONE ENCOUNTER
Patient with B&S referral. Patient states she needs to be seen next week and voiced concerns of running out of pain meds. I read her an unread message from Dr. Ramirez that she can call her pharmacy if she needs a refill of meds. She was under the impression that she would get an ARSH day of appointment and was very disappointed at wait time of next available appointment. I explained that an ARSH is a procedure and must be scheduled after initial consultation. Advised patient that I would send Dr. Ramirez's office a message and see how he would like to proceed. Message sent and pt v/u.  RD

## 2025-02-28 NOTE — TELEPHONE ENCOUNTER
No care due was identified.  [facility] Embedded Care Due Messages. Reference number: [id number].   2/28/2025 4:52:54 PM CST

## 2025-03-03 ENCOUNTER — CLINICAL SUPPORT (OUTPATIENT)
Dept: REHABILITATION | Facility: HOSPITAL | Age: 80
End: 2025-03-03
Payer: MEDICARE

## 2025-03-03 ENCOUNTER — TELEPHONE (OUTPATIENT)
Dept: INTERNAL MEDICINE | Facility: CLINIC | Age: 80
End: 2025-03-03
Payer: MEDICARE

## 2025-03-03 DIAGNOSIS — Z74.09 DECREASED FUNCTIONAL MOBILITY AND ENDURANCE: ICD-10-CM

## 2025-03-03 DIAGNOSIS — M54.40 BACK PAIN OF LUMBAR REGION WITH SCIATICA: Primary | ICD-10-CM

## 2025-03-03 DIAGNOSIS — R29.898 DECREASED RANGE OF MOTION OF NECK: Primary | ICD-10-CM

## 2025-03-03 DIAGNOSIS — M25.619 DECREASED RANGE OF MOTION OF SHOULDER, UNSPECIFIED LATERALITY: ICD-10-CM

## 2025-03-03 DIAGNOSIS — R29.898 DECREASED STRENGTH OF UPPER EXTREMITY: ICD-10-CM

## 2025-03-03 PROCEDURE — 97140 MANUAL THERAPY 1/> REGIONS: CPT | Mod: HCNC

## 2025-03-03 RX ORDER — TRAMADOL HYDROCHLORIDE 50 MG/1
50 TABLET ORAL EVERY 6 HOURS PRN
Qty: 12 TABLET | Refills: 0 | Status: SHIPPED | OUTPATIENT
Start: 2025-03-03 | End: 2025-03-03 | Stop reason: SDUPTHER

## 2025-03-03 RX ORDER — TIZANIDINE 4 MG/1
4 TABLET ORAL EVERY 8 HOURS PRN
Qty: 30 TABLET | Refills: 0 | Status: SHIPPED | OUTPATIENT
Start: 2025-03-03 | End: 2025-03-03 | Stop reason: SDUPTHER

## 2025-03-03 NOTE — TELEPHONE ENCOUNTER
----- Message from Nurse Baker sent at 2/28/2025  4:49 PM CST -----  Regarding: FW: Sooner Appt    ----- Message -----  From: Chyna Alonso RN  Sent: 2/28/2025   4:47 PM CST  To: [facility] Jr Staff  Subject: Sooner Appt                                      Good afternoon,I spoke with Ms. Coulter for scheduling of Back and Spine referral. She states she needs to see someone Monday. We unfortunately are booked out until May. I know the docs may be able to get her in a little sooner, but I think she was under the impression that she would get an injection day of appt. I explained that if she was a candidate for an ARSH, it would have to be scheduled, and she was very disappointed. I did read her the message that Dr. Ramirez sent her in [facility], as she had not read it, and she was very concerned about running out of meds. I wanted to know if Dr. Ramirez wants me to ask for an override, or if he wants to place an external referral.Thanks,Chyna Alonso, RNRN [facility] - [place]

## 2025-03-03 NOTE — TELEPHONE ENCOUNTER
See if she can be over rided, but external referral placed so she can see if she can get quicker in.

## 2025-03-03 NOTE — PROGRESS NOTES
[facility] OUTPATIENT THERAPY AND WELLNESS   Physical Therapy Treatment Note      Name: Hiwot Coulter  [facility] Number: [id number]    Therapy Diagnosis:   Encounter Diagnoses   Name Primary?    Decreased range of motion of neck Yes    Decreased range of motion of shoulder, unspecified laterality     Decreased strength of upper extremity     Decreased functional mobility and endurance        Physician: Kumar Ramirez MD    Visit Date: 3/3/2025    Physician Orders: Physical Therapy Evaluation and Treat  Medical Diagnosis from Referral: M50.30 (ICD-10-CM) - DDD (degenerative disc disease), cervical   Evaluation Date: 11/27/2024  Authorization Period Expiration: 12/31/2024  Plan of Care Expiration: 03/24/2025 (updated)  Progress Note Due: 03/24/2025  Visit # / Visits authorized: 10/24 (+1 Evaluation + 4 treatment)    FOTO: 3/3      Precautions: Standard    Time In: 3:55 PM  Time Out: 4:30 PM  Total Billable Time: 09 minutes  (Billing reflects 1 on 1 treatment time spent with patient)    SUBJECTIVE     Patient reports: she was able to get some rest over the weekend but states she has not been doing anything and is still on a lot of pain medication. Patient is requesting manual therapy and series 6 stretching only due to her pain and fatigue.    She was compliant with home exercise program.  Response to previous treatment: no adverse effects  Functional change: able to check blind spot, still having pain with lying supine    Pain: 4/10   Location: Bilateral posterior head near occiput, bilateral upper trapezial, levator scapulae, sternocleidomastoid     OBJECTIVE     Objective Measures updated at progress report unless specified.        TREATMENT        CPT Intervention Performed  Today Duration / Intensity      TE  UPPER BODY ERGOMETER  x 3 minutes forward, 3 minutes backwards     Pulleys - 2 minutes flexion & abduction bilateral with cues to perform slowly     Supine cervical Rotation - 10x each      Open books  -  12x b     Re-assess - 10 min         NMR  Standing Chin Tucks - 3 x 10 towel roll today    Quadruped Chin Tucks  3 x 10    Quadruped cervical rotation  10x each side       Standing Scapular Retractions  3 x 10 blue band      Standing Shoulder Extensions  3 x 10 blue band      Supine Cervical SNAG with moist heat pack  20x each side x 15 second holds      Seated Cervical ACTIVE RANGE OF MOTION  - 2 minutes each direction    Series 6 on [employer]- Pectoral stretch   Swimmers   [place]  [place]  Punches  Shoulder rolls BW x  x  x  x  X  x 3 min  3 min   3 min  3 min  3 min  3 min    Prone latissimus dorsi   10x/ 3 sets 1lbs    Prone T's   10x/ 3 sets 1lbs         TA          MT    Supine scapular releases with 1st rib grade III-IV bilateral mobilizations with breathing    Prone scapular mobilizations and grade I-II thoracic PA's for pain control    Seated bilateral upper trapezial SOFT TISSUE MOBILIZATION  -        -        x    9 min   PLAN  open books, supine chin tuck, continued scapular strengthening           CPT Codes available for Billing:   (09) minutes of Manual therapy (MT) to improve pain and ROM.  (06) minutes of Therapeutic Exercise (TE) to develop strength, endurance, range of motion, and flexibility.  (18) minutes of Neuromuscular Re-Education (NMR)  to improve: Balance, Coordination, Kinesthetic, Sense, Proprioception, and Posture.  (00) minutes of Therapeutic Activities (TA) to improve functional performance.  Unattended Electrical Stimulation (ES) for muscle performance or pain modulation.  BFR: Blood flow restriction applied during exercise  NP or (-): Not Performed    PATIENT EDUCATION AND HOME EXERCISES     Home Exercises Provided and Patient Education Provided     Education provided:   PURPOSE: Patient educated on the impairments noted above and the effects of physical therapy intervention to improve overall condition and QOL.   EXERCISE: Patient was educated on all the above exercise prior/during/after  for proper posture, positioning, and execution for safe performance with home exercise program.   STRENGTH: Patient educated on the importance of improved core and extremity strength in order to improve alignment of the spine and extremities with static positions and dynamic movement.   SLEEPING POSITIONS: Patient educated on the use of pillows to aid in neutral alignment of spine and extremities when sleeping in supine or side lying.    Written Home Exercises Provided: Patient instructed to cont prior HEP. Exercises were reviewed and Hiwot was able to demonstrate them prior to the end of the session.  Hiwot demonstrated good  understanding of the education provided. See EMR under Patient Instructions for exercises provided during therapy sessions      ASSESSMENT   Patient tolerated today's session well but per patient request only stretches and manual therapy performed today. Patient left session without reports of increased pain. Plan to resume to strengthening activities as tolerable.     Hiwot Is progressing well towards her goals.   Patient prognosis is Good.     Patient will continue to benefit from skilled outpatient physical therapy to address the deficits listed in the problem list box on initial evaluation, provide patient/family education and to maximize patient's level of independence in the home and community environment.     Patient's spiritual, cultural and educational needs considered and patient agreeable to plan of care and goals.     Anticipated barriers to physical therapy: chronicity of condition and occupation     Goals:   Reviewed: 3/3/2025      Short Term Goals:  4 weeks Status  Date    PAIN: Patient will report worst pain of 5/10 in order to progress toward max functional ability and improve quality of life. [] Progressing  [x] Met  [] Not Met 01/27/2025   FUNCTION: Patient will demonstrate improved function as indicated by a functional intake score of more than or equal to 59 out of 100  on FOTO. [x] Progressing  [] Met  [] Not Met 02/26/2025   MOBILITY: Patient will improve Range of Motion to 50% of stated goals, listed in objective measures above, in order to progress towards independence with functional activities.  [x] Progressing  [] Met  [] Not Met 02/26/2025   STRENGTH: Patient will improve strength to 50% of stated goals, listed in objective measures above, in order to progress towards independence with functional activities.  [] Progressing  [x] Met  [] Not Met  1/6/2025      Long Term Goals:  8 weeks Status Date    PAIN: Patient will report worst pain of 3/10 in order to progress toward max functional ability and improve quality of life [x] Progressing  [] Met  [] Not Met 02/26/2025    FUNCTION: Patient will demonstrate improved function as indicated by a functional intake score of more than or equal to 68 out of 100 on FOTO. [x] Progressing  [] Met  [] Not Met 02/26/2025   MOBILITY: Patient will improve Range of Motion to stated goals, listed in objective measures above, in order to return to maximal functional potential and improve quality of life. [x] Progressing  [] Met  [] Not Met 02/26/2025   STRENGTH: Patient will improve strength to stated goals, listed in objective measures above, in order to improve functional independence and quality of life. [x] Progressing  [] Met  [] Not Met 02/26/2025   Patient will return to normal ADL's, IADL's, community involvement, recreational activities, and work-related activities with less than or equal to 3/10 pain and maximal function.  [x] Progressing  [] Met  [] Not Met 02/26/2025        PLAN     Plan of Care Certification: 01/27/2025 to 03/24/2025.     Outpatient Physical Therapy 2 times weekly for 8 weeks to include any combination of the following interventions: virtual visits, dry needling, modalities, electrical stimulation (IFC, Pre-Mod, Attended with Functional Dry Needling), Cervical/Lumbar Traction, Gait Training, Manual Therapy, Moist  Heat/ Ice, Neuromuscular Re-ed, Patient Education, Self Care, Therapeutic Exercise, and Therapeutic Activites     Tresa Rick, PT

## 2025-03-03 NOTE — TELEPHONE ENCOUNTER
Reached out to patient to schedule appointment from messages. Apt has been made.   Pt understand. All questions answered.     Gael Gtz Medical Assistant

## 2025-03-05 ENCOUNTER — CLINICAL SUPPORT (OUTPATIENT)
Dept: REHABILITATION | Facility: HOSPITAL | Age: 80
End: 2025-03-05
Payer: MEDICARE

## 2025-03-05 DIAGNOSIS — R29.898 DECREASED RANGE OF MOTION OF NECK: Primary | ICD-10-CM

## 2025-03-05 DIAGNOSIS — Z74.09 DECREASED FUNCTIONAL MOBILITY AND ENDURANCE: ICD-10-CM

## 2025-03-05 DIAGNOSIS — M25.619 DECREASED RANGE OF MOTION OF SHOULDER, UNSPECIFIED LATERALITY: ICD-10-CM

## 2025-03-05 DIAGNOSIS — R29.898 DECREASED STRENGTH OF UPPER EXTREMITY: ICD-10-CM

## 2025-03-05 PROCEDURE — 97112 NEUROMUSCULAR REEDUCATION: CPT | Mod: HCNC

## 2025-03-05 PROCEDURE — 97140 MANUAL THERAPY 1/> REGIONS: CPT | Mod: HCNC

## 2025-03-05 NOTE — PROGRESS NOTES
[facility]   Physical Therapy Treatment Note      Name: Hiwot Coulter  [facility] Number: [id number]    Therapy Diagnosis:   Encounter Diagnoses   Name Primary?    Decreased range of motion of neck Yes    Decreased range of motion of shoulder, unspecified laterality     Decreased strength of upper extremity     Decreased functional mobility and endurance        Physician: Kumar Ramirez MD    Visit Date: 3/5/2025    Physician Orders: Physical Therapy Evaluation and Treat  Medical Diagnosis from Referral: M50.30 (ICD-10-CM) - DDD (degenerative disc disease), cervical   Evaluation Date: 11/27/2024  Authorization Period Expiration: 12/31/2024  Plan of Care Expiration: 03/24/2025 (updated)  Progress Note Due: 03/24/2025  Visit # / Visits authorized: 11/24 (+1 Evaluation + 4 treatment)    FOTO: 3/3      Precautions: Standard    Time In: 3:55 PM  Time Out: 4:45 PM  Total Billable Time: 45 minutes  (Billing reflects 1 on 1 treatment time spent with patient)    SUBJECTIVE     Patient reports: she is still taking pain medication which is helping mask her pain symptoms. Patient states she is feeling good enough to perform some scapular strengthening today.    She was compliant with home exercise program.  Response to previous treatment: no adverse effects  Functional change: able to check blind spot, still having pain with lying supine    Pain: 4/10   Location: Bilateral posterior head near occiput, bilateral upper trapezial, levator scapulae, sternocleidomastoid     OBJECTIVE     Objective Measures updated at progress report unless specified.        TREATMENT        CPT Intervention Performed  Today Duration / Intensity      TE  UPPER BODY ERGOMETER  x 3 minutes forward, 3 minutes backwards     Pulleys - 2 minutes flexion & abduction bilateral with cues to perform slowly     Supine cervical Rotation - 10x each      Open books  - 12x b     Re-assess - 10 min         NMR  Standing Chin Tucks -  3 x 10 towel roll today    Quadruped Chin Tucks  3 x 10    Quadruped cervical rotation  10x each side       Standing Scapular Retractions x 3 x 10 blue band (green band today)      Standing Shoulder Extensions x 3 x 10 blue band (green band today)      Supine Cervical SNAG with moist heat pack  20x each side x 15 second holds      Seated Cervical ACTIVE RANGE OF MOTION  - 2 minutes each direction    Series 6 on [employer]- Pectoral stretch   Swimmers   Rafi Calzada  Shoulder rolls BW x  x  x  x  X  x 3 min  3 min   3 min  3 min  3 min  3 min    Prone latissimus dorsi   10x/ 3 sets 1lbs    Prone T's   10x/ 3 sets 1lbs         TA          MT    Supine scapular releases with 1st rib grade III-IV bilateral mobilizations with breathing    Prone scapular mobilizations and grade I-II thoracic PA's for pain control    Seated bilateral upper trapezial SOFT TISSUE MOBILIZATION  -        -        x    15 min   PLAN  open books, supine chin tuck, continued scapular strengthening           CPT Codes available for Billing:   (15) minutes of Manual therapy (MT) to improve pain and ROM.  (06) minutes of Therapeutic Exercise (TE) to develop strength, endurance, range of motion, and flexibility.  (24) minutes of Neuromuscular Re-Education (NMR)  to improve: Balance, Coordination, Kinesthetic, Sense, Proprioception, and Posture.  (00) minutes of Therapeutic Activities (TA) to improve functional performance.  Unattended Electrical Stimulation (ES) for muscle performance or pain modulation.  BFR: Blood flow restriction applied during exercise  NP or (-): Not Performed    PATIENT EDUCATION AND HOME EXERCISES     Home Exercises Provided and Patient Education Provided     Education provided:   PURPOSE: Patient educated on the impairments noted above and the effects of physical therapy intervention to improve overall condition and QOL.   EXERCISE: Patient was educated on all the above exercise prior/during/after for proper posture,  positioning, and execution for safe performance with home exercise program.   STRENGTH: Patient educated on the importance of improved core and extremity strength in order to improve alignment of the spine and extremities with static positions and dynamic movement.   SLEEPING POSITIONS: Patient educated on the use of pillows to aid in neutral alignment of spine and extremities when sleeping in supine or side lying.    Written Home Exercises Provided: Patient instructed to cont prior HEP. Exercises were reviewed and Hiwot was able to demonstrate them prior to the end of the session.  Hiwot demonstrated good  understanding of the education provided. See EMR under Patient Instructions for exercises provided during therapy sessions      ASSESSMENT   Added scapular retractions and shoulder extensions back into today's session which patient tolerated very well. Plan to continue monitoring symptoms and progressing as tolerable.    Hiwot Is progressing well towards her goals.   Patient prognosis is Good.     Patient will continue to benefit from skilled outpatient physical therapy to address the deficits listed in the problem list box on initial evaluation, provide patient/family education and to maximize patient's level of independence in the home and community environment.     Patient's spiritual, cultural and educational needs considered and patient agreeable to plan of care and goals.     Anticipated barriers to physical therapy: chronicity of condition and occupation     Goals:   Reviewed: 3/5/2025      Short Term Goals:  4 weeks Status  Date    PAIN: Patient will report worst pain of 5/10 in order to progress toward max functional ability and improve quality of life. [] Progressing  [x] Met  [] Not Met 01/27/2025   FUNCTION: Patient will demonstrate improved function as indicated by a functional intake score of more than or equal to 59 out of 100 on FOTO. [x] Progressing  [] Met  [] Not Met 02/26/2025   MOBILITY:  Patient will improve Range of Motion to 50% of stated goals, listed in objective measures above, in order to progress towards independence with functional activities.  [x] Progressing  [] Met  [] Not Met 02/26/2025   STRENGTH: Patient will improve strength to 50% of stated goals, listed in objective measures above, in order to progress towards independence with functional activities.  [] Progressing  [x] Met  [] Not Met  1/6/2025      Long Term Goals:  8 weeks Status Date    PAIN: Patient will report worst pain of 3/10 in order to progress toward max functional ability and improve quality of life [x] Progressing  [] Met  [] Not Met 02/26/2025    FUNCTION: Patient will demonstrate improved function as indicated by a functional intake score of more than or equal to 68 out of 100 on FOTO. [x] Progressing  [] Met  [] Not Met 02/26/2025   MOBILITY: Patient will improve Range of Motion to stated goals, listed in objective measures above, in order to return to maximal functional potential and improve quality of life. [x] Progressing  [] Met  [] Not Met 02/26/2025   STRENGTH: Patient will improve strength to stated goals, listed in objective measures above, in order to improve functional independence and quality of life. [x] Progressing  [] Met  [] Not Met 02/26/2025   Patient will return to normal ADL's, IADL's, community involvement, recreational activities, and work-related activities with less than or equal to 3/10 pain and maximal function.  [x] Progressing  [] Met  [] Not Met 02/26/2025        PLAN     Plan of Care Certification: 01/27/2025 to 03/24/2025.     Outpatient Physical Therapy 2 times weekly for 8 weeks to include any combination of the following interventions: virtual visits, dry needling, modalities, electrical stimulation (IFC, Pre-Mod, Attended with Functional Dry Needling), Cervical/Lumbar Traction, Gait Training, Manual Therapy, Moist Heat/ Ice, Neuromuscular Re-ed, Patient Education, Self Care,  Therapeutic Exercise, and Therapeutic Activites     Tresa Rick, PT

## 2025-03-10 ENCOUNTER — CLINICAL SUPPORT (OUTPATIENT)
Dept: REHABILITATION | Facility: HOSPITAL | Age: 80
End: 2025-03-10
Payer: MEDICARE

## 2025-03-10 DIAGNOSIS — Z74.09 DECREASED FUNCTIONAL MOBILITY AND ENDURANCE: ICD-10-CM

## 2025-03-10 DIAGNOSIS — R29.898 DECREASED RANGE OF MOTION OF NECK: Primary | ICD-10-CM

## 2025-03-10 DIAGNOSIS — R29.898 DECREASED STRENGTH OF UPPER EXTREMITY: ICD-10-CM

## 2025-03-10 DIAGNOSIS — M25.619 DECREASED RANGE OF MOTION OF SHOULDER, UNSPECIFIED LATERALITY: ICD-10-CM

## 2025-03-10 PROCEDURE — 97112 NEUROMUSCULAR REEDUCATION: CPT | Mod: HCNC

## 2025-03-10 PROCEDURE — 97140 MANUAL THERAPY 1/> REGIONS: CPT | Mod: HCNC

## 2025-03-10 NOTE — PROGRESS NOTES
[facility]   Physical Therapy Treatment Note      Name: Hiwot Coulter  [facility] Number: [id number]    Therapy Diagnosis:   Encounter Diagnoses   Name Primary?    Decreased range of motion of neck Yes    Decreased range of motion of shoulder, unspecified laterality     Decreased strength of upper extremity     Decreased functional mobility and endurance          Physician: Kumar Ramirez MD    Visit Date: 3/10/2025    Physician Orders: Physical Therapy Evaluation and Treat  Medical Diagnosis from Referral: M50.30 (ICD-10-CM) - DDD (degenerative disc disease), cervical   Evaluation Date: 11/27/2024  Authorization Period Expiration: 12/31/2024  Plan of Care Expiration: 03/24/2025 (updated)  Progress Note Due: 03/24/2025  Visit # / Visits authorized: 12/24 (+1 Evaluation + 4 treatment)    FOTO: 3/3      Precautions: Standard    Time In: 4:05 PM  Time Out: 5:00 PM  Total Billable Time: 45 minutes  (Billing reflects 1 on 1 treatment time spent with patient)    SUBJECTIVE     Patient reports: she is beginning to wean herself off pain medication and muscle relaxers and feels like she is continuing to feel better.    She was compliant with home exercise program.  Response to previous treatment: no adverse effects  Functional change: able to check blind spot, still having pain with lying supine    Pain: 4/10   Location: Bilateral posterior head near occiput, bilateral upper trapezial, levator scapulae, sternocleidomastoid     OBJECTIVE     Objective Measures updated at progress report unless specified.        TREATMENT        CPT Intervention Performed  Today Duration / Intensity      TE  UPPER BODY ERGOMETER  x 3 minutes forward, 3 minutes backwards     Pulleys - 2 minutes flexion & abduction bilateral with cues to perform slowly     Supine cervical Rotation - 10x each      Open books  - 12x b     Re-assess - 10 min         NMR  Standing Chin Tucks - 3 x 10 towel roll today    Quadruped  Chin Tucks  3 x 10    Quadruped cervical rotation  10x each side       Standing Scapular Retractions x 3 x 10 blue band       Standing Shoulder Extensions x 3 x 10 blue band (green band today)      Supine Cervical SNAG with moist heat pack  20x each side x 15 second holds      Seated Cervical ACTIVE RANGE OF MOTION  - 2 minutes each direction    Series 6 on [employer]- Pectoral stretch   Swimmers   Rafi Calzada  Shoulder rolls BW x  x  x  x  X  x 3 min  3 min   3 min  3 min  3 min  3 min    Prone latissimus dorsi   10x/ 3 sets 1lbs    Prone T's   10x/ 3 sets 1lbs         TA          MT    Supine scapular releases with 1st rib grade III-IV bilateral mobilizations with breathing    Prone scapular mobilizations and grade I-II thoracic PA's for pain control    Seated bilateral upper trapezial SOFT TISSUE MOBILIZATION  -        -        x    15 min   PLAN  open books, supine chin tuck, continued scapular strengthening           CPT Codes available for Billing:   (15) minutes of Manual therapy (MT) to improve pain and ROM.  (06) minutes of Therapeutic Exercise (TE) to develop strength, endurance, range of motion, and flexibility.  (24) minutes of Neuromuscular Re-Education (NMR)  to improve: Balance, Coordination, Kinesthetic, Sense, Proprioception, and Posture.  (00) minutes of Therapeutic Activities (TA) to improve functional performance.  Unattended Electrical Stimulation (ES) for muscle performance or pain modulation.  BFR: Blood flow restriction applied during exercise  NP or (-): Not Performed    PATIENT EDUCATION AND HOME EXERCISES     Home Exercises Provided and Patient Education Provided     Education provided:   PURPOSE: Patient educated on the impairments noted above and the effects of physical therapy intervention to improve overall condition and QOL.   EXERCISE: Patient was educated on all the above exercise prior/during/after for proper posture, positioning, and execution for safe performance with home  exercise program.   STRENGTH: Patient educated on the importance of improved core and extremity strength in order to improve alignment of the spine and extremities with static positions and dynamic movement.   SLEEPING POSITIONS: Patient educated on the use of pillows to aid in neutral alignment of spine and extremities when sleeping in supine or side lying.    Written Home Exercises Provided: Patient instructed to cont prior HEP. Exercises were reviewed and Hiwot was able to demonstrate them prior to the end of the session.  Hiwot demonstrated good  understanding of the education provided. See EMR under Patient Instructions for exercises provided during therapy sessions      ASSESSMENT   Patient tolerated today's session well. Cueing given as appropriate to decrease compensatory patterns. Encouraged continued physical activity as tolerable.    Hiwot Is progressing well towards her goals.   Patient prognosis is Good.     Patient will continue to benefit from skilled outpatient physical therapy to address the deficits listed in the problem list box on initial evaluation, provide patient/family education and to maximize patient's level of independence in the home and community environment.     Patient's spiritual, cultural and educational needs considered and patient agreeable to plan of care and goals.     Anticipated barriers to physical therapy: chronicity of condition and occupation     Goals:   Reviewed: 3/10/2025      Short Term Goals:  4 weeks Status  Date    PAIN: Patient will report worst pain of 5/10 in order to progress toward max functional ability and improve quality of life. [] Progressing  [x] Met  [] Not Met 01/27/2025   FUNCTION: Patient will demonstrate improved function as indicated by a functional intake score of more than or equal to 59 out of 100 on FOTO. [x] Progressing  [] Met  [] Not Met 02/26/2025   MOBILITY: Patient will improve Range of Motion to 50% of stated goals, listed in objective  measures above, in order to progress towards independence with functional activities.  [x] Progressing  [] Met  [] Not Met 02/26/2025   STRENGTH: Patient will improve strength to 50% of stated goals, listed in objective measures above, in order to progress towards independence with functional activities.  [] Progressing  [x] Met  [] Not Met  1/6/2025      Long Term Goals:  8 weeks Status Date    PAIN: Patient will report worst pain of 3/10 in order to progress toward max functional ability and improve quality of life [x] Progressing  [] Met  [] Not Met 02/26/2025    FUNCTION: Patient will demonstrate improved function as indicated by a functional intake score of more than or equal to 68 out of 100 on FOTO. [x] Progressing  [] Met  [] Not Met 02/26/2025   MOBILITY: Patient will improve Range of Motion to stated goals, listed in objective measures above, in order to return to maximal functional potential and improve quality of life. [x] Progressing  [] Met  [] Not Met 02/26/2025   STRENGTH: Patient will improve strength to stated goals, listed in objective measures above, in order to improve functional independence and quality of life. [x] Progressing  [] Met  [] Not Met 02/26/2025   Patient will return to normal ADL's, IADL's, community involvement, recreational activities, and work-related activities with less than or equal to 3/10 pain and maximal function.  [x] Progressing  [] Met  [] Not Met 02/26/2025        PLAN     Plan of Care Certification: 01/27/2025 to 03/24/2025.     Outpatient Physical Therapy 2 times weekly for 8 weeks to include any combination of the following interventions: virtual visits, dry needling, modalities, electrical stimulation (IFC, Pre-Mod, Attended with Functional Dry Needling), Cervical/Lumbar Traction, Gait Training, Manual Therapy, Moist Heat/ Ice, Neuromuscular Re-ed, Patient Education, Self Care, Therapeutic Exercise, and Therapeutic Activites     Tresa Rick, PT

## 2025-03-17 ENCOUNTER — CLINICAL SUPPORT (OUTPATIENT)
Dept: REHABILITATION | Facility: HOSPITAL | Age: 80
End: 2025-03-17
Payer: MEDICARE

## 2025-03-17 DIAGNOSIS — M25.619 DECREASED RANGE OF MOTION OF SHOULDER, UNSPECIFIED LATERALITY: ICD-10-CM

## 2025-03-17 DIAGNOSIS — Z74.09 DECREASED FUNCTIONAL MOBILITY AND ENDURANCE: ICD-10-CM

## 2025-03-17 DIAGNOSIS — R29.898 DECREASED STRENGTH OF UPPER EXTREMITY: ICD-10-CM

## 2025-03-17 DIAGNOSIS — R29.898 DECREASED RANGE OF MOTION OF NECK: Primary | ICD-10-CM

## 2025-03-17 PROBLEM — M62.89 PELVIC FLOOR DYSFUNCTION: Status: RESOLVED | Noted: 2024-04-19 | Resolved: 2025-03-17

## 2025-03-17 PROBLEM — M53.82 DECREASED RANGE OF MOTION OF INTERVERTEBRAL DISCS OF CERVICAL SPINE: Status: RESOLVED | Noted: 2024-04-25 | Resolved: 2025-03-17

## 2025-03-17 PROCEDURE — 97112 NEUROMUSCULAR REEDUCATION: CPT | Mod: HCNC

## 2025-03-17 PROCEDURE — 97140 MANUAL THERAPY 1/> REGIONS: CPT | Mod: HCNC

## 2025-03-17 NOTE — PROGRESS NOTES
[facility]   Physical Therapy Treatment Note      Name: Hiwot Coulter  [facility] Number: [id number]    Therapy Diagnosis:   Encounter Diagnoses   Name Primary?    Decreased range of motion of neck Yes    Decreased range of motion of shoulder, unspecified laterality     Decreased strength of upper extremity     Decreased functional mobility and endurance          Physician: Kumar Ramirez MD    Visit Date: 3/17/2025    Physician Orders: Physical Therapy Evaluation and Treat  Medical Diagnosis from Referral: M50.30 (ICD-10-CM) - DDD (degenerative disc disease), cervical   Evaluation Date: 11/27/2024  Authorization Period Expiration: 12/31/2024  Plan of Care Expiration: 03/24/2025 (updated)  Progress Note Due: 03/24/2025  Visit # / Visits authorized: 13/24 (+1 Evaluation + 4 treatment)    FOTO: 3/3      Precautions: Standard    Time In: 3:55 PM  Time Out: 4:55 PM  Total Billable Time: 53 minutes  (Billing reflects 1 on 1 treatment time spent with patient)    SUBJECTIVE     Patient reports: she has not taken any pain medication recently and overall is doing well.    She was compliant with home exercise program.  Response to previous treatment: no adverse effects  Functional change: able to check blind spot, still having pain with lying supine    Pain: 2/10   Location: Bilateral posterior head near occiput, bilateral upper trapezial, levator scapulae, sternocleidomastoid     OBJECTIVE     Objective Measures updated at progress report unless specified.        TREATMENT        CPT Intervention Performed  Today Duration / Intensity      TE  UPPER BODY ERGOMETER  x 3 minutes forward, 3 minutes backwards     Pulleys - 2 minutes flexion & abduction bilateral with cues to perform slowly     Supine cervical Rotation - 10x each      Open books  - 12x b     Re-assess - 10 min         NMR  Standing Chin Tucks - 3 x 10 towel roll today    Quadruped Chin Tucks  3 x 10    Quadruped cervical rotation   10x each side       Standing Scapular Retractions x 3 x 10 blue band       Standing Shoulder Extensions x 3 x 10 blue band (green band today)      Supine Cervical SNAG with moist heat pack x 20x each side x 15 second holds      Seated Cervical ACTIVE RANGE OF MOTION  - 2 minutes each direction    Series 6 on [employer]- Pectoral stretch   Swimmers   Rafi Calzada  Shoulder rolls BW x  x  x  x  X  x 3 min  3 min   3 min  3 min  3 min  3 min    Prone latissimus dorsi   10x/ 3 sets 1lbs    Prone T's   10x/ 3 sets 1lbs         TA          MT    Supine scapular releases with 1st rib grade III-IV bilateral mobilizations with breathing    Prone scapular mobilizations and grade I-II thoracic PA's for pain control    Seated bilateral upper trapezial SOFT TISSUE MOBILIZATION  -        -        x    15 min   PLAN  open books, supine chin tuck, continued scapular strengthening           CPT Codes available for Billing:   (15) minutes of Manual therapy (MT) to improve pain and ROM.  (06) minutes of Therapeutic Exercise (TE) to develop strength, endurance, range of motion, and flexibility.  (32) minutes of Neuromuscular Re-Education (NMR)  to improve: Balance, Coordination, Kinesthetic, Sense, Proprioception, and Posture.  (00) minutes of Therapeutic Activities (TA) to improve functional performance.  Unattended Electrical Stimulation (ES) for muscle performance or pain modulation.  BFR: Blood flow restriction applied during exercise  NP or (-): Not Performed    PATIENT EDUCATION AND HOME EXERCISES     Home Exercises Provided and Patient Education Provided     Education provided:   PURPOSE: Patient educated on the impairments noted above and the effects of physical therapy intervention to improve overall condition and QOL.   EXERCISE: Patient was educated on all the above exercise prior/during/after for proper posture, positioning, and execution for safe performance with home exercise program.   STRENGTH: Patient educated on  the importance of improved core and extremity strength in order to improve alignment of the spine and extremities with static positions and dynamic movement.   SLEEPING POSITIONS: Patient educated on the use of pillows to aid in neutral alignment of spine and extremities when sleeping in supine or side lying.    Written Home Exercises Provided: Patient instructed to cont prior HEP. Exercises were reviewed and Hiwot was able to demonstrate them prior to the end of the session.  Hiwot demonstrated good  understanding of the education provided. See EMR under Patient Instructions for exercises provided during therapy sessions      ASSESSMENT   Patient tolerated today's session well. Cueing given as appropriate to decrease compensatory patterns. Encouraged continued physical activity as tolerable.    Hiwot Is progressing well towards her goals.   Patient prognosis is Good.     Patient will continue to benefit from skilled outpatient physical therapy to address the deficits listed in the problem list box on initial evaluation, provide patient/family education and to maximize patient's level of independence in the home and community environment.     Patient's spiritual, cultural and educational needs considered and patient agreeable to plan of care and goals.     Anticipated barriers to physical therapy: chronicity of condition and occupation     Goals:   Reviewed: 3/17/2025      Short Term Goals:  4 weeks Status  Date    PAIN: Patient will report worst pain of 5/10 in order to progress toward max functional ability and improve quality of life. [] Progressing  [x] Met  [] Not Met 01/27/2025   FUNCTION: Patient will demonstrate improved function as indicated by a functional intake score of more than or equal to 59 out of 100 on FOTO. [x] Progressing  [] Met  [] Not Met 02/26/2025   MOBILITY: Patient will improve Range of Motion to 50% of stated goals, listed in objective measures above, in order to progress towards  independence with functional activities.  [x] Progressing  [] Met  [] Not Met 02/26/2025   STRENGTH: Patient will improve strength to 50% of stated goals, listed in objective measures above, in order to progress towards independence with functional activities.  [] Progressing  [x] Met  [] Not Met  1/6/2025      Long Term Goals:  8 weeks Status Date    PAIN: Patient will report worst pain of 3/10 in order to progress toward max functional ability and improve quality of life [x] Progressing  [] Met  [] Not Met 02/26/2025    FUNCTION: Patient will demonstrate improved function as indicated by a functional intake score of more than or equal to 68 out of 100 on FOTO. [x] Progressing  [] Met  [] Not Met 02/26/2025   MOBILITY: Patient will improve Range of Motion to stated goals, listed in objective measures above, in order to return to maximal functional potential and improve quality of life. [x] Progressing  [] Met  [] Not Met 02/26/2025   STRENGTH: Patient will improve strength to stated goals, listed in objective measures above, in order to improve functional independence and quality of life. [x] Progressing  [] Met  [] Not Met 02/26/2025   Patient will return to normal ADL's, IADL's, community involvement, recreational activities, and work-related activities with less than or equal to 3/10 pain and maximal function.  [x] Progressing  [] Met  [] Not Met 02/26/2025        PLAN     Plan of Care Certification: 01/27/2025 to 03/24/2025.     Outpatient Physical Therapy 2 times weekly for 8 weeks to include any combination of the following interventions: virtual visits, dry needling, modalities, electrical stimulation (IFC, Pre-Mod, Attended with Functional Dry Needling), Cervical/Lumbar Traction, Gait Training, Manual Therapy, Moist Heat/ Ice, Neuromuscular Re-ed, Patient Education, Self Care, Therapeutic Exercise, and Therapeutic Activites     Tresa Rick, PT

## 2025-03-19 ENCOUNTER — TELEPHONE (OUTPATIENT)
Dept: OPHTHALMOLOGY | Facility: CLINIC | Age: 80
End: 2025-03-19
Payer: MEDICARE

## 2025-03-19 NOTE — TELEPHONE ENCOUNTER
Scheduled patient for a RX recheck per her request      ----- Message from Krysten sent at 3/19/2025  9:15 AM CDT -----  Contact: Hiwot  Type:  Patient Requesting a call back Who Called:Hiwot What is the call back request regarding?:pt would like to speak to nurse about her prescriptionWould the patient rather a call back or a response via [facility]?call Best Call Back Number:[phone] Information:Please call for additional information

## 2025-03-24 ENCOUNTER — DOCUMENTATION ONLY (OUTPATIENT)
Dept: REHABILITATION | Facility: HOSPITAL | Age: 80
End: 2025-03-24
Payer: MEDICARE

## 2025-03-24 NOTE — PROGRESS NOTES
[facility]  Physical Therapy Discharge Note    Name: Hiwot PACE [facility] Number: [id number]    Therapy Diagnosis: No diagnosis found.  Physician: No ref. provider found    Physician Orders: Physical Therapy Evaluation and Treat  Medical Diagnosis from Referral: M50.30 (ICD-10-CM) - DDD (degenerative disc disease), cervical   Evaluation Date: 11/27/2024      Date of Last visit: 03/17/2025  Total Visits Received: 17    ASSESSMENT        Discharge reason: Patient requested discharge    Discharge FOTO Score: not applicable     Goals: see last treatment note.    PLAN   This patient is discharged from PT.    Tresa Rick, PT, DPT

## 2025-03-31 ENCOUNTER — OFFICE VISIT (OUTPATIENT)
Dept: PAIN MEDICINE | Facility: CLINIC | Age: 80
End: 2025-03-31
Payer: MEDICARE

## 2025-03-31 VITALS
DIASTOLIC BLOOD PRESSURE: 73 MMHG | SYSTOLIC BLOOD PRESSURE: 130 MMHG | HEIGHT: 61 IN | BODY MASS INDEX: 27.85 KG/M2 | WEIGHT: 147.5 LBS | HEART RATE: 68 BPM

## 2025-03-31 DIAGNOSIS — M79.12 MYALGIA OF AUXILIARY MUSCLES, HEAD AND NECK: ICD-10-CM

## 2025-03-31 DIAGNOSIS — M79.18 CERVICAL MYOFASCIAL PAIN SYNDROME: Primary | ICD-10-CM

## 2025-03-31 DIAGNOSIS — M54.50 LUMBAR BACK PAIN: ICD-10-CM

## 2025-03-31 PROCEDURE — 1101F PT FALLS ASSESS-DOCD LE1/YR: CPT | Mod: HCNC,CPTII,S$GLB, | Performed by: PHYSICAL MEDICINE & REHABILITATION

## 2025-03-31 PROCEDURE — 1159F MED LIST DOCD IN RCRD: CPT | Mod: HCNC,CPTII,S$GLB, | Performed by: PHYSICAL MEDICINE & REHABILITATION

## 2025-03-31 PROCEDURE — 3288F FALL RISK ASSESSMENT DOCD: CPT | Mod: HCNC,CPTII,S$GLB, | Performed by: PHYSICAL MEDICINE & REHABILITATION

## 2025-03-31 PROCEDURE — 99203 OFFICE O/P NEW LOW 30 MIN: CPT | Mod: HCNC,S$GLB,, | Performed by: PHYSICAL MEDICINE & REHABILITATION

## 2025-03-31 PROCEDURE — 99999 PR PBB SHADOW E&M-EST. PATIENT-LVL IV: CPT | Mod: PBBFAC,HCNC,, | Performed by: PHYSICAL MEDICINE & REHABILITATION

## 2025-03-31 PROCEDURE — 3075F SYST BP GE 130 - 139MM HG: CPT | Mod: HCNC,CPTII,S$GLB, | Performed by: PHYSICAL MEDICINE & REHABILITATION

## 2025-03-31 PROCEDURE — 3078F DIAST BP <80 MM HG: CPT | Mod: HCNC,CPTII,S$GLB, | Performed by: PHYSICAL MEDICINE & REHABILITATION

## 2025-03-31 PROCEDURE — 1125F AMNT PAIN NOTED PAIN PRSNT: CPT | Mod: HCNC,CPTII,S$GLB, | Performed by: PHYSICAL MEDICINE & REHABILITATION

## 2025-03-31 NOTE — PROGRESS NOTES
New Patient Chronic Pain Note (Initial Visit)    Referring Physician: Kumar Ramirez MD    PCP: Kumar Ramirez MD    Chief Complaint:   Chief Complaint   Patient presents with    Arm Pain    Shoulder Pain    Mid-back Pain        SUBJECTIVE:    Hiwot Coulter is a 79 y.o. female who presents to the clinic for the evaluation of back pain.  She was referred by her primary care provider for further evaluation and management of this pain.  She has past medical history of cervical DDD, lumbar spondylosis, anxiety, hyperlipidemia, GERD, polyarthritis, JAMAL on CPAP, multiple other medical comorbidities as listed in her chart.  The pain started several years ago and is related to a childhood injury where she was performing a hand stand and walking in her hands but then fell and landed mostly on her head and neck area and patient states that she was diagnosed with a compression fracture.  This was treated with nonoperative bracing, but states that her pain periodically flares every few years.  She denies any new injuries or trauma recently.  The pain is located in the cervical myofascial area and radiates to the bilateral shoulder blades.  The pain is described as  sharp, stabbing, aching  and is rated as 1/10. The pain is rated with a score of  1/10 on the BEST day and a score of 10/10 on the WORST day.  Symptoms interfere with daily activity. The pain is exacerbated by head and neck movement and overhead activities.  The pain is mitigated by therapy, stretching, muscle relaxants and tramadol.     Patient denies night fever/night sweats, urinary incontinence, bowel incontinence, significant weight loss, significant motor weakness, and loss of sensations.    Pain Disability Index Review:         3/31/2025    10:46 AM   Last 3 PDI Scores   Pain Disability Index (PDI) 7       Non-Pharmacologic Treatments:  Physical Therapy/Home Exercise: yes  Ice/Heat:yes  TENS: no  Acupuncture: no  Massage: yes  Chiropractic: no     Other: no      Pain Medications:  - Opioids:  Tramadol, Norco  - Adjuvant Medications:  Tylenol, Mobic, ibuprofen, steroid pack, tizanidine  - Anti-Coagulants:  None     report:  Reviewed and consistent with medication use as prescribed.    Pain Procedures:   Denies any recent cervical interventions      Imaging:   X-ray left knee 12/04/2024:  Moderate medial compartment joint space narrowing on the right. Mild medial compartment joint space narrowing on the left. Lateral patellar tilt bilaterally. No acute fracture or dislocation on either side. No significant effusion on the left.     X-ray bilateral knee 04/26/2023:  No fractures nor dislocations. Early peripheral osteophytes are present primarily at the medial tibial femoral compartments with early joint space loss in both knees medial greater than lateral. No joint effusions. No soft tissue abnormalities.     X-ray thoracic spine 01/10/2022:   Vertebral body heights unchanged.  No definite acute fracture.  Multilevel mild degenerative findings again noted.  Soft tissues unremarkable.      X-ray thoracic spine 01/10/2022:  Vertebral body heights maintained. Trace retrolisthesis of C5 on C6. Degenerative disc height loss and osteophyte changes noted at C4-5, C5-6 and C6-7. Mid and lower cervical spine facet arthropathy noted. Lung apices and prevertebral soft tissues unremarkable.       Past Medical History:   Diagnosis Date    Anxiety     Arthritis     hand, neck, back    Behaviorally induced insufficient sleep syndrome     Behaviorally induced insufficient sleep syndrome     Cataract     Depression     GERD (gastroesophageal reflux disease)     Hyperlipidemia     Obesity     Osteopenia     Pollen allergies     Self-care deficit 12/10/2021    Sleep apnea      Past Surgical History:   Procedure Laterality Date    CATARACT EXTRACTION W/  INTRAOCULAR LENS IMPLANT Right 07/18/2019    CATARACT EXTRACTION W/  INTRAOCULAR LENS IMPLANT Left 08/22/2019     CHOLECYSTECTOMY  2012    HYSTERECTOMY       Social History[1]  Family History   Problem Relation Name Age of Onset    Leukemia Father      Heart disease Father      Cancer Father          leukemia    Cataracts Mother      Melanoma Neg Hx      Psoriasis Neg Hx      Lupus Neg Hx      Eczema Neg Hx         Review of patient's allergies indicates:  No Known Allergies    Current Outpatient Medications   Medication Sig    acetaminophen (TYLENOL) 500 mg Cap as needed.     efinaconazole (JUBLIA) 10 % Renetta Apply to toenail once a day    ergocalciferol (ERGOCALCIFEROL) 50,000 unit Cap Take 1 capsule (50,000 Units total) by mouth twice a week.    EUFLEXXA 10 mg/mL(mw 2.4 -3.6 million) IAt injection     flash glucose sensor (FREESTYLE AMELIA 2 SENSOR) Kit .    flu vac 2022 65up-[id number],PF, (FLUAD QUAD 2022-23,65Y UP,,PF,) 60 mcg (15 mcg x 4)/0.5 mL Syrg Inject 0.5 mLs into the muscle.    fluticasone propionate (FLONASE) 50 mcg/actuation nasal spray 2 sprays (100 mcg total) by Each Nostril route once daily.    ibandronate (BONIVA) 150 mg tablet TAKE 1 TABLET(150 MG) BY MOUTH EVERY 30 DAYS    ibuprofen (ADVIL,MOTRIN) 800 MG tablet Take 1 tablet (800 mg total) by mouth every 6 (six) hours as needed for Pain.    meloxicam (MOBIC) 15 MG tablet Take 1 tablet (15 mg total) by mouth once daily.    methylPREDNISolone (MEDROL DOSEPACK) 4 mg tablet use as directed    montelukast (SINGULAIR) 10 mg tablet TAKE 1 TABLET(10 MG) BY MOUTH EVERY EVENING    tirzepatide, weight loss, (ZEPBOUND) 5 mg/0.5 mL PnIj Inject 5 mg into the skin every 7 days.    tiZANidine (ZANAFLEX) 4 MG tablet Take 1 tablet (4 mg total) by mouth every 8 (eight) hours as needed.    traMADoL (ULTRAM) 50 mg tablet Take 1 tablet (50 mg total) by mouth every 6 (six) hours as needed for Pain.     No current facility-administered medications for this visit.       Review of Systems     GENERAL:  No weight loss, malaise or fevers.  HEENT:   No recent changes in vision or  "hearing  NECK:  Negative for lumps, no difficulty with swallowing.  RESPIRATORY:  Negative for cough, wheezing or shortness of breath, patient denies any recent URI.  CARDIOVASCULAR:  Negative for chest pain, leg swelling or palpitations.  GI:  Negative for abdominal discomfort, blood in stools or black stools or change in bowel habits.  MUSCULOSKELETAL:  See HPI.  SKIN:  Negative for lesions, rash, and itching.  PSYCH:  No mood disorder or recent psychosocial stressors.  Patients sleep is not disturbed secondary to pain.  HEMATOLOGY/LYMPHOLOGY:  Negative for prolonged bleeding, bruising easily or swollen nodes.  Patient is not currently taking any anti-coagulants  NEURO:   No history of headaches, syncope, paralysis, seizures or tremors.  All other reviewed and negative other than HPI.    OBJECTIVE:    /73   Pulse 68   Ht 5' 1" (1.549 m)   Wt 66.9 kg (147 lb 7.8 oz)   BMI 27.87 kg/m²         Physical Exam    GENERAL: Well appearing, in no acute distress, alert and oriented x3.  PSYCH:  Mood and affect appropriate.  SKIN: Skin color, texture, turgor normal, no rashes or lesions.  HEAD/FACE:  Normocephalic, atraumatic. Cranial nerves grossly intact.  NECK:  Minimal pain to palpation over the cervical paraspinous muscles. Spurling Negative. No pain with neck flexion, extension, or lateral flexion.   CV: RRR with palpation of the radial artery.  PULM: No evidence of respiratory difficulty, symmetric chest rise.  GI:  Soft and non-tender.  BACK: Normal range of motion without pain reproduction.   EXTREMITIES: No deformities, edema, or skin discoloration. Good capillary refill.  MUSCULOSKELETAL: Shoulder provocative maneuvers are negative.   Bilateral upper and lower extremity strength is normal and symmetric.  No atrophy or tone abnormalities are noted.  NEURO: Bilateral upper and lower extremity coordination and muscle stretch reflexes are physiologic and symmetric.  Negative Brian.  Plantar response are " downgoing. No clonus.  No loss of sensation is noted.  GAIT: normal.      LABS:  Lab Results   Component Value Date    WBC 5.02 06/28/2024    HGB 12.4 06/28/2024    HCT 37.2 06/28/2024    MCV 91 06/28/2024     06/28/2024       CMP  Sodium   Date Value Ref Range Status   12/06/2024 140 136 - 145 mmol/L Final     Potassium   Date Value Ref Range Status   12/06/2024 4.7 3.5 - 5.1 mmol/L Final     Chloride   Date Value Ref Range Status   12/06/2024 108 95 - 110 mmol/L Final     CO2   Date Value Ref Range Status   12/06/2024 26 23 - 29 mmol/L Final     Glucose   Date Value Ref Range Status   12/06/2024 90 70 - 110 mg/dL Final     BUN   Date Value Ref Range Status   12/06/2024 18 8 - 23 mg/dL Final     Creatinine   Date Value Ref Range Status   12/06/2024 0.8 0.5 - 1.4 mg/dL Final     Calcium   Date Value Ref Range Status   12/06/2024 9.6 8.7 - 10.5 mg/dL Final     Total Protein   Date Value Ref Range Status   12/06/2024 7.0 6.0 - 8.4 g/dL Final     Albumin   Date Value Ref Range Status   12/06/2024 4.0 3.5 - 5.2 g/dL Final     Total Bilirubin   Date Value Ref Range Status   12/06/2024 0.6 0.1 - 1.0 mg/dL Final     Comment:     For infants and newborns, interpretation of results should be based  on gestational age, weight and in agreement with clinical  observations.    Premature Infant recommended reference ranges:  Up to 24 hours.............<8.0 mg/dL  Up to 48 hours............<12.0 mg/dL  3-5 days..................<15.0 mg/dL  6-29 days.................<15.0 mg/dL       Alkaline Phosphatase   Date Value Ref Range Status   12/06/2024 58 40 - 150 U/L Final     AST   Date Value Ref Range Status   12/06/2024 18 10 - 40 U/L Final     ALT   Date Value Ref Range Status   12/06/2024 13 10 - 44 U/L Final     Anion Gap   Date Value Ref Range Status   12/06/2024 6 (L) 8 - 16 mmol/L Final     eGFR if    Date Value Ref Range Status   11/26/2021 >60.0 >60 mL/min/1.73 m^2 Final     eGFR if non African  "American   Date Value Ref Range Status   11/26/2021 >60.0 >60 mL/min/1.73 m^2 Final     Comment:     Calculation used to obtain the estimated glomerular filtration  rate (eGFR) is the CKD-EPI equation.          No results found for: "LABA1C", "HGBA1C"          ASSESSMENT: 79 y.o. year old female with neck and upper back pain, consistent with     1. Cervical myofascial pain syndrome        2. Lumbar back pain  Ambulatory referral/consult to [facility]      3. Myalgia of auxiliary muscles, head and neck              PLAN:   - Interventions:  None at this time    - Anticoagulation use:  None    - Medications: I have stressed the importance of physical activity and a home exercise plan to help with pain and improve health. and Patient can continue with medications for now since they are providing benefits, using them appropriately, and without side effects.           - Therapy:  Advised patient continue with daily stretching and home exercises as tolerated    - Labs:  Reviewed    - Imaging: Reviewed available imaging with patient and answered any questions they had regarding study.    - Consults/Referrals:  None at this time    - Records:  Reviewed/Obtain old records from outside physicians and imaging    - Follow up visit: return to clinic as needed    - Counseled patient regarding the importance of activity modification and physical therapy    - This condition does not require this patient to take time off of work, and the primary goal of our Pain Management services is to improve the patient's functional capacity.    - Patient Questions: Answered all of the patient's questions regarding diagnosis, therapy, and treatment        The above plan and management options were discussed at length with patient. Patient is in agreement with the above and verbalized understanding.    I discussed the goals of interventional chronic pain management with the patient on today's visit.  I explained the utility of injections " for diagnostic and therapeutic purposes.  We discussed a multimodal approach to pain including treating the patient's given worst pain at any given time.  We will use a systematic approach to addressing pain.  We will also adopt a multimodal approach that includes injections, adjuvant medications, physical therapy, at times psychiatry.  There may be a limited role for opioid use intermittently in the treatment of pain, more particularly for acute pain although no one approach can be used as a sole treatment modality.    I emphasized the importance of regular exercise, core strengthening and stretching, diet and weight loss as a cornerstone of long-term pain management.      Rob Pena MD  Interventional Pain Management  [facility]    Disclaimer:  This note was prepared using voice recognition system and is likely to have sound alike errors that may have been overlooked even after proof reading.  Please call me with any questions         [1]   Social History  Socioeconomic History    Marital status:    Occupational History    Occupation: [employer]     Employer: [employer]   Tobacco Use    Smoking status: Never    Smokeless tobacco: Never   Substance and Sexual Activity    Alcohol use: No    Drug use: No    Sexual activity: Yes     Partners: Male     Birth control/protection: None   Other Topics Concern    Are you pregnant or think you may be? No    Breast-feeding No   Social History Narrative    1 dog, no smokers in household.     Social Drivers of Health     Financial Resource Strain: Low Risk  (6/25/2024)    Overall Financial Resource Strain (CARDIA)     Difficulty of Paying Living Expenses: Not hard at all   Food Insecurity: No Food Insecurity (6/25/2024)    Hunger Vital Sign     Worried About Running Out of Food in the Last Year: Never true     Ran Out of Food in the Last Year: Never true   Physical Activity: Unknown (6/25/2024)    Exercise Vital Sign     Days of Exercise per Week:  Patient declined   Stress: Patient Declined (6/25/2024)    [employer] [place] [employer] - Occupational Stress Questionnaire     Feeling of Stress : Patient declined   Housing Stability: Unknown (6/25/2024)    Housing Stability Vital Sign     Unable to Pay for Housing in the Last Year: Patient declined

## 2025-04-14 ENCOUNTER — OFFICE VISIT (OUTPATIENT)
Dept: OPHTHALMOLOGY | Facility: CLINIC | Age: 80
End: 2025-04-14
Payer: MEDICARE

## 2025-04-14 DIAGNOSIS — H52.7 REFRACTION DISORDER: Primary | ICD-10-CM

## 2025-04-14 PROCEDURE — 99999 PR PBB SHADOW E&M-EST. PATIENT-LVL I: CPT | Mod: PBBFAC,HCNC,, | Performed by: OPHTHALMOLOGY

## 2025-04-14 PROCEDURE — 99499 UNLISTED E&M SERVICE: CPT | Mod: HCNC,S$GLB,, | Performed by: OPHTHALMOLOGY

## 2025-04-14 NOTE — PROGRESS NOTES
HPI     Eye Problem            Comments: Patient reports for glasses recheck. States she cannot see out   of new MR.           Comments    1. PCIOL OD 7/18/19 +20.0WF/CDE 10.73   PCIOL OS +21.0 SN60WF / CDE: 12.02 / 08/22/19   YAG OU 12/13/24  2. Glaucoma Suspect   3. Negative FH glaucoma   SLT OD 11/5/20   + CPAP     No drops            Last edited by Donis Ford MD on 4/14/2025  2:08 PM.            Assessment /Plan     For exam results, see Encounter Report.      ICD-10-CM ICD-9-CM    1. Refraction disorder  H52.7 367.9 Computer MR made incorrectly  Will reissue MR for remake          Return to clinic yearly

## 2025-06-08 ENCOUNTER — PATIENT MESSAGE (OUTPATIENT)
Dept: PAIN MEDICINE | Facility: CLINIC | Age: 80
End: 2025-06-08
Payer: MEDICARE

## 2025-06-11 DIAGNOSIS — M79.12 MYALGIA OF AUXILIARY MUSCLES, HEAD AND NECK: ICD-10-CM

## 2025-06-11 DIAGNOSIS — M79.18 CERVICAL MYOFASCIAL PAIN SYNDROME: Primary | ICD-10-CM

## 2025-06-16 ENCOUNTER — CLINICAL SUPPORT (OUTPATIENT)
Dept: REHABILITATION | Facility: HOSPITAL | Age: 80
End: 2025-06-16
Attending: PHYSICAL MEDICINE & REHABILITATION
Payer: MEDICARE

## 2025-06-16 DIAGNOSIS — R53.1 DECREASED STRENGTH, ENDURANCE, AND MOBILITY: ICD-10-CM

## 2025-06-16 DIAGNOSIS — R26.9 ABNORMALITY OF GAIT AND MOBILITY: Primary | ICD-10-CM

## 2025-06-16 DIAGNOSIS — M79.12 MYALGIA OF AUXILIARY MUSCLES, HEAD AND NECK: ICD-10-CM

## 2025-06-16 DIAGNOSIS — R68.89 DECREASED STRENGTH, ENDURANCE, AND MOBILITY: ICD-10-CM

## 2025-06-16 DIAGNOSIS — R29.898 WEAKNESS OF BOTH LOWER EXTREMITIES: ICD-10-CM

## 2025-06-16 DIAGNOSIS — M79.18 CERVICAL MYOFASCIAL PAIN SYNDROME: ICD-10-CM

## 2025-06-16 DIAGNOSIS — Z74.09 DECREASED STRENGTH, ENDURANCE, AND MOBILITY: ICD-10-CM

## 2025-06-16 PROCEDURE — 97162 PT EVAL MOD COMPLEX 30 MIN: CPT | Mod: HCNC | Performed by: PHYSICAL THERAPIST

## 2025-06-16 PROCEDURE — 97535 SELF CARE MNGMENT TRAINING: CPT | Mod: HCNC | Performed by: PHYSICAL THERAPIST

## 2025-06-16 NOTE — PROGRESS NOTES
Outpatient Rehab    Physical Therapy Evaluation    Patient Name: Hiwot Coulter  MRN: [id number]  YOB: 1945  Encounter Date: 6/16/2025    Therapy Diagnosis:   Encounter Diagnoses   Name Primary?    Abnormality of gait and mobility Yes    Decreased strength, endurance, and mobility     Weakness of both lower extremities     Cervical myofascial pain syndrome     Myalgia of auxiliary muscles, head and neck      Physician: Rob Pena MD    Physician Orders: Eval and Treat  Medical Diagnosis: Cervical myofascial pain syndrome  Myalgia of auxiliary muscles, head and neck  Surgical Diagnosis: Not applicable for this Episode   Surgical Date: Not applicable for this Episode  Days Since Last Surgery: Not applicable for this Episode    Visit # / Visits Authorized:  1 / 1  Insurance Authorization Period: 6/11/2025 to 6/11/2026  Date of Evaluation: 6/16/2025  Plan of Care Certification: 6/16/2025 to 9/14/2025     Time In: 1140   Time Out: 1230  Total Time (in minutes): 50   Total Billable Time (in minutes):  50    Intake Outcome Measure for FOTO Survey    Therapist reviewed FOTO scores for Hiwot Coulter on 6/16/2025.   FOTO report - see Media section or FOTO account episode details.     Intake Score: 27 (goal of 39)%         Subjective   History of Present Illness  Hiwot is a 80 y.o. female                  History of Present Condition/Illness: Patient reports that she has been noticing balance problems for a while now. She states that when she turns quickly, she feels off-balance. Patient reports that she has a history of lower back, mid back, and neck pain. Patient also reports having knee problems in both knees. She states that just recently she had treatment for her neck and did well. Patient reports that she does a lot of yard work around her house, and she wants to be able to do things around the house with better strength and without falling. Patient reports that she is an  and is still  working some. Just going from work to her car requires her to walk on an unstable surface because it's on rocks, and she also has to go work out in the field, which requires climbing an industrial ladder.     Pain     Patient reports a current pain level of 4/10. Pain at best is reported as 2/10. Pain at worst is reported as 7/10.   Location: neck, low back,           Past Medical History/Physical Systems Review:   Hiwot Coulter  has a past medical history of Anxiety, Arthritis, Behaviorally induced insufficient sleep syndrome, Behaviorally induced insufficient sleep syndrome, Cataract, Depression, GERD (gastroesophageal reflux disease), Hyperlipidemia, Obesity, Osteopenia, Pollen allergies, Self-care deficit, and Sleep apnea.    Hiwot Coulter  has a past surgical history that includes Cholecystectomy (2012); Hysterectomy; Cataract extraction w/  intraocular lens implant (Right, 07/18/2019); and Cataract extraction w/  intraocular lens implant (Left, 08/22/2019).    Hiwot has a current medication list which includes the following prescription(s): acetaminophen, efinaconazole, ergocalciferol, euflexxa, freestyle lana 2 sensor, fluad quad 2022-23(65y up)(pf), fluticasone propionate, ibandronate, ibuprofen, meloxicam, methylprednisolone, montelukast, zepbound, tizanidine, and tramadol.    Review of patient's allergies indicates:  No Known Allergies     Objective          AROM:  Top Tier Goals   Cervical Flexion Dysfunctional - Nonpainful Functional - Nonpainful   Cervical Extension Dysfunctional - Nonpainful Functional - Nonpainful   Cervical Rotation Dysfunctional - Painful both directions Functional - Nonpainful   Upper Extremity One (Medial Rotation) Dysfunctional - Nonpainful Functional - Nonpainful   Upper Extremity Two (External Rotation) Dysfunctional - Nonpainful Functional - Nonpainful   Multi-Segmental Flexion Dysfunctional - Painful Functional - Nonpainful   Multi-Segmental Extension Dysfunctional -  Painful Functional - Nonpainful   Multi-Segmental Rotation Dysfunctional - Painful Functional - Nonpainful   Single Leg Stance NT Functional - Nonpainful   Arms Down Deep Squat NT Functional - Nonpainful        STRENGTH:   U/E MMT Right Left Pain/Dysfunction with Movement Goal   Shoulder Flexion 3+/5 3+/5  4+/5 B   Shoulder Abduction 3+/5 3+/5 Pain in R shoulder 4+/5 B   Shoulder IR 3+/5 4-/5  4+/5 B   Shoulder ER 3+/5 4-/5 Pain in R shoulder 4+/5 B   Elbow Flexion  4-/5 4/5 Pain in R shoulder 5/5 B   Elbow Extension 4/5 4/5  5/5 B       L/E MMT Right Left Pain/Dysfunction with Movement Goal   Hip Flexion  4-/5 4-/5 Minimal pain in R hip 4+/5 B   Hip Extension  NT NT  4+/5 B   Hip Abduction  4-/5 4-/5 Tested in modified seated position  4+/5 B   Knee Extension 4/5 4-/5  5/5 B   Knee Flexion 4/5 4-/5  5/5 B   Ankle DF 4/5 4/5  5/5 B       SENSATION  [x] Intact to Light Touch   [] Impaired:      POSTURE:  Pt presents with postural abnormalities which include:     [x] Forward Head                               [] Increased Lumbar Lordosis              [x] Rounded Shoulder                        [] Genu Recurvatum              [] Increased Thoracic Kyphosis        [] Genu Valgus              [] Trunk Deviated                              [] Pes Planus              [] Scapular Winging                          [] Other:       GAIT ANALYSIS The patient ambulated with the following assistive device: none; the pt presents with the following gait abnormalities: bradykinetic, decreased step length bilateral, decreased heel strike bilateral, decreased push off bilateral, decreased hip extension bilateral, and decreased pelvic/trunk rotation. Patient reports that she has a cane she can use if needed when doing a lot of walking.     FUNCTIONAL MOVEMENT PATTERNS  Movement Analysis Notes   Bed Mobility  []Functional  [x]Dysfunctional:  [x]Painful  []Non-Painful    Sit to Stand []Functional  [x]Dysfunctional:   [x]Painful  []Non-Painful    Squat []Functional  [x]Dysfunctional:  [x]Painful  []Non-Painful        BALANCE:   Right   (seconds) Left  (seconds) Pain/dysfunction Noted Goal   Narrow Base of Support 60+ ---  60+ seconds   Tandem Stance 20 13 (Foot in back is how labeled) 60+ seconds   Single Leg Stance 9 10  60+ seconds       FUNCTIONAL TESTING    Outcome Norms Goal   Timed Up and Go 12 seconds <13.5 sec ---   Five Time Sit to Stand 16 seconds 60s: <11.4 sec  70s: <12.6 sec  80s: 14.8 sec 70s: <12.6 sec   30 second Sit to Stand 9 times Age Male Female   60-64 >14 >12   65-69 >12 >11   70-74 >12 >10   75-79 >11 >10   80-84 >10 >9   85-89 >8 >8   90-93 >7 >4    >10       Treatment:      Self Care/Education to improve functional, at home performance for (15) minutes including:    Intervention Performed Today     Educated patient on the impairments present and the plan of care to address impairments x     Educated patient on the proper form and sequencing of all exercises provided in HEP today x Including recall demonstration from patient          Assessment & Plan   Assessment  Hiwot presents with a condition of Moderate complexity.   Presentation of Symptoms: Evolving  Will Comorbidities Impact Care: Yes  Past Medical History: No date: Anxiety No date: Arthritis     Comment:  hand, neck, back No date: Behaviorally induced insufficient sleep syndrome No date: Behaviorally induced insufficient sleep syndrome No date: Cataract No date: Depression No date: GERD (gastroesophageal reflux disease) No date: Hyperlipidemia No date: Obesity No date: Osteopenia No date: Pollen allergies 12/10/2021: Self-care deficit No date: Sleep apnea          Patient Goal for Therapy (PT): Pt reported goals are to decrease overall pain levels in order to return to prior functional level.  Prognosis: Good  Prognosis Details: Anticipated Barriers for therapy: co-morbidities, chronicity of condition, and adherence to treatment plan   Assessment  Details: Pt presents with impairments in the following categories: IMPAIRMENTS: ROM, strength, endurance, balance, posture, gait mechanics, core strength and stability, and functional movement patterns. Pt will benefit from skilled outpatient Physical Therapy to address the deficits stated above, provide pt/family education, and to maximize pt's level of independence.      Plan  From a physical therapy perspective, the patient would benefit from: Skilled Rehab Services                Visit Frequency: 2 times Per Week for 12 Weeks.       This plan was discussed with Patient.   Discussion participants: Agreed Upon Plan of Care  Plan details: Outpatient Physical Therapy to include any combination of the following interventions: virtual visits, dry needling, modalities, electrical stimulation (IFC, Pre-Mod, Attended with Functional Dry Needling), Cervical/Lumbar Traction, Gait Training, Manual Therapy, Neuromuscular Re-ed, Patient Education, Self Care, Therapeutic Exercise, and Therapeutic Activites           The patient's spiritual, cultural, and educational needs were considered, and the patient is agreeable to the plan of care and goals.     Education  Education was done with Patient.  The patient Demonstrates understanding and Verbalizes understanding.         PURPOSE: Patient educated on the impairments noted above and the effects of physical therapy intervention to improve overall condition and QOL.  EXERCISE: Patient was educated on all the above exercise prior/during/after for proper posture, positioning, and execution for safe performance with home exercise program.  STRENGTH: Patient educated on the importance of improved core and extremity strength in order to improve alignment of the spine and extremities with static positions and dynamic movement.  GAIT & BALANCE: Patient educated on the importance of strong core and lower extremity musculature in order to improve both static and dynamic balance, improve  gait mechanics, reduce fall risk and improve household and community mobility.  POSTURE: Patient educated on postural awareness to reduce stress and maintain optimal alignment of the spine with static positions and dynamic movement  TRANSFERS & TRANSITIONS: Patient educated on proper technique for bed mobility, transitions and transfers to improve body mechanics and decrease risk of injury.  POLICIES: Educated patient on scheduling, cancelation and no-show policy.        Goals:   Active       Long Term Goals       Patient will demonstrate improved function as indicated by a score of greater than or equal to 39 out of 100 on FOTO.                Start:  06/16/25    Expected End:  09/08/25            Patient will improve strength to at least 4/5 grossly,  in order to improve functional independence and quality of life.         Start:  06/16/25    Expected End:  09/08/25            Patient will demonstrate improved gait mechanics in order to improve functional mobility, improve quality of life, and decrease risk of further injury or fall.         Start:  06/16/25    Expected End:  09/08/25            Patient will perform at least 10 sit to stands in 30 seconds without UE support to demonstrate increased functional strength.        Start:  06/16/25    Expected End:  09/08/25            Patient will improve functional balance activities to stated goals, listed in objective measures above, in order to return to maximal functional potential and improve quality of life.        Start:  06/16/25    Expected End:  09/08/25               Short Term Goals       Pt will report worst pain of 5/10 in order to progress toward max functional ability and improve quality of life                Start:  06/16/25    Expected End:  07/28/25            Patient will improve strength by 1/2 a grade, in order to progress towards independence with functional activities.                Start:  06/16/25    Expected End:  07/28/25             Patient will correct postural deviations in sitting and standing, to decrease pain and promote long term stability.                 Start:  06/16/25    Expected End:  07/28/25            Patient will demonstrate independence with HEP in order to progress toward functional independence.        Start:  06/16/25    Expected End:  07/28/25                Katie Gomez PT, DPT

## 2025-06-20 ENCOUNTER — PATIENT MESSAGE (OUTPATIENT)
Dept: INTERNAL MEDICINE | Facility: CLINIC | Age: 80
End: 2025-06-20
Payer: MEDICARE

## 2025-07-01 ENCOUNTER — CLINICAL SUPPORT (OUTPATIENT)
Dept: REHABILITATION | Facility: HOSPITAL | Age: 80
End: 2025-07-01
Attending: PHYSICAL THERAPIST
Payer: MEDICARE

## 2025-07-01 DIAGNOSIS — Z74.09 DECREASED STRENGTH, ENDURANCE, AND MOBILITY: Primary | ICD-10-CM

## 2025-07-01 DIAGNOSIS — R68.89 DECREASED STRENGTH, ENDURANCE, AND MOBILITY: Primary | ICD-10-CM

## 2025-07-01 DIAGNOSIS — R29.898 WEAKNESS OF BOTH LOWER EXTREMITIES: ICD-10-CM

## 2025-07-01 DIAGNOSIS — R53.1 DECREASED STRENGTH, ENDURANCE, AND MOBILITY: Primary | ICD-10-CM

## 2025-07-01 DIAGNOSIS — R26.9 ABNORMALITY OF GAIT AND MOBILITY: ICD-10-CM

## 2025-07-01 PROCEDURE — 97112 NEUROMUSCULAR REEDUCATION: CPT | Mod: HCNC | Performed by: PHYSICAL THERAPIST

## 2025-07-01 PROCEDURE — 97530 THERAPEUTIC ACTIVITIES: CPT | Mod: HCNC | Performed by: PHYSICAL THERAPIST

## 2025-07-01 PROCEDURE — 97110 THERAPEUTIC EXERCISES: CPT | Mod: HCNC | Performed by: PHYSICAL THERAPIST

## 2025-07-07 ENCOUNTER — CLINICAL SUPPORT (OUTPATIENT)
Dept: REHABILITATION | Facility: HOSPITAL | Age: 80
End: 2025-07-07
Payer: MEDICARE

## 2025-07-07 DIAGNOSIS — R68.89 DECREASED STRENGTH, ENDURANCE, AND MOBILITY: Primary | ICD-10-CM

## 2025-07-07 DIAGNOSIS — R53.1 DECREASED STRENGTH, ENDURANCE, AND MOBILITY: Primary | ICD-10-CM

## 2025-07-07 DIAGNOSIS — R26.9 ABNORMALITY OF GAIT AND MOBILITY: ICD-10-CM

## 2025-07-07 DIAGNOSIS — Z74.09 DECREASED STRENGTH, ENDURANCE, AND MOBILITY: Primary | ICD-10-CM

## 2025-07-07 DIAGNOSIS — R29.898 WEAKNESS OF BOTH LOWER EXTREMITIES: ICD-10-CM

## 2025-07-07 PROCEDURE — 97112 NEUROMUSCULAR REEDUCATION: CPT | Mod: HCNC,CQ

## 2025-07-07 PROCEDURE — 97530 THERAPEUTIC ACTIVITIES: CPT | Mod: HCNC,CQ

## 2025-07-07 PROCEDURE — 97110 THERAPEUTIC EXERCISES: CPT | Mod: HCNC,CQ

## 2025-07-07 NOTE — PROGRESS NOTES
"  Outpatient Rehab    Physical Therapy Visit    Patient Name: Hiwot Coulter  MRN: [id number]  YOB: 1945  Encounter Date: 7/1/2025    Therapy Diagnosis:   Encounter Diagnoses   Name Primary?    Decreased strength, endurance, and mobility Yes    Abnormality of gait and mobility     Weakness of both lower extremities      Physician: Rob Pena MD    Physician Orders: Eval and Treat  Medical Diagnosis: Cervical myofascial pain syndrome  Myalgia of auxiliary muscles, head and neck  Surgical Diagnosis: Not applicable for this Episode   Surgical Date: Not applicable for this Episode  Days Since Last Surgery: Not applicable for this Episode    Visit # / Visits Authorized:  1 / 10  Insurance Authorization Period: 6/16/2025 to 8/10/2025  Date of Evaluation: 6/16/2025  Plan of Care Certification: 6/16/2025 to 9/14/2025      PT/PTA:     Number of PTA visits since last PT visit:   Time In: 1655   Time Out: 1749  Total Time (in minutes): 54   Total Billable Time (in minutes): 54    FOTO:  Intake Score:  %  Survey Score 2:  %  Survey Score 3:  %    Precautions:       Subjective   Patient reports she was stuck behind a train at work.  Pain right now not bad..         Objective            Treatment:     CPT Intervention Performed   Today Duration / Intensity   MT         TE Nu-step X 8 minutes                       NMR LAQ x 3 x 10, bilateral LE     Ball squeezes x 3 x 10, 3" holds     Sidelying clams x 2 x 10 each side   TA Abdominal bracing x 3 x 10, 5" holds     Pelvic Tilts x 3 x 10, 3" holds; 3 minutes     Glute sets  with bridge 1 x 10, 3" holds     bridges x 3 x 10 (3 min)     Leg Press  x 3 x 10, 65#     Standing heel raises  X 2 minutes      Standing toe raises  X 2 minutes      Stance on foam with feet together X Eyes open, 30"        Single leg stance  trials  X 3 x 30" each    PLAN Squat taps, standing hip 3-way, standing heel raises, side stepping,   Step ups, Tandem stance, cone taps, hurdles, " unstable surface, any balance activity      CPT Codes available for Billing:   (00) minutes of Manual therapy (MT) to improve pain and ROM.  (08) minutes of Therapeutic Exercise (TE) to develop strength, endurance, range of motion, and flexibility.  (10) minutes of Neuromuscular Re-Education (NMR)  to improve: Balance, Coordination, Kinesthetic, Sense, Proprioception, and Posture.  (36) minutes of Therapeutic Activities (TA) to improve functional performance.  Vasopneumatic Device Therapy () for management of swelling/edema. (51846)  Unattended Electrical Stimulation (ES) for muscle performance or pain modulation.  BFR: Blood flow restriction applied during exercise    Time Entry(in minutes):       Assessment & Plan   Assessment: Patient tolerated session well.  She was able to perform standing activities today.  Slight pain in the left leg with single leg stance trials; however, instructions for patient to lift through the stance leg decreased this pain.         The patient will continue to benefit from skilled outpatient physical therapy in order to address the deficits listed in the problem list on the initial evaluation, provide patient and family education, and maximize the patients level of independence in the home and community environments.     The patient's spiritual, cultural, and educational needs were considered, and the patient is agreeable to the plan of care and goals.           Plan:      Goals:   Active       Long Term Goals       Patient will demonstrate improved function as indicated by a score of greater than or equal to 39 out of 100 on FOTO.                Start:  06/16/25    Expected End:  09/08/25            Patient will improve strength to at least 4/5 grossly,  in order to improve functional independence and quality of life.         Start:  06/16/25    Expected End:  09/08/25            Patient will demonstrate improved gait mechanics in order to improve functional mobility, improve  quality of life, and decrease risk of further injury or fall.         Start:  06/16/25    Expected End:  09/08/25            Patient will perform at least 10 sit to stands in 30 seconds without UE support to demonstrate increased functional strength.        Start:  06/16/25    Expected End:  09/08/25            Patient will improve functional balance activities to stated goals, listed in objective measures above, in order to return to maximal functional potential and improve quality of life.        Start:  06/16/25    Expected End:  09/08/25               Short Term Goals       Pt will report worst pain of 5/10 in order to progress toward max functional ability and improve quality of life                Start:  06/16/25    Expected End:  07/28/25            Patient will improve strength by 1/2 a grade, in order to progress towards independence with functional activities.                Start:  06/16/25    Expected End:  07/28/25            Patient will correct postural deviations in sitting and standing, to decrease pain and promote long term stability.                 Start:  06/16/25    Expected End:  07/28/25            Patient will demonstrate independence with HEP in order to progress toward functional independence.        Start:  06/16/25    Expected End:  07/28/25                Jaye Pedroza, PT, DPT

## 2025-07-07 NOTE — PROGRESS NOTES
"  Outpatient Rehab    Physical Therapy Visit    Patient Name: Hiwot Coulter  MRN: [id number]  YOB: 1945  Encounter Date: 7/7/2025    Therapy Diagnosis:   Encounter Diagnoses   Name Primary?    Decreased strength, endurance, and mobility Yes    Abnormality of gait and mobility     Weakness of both lower extremities      Physician: Rob Pena MD    Physician Orders: Eval and Treat  Medical Diagnosis: Cervical myofascial pain syndrome  Myalgia of auxiliary muscles, head and neck  Surgical Diagnosis: Not applicable for this Episode   Surgical Date: Not applicable for this Episode  Days Since Last Surgery: Not applicable for this Episode    Visit # / Visits Authorized:  2 / 10  Insurance Authorization Period: 6/16/2025 to 8/10/2025  Date of Evaluation: 6/16/2025  Plan of Care Certification: 6/16/2025 to 9/14/2025      PT/PTA: PTA   Number of PTA visits since last PT visit:1  Time In: 0826   Time Out: 0922  Total Time (in minutes): 56   Total Billable Time (in minutes):  56    FOTO:  Intake Score:  %  Survey Score 2:  %  Survey Score 3:  %    Precautions:       Subjective   generally dose not do any exercises but stays busy through out her day. she reports doing some lowere abdominal exercises over the weekend. left hip pain woke pt and she took over the counter pain meds without relief..    left hip unable to rate pain level last night at its worst    Objective            Treatment:     CPT Intervention Performed   Today Duration / Intensity   MT         TE Nu-step X 8 minutes level 3                        NMR LAQ  3 x 10, bilateral LE     Ball squeezes x 3 x 10, 5 seconds holds     Sidelying clams x 2 x 10 each side   TA Abdominal bracing  3 x 10, 5" holds     Pelvic Tilts x 3 minutes 3" holds     Glute sets  with bridge 1 x 10, 3" holds     bridges x 3 minutes      Leg Press  x 3 x 10, 65#     Standing heel raises  X 2 minutes  light touch as needed     Standing toe raises  X 2 minutes  light " "touch as needed     Stance on foam with feet together  Eyes open, 30"       March in place  x 2 minutes no hands    Side to side steps x 2 minutes no hands    Tandem walking  x 2 minutes light touch as needed    Hip abduction  x 2 minutes each lower extremity standing hands as needed     Single leg stance  trials  X 3 x 30" each    PLAN Squat taps, standing hip 3-way, side stepping,   Step ups, Tandem stance, cone taps, hurdles, unstable surface, any balance activity      CPT Codes available for Billing:   (00) minutes of Manual therapy (MT) to improve pain and ROM.  (8) minutes of Therapeutic Exercise (TE) to develop strength, endurance, range of motion, and flexibility.  (13) minutes of Neuromuscular Re-Education (NMR)  to improve: Balance, Coordination, Kinesthetic, Sense, Proprioception, and Posture.  (35) minutes of Therapeutic Activities (TA) to improve functional performance.  Vasopneumatic Device Therapy () for management of swelling/edema. (18405)  Unattended Electrical Stimulation (ES) for muscle performance or pain modulation.  BFR: Blood flow restriction applied during exercise    Time Entry(in minutes):       Assessment & Plan   Assessment: Patient performed all standing progressions with no loss of balance but with upper extremity assistance for balance as necessary.        The patient will continue to benefit from skilled outpatient physical therapy in order to address the deficits listed in the problem list on the initial evaluation, provide patient and family education, and maximize the patients level of independence in the home and community environments.     The patient's spiritual, cultural, and educational needs were considered, and the patient is agreeable to the plan of care and goals.           Plan: continue with plan as established on evaluation    Goals:   Active       Long Term Goals       Patient will demonstrate improved function as indicated by a score of greater than or equal to 39 " out of 100 on FOTO.                Start:  06/16/25    Expected End:  09/08/25            Patient will improve strength to at least 4/5 grossly,  in order to improve functional independence and quality of life.         Start:  06/16/25    Expected End:  09/08/25            Patient will demonstrate improved gait mechanics in order to improve functional mobility, improve quality of life, and decrease risk of further injury or fall.         Start:  06/16/25    Expected End:  09/08/25            Patient will perform at least 10 sit to stands in 30 seconds without UE support to demonstrate increased functional strength.        Start:  06/16/25    Expected End:  09/08/25            Patient will improve functional balance activities to stated goals, listed in objective measures above, in order to return to maximal functional potential and improve quality of life.        Start:  06/16/25    Expected End:  09/08/25               Short Term Goals       Pt will report worst pain of 5/10 in order to progress toward max functional ability and improve quality of life                Start:  06/16/25    Expected End:  07/28/25            Patient will improve strength by 1/2 a grade, in order to progress towards independence with functional activities.                Start:  06/16/25    Expected End:  07/28/25            Patient will correct postural deviations in sitting and standing, to decrease pain and promote long term stability.                 Start:  06/16/25    Expected End:  07/28/25            Patient will demonstrate independence with HEP in order to progress toward functional independence.        Start:  06/16/25    Expected End:  07/28/25                Donis Alexandre, PTA

## 2025-07-10 ENCOUNTER — CLINICAL SUPPORT (OUTPATIENT)
Dept: REHABILITATION | Facility: HOSPITAL | Age: 80
End: 2025-07-10
Payer: MEDICARE

## 2025-07-10 DIAGNOSIS — R53.1 DECREASED STRENGTH, ENDURANCE, AND MOBILITY: Primary | ICD-10-CM

## 2025-07-10 DIAGNOSIS — R68.89 DECREASED STRENGTH, ENDURANCE, AND MOBILITY: Primary | ICD-10-CM

## 2025-07-10 DIAGNOSIS — Z74.09 DECREASED STRENGTH, ENDURANCE, AND MOBILITY: Primary | ICD-10-CM

## 2025-07-10 DIAGNOSIS — R29.898 WEAKNESS OF BOTH LOWER EXTREMITIES: ICD-10-CM

## 2025-07-10 DIAGNOSIS — R26.9 ABNORMALITY OF GAIT AND MOBILITY: ICD-10-CM

## 2025-07-10 PROCEDURE — 97112 NEUROMUSCULAR REEDUCATION: CPT | Mod: HCNC | Performed by: PHYSICAL THERAPIST

## 2025-07-10 PROCEDURE — 97530 THERAPEUTIC ACTIVITIES: CPT | Mod: HCNC | Performed by: PHYSICAL THERAPIST

## 2025-07-14 ENCOUNTER — CLINICAL SUPPORT (OUTPATIENT)
Dept: REHABILITATION | Facility: HOSPITAL | Age: 80
End: 2025-07-14
Attending: PHYSICAL THERAPIST
Payer: MEDICARE

## 2025-07-14 ENCOUNTER — PATIENT MESSAGE (OUTPATIENT)
Dept: INTERNAL MEDICINE | Facility: CLINIC | Age: 80
End: 2025-07-14
Payer: MEDICARE

## 2025-07-14 DIAGNOSIS — R68.89 DECREASED STRENGTH, ENDURANCE, AND MOBILITY: Primary | ICD-10-CM

## 2025-07-14 DIAGNOSIS — R29.898 WEAKNESS OF BOTH LOWER EXTREMITIES: ICD-10-CM

## 2025-07-14 DIAGNOSIS — R26.9 ABNORMALITY OF GAIT AND MOBILITY: ICD-10-CM

## 2025-07-14 DIAGNOSIS — R53.1 DECREASED STRENGTH, ENDURANCE, AND MOBILITY: Primary | ICD-10-CM

## 2025-07-14 DIAGNOSIS — Z74.09 DECREASED STRENGTH, ENDURANCE, AND MOBILITY: Primary | ICD-10-CM

## 2025-07-14 PROCEDURE — 97530 THERAPEUTIC ACTIVITIES: CPT | Mod: HCNC | Performed by: PHYSICAL THERAPIST

## 2025-07-14 NOTE — PROGRESS NOTES
"  Outpatient Rehab    Physical Therapy Visit    Patient Name: Hiwot Coulter  MRN: [id number]  YOB: 1945  Encounter Date: 7/10/2025    Therapy Diagnosis:   Encounter Diagnoses   Name Primary?    Decreased strength, endurance, and mobility Yes    Abnormality of gait and mobility     Weakness of both lower extremities      Physician: Rob Pena MD    Physician Orders: Eval and Treat  Medical Diagnosis: Cervical myofascial pain syndrome  Myalgia of auxiliary muscles, head and neck  Surgical Diagnosis: Not applicable for this Episode   Surgical Date: Not applicable for this Episode  Days Since Last Surgery: Not applicable for this Episode    Visit # / Visits Authorized:  4 / 10  Insurance Authorization Period: 6/16/2025 to 8/10/2025  Date of Evaluation: 6/16/2025  Plan of Care Certification: 6/16/2025 to 9/14/2025      PT/PTA:     Number of PTA visits since last PT visit:   Time In: 1524   Time Out: 1620  Total Time (in minutes): 56   Total Billable Time (in minutes):  40    FOTO:  Intake Score:  %  Survey Score 2:  %  Survey Score 3:  %    Precautions:       Subjective   Patient reports feeling okay today. No new complaints, and she would like to continue to be challenged..  Pain reported as 3/10. left hip pain    Objective            Treatment:       Intervention Performed   Today Duration / Intensity   MT         TE Nu-step for strength and endurnace X 8 minutes, level PRN     Figure 4 stretch x 3 x 20" holds bilateral LE             NMR LAQ   3 x 10, bilateral LE     Ball squeezes x 3 x 10, 3" holds     Sidelying clams x 2 x 10 each side   TA Abdominal bracing   3 x 10, 5" holds     Pelvic Tilts x 3 x 10, 3" holds; 3 minutes     bridges x 3 x 10     Leg Press  x 3 x 10, 65#     Standing heel raises  X 2 minutes      Standing toe raises  x 2 minutes      Stance on foam with feet together x Eyes open, 30"        March in place x 2 minutes no hands     Side to side stepping   2 minutes no hands     " "Tandem walking x 2 minutes light touch as needed     Standing hip 3-way x 20x each LE, 2" step     Sls trials  X 3 x 30" each    PLAN Squat taps, standing hip 3-way, standing heel raises, side stepping,   Step ups, Tandem stance, cone taps, hurdles, unstable surface, any balance activity          CPT Codes available for Billing:   (00) minutes of Manual therapy (MT) to improve pain and ROM.  (03) minutes of Therapeutic Exercise (TE) to develop strength, endurance, range of motion, and flexibility.  (10) minutes of Neuromuscular Re-Education (NMR)  to improve: Balance, Coordination, Kinesthetic, Sense, Proprioception, and Posture.  (27) minutes of Therapeutic Activities (TA) to improve functional performance.         Assessment & Plan   Assessment: Patient tolerated treatment well. She completed exercises with only minimal difficulty and without complaint.  Minimal cues required throughout exercises in order for patient to maintain proper form. Figure 4 stretch added to address hip pain, and patient reported good relief following. Standing hip 3-way added for additional functional hip strengthening.        The patient will continue to benefit from skilled outpatient physical therapy in order to address the deficits listed in the problem list on the initial evaluation, provide patient and family education, and maximize the patients level of independence in the home and community environments.     The patient's spiritual, cultural, and educational needs were considered, and the patient is agreeable to the plan of care and goals.           Plan: Continue Plan of Care (POC) and progress per patient tolerance. See treatment section for details on planned progressions next session.    Goals:   Active       Long Term Goals       Patient will demonstrate improved function as indicated by a score of greater than or equal to 39 out of 100 on FOTO.                Start:  06/16/25    Expected End:  09/08/25            Patient " will improve strength to at least 4/5 grossly,  in order to improve functional independence and quality of life.         Start:  06/16/25    Expected End:  09/08/25            Patient will demonstrate improved gait mechanics in order to improve functional mobility, improve quality of life, and decrease risk of further injury or fall.         Start:  06/16/25    Expected End:  09/08/25            Patient will perform at least 10 sit to stands in 30 seconds without UE support to demonstrate increased functional strength.        Start:  06/16/25    Expected End:  09/08/25            Patient will improve functional balance activities to stated goals, listed in objective measures above, in order to return to maximal functional potential and improve quality of life.        Start:  06/16/25    Expected End:  09/08/25               Short Term Goals       Pt will report worst pain of 5/10 in order to progress toward max functional ability and improve quality of life                Start:  06/16/25    Expected End:  07/28/25            Patient will improve strength by 1/2 a grade, in order to progress towards independence with functional activities.                Start:  06/16/25    Expected End:  07/28/25            Patient will correct postural deviations in sitting and standing, to decrease pain and promote long term stability.                 Start:  06/16/25    Expected End:  07/28/25            Patient will demonstrate independence with HEP in order to progress toward functional independence.        Start:  06/16/25    Expected End:  07/28/25                Katie Gomez, PT, DPT

## 2025-07-15 NOTE — PROGRESS NOTES
"  Outpatient Rehab    Physical Therapy Visit    Patient Name: Hiwot Coulter  MRN: [id number]  YOB: 1945  Encounter Date: 7/14/2025    Therapy Diagnosis:   Encounter Diagnoses   Name Primary?    Decreased strength, endurance, and mobility Yes    Abnormality of gait and mobility     Weakness of both lower extremities      Physician: Rob Pena MD    Physician Orders: Eval and Treat  Medical Diagnosis: Cervical myofascial pain syndrome  Myalgia of auxiliary muscles, head and neck  Surgical Diagnosis: Not applicable for this Episode   Surgical Date: Not applicable for this Episode  Days Since Last Surgery: Not applicable for this Episode    Visit # / Visits Authorized:  4 / 10  Insurance Authorization Period: 6/16/2025 to 8/10/2025  Date of Evaluation: 6/16/2025  Plan of Care Certification: 6/16/2025 to 9/14/2025      PT/PTA:     Number of PTA visits since last PT visit:   Time In: 0830   Time Out: 0930  Total Time (in minutes): 60   Total Billable Time (in minutes):  30    FOTO:  Intake Score:  %  Survey Score 2:  %  Survey Score 3:  %    Precautions:       Subjective   Patient reports that her left hip is still bothering her some, but no new complaints..  Pain reported as 4/10. left hip pain    Objective            Treatment:       Intervention Performed   Today Duration / Intensity   MT Soft Tissue Mobilization x Right hip   TE Nu-step for strength and endurnace   8 minutes, level PRN     Elliptical  x 4'/4'     Figure 4 stretch x 3 x 20" holds bilateral LE             NMR LAQ   3 x 10, bilateral LE     Ball squeezes x 3 x 10, 3" holds     Sidelying clams x 2 x 10 each side   TA Abdominal bracing   3 x 10, 5" holds     Pelvic Tilts x 3 x 10, 3" holds; 3 minutes     bridges x 3 x 10     Leg Press  x 3 x 10, 65#     Standing heel raises  x 2 minutes      Standing toe raises  x 2 minutes      Stance on foam - tandem stance x 30" x 2 each foot position         March in place x 2 minutes no hands     " "Side to side stepping pink loop x 2 minutes no hands     Tandem walking x 2 minutes, orange beam     Standing hip 3-way x 20x each LE, 2" step     Sls trials  x 3 x 30" each      Squat taps x 2 x 10, 24" box   PLAN Squat taps, standing hip 3-way, standing heel raises, side stepping,   Step ups, Tandem stance, cone taps, hurdles, unstable surface, any balance activity          CPT Codes available for Billing:   (03) minutes of Manual therapy (MT) to improve pain and ROM.  (00) minutes of Therapeutic Exercise (TE) to develop strength, endurance, range of motion, and flexibility.  (00) minutes of Neuromuscular Re-Education (NMR)  to improve: Balance, Coordination, Kinesthetic, Sense, Proprioception, and Posture.  (27) minutes of Therapeutic Activities (TA) to improve functional performance.         Assessment & Plan   Assessment: Patient tolerated treatment well and without complaint. She was able to be progressed again this visit for additional balance skills and functional strengthening. Patient did well with progressions, and she responded well to STM this visit. She reported relief in right hip pain following manual therapy.        The patient will continue to benefit from skilled outpatient physical therapy in order to address the deficits listed in the problem list on the initial evaluation, provide patient and family education, and maximize the patients level of independence in the home and community environments.     The patient's spiritual, cultural, and educational needs were considered, and the patient is agreeable to the plan of care and goals.           Plan: Continue Plan of Care (POC) and progress per patient tolerance. See treatment section for details on planned progressions next session.    Goals:   Active       Long Term Goals       Patient will demonstrate improved function as indicated by a score of greater than or equal to 39 out of 100 on FOTO.                Start:  06/16/25    Expected End:  " 09/08/25            Patient will improve strength to at least 4/5 grossly,  in order to improve functional independence and quality of life.         Start:  06/16/25    Expected End:  09/08/25            Patient will demonstrate improved gait mechanics in order to improve functional mobility, improve quality of life, and decrease risk of further injury or fall.         Start:  06/16/25    Expected End:  09/08/25            Patient will perform at least 10 sit to stands in 30 seconds without UE support to demonstrate increased functional strength.        Start:  06/16/25    Expected End:  09/08/25            Patient will improve functional balance activities to stated goals, listed in objective measures above, in order to return to maximal functional potential and improve quality of life.        Start:  06/16/25    Expected End:  09/08/25               Short Term Goals       Pt will report worst pain of 5/10 in order to progress toward max functional ability and improve quality of life                Start:  06/16/25    Expected End:  07/28/25            Patient will improve strength by 1/2 a grade, in order to progress towards independence with functional activities.                Start:  06/16/25    Expected End:  07/28/25            Patient will correct postural deviations in sitting and standing, to decrease pain and promote long term stability.                 Start:  06/16/25    Expected End:  07/28/25            Patient will demonstrate independence with HEP in order to progress toward functional independence.        Start:  06/16/25    Expected End:  07/28/25                Katie Gomez, PT, DPT

## 2025-07-17 ENCOUNTER — CLINICAL SUPPORT (OUTPATIENT)
Dept: REHABILITATION | Facility: HOSPITAL | Age: 80
End: 2025-07-17
Payer: MEDICARE

## 2025-07-17 DIAGNOSIS — R68.89 DECREASED STRENGTH, ENDURANCE, AND MOBILITY: Primary | ICD-10-CM

## 2025-07-17 DIAGNOSIS — R53.1 DECREASED STRENGTH, ENDURANCE, AND MOBILITY: Primary | ICD-10-CM

## 2025-07-17 DIAGNOSIS — R26.9 ABNORMALITY OF GAIT AND MOBILITY: ICD-10-CM

## 2025-07-17 DIAGNOSIS — R29.898 WEAKNESS OF BOTH LOWER EXTREMITIES: ICD-10-CM

## 2025-07-17 DIAGNOSIS — Z74.09 DECREASED STRENGTH, ENDURANCE, AND MOBILITY: Primary | ICD-10-CM

## 2025-07-17 PROCEDURE — 97112 NEUROMUSCULAR REEDUCATION: CPT | Mod: HCNC | Performed by: PHYSICAL THERAPIST

## 2025-07-17 PROCEDURE — 97110 THERAPEUTIC EXERCISES: CPT | Mod: HCNC | Performed by: PHYSICAL THERAPIST

## 2025-07-17 PROCEDURE — 97530 THERAPEUTIC ACTIVITIES: CPT | Mod: HCNC | Performed by: PHYSICAL THERAPIST

## 2025-07-17 NOTE — PROGRESS NOTES
"  Outpatient Rehab    Physical Therapy Visit    Patient Name: Hiwot Coulter  MRN: [id number]  YOB: 1945  Encounter Date: 7/17/2025    Therapy Diagnosis:   Encounter Diagnoses   Name Primary?    Decreased strength, endurance, and mobility Yes    Abnormality of gait and mobility     Weakness of both lower extremities      Physician: Rob Pena MD    Physician Orders: Eval and Treat  Medical Diagnosis: Cervical myofascial pain syndrome  Myalgia of auxiliary muscles, head and neck  Surgical Diagnosis: Not applicable for this Episode   Surgical Date: Not applicable for this Episode  Days Since Last Surgery: Not applicable for this Episode    Visit # / Visits Authorized:  5 / 10 (+eval)  Insurance Authorization Period: 6/16/2025 to 8/10/2025  Date of Evaluation: 6/16/2025  Plan of Care Certification: 6/16/2025 to 9/14/2025      PT/PTA:     Number of PTA visits since last PT visit:   Time In: 1536   Time Out: 1640  Total Time (in minutes): 64   Total Billable Time (in minutes):  60    FOTO:  Intake Score:  %  Survey Score 2:  %  Survey Score 3:  %         Subjective   Patient reports that she felt good following last visit, but her left hip pain is still there. She got relief from manual therapy for a bit, but it returned. It hurts when she walks..  Pain reported as 4/10. left hip pain    Objective            Treatment:       Intervention Performed   Today Duration / Intensity   MT Soft Tissue Mobilization x Right gluteal and piriformis musculature   TE Nu-step for strength and endurnace   8 minutes, level PRN     Elliptical  x 4'/4'     Figure 4 stretch x 3 x 20" holds bilateral LE             NMR LAQ   3 x 10, bilateral LE     Ball squeezes x 3', 3" holds on and off     Sidelying clams x 2' each side   TA Pelvic Tilts with blood pressure cuff feedback x 3', 10" holds on and off     bridges x 3'     Leg Press  x 3 x 10, 65#     Standing heel raises  x 2 minutes      Standing toe raises  x 2 minutes  " "    March in place x 2 minutes no hands     Side to side stepping pink loop x Down and back 3x along mirrors in turf     Tandem walking x 2 minutes, (airex beam next visit)     Standing hip 3-way x 20x each LE, 2" step     Sls trials  x 3 x 30" each      Squat taps x 2 x 10, 24" box     Step over hurdles & cone taps x Attempted but stopped after a couple of repetitions of each due to activities not being challenging enough. Will progress next visit.   PLAN  standing hip 3-way   Step ups, Tandem stance, cone taps, hurdles, unstable surface, any balance activity          CPT Codes available for Billing:   (08) minutes of Manual therapy (MT) to improve pain and ROM.  (12) minutes of Therapeutic Exercise (TE) to develop strength, endurance, range of motion, and flexibility.  (08) minutes of Neuromuscular Re-Education (NMR)  to improve: Balance, Coordination, Kinesthetic, Sense, Proprioception, and Posture.  (32) minutes of Therapeutic Activities (TA) to improve functional performance.         Assessment & Plan   Assessment: Patient tolerated treatment very well. She was able to be progressed for additional balance and functional strengthening. Added blood pressure cuff feedback with pelvic tilts this visit to help cue patient to use proper form and keep core active. She did will with new activity. Attempted to progress balance with step over hurdles and taps, but activities did not challenge patient enough. She would benefit from additional balance activities in future visits.        The patient will continue to benefit from skilled outpatient physical therapy in order to address the deficits listed in the problem list on the initial evaluation, provide patient and family education, and maximize the patients level of independence in the home and community environments.     The patient's spiritual, cultural, and educational needs were considered, and the patient is agreeable to the plan of care and goals.       "     Plan: Continue Plan of Care (POC) and progress per patient tolerance. See treatment section for details on planned progressions next session.    Goals:   Active       Long Term Goals       Patient will demonstrate improved function as indicated by a score of greater than or equal to 39 out of 100 on FOTO.                Start:  06/16/25    Expected End:  09/08/25            Patient will improve strength to at least 4/5 grossly,  in order to improve functional independence and quality of life.         Start:  06/16/25    Expected End:  09/08/25            Patient will demonstrate improved gait mechanics in order to improve functional mobility, improve quality of life, and decrease risk of further injury or fall.         Start:  06/16/25    Expected End:  09/08/25            Patient will perform at least 10 sit to stands in 30 seconds without UE support to demonstrate increased functional strength.        Start:  06/16/25    Expected End:  09/08/25            Patient will improve functional balance activities to stated goals, listed in objective measures above, in order to return to maximal functional potential and improve quality of life.        Start:  06/16/25    Expected End:  09/08/25               Short Term Goals       Pt will report worst pain of 5/10 in order to progress toward max functional ability and improve quality of life                Start:  06/16/25    Expected End:  07/28/25            Patient will improve strength by 1/2 a grade, in order to progress towards independence with functional activities.                Start:  06/16/25    Expected End:  07/28/25            Patient will correct postural deviations in sitting and standing, to decrease pain and promote long term stability.                 Start:  06/16/25    Expected End:  07/28/25            Patient will demonstrate independence with HEP in order to progress toward functional independence.        Start:  06/16/25    Expected End:   07/28/25                Katie Gomez, PT, DPT

## 2025-07-18 ENCOUNTER — E-VISIT (OUTPATIENT)
Dept: INTERNAL MEDICINE | Facility: CLINIC | Age: 80
End: 2025-07-18
Payer: MEDICARE

## 2025-07-18 DIAGNOSIS — J06.9 UPPER RESPIRATORY TRACT INFECTION, UNSPECIFIED TYPE: Primary | ICD-10-CM

## 2025-07-21 ENCOUNTER — CLINICAL SUPPORT (OUTPATIENT)
Dept: REHABILITATION | Facility: HOSPITAL | Age: 80
End: 2025-07-21
Attending: PHYSICAL THERAPIST
Payer: MEDICARE

## 2025-07-21 DIAGNOSIS — R26.9 ABNORMALITY OF GAIT AND MOBILITY: ICD-10-CM

## 2025-07-21 DIAGNOSIS — R53.1 DECREASED STRENGTH, ENDURANCE, AND MOBILITY: Primary | ICD-10-CM

## 2025-07-21 DIAGNOSIS — R68.89 DECREASED STRENGTH, ENDURANCE, AND MOBILITY: Primary | ICD-10-CM

## 2025-07-21 DIAGNOSIS — R29.898 WEAKNESS OF BOTH LOWER EXTREMITIES: ICD-10-CM

## 2025-07-21 DIAGNOSIS — Z74.09 DECREASED STRENGTH, ENDURANCE, AND MOBILITY: Primary | ICD-10-CM

## 2025-07-21 PROCEDURE — 97530 THERAPEUTIC ACTIVITIES: CPT | Mod: HCNC | Performed by: PHYSICAL THERAPIST

## 2025-07-24 ENCOUNTER — CLINICAL SUPPORT (OUTPATIENT)
Dept: REHABILITATION | Facility: HOSPITAL | Age: 80
End: 2025-07-24
Attending: PHYSICAL THERAPIST
Payer: MEDICARE

## 2025-07-24 DIAGNOSIS — R68.89 DECREASED STRENGTH, ENDURANCE, AND MOBILITY: Primary | ICD-10-CM

## 2025-07-24 DIAGNOSIS — R29.898 WEAKNESS OF BOTH LOWER EXTREMITIES: ICD-10-CM

## 2025-07-24 DIAGNOSIS — Z74.09 DECREASED STRENGTH, ENDURANCE, AND MOBILITY: Primary | ICD-10-CM

## 2025-07-24 DIAGNOSIS — R53.1 DECREASED STRENGTH, ENDURANCE, AND MOBILITY: Primary | ICD-10-CM

## 2025-07-24 DIAGNOSIS — R26.9 ABNORMALITY OF GAIT AND MOBILITY: ICD-10-CM

## 2025-07-24 PROCEDURE — 97110 THERAPEUTIC EXERCISES: CPT | Mod: HCNC | Performed by: PHYSICAL THERAPIST

## 2025-07-24 PROCEDURE — 97530 THERAPEUTIC ACTIVITIES: CPT | Mod: HCNC | Performed by: PHYSICAL THERAPIST

## 2025-07-24 PROCEDURE — 97140 MANUAL THERAPY 1/> REGIONS: CPT | Mod: HCNC | Performed by: PHYSICAL THERAPIST

## 2025-07-24 NOTE — PROGRESS NOTES
"  Outpatient Rehab    Physical Therapy Visit    Patient Name: Hiwot Coulter  MRN: [id number]  YOB: 1945  Encounter Date: 7/21/2025    Therapy Diagnosis:   Encounter Diagnoses   Name Primary?    Decreased strength, endurance, and mobility Yes    Abnormality of gait and mobility     Weakness of both lower extremities      Physician: Rob Pena MD    Physician Orders: Eval and Treat  Medical Diagnosis: Cervical myofascial pain syndrome  Myalgia of auxiliary muscles, head and neck  Surgical Diagnosis: Not applicable for this Episode   Surgical Date: Not applicable for this Episode  Days Since Last Surgery: Not applicable for this Episode    Visit # / Visits Authorized:  7 / 10 (+eval)  Insurance Authorization Period: 6/16/2025 to 8/10/2025  Date of Evaluation: 6/16/2025  Plan of Care Certification: 6/16/2025 to 9/14/2025      PT/PTA:     Number of PTA visits since last PT visit:   Time In: 1136   Time Out: 1228  Total Time (in minutes): 52   Total Billable Time (in minutes):  25    FOTO:  Intake Score:  %  Survey Score 2:  %  Survey Score 3:  %         Subjective   Patient reports no new complaints. She had a good weekend, but her leftt hip is still painful..  Pain reported as 4/10. left hip pain    Objective            Treatment:       Intervention Performed   Today Duration / Intensity   MT Soft Tissue Mobilization   Right gluteal and piriformis musculature   TE Nu-step for strength and endurance   8 minutes, level PRN     Elliptical  x 4'/4'     Figure 4 stretch x 3 x 20" holds bilateral LE             NMR LAQ   3 x 10, bilateral LE     Ball squeezes x 3', 3" holds on and off     Sidelying clams x 2' each side   TA Pelvic Tilts with blood pressure cuff feedback x 3', 10" holds on and off     bridges x 3'     Leg Press  x 3 x 10, 65#     Standing heel raises  x 2 minutes      Standing toe raises  x 2 minutes      March in place x 2 minutes no hands     Side to side stepping pink loop x Down and " "back 3x along mirrors in turf     Tandem walking x 2 minutes, airex beam     Standing hip 3-way x 20x each LE, 2" step     Sls trials  x 3 x 30" each      Squat taps x 2 x 10, 24" box             PLAN  standing hip 3-way   Step ups, Tandem stance, cone taps, hurdles, unstable surface, any balance activity          CPT Codes available for Billing:   (00) minutes of Manual therapy (MT) to improve pain and ROM.  (00) minutes of Therapeutic Exercise (TE) to develop strength, endurance, range of motion, and flexibility.  (00) minutes of Neuromuscular Re-Education (NMR)  to improve: Balance, Coordination, Kinesthetic, Sense, Proprioception, and Posture.  (25) minutes of Therapeutic Activities (TA) to improve functional performance.         Assessment & Plan   Assessment: Patient tolerated treatment well. She completed exercises with less difficulty as compared to previous visits. Patient was able to be progressed challenged with tandem walking on unstable surface (ariex foam). Will resume manual therapy and progress balance activities next visit.        The patient will continue to benefit from skilled outpatient physical therapy in order to address the deficits listed in the problem list on the initial evaluation, provide patient and family education, and maximize the patients level of independence in the home and community environments.     The patient's spiritual, cultural, and educational needs were considered, and the patient is agreeable to the plan of care and goals.           Plan: Continue Plan of Care (POC) and progress per patient tolerance. See treatment section for details on planned progressions next session.    Goals:   Active       Long Term Goals       Patient will demonstrate improved function as indicated by a score of greater than or equal to 39 out of 100 on FOTO.                Start:  06/16/25    Expected End:  09/08/25            Patient will improve strength to at least 4/5 grossly,  in order to " improve functional independence and quality of life.         Start:  06/16/25    Expected End:  09/08/25            Patient will demonstrate improved gait mechanics in order to improve functional mobility, improve quality of life, and decrease risk of further injury or fall.         Start:  06/16/25    Expected End:  09/08/25            Patient will perform at least 10 sit to stands in 30 seconds without UE support to demonstrate increased functional strength.        Start:  06/16/25    Expected End:  09/08/25            Patient will improve functional balance activities to stated goals, listed in objective measures above, in order to return to maximal functional potential and improve quality of life.        Start:  06/16/25    Expected End:  09/08/25               Short Term Goals       Pt will report worst pain of 5/10 in order to progress toward max functional ability and improve quality of life                Start:  06/16/25    Expected End:  07/28/25            Patient will improve strength by 1/2 a grade, in order to progress towards independence with functional activities.                Start:  06/16/25    Expected End:  07/28/25            Patient will correct postural deviations in sitting and standing, to decrease pain and promote long term stability.                 Start:  06/16/25    Expected End:  07/28/25            Patient will demonstrate independence with HEP in order to progress toward functional independence.        Start:  06/16/25    Expected End:  07/28/25                Katie Gomez, PT, DPT

## 2025-07-28 ENCOUNTER — CLINICAL SUPPORT (OUTPATIENT)
Dept: REHABILITATION | Facility: HOSPITAL | Age: 80
End: 2025-07-28
Payer: MEDICARE

## 2025-07-28 DIAGNOSIS — Z74.09 DECREASED STRENGTH, ENDURANCE, AND MOBILITY: Primary | ICD-10-CM

## 2025-07-28 DIAGNOSIS — R68.89 DECREASED STRENGTH, ENDURANCE, AND MOBILITY: Primary | ICD-10-CM

## 2025-07-28 DIAGNOSIS — R53.1 DECREASED STRENGTH, ENDURANCE, AND MOBILITY: Primary | ICD-10-CM

## 2025-07-28 DIAGNOSIS — R29.898 WEAKNESS OF BOTH LOWER EXTREMITIES: ICD-10-CM

## 2025-07-28 DIAGNOSIS — R26.9 ABNORMALITY OF GAIT AND MOBILITY: ICD-10-CM

## 2025-07-28 PROCEDURE — 97110 THERAPEUTIC EXERCISES: CPT | Mod: HCNC | Performed by: PHYSICAL THERAPIST

## 2025-07-28 PROCEDURE — 97530 THERAPEUTIC ACTIVITIES: CPT | Mod: HCNC | Performed by: PHYSICAL THERAPIST

## 2025-07-28 NOTE — PROGRESS NOTES
"  Outpatient Rehab    Physical Therapy Visit    Patient Name: Hiwot Coulter  MRN: [id number]  YOB: 1945  Encounter Date: 7/24/2025    Therapy Diagnosis:   Encounter Diagnoses   Name Primary?    Decreased strength, endurance, and mobility Yes    Abnormality of gait and mobility     Weakness of both lower extremities      Physician: Rob Pena MD    Physician Orders: Eval and Treat  Medical Diagnosis: Cervical myofascial pain syndrome  Myalgia of auxiliary muscles, head and neck  Surgical Diagnosis: Not applicable for this Episode   Surgical Date: Not applicable for this Episode  Days Since Last Surgery: Not applicable for this Episode    Visit # / Visits Authorized:  7 / 10 (+eval)  Insurance Authorization Period: 6/16/2025 to 8/10/2025  Date of Evaluation: 6/16/2025  Plan of Care Certification: 6/16/2025 to 9/14/2025      PT/PTA:     Number of PTA visits since last PT visit:   Time In:     Time Out:    Total Time (in minutes):     Total Billable Time (in minutes):  25    FOTO:  Intake Score:  %  Survey Score 2:  %  Survey Score 3:  %         Subjective             Objective            Treatment:     CPT Intervention Performed   Today Duration / Intensity   MT Soft Tissue Mobilization x Right gluteal and piriformis musculature   TE Nu-step for strength and endurance   8 minutes, level PRN     Elliptical  x 4'/4'     Figure 4 stretch x 3 x 20" holds bilateral LE             NMR LAQ   3 x 10, bilateral LE     Ball squeezes x 3', 3" holds on and off     Sidelying clams x 2' each side with YTB    TA Pelvic Tilts with blood pressure cuff feedback x 3', 10" holds on and off     bridges x 3'     Leg Press    3 x 10, 65#     Standing heel raises    2 minutes      Standing alternating toe/heel raises  x 1 Minutes  x 2 no hands      March in place   2 minutes no hands     Heel walking  X 8 laps llbars      Side to side stepping pink loop   Down and back 3x along mirrors in turf     Tandem walking x 6 " "lengths, airex beam x 2     Toe walking X 6 length, airex beam x 2      Standing hip 3-way   20x each LE, 2" step     Sls trials on blue foam  x 3 x 30" each      Squat taps   2 x 10, 24" box     Step over hurdles & cone taps   Attempted but stopped after a couple of repetitions of each due to activities not being challenging enough. Will progress next visit.     4 point touch with single leg stance  x 4 Minutes alternating legs    PLAN  standing hip 3-way   Step ups, Tandem stance, cone taps, hurdles, unstable surface, any balance activity          CPT Codes available for Billing:   (08) minutes of Manual therapy (MT) to improve pain and ROM.  (10) minutes of Therapeutic Exercise (TE) to develop strength, endurance, range of motion, and flexibility.  (07) minutes of Neuromuscular Re-Education (NMR)  to improve: Balance, Coordination, Kinesthetic, Sense, Proprioception, and Posture.  (27) minutes of Therapeutic Activities (TA) to improve functional performance.         Assessment & Plan   Assessment:         The patient will continue to benefit from skilled outpatient physical therapy in order to address the deficits listed in the problem list on the initial evaluation, provide patient and family education, and maximize the patients level of independence in the home and community environments.     The patient's spiritual, cultural, and educational needs were considered, and the patient is agreeable to the plan of care and goals.           Plan:      Goals:   Active       Long Term Goals       Patient will demonstrate improved function as indicated by a score of greater than or equal to 39 out of 100 on FOTO.                Start:  06/16/25    Expected End:  09/08/25            Patient will improve strength to at least 4/5 grossly,  in order to improve functional independence and quality of life.         Start:  06/16/25    Expected End:  09/08/25            Patient will demonstrate improved gait mechanics in order " to improve functional mobility, improve quality of life, and decrease risk of further injury or fall.         Start:  06/16/25    Expected End:  09/08/25            Patient will perform at least 10 sit to stands in 30 seconds without UE support to demonstrate increased functional strength.        Start:  06/16/25    Expected End:  09/08/25            Patient will improve functional balance activities to stated goals, listed in objective measures above, in order to return to maximal functional potential and improve quality of life.        Start:  06/16/25    Expected End:  09/08/25               Short Term Goals       Pt will report worst pain of 5/10 in order to progress toward max functional ability and improve quality of life                Start:  06/16/25    Expected End:  07/28/25            Patient will improve strength by 1/2 a grade, in order to progress towards independence with functional activities.                Start:  06/16/25    Expected End:  07/28/25            Patient will correct postural deviations in sitting and standing, to decrease pain and promote long term stability.                 Start:  06/16/25    Expected End:  07/28/25            Patient will demonstrate independence with HEP in order to progress toward functional independence.        Start:  06/16/25    Expected End:  07/28/25                Jaye Pedroza, PT, DPT

## 2025-07-28 NOTE — PROGRESS NOTES
"  Outpatient Rehab    Physical Therapy Visit    Patient Name: Hiwot Coulter  MRN: [id number]  YOB: 1945  Encounter Date: 7/28/2025    Therapy Diagnosis:   Encounter Diagnoses   Name Primary?    Decreased strength, endurance, and mobility Yes    Abnormality of gait and mobility     Weakness of both lower extremities      Physician: Rob Pena MD    Physician Orders: Eval and Treat  Medical Diagnosis: Cervical myofascial pain syndrome  Myalgia of auxiliary muscles, head and neck  Surgical Diagnosis: Not applicable for this Episode   Surgical Date: Not applicable for this Episode  Days Since Last Surgery: Not applicable for this Episode    Visit # / Visits Authorized:  8 / 10 (+eval)  Insurance Authorization Period: 6/16/2025 to 8/10/2025  Date of Evaluation: 6/16/2025  Plan of Care Certification: 6/16/2025 to 9/14/2025      PT/PTA:     Number of PTA visits since last PT visit:   Time In: 0833   Time Out: 0901  Total Time (in minutes): 28   Total Billable Time (in minutes):  28    FOTO:  Intake Score:  %  Survey Score 2:  %  Survey Score 3:  %         Subjective   The sciatica pain is still there but a little better than last week. She did find the new exercises last visit were a good challenge..  Pain reported as 3/10. left hip pain    Objective            Treatment:       Intervention Performed   Today Duration / Intensity   MT Soft Tissue Mobilization x Right gluteal and piriformis musculature   TE Nu-step for strength and endurance   8 minutes, level PRN     Elliptical  x 4'/4'     Figure 4 stretch   3 x 20" holds bilateral LE   NMR LAQ   3 x 10, bilateral LE     Ball squeezes   3', 3" holds on and off     Sidelying clams   2' each side with YTB    TA Pelvic Tilts with blood pressure cuff feedback   3', 10" holds on and off     bridges   3'     Leg Press    3 x 10, 65#     Standing heel raises    2 minutes      Standing alternating toe/heel raises  x 1 Minutes x 2 no hands      March in place  " " 2 minutes no hands     Heel walking  x 8 laps llbars      Side to side stepping pink loop   Down and back 3x along mirrors in turf     Tandem walking x 6 lengths, airex beam x 2     Toe walking x 6 length, airex beam x 2      Standing hip 3-way   20x each LE, 2" step     Sls trials on blue foam    3 x 30" each      Squat taps   2 x 10, 24" box     4 point touch with single leg stance  x 4 Minutes alternating legs    PLAN  standing hip 3-way   Step ups, Tandem stance, cone taps, hurdles, unstable surface, any balance activity          CPT Codes available for Billing:   (05) minutes of Manual therapy (MT) to improve pain and ROM.  (08) minutes of Therapeutic Exercise (TE) to develop strength, endurance, range of motion, and flexibility.  (00) minutes of Neuromuscular Re-Education (NMR)  to improve: Balance, Coordination, Kinesthetic, Sense, Proprioception, and Posture.  (15) minutes of Therapeutic Activities (TA) to improve functional performance.         Assessment & Plan   Assessment: Patient tolerated treatment very well. Good tolerance to exercise progressions from last visit, with improved balance noted. Patient still limited by left hip pain with functional single leg activities. She reported good relief following manual therapy, but the relief is typically short lived and not long term. She will be reaching out to her MD about the lingering pain. Patient had to leave early this morning to bring her nephew to an appointment, so treatment was cut short. She is appropriate to resume full therex routine next visit.       The patient will continue to benefit from skilled outpatient physical therapy in order to address the deficits listed in the problem list on the initial evaluation, provide patient and family education, and maximize the patients level of independence in the home and community environments.     The patient's spiritual, cultural, and educational needs were considered, and the patient is agreeable " to the plan of care and goals.           Plan: Continue Plan of Care (POC) and progress per patient tolerance. See treatment section for details on planned progressions next session.    Goals:   Active       Long Term Goals       Patient will demonstrate improved function as indicated by a score of greater than or equal to 39 out of 100 on FOTO.                Start:  06/16/25    Expected End:  09/08/25            Patient will improve strength to at least 4/5 grossly,  in order to improve functional independence and quality of life.         Start:  06/16/25    Expected End:  09/08/25            Patient will demonstrate improved gait mechanics in order to improve functional mobility, improve quality of life, and decrease risk of further injury or fall.         Start:  06/16/25    Expected End:  09/08/25            Patient will perform at least 10 sit to stands in 30 seconds without UE support to demonstrate increased functional strength.        Start:  06/16/25    Expected End:  09/08/25            Patient will improve functional balance activities to stated goals, listed in objective measures above, in order to return to maximal functional potential and improve quality of life.        Start:  06/16/25    Expected End:  09/08/25               Short Term Goals       Pt will report worst pain of 5/10 in order to progress toward max functional ability and improve quality of life                Start:  06/16/25    Expected End:  07/28/25            Patient will improve strength by 1/2 a grade, in order to progress towards independence with functional activities.                Start:  06/16/25    Expected End:  07/28/25            Patient will correct postural deviations in sitting and standing, to decrease pain and promote long term stability.                 Start:  06/16/25    Expected End:  07/28/25            Patient will demonstrate independence with HEP in order to progress toward functional independence.         Start:  06/16/25    Expected End:  07/28/25                Katie Gomez PT, DPT

## 2025-07-30 ENCOUNTER — PATIENT MESSAGE (OUTPATIENT)
Dept: OPHTHALMOLOGY | Facility: CLINIC | Age: 80
End: 2025-07-30
Payer: MEDICARE

## 2025-07-30 ENCOUNTER — OFFICE VISIT (OUTPATIENT)
Dept: INTERNAL MEDICINE | Facility: CLINIC | Age: 80
End: 2025-07-30
Payer: MEDICARE

## 2025-07-30 ENCOUNTER — LAB VISIT (OUTPATIENT)
Dept: LAB | Facility: HOSPITAL | Age: 80
End: 2025-07-30
Attending: PEDIATRICS
Payer: MEDICARE

## 2025-07-30 VITALS
SYSTOLIC BLOOD PRESSURE: 132 MMHG | OXYGEN SATURATION: 99 % | HEART RATE: 66 BPM | DIASTOLIC BLOOD PRESSURE: 64 MMHG | HEIGHT: 61 IN | TEMPERATURE: 99 F | BODY MASS INDEX: 26.47 KG/M2 | WEIGHT: 140.19 LBS

## 2025-07-30 DIAGNOSIS — R51.9 LEFT-SIDED HEADACHE: ICD-10-CM

## 2025-07-30 DIAGNOSIS — R51.9 LEFT-SIDED HEADACHE: Primary | ICD-10-CM

## 2025-07-30 LAB — ERYTHROCYTE [SEDIMENTATION RATE] IN BLOOD BY PHOTOMETRIC METHOD: 17 MM/HR

## 2025-07-30 PROCEDURE — 36415 COLL VENOUS BLD VENIPUNCTURE: CPT | Mod: HCNC

## 2025-07-30 PROCEDURE — 1160F RVW MEDS BY RX/DR IN RCRD: CPT | Mod: CPTII,HCNC,S$GLB, | Performed by: PEDIATRICS

## 2025-07-30 PROCEDURE — 99214 OFFICE O/P EST MOD 30 MIN: CPT | Mod: HCNC,S$GLB,, | Performed by: PEDIATRICS

## 2025-07-30 PROCEDURE — 3078F DIAST BP <80 MM HG: CPT | Mod: CPTII,HCNC,S$GLB, | Performed by: PEDIATRICS

## 2025-07-30 PROCEDURE — 1101F PT FALLS ASSESS-DOCD LE1/YR: CPT | Mod: CPTII,HCNC,S$GLB, | Performed by: PEDIATRICS

## 2025-07-30 PROCEDURE — 1159F MED LIST DOCD IN RCRD: CPT | Mod: CPTII,HCNC,S$GLB, | Performed by: PEDIATRICS

## 2025-07-30 PROCEDURE — 1125F AMNT PAIN NOTED PAIN PRSNT: CPT | Mod: CPTII,HCNC,S$GLB, | Performed by: PEDIATRICS

## 2025-07-30 PROCEDURE — 85652 RBC SED RATE AUTOMATED: CPT

## 2025-07-30 PROCEDURE — 3288F FALL RISK ASSESSMENT DOCD: CPT | Mod: CPTII,HCNC,S$GLB, | Performed by: PEDIATRICS

## 2025-07-30 PROCEDURE — 99999 PR PBB SHADOW E&M-EST. PATIENT-LVL IV: CPT | Mod: PBBFAC,,, | Performed by: PEDIATRICS

## 2025-07-30 PROCEDURE — 3075F SYST BP GE 130 - 139MM HG: CPT | Mod: CPTII,HCNC,S$GLB, | Performed by: PEDIATRICS

## 2025-07-30 RX ORDER — PROMETHAZINE HYDROCHLORIDE AND DEXTROMETHORPHAN HYDROBROMIDE 6.25; 15 MG/5ML; MG/5ML
5 SYRUP ORAL EVERY 8 HOURS PRN
Qty: 200 ML | Refills: 1 | Status: SHIPPED | OUTPATIENT
Start: 2025-07-30 | End: 2025-08-09

## 2025-07-30 RX ORDER — SODIUM FLUORIDE 5 MG/ML
PASTE, DENTIFRICE DENTAL
COMMUNITY
Start: 2025-07-21

## 2025-07-31 NOTE — PROGRESS NOTES
Patient ID: Hiwot Coulter is a 80 y.o. female.    Chief Complaint: Headache and Blurred Vision    History of Present Illness    CHIEF COMPLAINT:  Patient presents today for headaches and vision problems.    VISION PROBLEMS:  She reports experiencing vision problems over the past week with difficulty focusing with her glasses. On Tuesday morning, she experienced blurry vision while driving to work, affecting her ability to read signs and billboards. The blurry vision lasted approximately 3 hours and resolved by 9:00 AM. Vision has since returned to normal. She has a history of floaters previously noted by her ophthalmologist. She denies experiencing squiggly lines or flashing lights in her vision. She was concerned about the episode but did not contact her eye doctor.    HEADACHE:  She reports a rare occurrence of headache that awoke her Monday night, located on the left side above the eye. She emphasizes she very seldom experiences headaches, typically only with sinus-related issues. No associated symptoms or triggers were reported.    PMH, PSH, SH, FH reviewed with patient.      ROS:  General: -fever, -chills, -fatigue, -weight gain, -weight loss  Eyes: -vision changes, -redness, -discharge, +blurry vision, +spots, specks or flashing lights  ENT: -ear pain, -nasal congestion, -sore throat  Cardiovascular: -chest pain, -palpitations, -lower extremity edema  Respiratory: -cough, -shortness of breath  Gastrointestinal: -abdominal pain, -nausea, -vomiting, -diarrhea, -constipation, -blood in stool  Genitourinary: -dysuria, -hematuria, -frequency  Musculoskeletal: -joint pain, -muscle pain  Skin: -rash, -lesion  Neurological: +headache, -dizziness, -numbness, -tingling  Psychiatric: -anxiety, -depression, -sleep difficulty  Head: +head pain         Exam:  Physical Exam    Vitals: Blood pressure is normal.  General: No acute distress. Well-developed. Well-nourished.  Eyes: EOMI. Sclerae anicteric. Pupils are equal and  reactive.  HENT: Normocephalic. Atraumatic. Nares patent. Moist oral mucosa.  Cardiovascular: Regular rate. Regular rhythm. No murmurs. No rubs. No gallops. Normal S1, S2.  Respiratory: Normal respiratory effort. Clear to auscultation bilaterally. No rales. No rhonchi. No wheezing.  Abdomen: Soft. Non-tender. Non-distended. Normoactive bowel sounds.  Musculoskeletal: No  obvious deformity.  Extremities: No lower extremity edema.  Neurological: Alert & oriented x3. No slurred speech. Normal gait. Cranial nerves are intact.  Psychiatric: Normal mood. Normal affect. Good insight. Good judgment.  Skin: Warm. Dry. No rash.         Assessment/Plan:  Left-sided headache  -     CT Head Without Contrast; Future; Expected date: 07/30/2025  -     Sedimentation rate; Future; Expected date: 07/30/2025    Other orders  -     promethazine-dextromethorphan (PROMETHAZINE-DM) 6.25-15 mg/5 mL Syrp; Take 5 mLs by mouth every 8 (eight) hours as needed (cough).  Dispense: 200 mL; Refill: 1         Assessment & Plan    IMPRESSION:  - Considered possibility of atypical migraine.  - Determined immediate ophthalmological evaluation necessary due to risk of ocular stroke or other retinal abnormality.  - Discussed temporal arteritis as a possible but less likely cause of symptoms.    HEADACHE:  - Patient reports experiencing a headache on Monday night that woke them up, located on the left side above the eye.  - Neurological exam showed normal findings with no evidence of conjunctivitis, pupils equal in size and reactive, and intact cranial nerves.  - Assessed for possible atypical migraine.  - Ordered CT Head to rule out stroke and CRP blood test to check for inflammation.    VISUAL DISTURBANCE:  - Patient experienced difficulty focusing with glasses and blurry vision, unable to read signs and billboards, lasting approximately 3 hours.  - Vision returned to normal by 9:00 in the morning after the episode.  - Educated patient that the eye is  part of the brain and susceptible to stroke, emphasizing the urgency of seeking immediate medical attention for sudden vision changes.  - Instructed patient to go to the emergency room immediately if vision problems or similar symptoms recur due to potential vascular problems.  - Referred to Dr. Ford (ophthalmologist) for urgent eye exam to check the optic nerve and retina.  - Patient will await call from Dr. Ford's office to schedule this appointment.    ARTERITIS:  - Considered temporal arteritis as a possible condition affecting the artery to the eye, noting it usually worsens over time.  - Ordered CRP blood test to rule out this condition.  - Patient instructed to contact the office for CRP test results the following morning.          Visit today included increased complexity associated with the care of the episodic problem  addressed and managing the longitudinal care of the patient due to the serious and/or complex managed problem(s) .      No follow-ups on file.    This note was generated with the assistance of ambient listening technology. Verbal consent was obtained by the patient and accompanying visitor(s) for the recording of patient appointment to facilitate this note. I attest to having reviewed and edited the generated note for accuracy, though some syntax or spelling errors may persist. Please contact the author of this note for any clarification.

## 2025-08-01 ENCOUNTER — OFFICE VISIT (OUTPATIENT)
Dept: OPHTHALMOLOGY | Facility: CLINIC | Age: 80
End: 2025-08-01
Payer: MEDICARE

## 2025-08-01 ENCOUNTER — HOSPITAL ENCOUNTER (OUTPATIENT)
Dept: RADIOLOGY | Facility: HOSPITAL | Age: 80
Discharge: HOME | End: 2025-08-01
Attending: PEDIATRICS
Payer: MEDICARE

## 2025-08-01 DIAGNOSIS — H35.3111 EARLY DRY STAGE NONEXUDATIVE AGE-RELATED MACULAR DEGENERATION OF RIGHT EYE: ICD-10-CM

## 2025-08-01 DIAGNOSIS — H04.129 DRYNESS, EYE: ICD-10-CM

## 2025-08-01 DIAGNOSIS — G45.3 AMAUROSIS FUGAX: Primary | ICD-10-CM

## 2025-08-01 DIAGNOSIS — H40.023 OAG (OPEN ANGLE GLAUCOMA) SUSPECT, HIGH RISK, BILATERAL: ICD-10-CM

## 2025-08-01 DIAGNOSIS — R51.9 LEFT-SIDED HEADACHE: ICD-10-CM

## 2025-08-01 DIAGNOSIS — H43.811 PVD (POSTERIOR VITREOUS DETACHMENT), RIGHT: ICD-10-CM

## 2025-08-01 DIAGNOSIS — Z96.1 PSEUDOPHAKIA: ICD-10-CM

## 2025-08-01 PROCEDURE — 99999 PR PBB SHADOW E&M-EST. PATIENT-LVL III: CPT | Mod: PBBFAC,HCNC,, | Performed by: OPHTHALMOLOGY

## 2025-08-01 PROCEDURE — 70450 CT HEAD/BRAIN W/O DYE: CPT | Mod: 26,HCNC,, | Performed by: STUDENT IN AN ORGANIZED HEALTH CARE EDUCATION/TRAINING PROGRAM

## 2025-08-01 PROCEDURE — 70450 CT HEAD/BRAIN W/O DYE: CPT | Mod: TC,HCNC

## 2025-08-01 NOTE — PROGRESS NOTES
===============================  Date today is 8/1/2025  Hiwot Coulter is a 80 y.o. female  Last visit [facility]: :4/26/2019   Last visit eye dept. 4/14/2025    Corrected distance visual acuity was 20/25 in the right eye and 20/20 in the left eye.  Tonometry       Tonometry (Applanation, 2:16 PM)         Right Left    Pressure 15 17                  Wearing Rx       Wearing Rx         Sphere Cylinder Axis Add    Right +0.75 +1.25 081 +1.50    Left +0.25 +1.00 015 +1.50      Type: COMPUTER/INTERMEDIATE              Wearing Rx #2         Sphere Cylinder Axis Add    Right +1.00 +1.25 170 +2.75    Left [place] +1.00 015 +2.75      Type: Trifocal                  Not recorded       Not recorded       Chief Complaint   Patient presents with    Peripheral Vascular Disease     Retina Eval per Dr. Ramirez/  saw Dr. Busch in 2019     HPI     Peripheral Vascular Disease            Comments: Retina Eval per Dr. Ramirez/  robert Busch in 2019          Comments    1. PCIOL OD 7/18/19 +20.0WF/CDE 10.73   PCIOL OS +21.0 SN60WF / CDE: 12.02 / 08/22/19   YAG OU 12/13/24  2. Glaucoma Suspect   3. Negative FH glaucoma   SLT OD 11/5/20   + CPAP     No drops            Last edited by Rosalind Love on 8/1/2025  2:09 PM.      Problem List Items Addressed This Visit    None  Visit Diagnoses         Amaurosis fugax    -  Primary    Relevant Orders    Ambulatory referral/consult to Vascular Surgery      Pseudophakia          OAG (open angle glaucoma) suspect, high risk, bilateral          Early dry stage nonexudative age-related macular degeneration of right eye        Relevant Orders    Posterior Segment OCT Retina-Both eyes (Completed)      PVD (posterior vitreous detachment), right          Dryness, eye              Instructed to call 24/7 for any worsening of vision, visual distortion or pain.  Check OU independently daily.    Gave my office and personal cell phone number.  ________________  8/1/2025 today  Hiwot Coulter    Recent  episode bilateral relative visual loss- not altitudinal, no scintillating scotoma  Eyes felt normal, couldn't see the road signs clearly  Sed rate normal  OCT mild RPE mottling OD, OS stable  PCIOL OU  Soft SPK OU  PVD OD  Sharp disc OD 0.6 negative slope  Vessels look good  No embolic dz  RPE mottling OD  Sharp disc 0.5   Macula looks good OS  Order carotid studies  Pretibial edema  Recommend artificial tears  Keep follow up visit with Dr. Ford, will order 24-2 VF prior to visit        RTC with Dr. Ford as scheduled, do 24-2 VF prior  Instructed to call 24/7 for any worsening of vision or symptoms. Check OU daily.   Gave my office and cell phone number.      =============================

## 2025-08-05 DIAGNOSIS — R09.89 CAROTID BRUIT, UNSPECIFIED LATERALITY: Primary | ICD-10-CM

## 2025-08-11 ENCOUNTER — HOSPITAL ENCOUNTER (OUTPATIENT)
Facility: HOSPITAL | Age: 80
Discharge: HOME | End: 2025-08-11
Attending: PHYSICIAN ASSISTANT
Payer: MEDICARE

## 2025-08-11 ENCOUNTER — INITIAL CONSULT (OUTPATIENT)
Dept: VASCULAR SURGERY | Facility: CLINIC | Age: 80
End: 2025-08-11
Payer: MEDICARE

## 2025-08-11 ENCOUNTER — CLINICAL SUPPORT (OUTPATIENT)
Dept: REHABILITATION | Facility: HOSPITAL | Age: 80
End: 2025-08-11
Payer: MEDICARE

## 2025-08-11 VITALS — HEART RATE: 66 BPM | DIASTOLIC BLOOD PRESSURE: 64 MMHG | SYSTOLIC BLOOD PRESSURE: 132 MMHG

## 2025-08-11 DIAGNOSIS — R53.1 DECREASED STRENGTH, ENDURANCE, AND MOBILITY: Primary | ICD-10-CM

## 2025-08-11 DIAGNOSIS — R68.89 DECREASED STRENGTH, ENDURANCE, AND MOBILITY: Primary | ICD-10-CM

## 2025-08-11 DIAGNOSIS — G45.3 AMAUROSIS FUGAX: ICD-10-CM

## 2025-08-11 DIAGNOSIS — R29.898 WEAKNESS OF BOTH LOWER EXTREMITIES: ICD-10-CM

## 2025-08-11 DIAGNOSIS — R26.9 ABNORMALITY OF GAIT AND MOBILITY: ICD-10-CM

## 2025-08-11 DIAGNOSIS — E78.00 PURE HYPERCHOLESTEROLEMIA: Primary | ICD-10-CM

## 2025-08-11 DIAGNOSIS — R09.89 CAROTID BRUIT, UNSPECIFIED LATERALITY: ICD-10-CM

## 2025-08-11 DIAGNOSIS — Z74.09 DECREASED STRENGTH, ENDURANCE, AND MOBILITY: Primary | ICD-10-CM

## 2025-08-11 LAB
LEFT ARM DIASTOLIC BLOOD PRESSURE: 70 MMHG
LEFT ARM SYSTOLIC BLOOD PRESSURE: 138 MMHG
LEFT CCA DIST DIAS: 21.25 CM/S
LEFT CCA DIST SYS: 51.29 CM/S
LEFT CCA MID DIAS: 15.93 CM/S
LEFT CCA MID SYS: 64.72 CM/S
LEFT CCA PROX DIAS: 21.91 CM/S
LEFT CCA PROX SYS: 99.57 CM/S
LEFT ECA DIAS: 10.09 CM/S
LEFT ECA SYS: 63.9 CM/S
LEFT ICA DIST DIAS: 36.64 CM/S
LEFT ICA DIST SYS: 88.66 CM/S
LEFT ICA MID DIAS: 37.67 CM/S
LEFT ICA MID SYS: 90.81 CM/S
LEFT ICA PROX DIAS: 21.98 CM/S
LEFT ICA PROX SYS: 58.62 CM/S
LEFT ICA/CCA SYS: 1.77
LEFT VERTEBRAL DIAS: 21.98 CM/S
LEFT VERTEBRAL SYS: 54.95 CM/S
OHS CV CAROTID RIGHT ICA EDV HIGHEST: 36.53
OHS CV CAROTID ULTRASOUND LEFT ICA/CCA RATIO: 1.77
OHS CV CAROTID ULTRASOUND RIGHT ICA/CCA RATIO: 1.75
OHS CV PV CAROTID LEFT HIGHEST CCA: 100
OHS CV PV CAROTID LEFT HIGHEST ICA: 90.81
OHS CV PV CAROTID RIGHT HIGHEST CCA: 69
OHS CV PV CAROTID RIGHT HIGHEST ICA: 88.27
OHS CV US CAROTID LEFT HIGHEST EDV: 37.67
RIGHT ARM DIASTOLIC BLOOD PRESSURE: 68 MMHG
RIGHT ARM SYSTOLIC BLOOD PRESSURE: 140 MMHG
RIGHT CCA DIST DIAS: 16.12 CM/S
RIGHT CCA DIST SYS: 50.56 CM/S
RIGHT CCA MID DIAS: 19.78 CM/S
RIGHT CCA MID SYS: 58.62 CM/S
RIGHT CCA PROX DIAS: 18.92 CM/S
RIGHT CCA PROX SYS: 68.71 CM/S
RIGHT ECA DIAS: 9.58 CM/S
RIGHT ECA SYS: 73.45 CM/S
RIGHT ICA DIST DIAS: 36.53 CM/S
RIGHT ICA DIST SYS: 88.27 CM/S
RIGHT ICA MID DIAS: 26.86 CM/S
RIGHT ICA MID SYS: 85.83 CM/S
RIGHT ICA PROX DIAS: 22.71 CM/S
RIGHT ICA PROX SYS: 63.75 CM/S
RIGHT ICA/CCA SYS: 1.75
RIGHT PROX SUBCLAVIAN ARTERY: 90.61 CM/S
RIGHT VERTEBRAL DIAS: 13.19 CM/S
RIGHT VERTEBRAL SYS: 59.35 CM/S

## 2025-08-11 PROCEDURE — 99204 OFFICE O/P NEW MOD 45 MIN: CPT | Mod: HCNC,S$GLB,, | Performed by: STUDENT IN AN ORGANIZED HEALTH CARE EDUCATION/TRAINING PROGRAM

## 2025-08-11 PROCEDURE — 99999 PR PBB SHADOW E&M-EST. PATIENT-LVL III: CPT | Mod: PBBFAC,HCNC,,

## 2025-08-11 PROCEDURE — 97110 THERAPEUTIC EXERCISES: CPT | Mod: HCNC | Performed by: PHYSICAL THERAPIST

## 2025-08-11 PROCEDURE — 93880 EXTRACRANIAL BILAT STUDY: CPT | Mod: HCNC

## 2025-08-11 PROCEDURE — 97530 THERAPEUTIC ACTIVITIES: CPT | Mod: HCNC | Performed by: PHYSICAL THERAPIST

## 2025-08-11 PROCEDURE — 93880 EXTRACRANIAL BILAT STUDY: CPT | Mod: 26,HCNC,, | Performed by: STUDENT IN AN ORGANIZED HEALTH CARE EDUCATION/TRAINING PROGRAM

## 2025-08-18 ENCOUNTER — CLINICAL SUPPORT (OUTPATIENT)
Dept: REHABILITATION | Facility: HOSPITAL | Age: 80
End: 2025-08-18
Payer: MEDICARE

## 2025-08-18 DIAGNOSIS — Z74.09 DECREASED STRENGTH, ENDURANCE, AND MOBILITY: Primary | ICD-10-CM

## 2025-08-18 DIAGNOSIS — R68.89 DECREASED STRENGTH, ENDURANCE, AND MOBILITY: Primary | ICD-10-CM

## 2025-08-18 DIAGNOSIS — R53.1 DECREASED STRENGTH, ENDURANCE, AND MOBILITY: Primary | ICD-10-CM

## 2025-08-18 DIAGNOSIS — R26.9 ABNORMALITY OF GAIT AND MOBILITY: ICD-10-CM

## 2025-08-18 DIAGNOSIS — R29.898 WEAKNESS OF BOTH LOWER EXTREMITIES: ICD-10-CM

## 2025-08-18 PROCEDURE — 97530 THERAPEUTIC ACTIVITIES: CPT | Mod: HCNC | Performed by: PHYSICAL THERAPIST

## 2025-08-25 ENCOUNTER — CLINICAL SUPPORT (OUTPATIENT)
Dept: REHABILITATION | Facility: HOSPITAL | Age: 80
End: 2025-08-25
Attending: PHYSICAL THERAPIST
Payer: MEDICARE

## 2025-08-25 DIAGNOSIS — R29.898 WEAKNESS OF BOTH LOWER EXTREMITIES: ICD-10-CM

## 2025-08-25 DIAGNOSIS — R26.9 ABNORMALITY OF GAIT AND MOBILITY: ICD-10-CM

## 2025-08-25 DIAGNOSIS — R53.1 DECREASED STRENGTH, ENDURANCE, AND MOBILITY: Primary | ICD-10-CM

## 2025-08-25 DIAGNOSIS — Z74.09 DECREASED STRENGTH, ENDURANCE, AND MOBILITY: Primary | ICD-10-CM

## 2025-08-25 DIAGNOSIS — R68.89 DECREASED STRENGTH, ENDURANCE, AND MOBILITY: Primary | ICD-10-CM

## 2025-08-25 PROCEDURE — 97112 NEUROMUSCULAR REEDUCATION: CPT | Mod: HCNC | Performed by: PHYSICAL THERAPIST

## 2025-08-25 PROCEDURE — 97530 THERAPEUTIC ACTIVITIES: CPT | Mod: HCNC | Performed by: PHYSICAL THERAPIST

## 2025-08-25 PROCEDURE — 97110 THERAPEUTIC EXERCISES: CPT | Mod: HCNC | Performed by: PHYSICAL THERAPIST
